# Patient Record
Sex: FEMALE | Race: BLACK OR AFRICAN AMERICAN | Employment: OTHER | URBAN - METROPOLITAN AREA
[De-identification: names, ages, dates, MRNs, and addresses within clinical notes are randomized per-mention and may not be internally consistent; named-entity substitution may affect disease eponyms.]

---

## 2017-01-25 ENCOUNTER — OFFICE VISIT (OUTPATIENT)
Dept: PULMONOLOGY | Age: 77
End: 2017-01-25

## 2017-01-25 VITALS
DIASTOLIC BLOOD PRESSURE: 80 MMHG | WEIGHT: 158 LBS | HEIGHT: 62 IN | TEMPERATURE: 98.3 F | HEART RATE: 78 BPM | BODY MASS INDEX: 29.08 KG/M2 | SYSTOLIC BLOOD PRESSURE: 130 MMHG | RESPIRATION RATE: 20 BRPM | OXYGEN SATURATION: 97 %

## 2017-01-25 DIAGNOSIS — I27.20 PULMONARY HYPERTENSION (HCC): ICD-10-CM

## 2017-01-25 DIAGNOSIS — R91.8 PULMONARY INFILTRATES: ICD-10-CM

## 2017-01-25 DIAGNOSIS — R05.9 COUGH: Primary | ICD-10-CM

## 2017-01-25 NOTE — PATIENT INSTRUCTIONS
Submit a good fresh sputum specimen to the hospital  Obtain lung scan before next visit  Obtain overnight oxygen study before next visit and bring equipment promptly back to the office  Call for symptoms such as worsening shortness of breath or cough productive of discolored mucus or unexplained fever

## 2017-01-25 NOTE — LETTER
Request for Examination Exam Date: 2017 Patient's Name:  Rosie Hauser SS#:  [id number] :  1940 Pregnant: NO Address:  [address] [place] [id number] Phone#:  [phone] (home) Ordering Physician: Teja Castro. Can Lindquist MD 
 
Technician: Briseida Mai LPN Insurance Information: See Attached Exams Ordered: CXR: PA & LAT Clinical Data/ Brief History: Cough R05 Preliminary Exam Report: 
 
 
 
 
 
 
 
 
___________________________  __________________ ___________ Radiologist                  Date         Time

## 2017-01-25 NOTE — PROGRESS NOTES
Chief Complaint   Patient presents with    Cough     Patient c/o cough since she was discharged from hospital in Aug. 2016. Cough has gotten worse since Nov. 2016 per patient. Startes at night continues thru night.

## 2017-01-25 NOTE — PROGRESS NOTES
[facility]  Pulmonary, Critical Care, and Sleep Medicine      Chief complaint:  cough    HPI:  Roberta Hutson is 77years old and returns to the office today for followup concerning a cough. She says she has been coughing or bringing up frothy and clear mucus without blood or purulence. She denies chest pain but has significant shortness of breath which is relatively stable but very noticeable when she does any type of activity. The patient is not aware if she stops breathing at night but she uses a sleeping pill and says she does not nap frequently in the daytime but her  says she does. Allergies   Allergen Reactions    Latex Itching    Asmanex Hfa [Mometasone] Itching    Cleocin [Clindamycin Hcl] Itching and Swelling     Lips and tongue    Pcn [Penicillins] Swelling    Robitussin [Guaifenesin] Rash     Current Outpatient Prescriptions   Medication Sig    HYDROcodone-acetaminophen (NORCO) 5-325 mg per tablet Take 1 Tab by mouth two (2) times a day. Max Daily Amount: 2 Tabs.  albuterol-ipratropium (DUO-NEB) 2.5 mg-0.5 mg/3 ml nebu 3 mL by Nebulization route three (3) times daily.  zolpidem (AMBIEN) 10 mg tablet Take 1 Tab by mouth nightly as needed for Sleep. Max Daily Amount: 10 mg.    furosemide (LASIX) 40 mg tablet Take 1 Tab by mouth daily. Indications: EDEMA    omeprazole (PRILOSEC) 20 mg capsule Take 20 mg by mouth daily.  VITAMIN B COMPLEX (SUPER B COMPLEX-B-12 PO) Take  by mouth.  Cetirizine (ZYRTEC) 10 mg cap Take  by mouth daily as needed.  naproxen sodium (ALEVE) 220 mg cap Take  by mouth as needed.  albuterol (PROVENTIL HFA, VENTOLIN HFA) 90 mcg/actuation inhaler Take 2 Puffs by inhalation.  hydroxychloroquine (PLAQUENIL) 200 mg tablet Take 200 mg by mouth daily.  amLODIPine (NORVASC) 5 mg tablet Take 1 Tab by mouth daily.  Indications: HYPERTENSION    ondansetron (ZOFRAN ODT) 4 mg disintegrating tablet 1 Tab by SubLINGual route every eight (8) hours as needed. Indications: nausea    TRIAMCINOLONE ACETONIDE (NASACORT NA) by Nasal route.  mometasone-formoterol (DULERA) 100-5 mcg/actuation HFA inhaler Take 2 Puffs by inhalation two (2) times a day.  CALCIUM CARBONATE (SCOOTER-SELTZER ANTACID PO) Take  by mouth.  5-HYDROXYTRYPTOPHAN (5-HTP PO) Take  by mouth daily.  OTHER Focus formula supplement    omega-3 fatty acids (FISH OIL) cap Take 1,500 mg by mouth.  potassium chloride (KLOR-CON M20) 20 mEq tablet Take 20 mEq by mouth two (2) times a day. No current facility-administered medications for this visit. Past Medical History   Diagnosis Date    Asthma     Chronic lung disease     Lupus ([facility]     Rheumatoid arthritis(714.0)      History reviewed. No pertinent past surgical history. Social History     Social History    Marital status: UNKNOWN     Spouse name: N/A    Number of children: N/A    Years of education: N/A     Occupational History    Not on file.      Social History Main Topics    Smoking status: Never Smoker    Smokeless tobacco: Never Used    Alcohol use No    Drug use: Not on file    Sexual activity: Not on file     Other Topics Concern    Not on file     Social History Narrative     Family History   Problem Relation Age of Onset    Heart Disease Mother     Asthma Mother     Asthma Father     Parkinson's Disease Father        Review of systems:  She denies fever or chills night sweats but has a poor appetite and has had some weight loss    Physical Exam:  Visit Vitals    /80 (BP 1 Location: Left arm, BP Patient Position: Sitting)    Pulse 78    Temp 98.3 °F (36.8 °C) (Oral)    Resp 20    Ht 5' 2\" (1.575 m)    Wt 71.7 kg (158 lb)    SpO2 97%    BMI 28.9 kg/m2       Well-developed well-nourished elderly  HEENT: WNL  Lymph node exam: Supraclavicular and cervical lymph nodes negative  Chest: Equal symmetrical expansion no excessive muscle use is no dullness no wheezes rales rubs  Heart: Regular rhythm no gallop or murmur no JVD no peripheral edema  Extremities: No cyanosis clubbing or calf tenderness  Neurological: Alert and oriented  Skin: No rashes but there is vitiligo  Musculoskeletal: No acute joint inflammation of muscle wasting    LABS:  O2 sat 97% room air at rest  Chest x-ray 1/25/17: Personally reviewed bilateral fibrotic infiltrates similar to chest x-ray July 2016    Impression:   Pulmonary fibrosis and upper lung zones but this is more characteristic of a chronic infection which may be stable  Cough clear mucus in the cause of this is not clear but does not appear to be due to a bacterial infection without fever. Pulmonary hypertension most likely associated with connective tissue disorder in a patient with an elevated MIGUEL ANGEL titer but other causes should be ruled out such as CETPH and TERESA    Plan:  Overnight oxygen study  VQ lung scan looking for evidence of CETPH  Recheck H&H concerning symptoms of shortness of breath in a patient with known anemia  SC  L- 70, aldolase to more fully evaluate connective tissue disorder  Sputum for AFB and fungus  Followup in 3-4 weeks for consideration for further evaluation with right heart catheter and treatment for pulmonary hypertension    Melyssa Cabrera MD , [facility]    CC: Andrews Davis MD     [address] [facility]  [place], [address] [address]     P: [phone]     F: [phone]

## 2017-01-31 ENCOUNTER — TELEPHONE (OUTPATIENT)
Dept: PULMONOLOGY | Age: 77
End: 2017-01-31

## 2017-01-31 DIAGNOSIS — R05.9 COUGH: Primary | ICD-10-CM

## 2017-01-31 DIAGNOSIS — R91.8 PULMONARY INFILTRATES: ICD-10-CM

## 2017-01-31 NOTE — TELEPHONE ENCOUNTER
Patient wants to know if she can come  sputum cups today. Told her yes she can. Clarified that patient knows how to produce a sputum sample. Went over instructions with her.      Raphael Cameron LPN

## 2017-01-31 NOTE — TELEPHONE ENCOUNTER
PT CALLED([phone]). WANTS TO TALK TO NURSE REGARDING SPUTUM TESTING. PLEASE CHECK WITH EJF AND CALL BACK.

## 2017-02-06 ENCOUNTER — HOSPITAL ENCOUNTER (OUTPATIENT)
Dept: LAB | Age: 77
Discharge: HOME | End: 2017-02-06
Payer: MEDICARE

## 2017-02-06 DIAGNOSIS — I27.20 PULMONARY HYPERTENSION (HCC): ICD-10-CM

## 2017-02-06 DIAGNOSIS — R05.9 COUGH: ICD-10-CM

## 2017-02-06 DIAGNOSIS — R91.8 PULMONARY INFILTRATES: ICD-10-CM

## 2017-02-06 LAB
BASOPHILS # BLD AUTO: 0 K/UL (ref 0–0.1)
BASOPHILS # BLD: 1 % (ref 0–2)
DIFFERENTIAL METHOD BLD: ABNORMAL
EOSINOPHIL # BLD: 0.9 K/UL (ref 0–0.4)
EOSINOPHIL NFR BLD: 14 % (ref 0–5)
ERYTHROCYTE [DISTWIDTH] IN BLOOD BY AUTOMATED COUNT: 15.6 % (ref 11.6–14.5)
HCT VFR BLD AUTO: 32.3 % (ref 35–45)
HGB BLD-MCNC: 9.9 G/DL (ref 12–16)
LYMPHOCYTES # BLD AUTO: 19 % (ref 21–52)
LYMPHOCYTES # BLD: 1.2 K/UL (ref 0.9–3.6)
MCH RBC QN AUTO: 23.3 PG (ref 24–34)
MCHC RBC AUTO-ENTMCNC: 30.7 G/DL (ref 31–37)
MCV RBC AUTO: 76.2 FL (ref 74–97)
MONOCYTES # BLD: 0.5 K/UL (ref 0.05–1.2)
MONOCYTES NFR BLD AUTO: 8 % (ref 3–10)
NEUTS SEG # BLD: 3.9 K/UL (ref 1.8–8)
NEUTS SEG NFR BLD AUTO: 58 % (ref 40–73)
PLATELET # BLD AUTO: 217 K/UL (ref 135–420)
PMV BLD AUTO: 10.7 FL (ref 9.2–11.8)
RBC # BLD AUTO: 4.24 M/UL (ref 4.2–5.3)
WBC # BLD AUTO: 6.5 K/UL (ref 4.6–13.2)

## 2017-02-06 PROCEDURE — 87116 MYCOBACTERIA CULTURE: CPT | Performed by: INTERNAL MEDICINE

## 2017-02-06 PROCEDURE — 82085 ASSAY OF ALDOLASE: CPT | Performed by: INTERNAL MEDICINE

## 2017-02-06 PROCEDURE — 36415 COLL VENOUS BLD VENIPUNCTURE: CPT | Performed by: INTERNAL MEDICINE

## 2017-02-06 PROCEDURE — 86235 NUCLEAR ANTIGEN ANTIBODY: CPT | Performed by: INTERNAL MEDICINE

## 2017-02-06 PROCEDURE — 87102 FUNGUS ISOLATION CULTURE: CPT | Performed by: INTERNAL MEDICINE

## 2017-02-06 PROCEDURE — 85025 COMPLETE CBC W/AUTO DIFF WBC: CPT | Performed by: INTERNAL MEDICINE

## 2017-02-07 LAB
ALDOLASE SERPL-CCNC: 7.3 U/L (ref 3.3–10.3)
ENA SCL70 AB SER-ACNC: <0.2 AI (ref 0–0.9)

## 2017-02-16 ENCOUNTER — TELEPHONE (OUTPATIENT)
Dept: PULMONOLOGY | Age: 77
End: 2017-02-16

## 2017-02-16 NOTE — TELEPHONE ENCOUNTER
Pt is wanting to know if we have gotten sputum results and labs she had done last week. She states she is coughing so bad it hurts and making her vomit at times. Please call [phone].

## 2017-02-16 NOTE — TELEPHONE ENCOUNTER
The pt's results are reviewed as negative. She is instructed to call her PCP for a sick visit or go to the ED or Urgent Care. We have no openings and Dr. Valery Bunch is not available to treat her symptoms of persistent cough.

## 2017-03-13 LAB
BACTERIA SPEC CULT: NORMAL
FUNGUS SMEAR,FNGSMR: NORMAL
SERVICE CMNT-IMP: NORMAL

## 2017-03-23 LAB
ACID FAST STN SPEC: NEGATIVE
MYCOBACTERIUM SPEC QL CULT: NEGATIVE
SPECIMEN PREPARATION: NORMAL
SPECIMEN SOURCE: NORMAL

## 2017-04-03 ENCOUNTER — OFFICE VISIT (OUTPATIENT)
Dept: PULMONOLOGY | Age: 77
End: 2017-04-03

## 2017-04-03 VITALS
DIASTOLIC BLOOD PRESSURE: 80 MMHG | HEART RATE: 78 BPM | BODY MASS INDEX: 27.97 KG/M2 | OXYGEN SATURATION: 96 % | HEIGHT: 62 IN | WEIGHT: 152 LBS | RESPIRATION RATE: 20 BRPM | TEMPERATURE: 98.2 F | SYSTOLIC BLOOD PRESSURE: 134 MMHG

## 2017-04-03 DIAGNOSIS — J84.10 PULMONARY FIBROSIS (HCC): ICD-10-CM

## 2017-04-03 DIAGNOSIS — K76.6 PAH (PULMONARY ARTERIAL HYPERTENSION) WITH PORTAL HYPERTENSION (HCC): ICD-10-CM

## 2017-04-03 DIAGNOSIS — I27.21 PAH (PULMONARY ARTERIAL HYPERTENSION) WITH PORTAL HYPERTENSION (HCC): ICD-10-CM

## 2017-04-03 DIAGNOSIS — R91.8 PULMONARY INFILTRATES: Primary | ICD-10-CM

## 2017-04-03 RX ORDER — BUDESONIDE 0.5 MG/2ML
500 INHALANT ORAL
COMMUNITY
End: 2017-04-03

## 2017-04-03 RX ORDER — PREDNISONE 20 MG/1
TABLET ORAL
Qty: 14 TAB | Refills: 0 | Status: SHIPPED | OUTPATIENT
Start: 2017-04-03 | End: 2017-05-05

## 2017-04-03 RX ORDER — BUDESONIDE 90 UG/1
AEROSOL, POWDER RESPIRATORY (INHALATION)
COMMUNITY
Start: 2017-03-30 | End: 2017-05-06 | Stop reason: ALTCHOICE

## 2017-04-03 NOTE — PATIENT INSTRUCTIONS
Continue Pulmicort 2 puffs twice a day  Albuterol 2 inhalations every 4 hours as needed and if you require albuterol too often to control respiratory symptoms call the office for severe symptoms go to the emergency room  Always call for symptoms such as increasing shortness of breath or cough productive of discolored mucus  Prednisone 2 tablets every morning for 7 days  Obtain echocardiogram before next visit

## 2017-04-03 NOTE — MR AVS SNAPSHOT
Visit Information Date & Time Provider Department Dept. Phone Encounter #  
 4/3/2017  1:45 PM Patrick Ware MD [facility] [id number] Follow-up Instructions Return in about 5 weeks (around 5/8/2017). Upcoming Health Maintenance Date Due DTaP/Tdap/Td series (1 - Tdap) 5/15/1962 ZOSTER VACCINE AGE 60> 5/15/2001 GLAUCOMA SCREENING Q2Y 5/15/2006 Pneumococcal 65+ Low/Medium Risk (1 of 2 - PCV13) 5/15/2006 MEDICARE YEARLY EXAM 5/15/2006 INFLUENZA AGE 9 TO ADULT 8/1/2016 Allergies as of 4/3/2017  Review Complete On: 4/3/2017 By: Tha Caceres RN Severity Noted Reaction Type Reactions Latex  06/04/2014    Itching Asmanex Hfa [Mometasone]  07/31/2016    Itching Cleocin [Clindamycin Hcl]  06/04/2014    Itching, Swelling Lips and tongue Pcn [Penicillins]  06/04/2014    Swelling Robitussin [Guaifenesin]  07/31/2016    Rash Current Immunizations  Reviewed on 9/29/2016 No immunizations on file. Not reviewed this visit You Were Diagnosed With   
  
 Codes Comments Pulmonary infiltrates    -  Primary ICD-10-CM: R91.8 ICD-9-CM: 793.19 PAH (pulmonary arterial hypertension) with portal hypertension (HCC)     ICD-10-CM: I27.2, K76.6 ICD-9-CM: 416.8 [facility]     ICD-10-CM: J84.10 ICD-9-CM: 282 Vitals BP Pulse Temp Resp Height(growth percentile) Weight(growth percentile) 134/80 (BP 1 Location: Left arm, BP Patient Position: Sitting) 78 98.2 °F (36.8 °C) (Oral) 20 5' 2\" (1.575 m) 152 lb (68.9 kg) SpO2 BMI OB Status Smoking Status 96% 27.8 kg/m2 Menopause Never Smoker Vitals History BMI and BSA Data Body Mass Index Body Surface Area  
 27.8 kg/m 2 1.74 m 2 Your Updated Medication List  
  
   
This list is accurate as of: 4/3/17  3:22 PM.  Always use your most recent med list.  
  
  
  
  
 5-HTP PO  
 Take  by mouth daily. albuterol 90 mcg/actuation inhaler Commonly known as:  PROVENTIL HFA, VENTOLIN HFA, PROAIR HFA Take 2 Puffs by inhalation. albuterol-ipratropium 2.5 mg-0.5 mg/3 ml Nebu Commonly known as:  DUO-NEB  
3 mL by Nebulization route three (3) times daily. ALEVE 220 mg Cap Generic drug:  naproxen sodium Take  by mouth as needed. SCOOTER-SELTZER ANTACID PO Take  by mouth. amLODIPine 5 mg tablet Commonly known as:  Ita Coop Take 1 Tab by mouth daily. Indications: HYPERTENSION  
  
 furosemide 40 mg tablet Commonly known as:  LASIX Take 1 Tab by mouth daily. Indications: EDEMA HYDROcodone-acetaminophen 5-325 mg per tablet Commonly known as:  Ivelisse Staple Take 1 Tab by mouth two (2) times a day. Max Daily Amount: 2 Tabs. KLOR-CON M20 20 mEq tablet Generic drug:  potassium chloride Take 20 mEq by mouth two (2) times a day. NASACORT NA  
by Nasal route. omeprazole 20 mg capsule Commonly known as:  PRILOSEC Take 20 mg by mouth daily. ondansetron 4 mg disintegrating tablet Commonly known as:  ZOFRAN ODT  
1 Tab by SubLINGual route every eight (8) hours as needed. Indications: nausea PLAQUENIL 200 mg tablet Generic drug:  hydroxychloroquine Take 200 mg by mouth daily. predniSONE 20 mg tablet Commonly known as:  DELTASONE  
2 tablets every morning with breakfast for 7 days PULMICORT FLEXHALER 90 mcg/actuation Aepb inhaler Generic drug:  budesonide SUPER B COMPLEX-B-12 PO Take  by mouth.  
  
 zolpidem 10 mg tablet Commonly known as:  AMBIEN Take 1 Tab by mouth nightly as needed for Sleep. Max Daily Amount: 10 mg.  
  
 ZyrTEC 10 mg Cap Generic drug:  Cetirizine Take  by mouth daily as needed. Prescriptions Printed Refills  
 predniSONE (DELTASONE) 20 mg tablet 0 Si tablets every morning with breakfast for 7 days Class: Print Follow-up Instructions Return in about 5 weeks (around 5/8/2017). Patient Instructions Continue Pulmicort 2 puffs twice a day Albuterol 2 inhalations every 4 hours as needed and if you require albuterol too often to control respiratory symptoms call the office for severe symptoms go to the emergency room Always call for symptoms such as increasing shortness of breath or cough productive of discolored mucus Prednisone 2 tablets every morning for 7 days Obtain echocardiogram before next visit Please provide this summary of care documentation to your next provider. Your primary care clinician is listed as Alejandra Juárez. If you have any questions after today's visit, please call [phone].

## 2017-04-03 NOTE — PROGRESS NOTES
[facility]  Pulmonary, Critical Care, and Sleep Medicine      Chief complaint:  Pulmonary infiltrates possible asthma and pulmonary hypertension    HPI:  Macy Gaines is 77years old and returns to the office today for followup concerning wheezing which she says she's had for several weeks despite taking prednisone and her usual Pulmicort and albuterol at least several times a day. She reports a cough but says she is not aware of purulence or blood she denies chest pain or leg swelling  Allergies   Allergen Reactions    Latex Itching    Asmanex Hfa [Mometasone] Itching    Cleocin [Clindamycin Hcl] Itching and Swelling     Lips and tongue    Pcn [Penicillins] Swelling    Robitussin [Guaifenesin] Rash     Current Outpatient Prescriptions   Medication Sig    PULMICORT FLEXHALER 90 mcg/actuation aepb inhaler     HYDROcodone-acetaminophen (NORCO) 5-325 mg per tablet Take 1 Tab by mouth two (2) times a day. Max Daily Amount: 2 Tabs.  zolpidem (AMBIEN) 10 mg tablet Take 1 Tab by mouth nightly as needed for Sleep. Max Daily Amount: 10 mg.    furosemide (LASIX) 40 mg tablet Take 1 Tab by mouth daily. Indications: EDEMA    omeprazole (PRILOSEC) 20 mg capsule Take 20 mg by mouth daily.  CALCIUM CARBONATE (SCOOTER-SELTZER ANTACID PO) Take  by mouth.  VITAMIN B COMPLEX (SUPER B COMPLEX-B-12 PO) Take  by mouth.  Cetirizine (ZYRTEC) 10 mg cap Take  by mouth daily as needed.  naproxen sodium (ALEVE) 220 mg cap Take  by mouth as needed.  albuterol (PROVENTIL HFA, VENTOLIN HFA) 90 mcg/actuation inhaler Take 2 Puffs by inhalation.  hydroxychloroquine (PLAQUENIL) 200 mg tablet Take 200 mg by mouth daily.  potassium chloride (KLOR-CON M20) 20 mEq tablet Take 20 mEq by mouth two (2) times a day.  albuterol-ipratropium (DUO-NEB) 2.5 mg-0.5 mg/3 ml nebu 3 mL by Nebulization route three (3) times daily.  amLODIPine (NORVASC) 5 mg tablet Take 1 Tab by mouth daily.  Indications: HYPERTENSION    ondansetron (ZOFRAN ODT) 4 mg disintegrating tablet 1 Tab by SubLINGual route every eight (8) hours as needed. Indications: nausea    TRIAMCINOLONE ACETONIDE (NASACORT NA) by Nasal route.  5-HYDROXYTRYPTOPHAN (5-HTP PO) Take  by mouth daily. No current facility-administered medications for this visit. Past Medical History:   Diagnosis Date    Asthma     Chronic lung disease     [facility]upus ([facility]     Rheumatoid arthritis(714.0) ([facility]      No past surgical history on file. Social History     Social History    Marital status: UNKNOWN     Spouse name: N/A    Number of children: N/A    Years of education: N/A     Occupational History    Not on file.      Social History Main Topics    Smoking status: Never Smoker    Smokeless tobacco: Never Used    Alcohol use No    Drug use: Not on file    Sexual activity: Not on file     Other Topics Concern    Not on file     Social History Narrative     Family History   Problem Relation Age of Onset    Heart Disease Mother     Asthma Mother     Asthma Father     Parkinson's Disease Father        Review of systems:  She reports no fever or chills but her appetite is poor and she's been received by muscle aches and pains which he attributes to lupus    Physical Exam:  Visit Vitals    /80 (BP 1 Location: Left arm, BP Patient Position: Sitting)    Pulse 78    Temp 98.2 °F (36.8 °C) (Oral)    Resp 20    Ht 5' 2\" (1.575 m)    Wt 68.9 kg (152 lb)    SpO2 96%    BMI 27.8 kg/m2       Well-developed well-nourished  HEENT: WNL  Lymph node exam: Significant radicular cervical lymph nodes negative  Chest: Equal symmetrical expansion no dullness a few scattered expiratory wheezes  No rales no rubs  Heart: Regular rhythm no gallop or murmur no JVD no peripheral edema  Extremities: No cyanosis clubbing or calf tenderness  Neurological: Alert and oriented    LABS:    O2 sat 96% room air at rest    Impression:   Possible exacerbation of asthma with wheezing now but the patient's spirometry was normal in the past  Pulmonary hypertension which needs to be confirmed and evaluated further in the future    Plan:  Prednisone 40 mg every day for 7 days and continue Pulmicort and albuterol  Echocardiogram on return with spirometry  Followup in 4-6 weeks    Nahomy Murillo MD , [facility]    CC: Alejandra Juárez MD     [address] [facility]. [facility] [address] [address]     P: [phone]     F: [phone]

## 2017-04-18 DIAGNOSIS — I27.21 PAH (PULMONARY ARTERY HYPERTENSION) (HCC): Primary | ICD-10-CM

## 2017-04-18 NOTE — PROGRESS NOTES
Per Dr. Baldo BERNARD, with read back, order placed for patient to have 2D Echo prior to appointment in May.      Pacheco Barr LPN

## 2017-05-03 ENCOUNTER — HOSPITAL ENCOUNTER (OUTPATIENT)
Dept: NON INVASIVE DIAGNOSTICS | Age: 77
Discharge: HOME | End: 2017-05-03
Attending: INTERNAL MEDICINE
Payer: COMMERCIAL

## 2017-05-03 DIAGNOSIS — I27.21 PAH (PULMONARY ARTERY HYPERTENSION) (HCC): ICD-10-CM

## 2017-05-03 PROCEDURE — 93306 TTE W/DOPPLER COMPLETE: CPT

## 2017-05-05 ENCOUNTER — TELEPHONE (OUTPATIENT)
Dept: PULMONOLOGY | Age: 77
End: 2017-05-05

## 2017-05-05 ENCOUNTER — APPOINTMENT (OUTPATIENT)
Dept: GENERAL RADIOLOGY | Age: 77
End: 2017-05-05
Attending: PHYSICIAN ASSISTANT
Payer: COMMERCIAL

## 2017-05-05 ENCOUNTER — HOSPITAL ENCOUNTER (EMERGENCY)
Age: 77
Discharge: HOME | End: 2017-05-05
Attending: EMERGENCY MEDICINE
Payer: COMMERCIAL

## 2017-05-05 VITALS
HEART RATE: 87 BPM | SYSTOLIC BLOOD PRESSURE: 146 MMHG | TEMPERATURE: 98.5 F | RESPIRATION RATE: 36 BRPM | DIASTOLIC BLOOD PRESSURE: 73 MMHG | WEIGHT: 155 LBS | OXYGEN SATURATION: 99 % | BODY MASS INDEX: 27.46 KG/M2 | HEIGHT: 63 IN

## 2017-05-05 DIAGNOSIS — J45.901 ASTHMA WITH ACUTE EXACERBATION, UNSPECIFIED ASTHMA SEVERITY: Primary | ICD-10-CM

## 2017-05-05 DIAGNOSIS — I27.20 PULMONARY HTN (HCC): ICD-10-CM

## 2017-05-05 LAB
ALBUMIN SERPL BCP-MCNC: 3.3 G/DL (ref 3.4–5)
ALBUMIN/GLOB SERPL: 0.8 {RATIO} (ref 0.8–1.7)
ALP SERPL-CCNC: 123 U/L (ref 45–117)
ALT SERPL-CCNC: 19 U/L (ref 13–56)
ANION GAP BLD CALC-SCNC: 4 MMOL/L (ref 3–18)
AST SERPL W P-5'-P-CCNC: 20 U/L (ref 15–37)
BASOPHILS # BLD AUTO: 0 K/UL (ref 0–0.1)
BASOPHILS # BLD: 0 % (ref 0–2)
BILIRUB SERPL-MCNC: 0.6 MG/DL (ref 0.2–1)
BNP SERPL-MCNC: 1061 PG/ML (ref 0–1800)
BUN SERPL-MCNC: 10 MG/DL (ref 7–18)
BUN/CREAT SERPL: 15 (ref 12–20)
CALCIUM SERPL-MCNC: 8.4 MG/DL (ref 8.5–10.1)
CHLORIDE SERPL-SCNC: 108 MMOL/L (ref 100–108)
CK MB CFR SERPL CALC: NORMAL % (ref 0–4)
CK MB SERPL-MCNC: <1 NG/ML (ref 5–25)
CK SERPL-CCNC: 81 U/L (ref 26–192)
CO2 SERPL-SCNC: 31 MMOL/L (ref 21–32)
CREAT SERPL-MCNC: 0.68 MG/DL (ref 0.6–1.3)
DIFFERENTIAL METHOD BLD: ABNORMAL
EOSINOPHIL # BLD: 0.5 K/UL (ref 0–0.4)
EOSINOPHIL NFR BLD: 8 % (ref 0–5)
ERYTHROCYTE [DISTWIDTH] IN BLOOD BY AUTOMATED COUNT: 16 % (ref 11.6–14.5)
GLOBULIN SER CALC-MCNC: 4.3 G/DL (ref 2–4)
GLUCOSE SERPL-MCNC: 107 MG/DL (ref 74–99)
HCT VFR BLD AUTO: 30.2 % (ref 35–45)
HGB BLD-MCNC: 9.3 G/DL (ref 12–16)
LYMPHOCYTES # BLD AUTO: 13 % (ref 21–52)
LYMPHOCYTES # BLD: 0.7 K/UL (ref 0.9–3.6)
MAGNESIUM SERPL-MCNC: 2.4 MG/DL (ref 1.6–2.6)
MCH RBC QN AUTO: 22.9 PG (ref 24–34)
MCHC RBC AUTO-ENTMCNC: 30.8 G/DL (ref 31–37)
MCV RBC AUTO: 74.2 FL (ref 74–97)
MONOCYTES # BLD: 0.5 K/UL (ref 0.05–1.2)
MONOCYTES NFR BLD AUTO: 8 % (ref 3–10)
NEUTS SEG # BLD: 4 K/UL (ref 1.8–8)
NEUTS SEG NFR BLD AUTO: 71 % (ref 40–73)
PLATELET # BLD AUTO: 181 K/UL (ref 135–420)
PLATELET COMMENTS,PCOM: ABNORMAL
PMV BLD AUTO: 10.6 FL (ref 9.2–11.8)
POTASSIUM SERPL-SCNC: 3.2 MMOL/L (ref 3.5–5.5)
PROT SERPL-MCNC: 7.6 G/DL (ref 6.4–8.2)
RBC # BLD AUTO: 4.07 M/UL (ref 4.2–5.3)
RBC MORPH BLD: ABNORMAL
RBC MORPH BLD: ABNORMAL
SODIUM SERPL-SCNC: 143 MMOL/L (ref 136–145)
TROPONIN I SERPL-MCNC: <0.02 NG/ML (ref 0–0.04)
WBC # BLD AUTO: 5.7 K/UL (ref 4.6–13.2)

## 2017-05-05 PROCEDURE — 85025 COMPLETE CBC W/AUTO DIFF WBC: CPT

## 2017-05-05 PROCEDURE — 93005 ELECTROCARDIOGRAM TRACING: CPT

## 2017-05-05 PROCEDURE — 74011250636 HC RX REV CODE- 250/636: Performed by: EMERGENCY MEDICINE

## 2017-05-05 PROCEDURE — 74011000250 HC RX REV CODE- 250: Performed by: EMERGENCY MEDICINE

## 2017-05-05 PROCEDURE — 80053 COMPREHEN METABOLIC PANEL: CPT

## 2017-05-05 PROCEDURE — 96374 THER/PROPH/DIAG INJ IV PUSH: CPT

## 2017-05-05 PROCEDURE — 94640 AIRWAY INHALATION TREATMENT: CPT

## 2017-05-05 PROCEDURE — 83880 ASSAY OF NATRIURETIC PEPTIDE: CPT

## 2017-05-05 PROCEDURE — 74011250637 HC RX REV CODE- 250/637: Performed by: EMERGENCY MEDICINE

## 2017-05-05 PROCEDURE — 83735 ASSAY OF MAGNESIUM: CPT | Performed by: EMERGENCY MEDICINE

## 2017-05-05 PROCEDURE — 74011636637 HC RX REV CODE- 636/637: Performed by: EMERGENCY MEDICINE

## 2017-05-05 PROCEDURE — 99284 EMERGENCY DEPT VISIT MOD MDM: CPT

## 2017-05-05 PROCEDURE — 77030013140 HC MSK NEB VYRM -A

## 2017-05-05 PROCEDURE — 82550 ASSAY OF CK (CPK): CPT

## 2017-05-05 PROCEDURE — 71010 XR CHEST PORT: CPT

## 2017-05-05 RX ORDER — ALBUTEROL SULFATE 0.83 MG/ML
2.5 SOLUTION RESPIRATORY (INHALATION)
Qty: 1 PACKAGE | Refills: 0 | Status: SHIPPED | OUTPATIENT
Start: 2017-05-05 | End: 2017-05-06 | Stop reason: ALTCHOICE

## 2017-05-05 RX ORDER — PREDNISONE 20 MG/1
60 TABLET ORAL
Status: COMPLETED | OUTPATIENT
Start: 2017-05-05 | End: 2017-05-05

## 2017-05-05 RX ORDER — ALBUTEROL SULFATE 90 UG/1
2 AEROSOL, METERED RESPIRATORY (INHALATION)
Status: COMPLETED | OUTPATIENT
Start: 2017-05-05 | End: 2017-05-05

## 2017-05-05 RX ORDER — IPRATROPIUM BROMIDE AND ALBUTEROL SULFATE 2.5; .5 MG/3ML; MG/3ML
3 SOLUTION RESPIRATORY (INHALATION)
Status: COMPLETED | OUTPATIENT
Start: 2017-05-05 | End: 2017-05-05

## 2017-05-05 RX ORDER — PREDNISONE 50 MG/1
50 TABLET ORAL DAILY
Qty: 4 TAB | Refills: 0 | Status: SHIPPED | OUTPATIENT
Start: 2017-05-05 | End: 2017-05-06 | Stop reason: ALTCHOICE

## 2017-05-05 RX ADMIN — IPRATROPIUM BROMIDE AND ALBUTEROL SULFATE 3 ML: .5; 3 SOLUTION RESPIRATORY (INHALATION) at 19:04

## 2017-05-05 RX ADMIN — IPRATROPIUM BROMIDE AND ALBUTEROL SULFATE 3 ML: .5; 3 SOLUTION RESPIRATORY (INHALATION) at 18:03

## 2017-05-05 RX ADMIN — PREDNISONE 60 MG: 20 TABLET ORAL at 19:04

## 2017-05-05 RX ADMIN — ALBUTEROL SULFATE 2 PUFF: 90 AEROSOL, METERED RESPIRATORY (INHALATION) at 19:04

## 2017-05-05 RX ADMIN — METHYLPREDNISOLONE SODIUM SUCCINATE 125 MG: 125 INJECTION, POWDER, FOR SOLUTION INTRAMUSCULAR; INTRAVENOUS at 18:03

## 2017-05-05 NOTE — DISCHARGE INSTRUCTIONS
Asthma Action Plan: After Your Visit  Your Care Instructions  An asthma action plan is based on peak flow and asthma symptoms. Sorting symptoms and peak flow into red, yellow, and green \"zones\" can help you know how bad your asthma is and what actions you should take. Work with your doctor to make your plan. An action plan may include:  · The peak flow readings and symptoms for each zone. · What medicines to take in each zone. · When to call a doctor. · A list of emergency contact numbers. · A list of your asthma triggers. Follow-up care is a key part of your treatment and safety. Be sure to make and go to all appointments, and call your doctor if you are having problems. It's also a good idea to know your test results and keep a list of the medicines you take. How can you care for yourself at home? · Take your daily medicines to help minimize long-term damage and avoid asthma attacks. · Check your peak flow every morning and evening. This is the best way to know how well your lungs are working. · Check your action plan to see what zone you are in.  ¨ If you are in the green zone, keep taking your daily asthma medicines as prescribed. ¨ If you are in the yellow zone, you may be having or will soon have an asthma attack. You may not have any symptoms, but your lungs are not working as well as they should. Take the medicines listed in your action plan. If you stay in the yellow zone, your doctor may need to increase the dose or add a medicine. ¨ If you are in the red zone, follow your action plan. If your symptoms or peak flow don't improve soon, you may need to go to the emergency room or be admitted to the hospital.  · Use an asthma diary. Write down your peak flow readings in the asthma diary. If you have an attack, write down what caused it (if you know), the symptoms, and what medicine you took.   · Make sure you know how and when to call your doctor or go to the hospital.  · Take both the asthma action plan and the asthma diary--along with your peak flow meter and medicines--when you see your doctor. Tell your doctor if you are having trouble following your action plan. When should you call for help? Call 911 anytime you think you may need emergency care. For example, call if:  · You have severe trouble breathing. Call your doctor now or seek immediate medical care if:  · Your symptoms do not get better after you have followed your asthma action plan. · You cough up yellow, dark brown, or bloody mucus (sputum). Watch closely for changes in your health, and be sure to contact your doctor if:  · Your coughing and wheezing get worse. · You need to use quick-relief medicine on more than 2 days a week (unless it is just for exercise). · You need help figuring out what is triggering your asthma attacks. Where can you learn more? Go to BitPass[employer]be  Enter B511 in the search box to learn more about \"Asthma Action Plan: After Your Visit. \"   © 4038-2338 [employer]. Care instructions adapted under license by [employer] (which disclaims liability or warranty for this information). This care instruction is for use with your licensed healthcare professional. If you have questions about a medical condition or this instruction, always ask your healthcare professional. [address] any warranty or liability for your use of this information. Content Version: 26.0.202788; Last Revised: March 9, 2012                   Pulmonary Hypertension: Care Instructions  Your Care Instructions  Pulmonary hypertension is high blood pressure in the arteries of your lungs. These blood vessels carry blood from the heart to the lungs, where the blood picks up oxygen. The walls of the arteries may get thick, and the arteries may get narrow. When this happens, blood does not flow as well as it should. Pressure builds up in the arteries.  Then your heart has to work harder to pump blood through your lungs. There are different types of pulmonary hypertension. They are caused by different things. Causes include other health conditions such as heart or lung problems. Sometimes it can happen without a known cause. When you have this condition, your body gets less oxygen from your blood. This causes symptoms such as shortness of breath and feeling tired, faint, or dizzy. Over time, these symptoms may change or get worse if your heart gets weaker. You may get heart failure. Heart failure means your heart doesn't pump as much blood as your body needs. Treatment can help you feel better and live longer. Your treatment options will depend on the type of pulmonary hypertension you have. It can be hard to learn that you have a problem with your lungs and heart. But there are things you can do to feel better and stay as active as you can. Follow-up care is a key part of your treatment and safety. Be sure to make and go to all appointments, and call your doctor if you are having problems. It's also a good idea to know your test results and keep a list of the medicines you take. How can you care for yourself at home? Medicine  · Be safe with medicines. Take your medicines exactly as prescribed. Call your doctor if you think you are having a problem with your medicine. You will get more details on the specific medicines your doctor prescribes. · Your doctor may prescribe oxygen therapy. You will get more details on how to use it. · Talk to your doctor before you take any vitamins, over-the-counter medicine, or herbal products. Don't take ibuprofen (Advil or Motrin) and naproxen (Aleve) without talking to your doctor first.  · If you take a blood thinner, be sure to get instructions about how to take your medicine safely. Blood thinners can cause serious bleeding problems. Activity  · Be as active as you can. Talk to your doctor about making a plan before you start a new activity.   · Ask your doctor if a pulmonary rehabilitation program is right for you. · Learn how to save your energy. Making small changes in daily activities can make a big impact on how you feel. Staying healthy  · Eat healthy foods, and try to stay at a healthy weight. · Do not smoke. Smoking can make this condition worse. If you need help quitting, talk to your doctor about stop-smoking programs and medicines. These can increase your chances of quitting for good. · Talk to your doctor about preventing pregnancy. You may need to take steps to avoid becoming pregnant. Pregnancy and childbirth can cause changes in the body that could be life-threatening for women who have this condition. · Avoid colds and flu. Get a pneumococcal vaccine shot. If you have had one before, ask your doctor if you need another dose. Get a flu vaccine every year. If you must be around people with colds or flu, wash your hands often. When should you call for help? Call 911 anytime you think you may need emergency care. For example, call if:  · You have symptoms of sudden heart failure. These may include:  ¨ Severe trouble breathing. ¨ A fast or irregular heartbeat. ¨ Coughing up pink, foamy mucus. ¨ Passing out. Call your doctor now or seek immediate medical care if:  · You have new or changed symptoms of heart failure, such as:  ¨ New or increased shortness of breath. ¨ New or worse swelling in your legs, ankles, or feet. ¨ Sudden weight gain, such as 3 pounds or more in 2 to 3 days. (Your doctor may suggest a different range of weight gain.)  ¨ Feeling dizzy or lightheaded or like you may faint. ¨ Feeling so tired or weak that you cannot do your usual activities. ¨ Not sleeping well. Shortness of breath wakes you at night. You need extra pillows to prop yourself up to breathe easier. Watch closely for changes in your health, and be sure to contact your doctor if:  · You have new or worse symptoms. Where can you learn more?   Go to http://brian-peyman.info/. Enter G995 in the search box to learn more about \"Pulmonary Hypertension: Care Instructions. \"  Current as of: January 27, 2016  Content Version: 11.2  © 4701-3849 [employer]. Care instructions adapted under license by [employer] (which disclaims liability or warranty for this information). If you have questions about a medical condition or this instruction, always ask your healthcare professional. [place] [address] any warranty or liability for your use of this information.

## 2017-05-05 NOTE — TELEPHONE ENCOUNTER
Received page from answering service by Zack Apodaca [phone] and left VM for call back. Also called pt Devendra Mitchell, phone kept on ringing and I could not leave a message.

## 2017-05-05 NOTE — ED NOTES
I performed a brief evaluation, including history and physical, of the patient here in triage and I have determined that pt will need further treatment and evaluation from the main side ER physician. I have placed initial orders to help in expediting patients care.      May 05, 2017 at 4:46 PM - Aimee De Leon PA-C        Visit Vitals    /82 (BP 1 Location: Left arm, BP Patient Position: Sitting)    Pulse 89    Temp 98.5 °F (36.9 °C)    Resp (!) 36    Ht 5' 3\" (1.6 m)    Wt 70.3 kg (155 lb)    SpO2 (!) 86%    BMI 27.46 kg/m2

## 2017-05-05 NOTE — ED PROVIDER NOTES
HPI Comments: Pt with history of asthma, pulmonary HTN, lupus and RA presents to ED due to urging by her children due to persistent symptoms of cough and wheezing \"since November\". She denies any acute changes to her symptoms today. She is followed by Dr. Teddy Giang and states she was doing well on pulmicort and albuterol \"but it seems to have stopped working a few months ago\". She denies any chest pain but \"there's a mild soreness but its not pain\". She denies any fevers or chills, no lower extremity or abdominal edema. She denies any history of CAD or CHF, no history of A fib or cardiac dysrhythmias, no history of liver or renal dysfunction, no known ill contacts. No other acute symptoms or complaints were noted. Pt did have an echo done yesterday, ordered by Dr. Teddy Giang and daughter notes that pt only has an MDI at home and she doesn't believe the medications are being effectively delivered without a nebulizer, \"she seemed to be doing better before when she had the vials of medicines\". Past Medical History:   Diagnosis Date    Asthma     Chronic lung disease     Lupus ([facility] [address]     Rheumatoid arthritis(714.0) ([facility] [address]        No past surgical history on file. Family History:   Problem Relation Age of Onset    Heart Disease Mother     Asthma Mother     Asthma Father     Parkinson's Disease Father        Social History     Social History    Marital status:      Spouse name: N/A    Number of children: N/A    Years of education: N/A     Occupational History    Not on file. Social History Main Topics    Smoking status: Never Smoker    Smokeless tobacco: Never Used    Alcohol use No    Drug use: Not on file    Sexual activity: Not on file     Other Topics Concern    Not on file     Social History Narrative         ALLERGIES: Latex; Asmanex hfa [mometasone];  Cleocin [clindamycin hcl]; Pcn [penicillins]; and Robitussin [guaifenesin]    Review of Systems   Constitutional: Negative for chills, diaphoresis, fatigue and fever. HENT: Negative. Eyes: Negative for visual disturbance. Respiratory: Positive for cough, chest tightness and wheezing. Negative for shortness of breath. Cardiovascular: Negative for chest pain, palpitations and leg swelling. Gastrointestinal: Negative for abdominal pain, diarrhea, nausea and vomiting. Endocrine: Negative. Genitourinary: Negative for dysuria and hematuria. Musculoskeletal: Negative. Negative for back pain, neck pain and neck stiffness. Skin: Negative for pallor. Allergic/Immunologic: Negative. Neurological: Negative for dizziness, syncope, light-headedness and headaches. Hematological: Does not bruise/bleed easily. Vitals:    05/05/17 1644   BP: 160/82   Pulse: 89   Resp: (!) 36   Temp: 98.5 °F (36.9 °C)   SpO2: (!) 86%   Weight: 70.3 kg (155 lb)   Height: 5' 3\" (1.6 m)            Physical Exam   Constitutional: She is oriented to person, place, and time. She appears well-developed and well-nourished. No distress. Resting comfortably on stretcher, speaking comfortably in full sentences. HENT:   Head: Normocephalic and atraumatic. MM moist   Eyes: Conjunctivae and EOM are normal. No scleral icterus. Sclera clear bilaterally   Neck: Normal range of motion. Neck supple. No JVD present. Non-tender to palpation   Cardiovascular: Normal rate, regular rhythm and normal heart sounds. Exam reveals no gallop and no friction rub. No murmur heard. Pulmonary/Chest: Effort normal. No respiratory distress. She has wheezes. She has no rales. She exhibits no tenderness. No crepitance with palpation. Bilateral expiratory wheeze without significant increase in expiratory phase time. No significant increase in WOB   Abdominal: Soft. She exhibits no distension. There is no tenderness. Genitourinary:   Genitourinary Comments: No CVA tenderness   Musculoskeletal: She exhibits no edema or tenderness.    Normal inspection of upper extremities. No edema noted to bilateral lower extremities   Lymphadenopathy:     She has no cervical adenopathy. Neurological: She is alert and oriented to person, place, and time. No cranial nerve deficit. She exhibits normal muscle tone. Normal motor and sensation bilaterally to upper and lower extremities   Skin: Skin is warm and dry. No rash noted. She is not diaphoretic. Psychiatric:   Normal mood and affect. Vitals reviewed. MDM  Number of Diagnoses or Management Options  Diagnosis management comments: Pt with long standing history of cough and wheezing with history of pulmonary HTN and asthma. No acute change to symptoms today. Will check labs and XR, give steroids and neb and reassess but anticipate discharge to home with PMD/pulmonology follow up. Echo done 5/3/17 showed EF of 55-60% with no regional wall motion abnormalities. Mild MR, mild-moderate TR and moderate NE, PAP of 50. No significant changes noted from prior echo. Reviewed results with pt and family. She notes that she is feeling better but still has some wheezing. She states that she doesn't have a nebulizer machine at home and doesn't think she ever did, \"they were supposed to get me one but I don't know what happened\". Will give spacer and home health consult to assist with COPD management. Pt and family in agreement with anticipated discharge plans.        Amount and/or Complexity of Data Reviewed  Clinical lab tests: ordered and reviewed  Tests in the radiology section of CPT®: ordered and reviewed  Tests in the medicine section of CPT®: ordered and reviewed  Decide to obtain previous medical records or to obtain history from someone other than the patient: yes  Obtain history from someone other than the patient: yes  Review and summarize past medical records: yes  Independent visualization of images, tracings, or specimens: yes    Risk of Complications, Morbidity, and/or Mortality  Presenting problems: moderate  Management options: moderate    Patient Progress  Patient progress: improved    ED Course       Procedures    EKG:  NSR, rate 82, LAD, RBBB, LAFB, no significant changes noted from 7/31/16.

## 2017-05-06 ENCOUNTER — APPOINTMENT (OUTPATIENT)
Dept: GENERAL RADIOLOGY | Age: 77
DRG: 189 | End: 2017-05-06
Attending: NURSE PRACTITIONER
Payer: MEDICARE

## 2017-05-06 ENCOUNTER — HOSPITAL ENCOUNTER (INPATIENT)
Age: 77
LOS: 9 days | Discharge: HOME HEALTH | DRG: 189 | End: 2017-05-15
Attending: EMERGENCY MEDICINE | Admitting: INTERNAL MEDICINE
Payer: MEDICARE

## 2017-05-06 DIAGNOSIS — R09.02 HYPOXIA: Primary | ICD-10-CM

## 2017-05-06 DIAGNOSIS — J45.51 SEVERE PERSISTENT ASTHMA WITH ACUTE EXACERBATION: ICD-10-CM

## 2017-05-06 PROBLEM — J44.1 COPD EXACERBATION (HCC): Status: ACTIVE | Noted: 2017-05-06

## 2017-05-06 LAB
ALBUMIN SERPL BCP-MCNC: 3.4 G/DL (ref 3.4–5)
ALBUMIN/GLOB SERPL: 0.8 {RATIO} (ref 0.8–1.7)
ALP SERPL-CCNC: 117 U/L (ref 45–117)
ALT SERPL-CCNC: 20 U/L (ref 13–56)
ANION GAP BLD CALC-SCNC: 6 MMOL/L (ref 3–18)
APPEARANCE UR: CLEAR
ARTERIAL PATENCY WRIST A: YES
AST SERPL W P-5'-P-CCNC: 25 U/L (ref 15–37)
BASE EXCESS BLD CALC-SCNC: 3 MMOL/L
BASOPHILS # BLD AUTO: 0 K/UL (ref 0–0.06)
BASOPHILS # BLD: 0 % (ref 0–2)
BDY SITE: ABNORMAL
BILIRUB SERPL-MCNC: 0.6 MG/DL (ref 0.2–1)
BILIRUB UR QL: NEGATIVE
BNP SERPL-MCNC: 1712 PG/ML (ref 0–1800)
BUN SERPL-MCNC: 16 MG/DL (ref 7–18)
BUN/CREAT SERPL: 25 (ref 12–20)
CALCIUM SERPL-MCNC: 8.5 MG/DL (ref 8.5–10.1)
CHLORIDE SERPL-SCNC: 108 MMOL/L (ref 100–108)
CK MB CFR SERPL CALC: 1.2 % (ref 0–4)
CK MB SERPL-MCNC: 1.3 NG/ML (ref 5–25)
CK SERPL-CCNC: 112 U/L (ref 26–192)
CO2 SERPL-SCNC: 28 MMOL/L (ref 21–32)
COLOR UR: YELLOW
CREAT SERPL-MCNC: 0.65 MG/DL (ref 0.6–1.3)
DIFFERENTIAL METHOD BLD: ABNORMAL
EOSINOPHIL # BLD: 0 K/UL (ref 0–0.4)
EOSINOPHIL NFR BLD: 0 % (ref 0–5)
ERYTHROCYTE [DISTWIDTH] IN BLOOD BY AUTOMATED COUNT: 16.1 % (ref 11.6–14.5)
GAS FLOW.O2 O2 DELIVERY SYS: ABNORMAL L/MIN
GLOBULIN SER CALC-MCNC: 4.3 G/DL (ref 2–4)
GLUCOSE SERPL-MCNC: 117 MG/DL (ref 74–99)
GLUCOSE UR STRIP.AUTO-MCNC: NEGATIVE MG/DL
HCO3 BLD-SCNC: 27.3 MMOL/L (ref 22–26)
HCT VFR BLD AUTO: 28.2 % (ref 35–45)
HGB BLD-MCNC: 8.6 G/DL (ref 12–16)
HGB UR QL STRIP: NEGATIVE
KETONES UR QL STRIP.AUTO: NEGATIVE MG/DL
LEUKOCYTE ESTERASE UR QL STRIP.AUTO: NEGATIVE
LYMPHOCYTES # BLD AUTO: 9 % (ref 21–52)
LYMPHOCYTES # BLD: 0.6 K/UL (ref 0.9–3.6)
MAGNESIUM SERPL-MCNC: 2.7 MG/DL (ref 1.6–2.6)
MCH RBC QN AUTO: 22.7 PG (ref 24–34)
MCHC RBC AUTO-ENTMCNC: 30.5 G/DL (ref 31–37)
MCV RBC AUTO: 74.4 FL (ref 74–97)
MONOCYTES # BLD: 0.4 K/UL (ref 0.05–1.2)
MONOCYTES NFR BLD AUTO: 6 % (ref 3–10)
NEUTS SEG # BLD: 5.4 K/UL (ref 1.8–8)
NEUTS SEG NFR BLD AUTO: 85 % (ref 40–73)
NITRITE UR QL STRIP.AUTO: NEGATIVE
O2/TOTAL GAS SETTING VFR VENT: 21 %
PCO2 BLD: 41.2 MMHG (ref 35–45)
PH BLD: 7.43 [PH] (ref 7.35–7.45)
PH UR STRIP: 6.5 [PH] (ref 5–8)
PLATELET # BLD AUTO: 168 K/UL (ref 135–420)
PLATELET COMMENTS,PCOM: ABNORMAL
PMV BLD AUTO: 10 FL (ref 9.2–11.8)
PO2 BLD: 54 MMHG (ref 80–100)
POTASSIUM SERPL-SCNC: 3.9 MMOL/L (ref 3.5–5.5)
PROT SERPL-MCNC: 7.7 G/DL (ref 6.4–8.2)
PROT UR STRIP-MCNC: NEGATIVE MG/DL
RBC # BLD AUTO: 3.79 M/UL (ref 4.2–5.3)
RBC MORPH BLD: ABNORMAL
SAO2 % BLD: 89 % (ref 92–97)
SERVICE CMNT-IMP: ABNORMAL
SODIUM SERPL-SCNC: 142 MMOL/L (ref 136–145)
SP GR UR REFRACTOMETRY: 1.01 (ref 1–1.03)
SPECIMEN TYPE: ABNORMAL
TOTAL RESP. RATE, ITRR: 20
TROPONIN I SERPL-MCNC: <0.02 NG/ML (ref 0–0.04)
UROBILINOGEN UR QL STRIP.AUTO: 0.2 EU/DL (ref 0.2–1)
WBC # BLD AUTO: 6.4 K/UL (ref 4.6–13.2)

## 2017-05-06 PROCEDURE — 74011000250 HC RX REV CODE- 250: Performed by: INTERNAL MEDICINE

## 2017-05-06 PROCEDURE — 74011250636 HC RX REV CODE- 250/636: Performed by: INTERNAL MEDICINE

## 2017-05-06 PROCEDURE — 74011636637 HC RX REV CODE- 636/637: Performed by: NURSE PRACTITIONER

## 2017-05-06 PROCEDURE — 99285 EMERGENCY DEPT VISIT HI MDM: CPT

## 2017-05-06 PROCEDURE — 71020 XR CHEST PA LAT: CPT

## 2017-05-06 PROCEDURE — 83735 ASSAY OF MAGNESIUM: CPT | Performed by: NURSE PRACTITIONER

## 2017-05-06 PROCEDURE — 82550 ASSAY OF CK (CPK): CPT | Performed by: NURSE PRACTITIONER

## 2017-05-06 PROCEDURE — 80053 COMPREHEN METABOLIC PANEL: CPT | Performed by: NURSE PRACTITIONER

## 2017-05-06 PROCEDURE — 77030013140 HC MSK NEB VYRM -A

## 2017-05-06 PROCEDURE — 83880 ASSAY OF NATRIURETIC PEPTIDE: CPT | Performed by: NURSE PRACTITIONER

## 2017-05-06 PROCEDURE — 87450 LEGIONELLA PNEUMOPHILA AG, URINE: CPT | Performed by: PHYSICIAN ASSISTANT

## 2017-05-06 PROCEDURE — 36600 WITHDRAWAL OF ARTERIAL BLOOD: CPT

## 2017-05-06 PROCEDURE — 87449 NOS EACH ORGANISM AG IA: CPT | Performed by: PHYSICIAN ASSISTANT

## 2017-05-06 PROCEDURE — 74011000250 HC RX REV CODE- 250: Performed by: NURSE PRACTITIONER

## 2017-05-06 PROCEDURE — 82803 BLOOD GASES ANY COMBINATION: CPT

## 2017-05-06 PROCEDURE — 65660000000 HC RM CCU STEPDOWN

## 2017-05-06 PROCEDURE — 94761 N-INVAS EAR/PLS OXIMETRY MLT: CPT

## 2017-05-06 PROCEDURE — 81003 URINALYSIS AUTO W/O SCOPE: CPT | Performed by: NURSE PRACTITIONER

## 2017-05-06 PROCEDURE — 93005 ELECTROCARDIOGRAM TRACING: CPT

## 2017-05-06 PROCEDURE — 94640 AIRWAY INHALATION TREATMENT: CPT

## 2017-05-06 PROCEDURE — 74011250637 HC RX REV CODE- 250/637: Performed by: INTERNAL MEDICINE

## 2017-05-06 PROCEDURE — 85025 COMPLETE CBC W/AUTO DIFF WBC: CPT | Performed by: NURSE PRACTITIONER

## 2017-05-06 RX ORDER — DIPHENHYDRAMINE HCL 25 MG
25 TABLET ORAL
COMMUNITY
End: 2017-05-15

## 2017-05-06 RX ORDER — CETIRIZINE HCL 10 MG
10 TABLET ORAL DAILY
COMMUNITY
End: 2017-05-15

## 2017-05-06 RX ORDER — ASPIRIN 650 MG
650 TABLET, DELAYED RELEASE (ENTERIC COATED) ORAL
COMMUNITY
End: 2017-05-15

## 2017-05-06 RX ORDER — NAPROXEN SODIUM 220 MG
220 TABLET ORAL 2 TIMES DAILY WITH MEALS
Status: DISCONTINUED | OUTPATIENT
Start: 2017-05-06 | End: 2017-05-06 | Stop reason: CLARIF

## 2017-05-06 RX ORDER — IPRATROPIUM BROMIDE AND ALBUTEROL SULFATE 2.5; .5 MG/3ML; MG/3ML
3 SOLUTION RESPIRATORY (INHALATION)
Status: COMPLETED | OUTPATIENT
Start: 2017-05-06 | End: 2017-05-06

## 2017-05-06 RX ORDER — LEVOFLOXACIN 5 MG/ML
750 INJECTION, SOLUTION INTRAVENOUS EVERY 24 HOURS
Status: COMPLETED | OUTPATIENT
Start: 2017-05-06 | End: 2017-05-10

## 2017-05-06 RX ORDER — SODIUM CHLORIDE 0.9 % (FLUSH) 0.9 %
5-10 SYRINGE (ML) INJECTION EVERY 8 HOURS
Status: DISCONTINUED | OUTPATIENT
Start: 2017-05-06 | End: 2017-05-15 | Stop reason: HOSPADM

## 2017-05-06 RX ORDER — PREDNISONE 20 MG/1
60 TABLET ORAL
Status: COMPLETED | OUTPATIENT
Start: 2017-05-06 | End: 2017-05-06

## 2017-05-06 RX ORDER — TRIAMCINOLONE ACETONIDE 55 UG/1
2 SPRAY, METERED NASAL
COMMUNITY
End: 2017-05-15

## 2017-05-06 RX ORDER — POTASSIUM CHLORIDE 20 MEQ/1
20 TABLET, EXTENDED RELEASE ORAL 2 TIMES DAILY
Status: DISCONTINUED | OUTPATIENT
Start: 2017-05-06 | End: 2017-05-15 | Stop reason: HOSPADM

## 2017-05-06 RX ORDER — BUDESONIDE AND FORMOTEROL FUMARATE DIHYDRATE 160; 4.5 UG/1; UG/1
2 AEROSOL RESPIRATORY (INHALATION)
Status: DISCONTINUED | OUTPATIENT
Start: 2017-05-06 | End: 2017-05-15 | Stop reason: HOSPADM

## 2017-05-06 RX ORDER — HEPARIN SODIUM 5000 [USP'U]/ML
5000 INJECTION, SOLUTION INTRAVENOUS; SUBCUTANEOUS EVERY 8 HOURS
Status: DISCONTINUED | OUTPATIENT
Start: 2017-05-06 | End: 2017-05-15 | Stop reason: HOSPADM

## 2017-05-06 RX ORDER — ZOLPIDEM TARTRATE 10 MG/1
10 TABLET ORAL
Status: ON HOLD | COMMUNITY
End: 2017-05-15

## 2017-05-06 RX ORDER — HYDROXYCHLOROQUINE SULFATE 200 MG/1
200 TABLET, FILM COATED ORAL DAILY
Status: DISCONTINUED | OUTPATIENT
Start: 2017-05-07 | End: 2017-05-15 | Stop reason: HOSPADM

## 2017-05-06 RX ORDER — AMLODIPINE BESYLATE 5 MG/1
5 TABLET ORAL DAILY
Status: DISCONTINUED | OUTPATIENT
Start: 2017-05-07 | End: 2017-05-15 | Stop reason: HOSPADM

## 2017-05-06 RX ORDER — PANTOPRAZOLE SODIUM 40 MG/1
40 TABLET, DELAYED RELEASE ORAL
Status: DISCONTINUED | OUTPATIENT
Start: 2017-05-07 | End: 2017-05-15 | Stop reason: HOSPADM

## 2017-05-06 RX ORDER — HYDROCODONE BITARTRATE AND ACETAMINOPHEN 5; 325 MG/1; MG/1
1 TABLET ORAL 2 TIMES DAILY
Status: DISCONTINUED | OUTPATIENT
Start: 2017-05-06 | End: 2017-05-07

## 2017-05-06 RX ORDER — SODIUM CHLORIDE 0.9 % (FLUSH) 0.9 %
5-10 SYRINGE (ML) INJECTION AS NEEDED
Status: DISCONTINUED | OUTPATIENT
Start: 2017-05-06 | End: 2017-05-15 | Stop reason: HOSPADM

## 2017-05-06 RX ORDER — FUROSEMIDE 40 MG/1
40 TABLET ORAL DAILY
Status: DISCONTINUED | OUTPATIENT
Start: 2017-05-07 | End: 2017-05-09

## 2017-05-06 RX ORDER — IPRATROPIUM BROMIDE AND ALBUTEROL SULFATE 2.5; .5 MG/3ML; MG/3ML
3 SOLUTION RESPIRATORY (INHALATION)
Status: DISCONTINUED | OUTPATIENT
Start: 2017-05-06 | End: 2017-05-08

## 2017-05-06 RX ORDER — NAPROXEN 250 MG/1
250 TABLET ORAL 2 TIMES DAILY WITH MEALS
Status: DISCONTINUED | OUTPATIENT
Start: 2017-05-06 | End: 2017-05-09

## 2017-05-06 RX ADMIN — POTASSIUM CHLORIDE 20 MEQ: 20 TABLET, EXTENDED RELEASE ORAL at 18:49

## 2017-05-06 RX ADMIN — PREDNISONE 60 MG: 20 TABLET ORAL at 10:45

## 2017-05-06 RX ADMIN — Medication 10 ML: at 23:27

## 2017-05-06 RX ADMIN — IPRATROPIUM BROMIDE AND ALBUTEROL SULFATE 3 ML: .5; 3 SOLUTION RESPIRATORY (INHALATION) at 21:52

## 2017-05-06 RX ADMIN — NAPROXEN 250 MG: 250 TABLET ORAL at 18:49

## 2017-05-06 RX ADMIN — HYDROCODONE BITARTRATE AND ACETAMINOPHEN 1 TABLET: 5; 325 TABLET ORAL at 21:52

## 2017-05-06 RX ADMIN — HEPARIN SODIUM 5000 UNITS: 5000 INJECTION, SOLUTION INTRAVENOUS; SUBCUTANEOUS at 14:50

## 2017-05-06 RX ADMIN — IPRATROPIUM BROMIDE AND ALBUTEROL SULFATE 3 ML: .5; 3 SOLUTION RESPIRATORY (INHALATION) at 11:58

## 2017-05-06 RX ADMIN — HEPARIN SODIUM 5000 UNITS: 5000 INJECTION, SOLUTION INTRAVENOUS; SUBCUTANEOUS at 23:30

## 2017-05-06 RX ADMIN — IPRATROPIUM BROMIDE AND ALBUTEROL SULFATE 3 ML: .5; 3 SOLUTION RESPIRATORY (INHALATION) at 14:50

## 2017-05-06 RX ADMIN — LEVOFLOXACIN 750 MG: 5 INJECTION, SOLUTION INTRAVENOUS at 14:50

## 2017-05-06 RX ADMIN — IPRATROPIUM BROMIDE AND ALBUTEROL SULFATE 3 ML: .5; 3 SOLUTION RESPIRATORY (INHALATION) at 10:46

## 2017-05-06 RX ADMIN — IPRATROPIUM BROMIDE AND ALBUTEROL SULFATE 3 ML: 2.5; .5 SOLUTION RESPIRATORY (INHALATION) at 11:33

## 2017-05-06 RX ADMIN — IPRATROPIUM BROMIDE AND ALBUTEROL SULFATE 3 ML: .5; 3 SOLUTION RESPIRATORY (INHALATION) at 18:49

## 2017-05-06 NOTE — ED TRIAGE NOTES
Pt, c/o cough, chest congestion , wheezing, was seen here  Yesterday, states not  Getting any better

## 2017-05-06 NOTE — ED PROVIDER NOTES
HPI Comments: Katia CastroAntonia Ladd is a 77year old female with a PMHX Lupus, Asthma, RA, and Chronic Lung Disease arrived to ER c/o fatigue, cough, congestion, wheezing, and SOB on exertion x2 days. She verbalizes has been coughing up yellow phlegm. Patient verbalizes she was seen in ER yesterday and has symptoms have worsen. Patient denies any recent travels. She has been taking Albuterol inhaler, Pulmicort, and Prednisone as directed with minimal relief. She verbalizes has been tolerating po fluids and solids well. Denies fever, chills, headache, dizziness, CP,SOB at rest or on exertion, abdominal pain, N/V/D, flank pain, back pain, urinary sx's, leg swelling, or any other concerns. Patient is a 77 y.o. female presenting with cough and wheezing. The history is provided by the patient. Cough   Associated symptoms include shortness of breath and wheezing. Pertinent negatives include no chest pain, no chills, no nausea and no vomiting. Wheezing    Associated symptoms include cough. Pertinent negatives include no chest pain, no fever, no abdominal pain, no vomiting and no diarrhea. Past Medical History:   Diagnosis Date    Asthma     Chronic lung disease     Lupus ([facility] [address]     Pulmonary hypertension (HCC)     Rheumatoid arthritis ([facility] [address]        No past surgical history on file. Family History:   Problem Relation Age of Onset    Heart Disease Mother     Asthma Mother     Asthma Father     Parkinson's Disease Father        Social History     Social History    Marital status:      Spouse name: N/A    Number of children: N/A    Years of education: N/A     Occupational History    Not on file.      Social History Main Topics    Smoking status: Never Smoker    Smokeless tobacco: Never Used    Alcohol use No    Drug use: Not on file    Sexual activity: Not on file     Other Topics Concern    Not on file     Social History Narrative         ALLERGIES: Latex; Asmanex hfa [mometasone]; Cleocin [clindamycin hcl]; Pcn [penicillins]; and Robitussin [guaifenesin]    Review of Systems   Constitutional: Positive for fatigue. Negative for activity change, appetite change, chills, diaphoresis and fever. HENT: Negative. Respiratory: Positive for cough, shortness of breath and wheezing. Negative for choking, chest tightness and stridor. Cardiovascular: Negative. Negative for chest pain, palpitations and leg swelling. Gastrointestinal: Negative. Negative for abdominal distention, abdominal pain, blood in stool, constipation, diarrhea, nausea and vomiting. Genitourinary: Negative. Musculoskeletal: Negative. Skin: Negative. Neurological: Negative. Vitals:    05/06/17 0944   BP: 180/84   Pulse: 84   Resp: (!) 32   Temp: 98.4 °F (36.9 °C)   SpO2: (!) 87%   Weight: 73.9 kg (163 lb)   Height: 5' 3\" (1.6 m)            Physical Exam   Constitutional: She is oriented to person, place, and time. She appears well-developed and well-nourished. No distress. HENT:   Mouth/Throat: Uvula is midline, oropharynx is clear and moist and mucous membranes are normal.   Cardiovascular: Normal rate, regular rhythm and normal heart sounds. Exam reveals no gallop and no friction rub. No murmur heard. Pulmonary/Chest: Accessory muscle usage present. No respiratory distress. She has no decreased breath sounds. She has wheezes. She has no rhonchi. She has no rales. She exhibits no tenderness. Scattered inspiratory and expiratory wheezes to all lobes. Abdominal: Soft. Bowel sounds are normal. She exhibits no distension and no mass. There is no tenderness. There is no rebound and no guarding. Musculoskeletal: Normal range of motion. Neurological: She is alert and oriented to person, place, and time. Skin: Skin is warm and dry. She is not diaphoretic. Psychiatric: She has a normal mood and affect.  Her behavior is normal. Judgment and thought content normal.   Nursing note and vitals reviewed. MDM  Number of Diagnoses or Management Options  Hypoxia:   Severe persistent asthma with acute exacerbation:   Diagnosis management comments: DDX:  URI  Bronchitis  Pneumonia  Asthma Exacerbation  COPD  CHF    Clinical Impression/Plan:    10:15 AM: Patient stable condition. I have re-assessed breath sounds. Scattered faint inspiratory and expiratory wheezes to all lobes. Patient speaking in full sentences with no distress. 10:50 AM: Consulted and discussed case with Dr. Ana Lopez. Per Dr. Ana Lopez, plan will be consult hospitalist for admission. Dx:  Hypoxia, asthma exacerbation    11:30 AM: [place] paged hospitalist    12:15 PM:  Consulted and spoke to Hospitalist (Dr. Deniz Johnson). Standard discussion included reason for patient ER visit. PMHx, V/S, ROS, PE, and diagnostic results. Dr. Rachelle Charles accepts admission and verbalizes he will place in orders once he evaluates patient in ER.     12:20 PM: Patient at bedside assessing patient.         Amount and/or Complexity of Data Reviewed  Clinical lab tests: ordered and reviewed  Tests in the radiology section of CPT®: ordered and reviewed  Review and summarize past medical records: yes  Discuss the patient with other providers: yes    Risk of Complications, Morbidity, and/or Mortality  Presenting problems: high  Diagnostic procedures: high  Management options: high      ED Course       Procedures

## 2017-05-06 NOTE — ED NOTES
Assisted patient to ambulate to restroom without difficulty, safety intact, will continue to monitor

## 2017-05-06 NOTE — H&P
[address] [place] [address] PS    Name:  Kassi Leger  MR#:  [id number]  :  1940  Account #:  [de-identified]  Date of Adm:  2017      CHIEF COMPLAINT: Shortness of breath. HISTORY OF PRESENT ILLNESS: The patient is a 78-year-old black  female with a history of COPD, lupus, rheumatoid arthritis, and  pulmonary fibrosis. She apparently was seen here last night for an  exacerbation of same. She was treated and subsequently discharged. However, during night, the patient became progressively more  uncomfortable with increased coughing and shortness of breath and  she was brought back to the emergency room by her family. As  mentioned, she has a history of COPD, lupus and rheumatoid arthritis. She also has a history of pulmonary fibrosis, presumably related to her  collagen vascular disorders. She has had documented chronic  respiratory failure in the past as well as echo evidence of pulmonary  hypertension. She has not been on home oxygen at home. There is no  history of chest pain associated with this, except with regard to  coughing. She has had no sputum production, no fever, shaking chills,  etc.    REVIEW OF SYSTEMS  CONSTITUTIONAL: Her weight has been stable for the past several  months. She has had no fevers. HEENT: No problems with vision, visual field cuts, tinnitus, difficulty  with pain in her face, postnasal drip. RESPIRATORY: As mentioned above. CARDIOVASCULAR: As mentioned above. GASTROINTESTINAL: No dysphagia, hematemesis, or melena. GENITOURINARY: No urologic or gynecologic problems. ENDOCRINOLOGIC: No diabetes, hypo- or hyperthyroidism. HEMATOLOGIC: No coagulopathies or thromboembolic phenomenon. ORTHOPEDIC: No bone pain, pain or swelling involving her joints, or  myopathies. SKIN: No history of easy bruisability or pruritus.   NEUROLOGIC: No seizure, tremor, change in her mental status, etc.    PAST MEDICAL HISTORY: Except as mentioned above, otherwise  unremarkable. SOCIAL HISTORY: She is a nonsmoker, does not use alcohol, does  not use street drugs. MEDICATIONS OF USE: Are mentioned on her admission  documentation. ALLERGIES: SHE HAS A NUMBER OF ALLERGIES INCLUDING  1. ASMANEX. 2. CLEOCIN. 3. LATEX. 4. PENICILLIN. 2. ROBITUSSIN. although I do not know what specific kind of reaction she has had to  any of these with the exception of the PENICILLIN, [address] [place] [address]  LATEX HAS CAUSED ITCHING. FAMILY HISTORY: Positive for congestive heart failure, hypertension,  and diabetes. PHYSICAL EXAMINATION  GENERAL: When I saw her, she was a well-developed, well-nourished  black female who was on a nebulizer machine. VITAL SIGNS: Her temperature was 98.4, pulse was 84 and regular,  respiratory rate was 32, and blood pressure was 180/84. HEENT: Unremarkable. Pharynx was clear. NECK: Supple. She had no carotid bruits. No enlargement of thyroid  gland, no enlargement of cervical nodes. Trachea midline. CHEST: Revealed good airway movement bilaterally. She had a  prolongation of expiratory phase. Occasional wheeze bilaterally. CARDIAC: Revealed PMI to be in the 5th intercostal space on  midclavicular line. S1 and S2 were normal. She had no S3, S4, or  murmur. ABDOMEN: Soft. She had no palpable organs or masses. EXTREMITIES: Reveal trace to +1 pedal edema bilaterally. She had  good pulses all around. NEUROLOGIC: Examination at the time I saw her was intact. IMPRESSION  1. Acute on chronic respiratory failure. 2. Chronic obstructive pulmonary disease exacerbation. 3. Lupus/rheumatoid arthritis by history. 4. Pulmonary fibrosis. 5. Hypertension. PLAN: The patient is started on intensive respiratory therapy,  bronchodilators, and steroids. Supplemental O2 has been used. She  will be started empirically on a fluoroquinolone although cultures are  presently pending. Other medications per orders.  She probably should  be admitted to tele which I will arrange. Usual housekeeping  procedures to include a PPI for stress ulcer prophylaxis and heparin  compound for DVT prophylaxis.         MD MARISSA Jaimes / Kadi Owens  D:  05/06/2017   12:53  T:  05/06/2017   13:12  Job #:  [id number]

## 2017-05-06 NOTE — IP AVS SNAPSHOT
Rola Lasso 
 
 
 [address] Patient: Johny Briggs MRN: [id number] MIGUEL ANGEL:[birth year] You are allergic to the following Allergen Reactions Latex Itching Asmanex Hfa (Mometasone) Itching Cleocin (Clindamycin Hcl) Itching Swelling Lips and tongue Pcn (Penicillins) Swelling Robitussin (Guaifenesin) Rash Recent Documentation Height Weight Breastfeeding? BMI OB Status Smoking Status 1.6 m 67.3 kg No 26.28 kg/m2 Menopause Former Smoker Emergency Contacts Name Discharge Info Relation Home Work Mobile Mike Blevins DISCHARGE CAREGIVER [3] Spouse [3] [phone] About your hospitalization You were admitted on:  May 6, 2017 You last received care in the:  [facility] [address] 1979 You were discharged on:  May 15, 2017 Unit phone number:  [phone] Why you were hospitalized Your primary diagnosis was:  Not on File Your diagnoses also included:  Copd Exacerbation (Hcc), Systemic Lupus Erythematosus (Hcc), Rheumatoid Arthritis (Hcc), Dyslipidemia, Htn (Hypertension), Pulmonary Htn (Hcc), Acute Bronchitis Providers Seen During Your Hospitalizations Provider Role Specialty Primary office phone Leslie Ochoa DO Attending Provider Emergency Medicine [phone] Mukul Fry MD Attending Provider Internal Medicine [phone] Patti Pelayo MD Attending Provider [facility] [phone] Your Primary Care Physician (PCP) Primary Care Physician Office Phone Office Fax Renny Romero [phone] Follow-up Information Follow up With Details Comments Contact Info Malika Chew MD On 5/16/2017 Per office/Mary Tuesday , May 16, 2017, @ 3:00pm. [address] [facility] [address] [place] [address] 
[phone] Monalisa Brown MD On 5/23/2017 Appointment at 3:30pm [address] [facility] [address] [address] 
[phone] Your Appointments Tuesday May 23, 2017  3:30 PM EDT Follow Up with Monalisa Brown MD  
[address] ([address]) [address] [facility], [address] [place] [address]  
[phone] Current Discharge Medication List  
  
START taking these medications Dose & Instructions Dispensing Information Comments Morning Noon Evening Bedtime  
 benzonatate 100 mg capsule Commonly known as:  TESSALON Your last dose was: Your next dose is:    
   
   
 Dose:  100 mg Take 1 Cap by mouth three (3) times daily as needed for Cough for up to 7 days. Indications: for breakthrough cough Quantity:  40 Cap Refills:  0  
     
   
   
   
  
 budesonide-formoterol 160-4.5 mcg/actuation HFA inhaler Commonly known as:  SYMBICORT Your last dose was: Your next dose is:    
   
   
 Dose:  2 Puff Take 2 Puffs by inhalation two (2) times a day. Indications: BRONCHOSPASM PREVENTION WITH COPD, MAINTENANCE THERAPY FOR ASTHMA Quantity:  1 Inhaler Refills:  0  
     
   
   
   
  
 chlorpheniramine-HYDROcodone 10-8 mg/5 mL suspension Commonly known as:  Verneice Canes Your last dose was: Your next dose is:    
   
   
 Dose:  5 mL Take 5 mL by mouth every twelve (12) hours as needed for Cough. Max Daily Amount: 10 mL. Quantity:  115 mL Refills:  0  
     
   
   
   
  
 tiotropium 18 mcg inhalation capsule Commonly known as:  Tian Meres Your last dose was: Your next dose is:    
   
   
 Dose:  1 Cap Take 1 Cap by inhalation daily. Quantity:  30 Cap Refills:  0  
     
   
   
   
  
 traZODone 50 mg tablet Commonly known as:  Alec Cifuentes Your last dose was: Your next dose is:    
   
   
 Dose:  50 mg Take 1 Tab by mouth nightly. Quantity:  30 Tab Refills:  0 CONTINUE these medications which have CHANGED Dose & Instructions Dispensing Information Comments Morning Noon Evening Bedtime HYDROcodone-acetaminophen 5-325 mg per tablet Commonly known as:  Vishnu Solis What changed:   
- when to take this 
- reasons to take this Your last dose was: Your next dose is:    
   
   
 Dose:  1 Tab Take 1 Tab by mouth every six (6) hours as needed. Max Daily Amount: 4 Tabs. Quantity:  60 Tab Refills:  0  
     
   
   
   
  
 predniSONE 10 mg tablet Commonly known as:  Elvin Primer What changed:   
- medication strength 
- how much to take - when to take this Your last dose was: Your next dose is:    
   
   
 Dose:  10 mg Take 1 Tab by mouth daily (with lunch). Quantity:  30 Tab Refills:  0  
     
   
   
   
  
 zolpidem 10 mg tablet Commonly known as:  AMBIEN What changed:  Another medication with the same name was removed. Continue taking this medication, and follow the directions you see here. Your last dose was: Your next dose is:    
   
   
 Dose:  10 mg Take 1 Tab by mouth nightly as needed for Sleep. Max Daily Amount: 10 mg.  
 Quantity:  30 Tab Refills:  0 CONTINUE these medications which have NOT CHANGED Dose & Instructions Dispensing Information Comments Morning Noon Evening Bedtime  
 albuterol-ipratropium 2.5 mg-0.5 mg/3 ml Nebu Commonly known as:  Xochitl Osuna Your last dose was: Your next dose is:    
   
   
 Dose:  3 mL  
3 mL by Nebulization route three (3) times daily. Quantity:  30 Nebule Refills:  1  
     
   
   
   
  
 amLODIPine 5 mg tablet Commonly known as:  Lashonda West Your last dose was: Your next dose is:    
   
   
 Dose:  5 mg Take 1 Tab by mouth daily. Indications: HYPERTENSION Quantity:  30 Tab Refills:  1  
     
   
   
   
  
 furosemide 40 mg tablet Commonly known as:  LASIX Your last dose was: Your next dose is:    
   
   
 Dose:  40 mg Take 1 Tab by mouth daily. Indications: EDEMA Quantity:  30 Tab Refills:  1 KLOR-CON M20 20 mEq tablet Generic drug:  potassium chloride Your last dose was: Your next dose is:    
   
   
 Dose:  20 mEq Take 20 mEq by mouth two (2) times a day. Refills:  0  
     
   
   
   
  
 omeprazole 20 mg capsule Commonly known as:  PRILOSEC Your last dose was: Your next dose is:    
   
   
 Dose:  20 mg Take 20 mg by mouth daily. Refills:  0  
     
   
   
   
  
 PLAQUENIL 200 mg tablet Generic drug:  hydroxychloroquine Your last dose was: Your next dose is:    
   
   
 Dose:  200 mg Take 200 mg by mouth daily. Refills:  0 STOP taking these medications 5-HTP PO  
   
  
 albuterol 2.5 mg /3 mL (0.083 %) nebulizer solution Commonly known as:  PROVENTIL VENTOLIN  
   
  
 albuterol 90 mcg/actuation inhaler Commonly known as:  PROVENTIL HFA, VENTOLIN HFA, PROAIR HFA  
   
  
 ALEVE 220 mg Cap Generic drug:  naproxen sodium SCOOTER-SELTZER ANTACID PO  
   
  
 aspirin  mg tablet BENADRYL ALLERGY 25 mg tablet Generic drug:  diphenhydrAMINE  
   
  
 NASACORT AQ 55 mcg nasal inhaler Generic drug:  triamcinolone NASACORT NA  
   
  
 SUPER B COMPLEX-B-12 PO ZyrTEC 10 mg Cap Generic drug:  Cetirizine ZyrTEC 10 mg tablet Generic drug:  cetirizine ASK your doctor about these medications Dose & Instructions Dispensing Information Comments Morning Noon Evening Bedtime  
 ondansetron 4 mg disintegrating tablet Commonly known as:  ZOFRAN ODT Your last dose was: Your next dose is:    
   
   
 Dose:  4 mg 1 Tab by SubLINGual route every eight (8) hours as needed. Indications: nausea Quantity:  30 Tab Refills:  0 PULMICORT FLEXHALER 90 mcg/actuation Aepb inhaler Generic drug:  budesonide Your last dose was: Your next dose is:    
   
   
  Refills:  0 Where to Get Your Medications Information on where to get these meds will be given to you by the nurse or doctor. ! Ask your nurse or doctor about these medications  
  benzonatate 100 mg capsule  
 budesonide-formoterol 160-4.5 mcg/actuation HFA inhaler  
 chlorpheniramine-HYDROcodone 10-8 mg/5 mL suspension HYDROcodone-acetaminophen 5-325 mg per tablet  
 predniSONE 10 mg tablet  
 tiotropium 18 mcg inhalation capsule  
 traZODone 50 mg tablet  
 zolpidem 10 mg tablet Discharge Instructions DISCHARGE SUMMARY from Nurse The following personal items are in your possession at time of discharge: 
 
Dental Appliances: None Visual Aid: Glasses Home Medications: None Jewelry: None Clothing: Pajamas, Shirt, Undergarments Other Valuables: At bedside, With patient Personal Items Sent to Safe: no PATIENT INSTRUCTIONS: 
 
 
F-face looks uneven A-arms unable to move or move unevenly S-speech slurred or non-existent T-time-call 911 as soon as signs and symptoms begin-DO NOT go Back to bed or wait to see if you get better-TIME IS BRAIN. Warning Signs of HEART ATTACK Call 911 if you have these symptoms: 
? Chest discomfort. Most heart attacks involve discomfort in the center of the chest that lasts more than a few minutes, or that goes away and comes back. It can feel like uncomfortable pressure, squeezing, fullness, or pain. ? Discomfort in other areas of the upper body. Symptoms can include pain or discomfort in one or both arms, the back, neck, jaw, or stomach. ? Shortness of breath with or without chest discomfort. ? Other signs may include breaking out in a cold sweat, nausea, or lightheadedness. Don't wait more than five minutes to call [address]! Fast action can save your life. Calling 911 is almost always the fastest way to get lifesaving treatment. Emergency Medical Services staff can begin treatment when they arrive  up to an hour sooner than if someone gets to the hospital by car. The discharge information has been reviewed with the patient. The patient verbalized understanding. Discharge medications reviewed with the patient and appropriate educational materials and side effects teaching were provided. Patient armband removed and shredded [employer] Activation Thank you for requesting access to [employer]. Please follow the instructions below to securely access and download your online medical record. [employer] allows you to send messages to your doctor, view your test results, renew your prescriptions, schedule appointments, and more. How Do I Sign Up? 1. In your internet browser, go to [email or web address] 
2. Click on the First Time User? Click Here link in the Sign In box. You will be redirect to the New Member Sign Up page. 3. Enter your [employer] Access Code exactly as it appears below. You will not need to use this code after youve completed the sign-up process. If you do not sign up before the expiration date, you must request a new code. [employer] Access Code: Activation code not generated Current [employer] Status: Patient Declined (This is the date your [employer] access code will ) 4. Enter the last four digits of your Social Security Number (xxxx) and Date of Birth (mm/dd/yyyy) as indicated and click Submit. You will be taken to the next sign-up page. 5. Create a [employer] ID. This will be your [employer] login ID and cannot be changed, so think of one that is secure and easy to remember. 6. Create a [employer] password. You can change your password at any time. 7. Enter your Password Reset Question and Answer. This can be used at a later time if you forget your password. 8. Enter your e-mail address. You will receive e-mail notification when new information is available in [address] 9. Click Sign Up. You can now view and download portions of your medical record. 10. Click the Download Summary menu link to download a portable copy of your medical information. Additional Information If you have questions, please visit the Frequently Asked Questions section of the [employer] website at [email or web address] Remember, [employer] is NOT to be used for urgent needs. For medical emergencies, dial 911. Discharge Orders None [employer] Announcement We are excited to announce that we are making your provider's discharge notes available to you in [employer]. You will see these notes when they are completed and signed by the physician that discharged you from your recent hospital stay. If you have any questions or concerns about any information you see in [employer], please call the Health Information Department where you were seen or reach out to your Primary Care Provider for more information about your plan of care. General Information Please provide this summary of care documentation to your next provider. Patient Signature:  ____________________________________________________________ Date:  ____________________________________________________________  
  
Erick Burgos Provider Signature:  ____________________________________________________________ Date:  ____________________________________________________________

## 2017-05-07 ENCOUNTER — APPOINTMENT (OUTPATIENT)
Dept: CT IMAGING | Age: 77
DRG: 189 | End: 2017-05-07
Attending: PHYSICIAN ASSISTANT
Payer: MEDICARE

## 2017-05-07 LAB
GLUCOSE BLD STRIP.AUTO-MCNC: 111 MG/DL (ref 70–110)
GLUCOSE BLD STRIP.AUTO-MCNC: 117 MG/DL (ref 70–110)
GLUCOSE BLD STRIP.AUTO-MCNC: 118 MG/DL (ref 70–110)
L PNEUMO AG UR QL IA: NEGATIVE
MAGNESIUM SERPL-MCNC: 2.5 MG/DL (ref 1.6–2.6)
S PNEUM AG UR QL: NEGATIVE

## 2017-05-07 PROCEDURE — 74011250637 HC RX REV CODE- 250/637: Performed by: INTERNAL MEDICINE

## 2017-05-07 PROCEDURE — 94640 AIRWAY INHALATION TREATMENT: CPT

## 2017-05-07 PROCEDURE — 83735 ASSAY OF MAGNESIUM: CPT | Performed by: INTERNAL MEDICINE

## 2017-05-07 PROCEDURE — 74011250636 HC RX REV CODE- 250/636: Performed by: INTERNAL MEDICINE

## 2017-05-07 PROCEDURE — 82962 GLUCOSE BLOOD TEST: CPT

## 2017-05-07 PROCEDURE — 65660000000 HC RM CCU STEPDOWN

## 2017-05-07 PROCEDURE — 74011000250 HC RX REV CODE- 250: Performed by: INTERNAL MEDICINE

## 2017-05-07 PROCEDURE — 74011636637 HC RX REV CODE- 636/637: Performed by: INTERNAL MEDICINE

## 2017-05-07 PROCEDURE — 71250 CT THORAX DX C-: CPT

## 2017-05-07 PROCEDURE — 36415 COLL VENOUS BLD VENIPUNCTURE: CPT | Performed by: INTERNAL MEDICINE

## 2017-05-07 RX ORDER — INSULIN LISPRO 100 [IU]/ML
INJECTION, SOLUTION INTRAVENOUS; SUBCUTANEOUS
Status: DISCONTINUED | OUTPATIENT
Start: 2017-05-07 | End: 2017-05-15 | Stop reason: HOSPADM

## 2017-05-07 RX ORDER — DEXTROSE 50 % IN WATER (D50W) INTRAVENOUS SYRINGE
25-50 AS NEEDED
Status: DISCONTINUED | OUTPATIENT
Start: 2017-05-07 | End: 2017-05-15 | Stop reason: HOSPADM

## 2017-05-07 RX ORDER — ZOLPIDEM TARTRATE 5 MG/1
5 TABLET ORAL
Status: DISCONTINUED | OUTPATIENT
Start: 2017-05-07 | End: 2017-05-08

## 2017-05-07 RX ORDER — BENZONATATE 100 MG/1
100 CAPSULE ORAL
Status: DISCONTINUED | OUTPATIENT
Start: 2017-05-07 | End: 2017-05-15 | Stop reason: HOSPADM

## 2017-05-07 RX ORDER — HYDROCODONE BITARTRATE AND ACETAMINOPHEN 5; 325 MG/1; MG/1
1 TABLET ORAL
Status: DISCONTINUED | OUTPATIENT
Start: 2017-05-07 | End: 2017-05-15 | Stop reason: HOSPADM

## 2017-05-07 RX ORDER — ZOLPIDEM TARTRATE 5 MG/1
10 TABLET ORAL
Status: DISCONTINUED | OUTPATIENT
Start: 2017-05-07 | End: 2017-05-07

## 2017-05-07 RX ORDER — MAGNESIUM SULFATE 100 %
16 CRYSTALS MISCELLANEOUS AS NEEDED
Status: DISCONTINUED | OUTPATIENT
Start: 2017-05-07 | End: 2017-05-15 | Stop reason: HOSPADM

## 2017-05-07 RX ADMIN — PREDNISONE 30 MG: 20 TABLET ORAL at 12:25

## 2017-05-07 RX ADMIN — IPRATROPIUM BROMIDE AND ALBUTEROL SULFATE 3 ML: .5; 3 SOLUTION RESPIRATORY (INHALATION) at 23:48

## 2017-05-07 RX ADMIN — Medication 10 ML: at 06:09

## 2017-05-07 RX ADMIN — LEVOFLOXACIN 750 MG: 5 INJECTION, SOLUTION INTRAVENOUS at 12:26

## 2017-05-07 RX ADMIN — FUROSEMIDE 40 MG: 40 TABLET ORAL at 09:05

## 2017-05-07 RX ADMIN — BENZONATATE 100 MG: 100 CAPSULE ORAL at 12:25

## 2017-05-07 RX ADMIN — HYDROCODONE BITARTRATE AND ACETAMINOPHEN 1 TABLET: 5; 325 TABLET ORAL at 09:05

## 2017-05-07 RX ADMIN — IPRATROPIUM BROMIDE AND ALBUTEROL SULFATE 3 ML: .5; 3 SOLUTION RESPIRATORY (INHALATION) at 00:11

## 2017-05-07 RX ADMIN — HYDROXYCHLOROQUINE SULFATE 200 MG: 200 TABLET, FILM COATED ENTERAL at 09:05

## 2017-05-07 RX ADMIN — HEPARIN SODIUM 5000 UNITS: 5000 INJECTION, SOLUTION INTRAVENOUS; SUBCUTANEOUS at 06:09

## 2017-05-07 RX ADMIN — NAPROXEN 250 MG: 250 TABLET ORAL at 09:06

## 2017-05-07 RX ADMIN — POTASSIUM CHLORIDE 20 MEQ: 20 TABLET, EXTENDED RELEASE ORAL at 17:01

## 2017-05-07 RX ADMIN — PANTOPRAZOLE SODIUM 40 MG: 40 TABLET, DELAYED RELEASE ORAL at 09:06

## 2017-05-07 RX ADMIN — POTASSIUM CHLORIDE 20 MEQ: 20 TABLET, EXTENDED RELEASE ORAL at 09:05

## 2017-05-07 RX ADMIN — HEPARIN SODIUM 5000 UNITS: 5000 INJECTION, SOLUTION INTRAVENOUS; SUBCUTANEOUS at 21:57

## 2017-05-07 RX ADMIN — Medication 10 ML: at 17:01

## 2017-05-07 RX ADMIN — NAPROXEN 250 MG: 250 TABLET ORAL at 17:01

## 2017-05-07 RX ADMIN — AMLODIPINE BESYLATE 5 MG: 5 TABLET ORAL at 09:06

## 2017-05-07 RX ADMIN — IPRATROPIUM BROMIDE AND ALBUTEROL SULFATE 3 ML: .5; 3 SOLUTION RESPIRATORY (INHALATION) at 20:52

## 2017-05-07 RX ADMIN — IPRATROPIUM BROMIDE AND ALBUTEROL SULFATE 3 ML: .5; 3 SOLUTION RESPIRATORY (INHALATION) at 10:34

## 2017-05-07 RX ADMIN — ZOLPIDEM TARTRATE 10 MG: 5 TABLET ORAL at 00:27

## 2017-05-07 RX ADMIN — Medication 10 ML: at 21:57

## 2017-05-07 RX ADMIN — HEPARIN SODIUM 5000 UNITS: 5000 INJECTION, SOLUTION INTRAVENOUS; SUBCUTANEOUS at 12:25

## 2017-05-07 RX ADMIN — ZOLPIDEM TARTRATE 5 MG: 5 TABLET ORAL at 23:45

## 2017-05-07 NOTE — ROUTINE PROCESS
Bedside shift change report given to [facility] (oncoming nurse) by Doug Ruiz RN (offgoing nurse). Report given with SBAR, Kardex, Intake/Output, MAR and Recent Results.

## 2017-05-07 NOTE — ROUTINE PROCESS
Received patient from ER per stretcher. Accompanied by family. Patient A/O x 4 in no distress. Orientation to room provided. Patient assisted to ambulate to bathroom, tolerated well. Call light placed within reach. Primary Nurse Ida Kent RN and IVONE Kelly performed a dual skin assessment on this patient No impairment noted  Adrián score is 21    0012- Patient requesting Dimple Douglas for sleeping. Fidel BUTCHER, received orders from Dr Shira Medina.

## 2017-05-07 NOTE — ROUTINE PROCESS
TRANSFER - IN REPORT:    Verbal report received from [facility]) on Cloyce Linda  being received from ER(unit) for routine progression of care      Report consisted of patients Situation, Background, Assessment and   Recommendations(SBAR). Information from the following report(s) Kardex, MAR and Cardiac Rhythm sinus rythm was reviewed with the receiving nurse. Opportunity for questions and clarification was provided. Assessment completed upon patients arrival to unit and care assumed.

## 2017-05-07 NOTE — PROGRESS NOTES
SUBJECTIVE:    Knows where she is,  and [place] president's name. Sees dr. Sheri Mckeon. Cough present. LAND improving. No chest or abdominal pain. Denies being on oxygen at home. OBJECTIVE:    Visit Vitals    /89 (BP 1 Location: Left arm, BP Patient Position: At rest)    Pulse 81    Temp 97 °F (36.1 °C)    Resp 18    Ht 5' 3\" (1.6 m)    Wt 75.9 kg (167 lb 4.8 oz)    SpO2 94%    Breastfeeding No    BMI 29.64 kg/m2     RRR  Diminished BS bilaterally, no wheezes  NT, BS +  No pedal edema  NAD  Follows commands    ASSESSMENT:    1. Acute hypoxic respiratory distress due to # 2.  2. Chronic obstructive pulmonary disease exacerbation with acute bronchitis. 3. Lupus/rheumatoid arthritis. 4. Pulmonary fibrosis. 5. Hypertension.     PLAN:    Add steroid  Cont antibiotic and nebs  Pulmonology consulted      CMP:   Lab Results   Component Value Date/Time     2017 10:37 AM    K 3.9 2017 10:37 AM     2017 10:37 AM    CO2 28 2017 10:37 AM    AGAP 6 2017 10:37 AM     (H) 2017 10:37 AM    BUN 16 2017 10:37 AM    CREA 0.65 2017 10:37 AM    GFRAA >60 2017 10:37 AM    GFRNA >60 2017 10:37 AM    CA 8.5 2017 10:37 AM    MG 2.5 2017 03:08 AM    ALB 3.4 2017 10:37 AM    TP 7.7 2017 10:37 AM    GLOB 4.3 (H) 2017 10:37 AM    AGRAT 0.8 2017 10:37 AM    SGOT 25 2017 10:37 AM    ALT 20 2017 10:37 AM     CBC:   Lab Results   Component Value Date/Time    WBC 6.4 2017 10:37 AM    HGB 8.6 (L) 2017 10:37 AM    HCT 28.2 (L) 2017 10:37 AM     2017 10:37 AM

## 2017-05-07 NOTE — ROUTINE PROCESS
Assumed patient care after receiving report from Isabella Mcgill. Patient in bed, awake, alert and oriented x3. Denies pain or discomforts at this time. Call light placed within reach. Bed in low position.

## 2017-05-07 NOTE — ROUTINE PROCESS
Bedside and Verbal shift change report given to IVONE Garcia (oncoming nurse) by Arian Saba (offgoing nurse). Report included the following information SBAR, Kardex, Procedure Summary, Intake/Output, MAR, Recent Results and Med Rec Status.

## 2017-05-07 NOTE — ED NOTES
TRANSFER - OUT REPORT:    Verbal report given to Shireen RN (name) on Ada Acevedo  being transferred to [employer] (unit) for routine progression of care       Report consisted of patients Situation, Background, Assessment and   Recommendations(SBAR). Information from the following report(s) SBAR, ED Summary and MAR was reviewed with the receiving nurse. Lines:   Peripheral IV 05/06/17 Right Arm (Active)   Site Assessment Clean, dry, & intact 5/6/2017 10:50 AM   Phlebitis Assessment 0 5/6/2017 10:50 AM   Infiltration Assessment 0 5/6/2017 10:50 AM   Dressing Status Clean, dry, & intact 5/6/2017 10:50 AM   Dressing Type Transparent 5/6/2017 10:50 AM   Hub Color/Line Status Patent;Blue 5/6/2017 10:50 AM        Opportunity for questions and clarification was provided.       Patient transported with:   Monitor  O2 @ 3 liters  Registered Nurse

## 2017-05-07 NOTE — CONSULTS
[facility]  Pulmonary, Critical Care, and Sleep Medicine    Name: Elba Le MRN: [id number]   : 1940 Hospital: [address] [place] [address]   Date: 2017        Pulmonary Initial In-Patient Consult                                              Consult requesting physician: Dr. Matt Lovell  Reason for Consult: Hypoxemic respiratory failure    IMPRESSION:   · Acute hypoxemic respiratory failure  · Pulmonary HTN - RV pressure 50 mmHg on 5/3/17  · Lupus, with interstitial lung disease, on Plaquenil   · Cough, with likely bronchitis, no infiltrate on CXR  · Chronic anemia  · Hx tobacco abuse, stopped in  (1pack per week x 16 years)      RECOMMENDATIONS:   · Would continue duonebs for wheezing  · Symbicort as maintenance  · Reasonable to continue short course of Levaquin  · Will obtain CT chest  · Strep, legionella Ag. Mycoplasma IgG/IgM  · Finished 7 day steroid course in early April - will speak with attending regarding steroids this admission (patient with excessive weigh gain)  · Last PFT 2016 was normal study  · Will need eval for home oxygen prior to discharge  · Would benefit from sleep study as outpatient  · Will need to follow up with Dr. Erin Betancourt once discharged. Subjective/History:     Elba Le is a 77 y.o. female with PMHx significant for chronic interstitial lung disease, lupus, RA, and pulmonary hypertension who came to the ED after developing a cough 5/6 morning which worsened throughout the day. She states she usually has a cough after being exposed to any type of dust, mold, or scent and usually happens around this time of the year. Her cough has been non-productive. She does associate some shortness of breath with this while at rest.  She does report some chest pain which she relates to being sore from coughing. She denies have any lower extremity edema.   She reports being on a 7 day course of Prednisone in early April for similar symptoms after seeing her pulmonologist as an outpatient. She denies any fevers, chills, weight loss, palpitations, abdominal pain, dysuria or hematuria, or recent diarrhea. Her last PFT was in 9/2016 from which she had a normal baseline FEV1. She is scheduled for an overnight oximetry study but has yet to have this done. She was admitted to the hospital and started on nebulizer, Symbicort, Levaquin, and has since had prednisone added. Review of Systems:  A comprehensive review of systems was negative except for that written in the HPI. Immunization status: There is no immunization history on file for this patient. Allergies   Allergen Reactions    Latex Itching    Asmanex Hfa [Mometasone] Itching    Cleocin [Clindamycin Hcl] Itching and Swelling     Lips and tongue    Pcn [Penicillins] Swelling    Robitussin [Guaifenesin] Rash        Past Medical History:   Diagnosis Date    Asthma     Chronic lung disease     Lupus ([facility]     Pulmonary hypertension (HCC)     Rheumatoid arthritis ([facility]         No past surgical history on file. Family History   Problem Relation Age of Onset    Heart Disease Mother     Asthma Mother     Asthma Father     Parkinson's Disease Father       Social History   Substance Use Topics    Smoking status: Never Smoker    Smokeless tobacco: Never Used    Alcohol use No        Prior to Admission medications    Medication Sig Start Date End Date Taking? Authorizing Provider   zolpidem (AMBIEN) 10 mg tablet Take 10 mg by mouth nightly as needed for Sleep. Yes Phys Other, MD   cetirizine (ZYRTEC) 10 mg tablet Take 10 mg by mouth daily. Yes Historical Provider   diphenhydrAMINE (BENADRYL ALLERGY) 25 mg tablet Take 25 mg by mouth every six (6) hours as needed for Sleep. Yes Historical Provider   triamcinolone (NASACORT AQ) 55 mcg nasal inhaler 2 Sprays daily as needed. Yes Historical Provider   aspirin  mg tablet Take 650 mg by mouth every six (6) hours as needed for Pain. Yes Historical Provider   HYDROcodone-acetaminophen (NORCO) 5-325 mg per tablet Take 1 Tab by mouth two (2) times a day. Max Daily Amount: 2 Tabs. 8/15/16  Yes Beverley Celis MD   albuterol-ipratropium (DUO-NEB) 2.5 mg-0.5 mg/3 ml nebu 3 mL by Nebulization route three (3) times daily. 8/15/16  Yes Beverley Celis MD   amLODIPine (NORVASC) 5 mg tablet Take 1 Tab by mouth daily. Indications: HYPERTENSION 8/15/16  Yes Beverley Celis MD   furosemide (LASIX) 40 mg tablet Take 1 Tab by mouth daily. Indications: EDEMA 8/15/16  Yes Beverley Celis MD   omeprazole (PRILOSEC) 20 mg capsule Take 20 mg by mouth daily. Yes Sameera King MD   hydroxychloroquine (PLAQUENIL) 200 mg tablet Take 200 mg by mouth daily. Yes Sameera King MD   potassium chloride (KLOR-CON M20) 20 mEq tablet Take 20 mEq by mouth two (2) times a day. Yes Sameera King MD   naproxen sodium (ALEVE) 220 mg cap Take  by mouth as needed.     Sameera King MD       Current Facility-Administered Medications   Medication Dose Route Frequency    zolpidem (AMBIEN) tablet 10 mg  10 mg Oral QHS PRN    predniSONE (DELTASONE) tablet 30 mg  30 mg Oral DAILY WITH LUNCH    HYDROcodone-acetaminophen (NORCO) 5-325 mg per tablet 1 Tab  1 Tab Oral Q6H PRN    insulin lispro (HUMALOG) injection   SubCUTAneous AC&HS    glucose chewable tablet 16 g  16 g Oral PRN    glucagon (GLUCAGEN) injection 1 mg  1 mg IntraMUSCular PRN    dextrose (D50W) injection syrg 12.5-25 g  25-50 mL IntraVENous PRN    albuterol-ipratropium (DUO-NEB) 2.5 MG-0.5 MG/3 ML  3 mL Nebulization Q4H RT    amLODIPine (NORVASC) tablet 5 mg  5 mg Oral DAILY    furosemide (LASIX) tablet 40 mg  40 mg Oral DAILY    pantoprazole (PROTONIX) tablet 40 mg  40 mg Oral ACB    hydroxychloroquine (PLAQUENIL) tablet 200 mg  200 mg Oral DAILY    potassium chloride (K-DUR, KLOR-CON) SR tablet 20 mEq  20 mEq Oral BID    sodium chloride (NS) flush 5-10 mL  5-10 mL IntraVENous Q8H    sodium chloride (NS) flush 5-10 mL  5-10 mL IntraVENous PRN    levoFLOXacin (LEVAQUIN) 750 mg in D5W IVPB  750 mg IntraVENous Q24H    heparin (porcine) injection 5,000 Units  5,000 Units SubCUTAneous Q8H    budesonide-formoterol (SYMBICORT) 160-4.5 mcg/actuation HFA inhaler 2 Puff  2 Puff Inhalation BID RT    naproxen (NAPROSYN) tablet 250 mg  250 mg Oral BID WITH MEALS         Telemetry:normal sinus rhythm    Objective:   Vital Signs:    Visit Vitals    /89 (BP 1 Location: Left arm, BP Patient Position: At rest)    Pulse 81    Temp 97 °F (36.1 °C)    Resp 18    Ht 5' 3\" (1.6 m)    Wt 75.9 kg (167 lb 4.8 oz)    SpO2 94%    Breastfeeding No    BMI 29.64 kg/m2       O2 Device: Nasal cannula   O2 Flow Rate (L/min): 2 l/min   Temp (24hrs), Av.8 °F (36.6 °C), Min:97 °F (36.1 °C), Max:98.2 °F (36.8 °C)       Intake/Output:   Last shift:         Last 3 shifts:  190 -  0700  In: 240 [P.O.:240]  Out: 750 [Urine:750]    Intake/Output Summary (Last 24 hours) at 17 0949  Last data filed at 17 0606   Gross per 24 hour   Intake              240 ml   Output              750 ml   Net             -510 ml       Physical Exam:      General: Middle aged female, laying in bed, no apparent distress   HEENT: Normocephalic and atraumatic. Conjunctivae clear. No scleral icterus. Neck: Supple. Trachea midline. No lymphadenopathy or thyromegaly. No JVD. Chest: No bruising or deformity. Lungs: Symmetrical chest rise and fall. No accessory muscle usage. Lungs with expiratory wheezing in bases bilaterally   Heart: Regular rate and regular rhythm. Abdomen:  Soft, non-tender. Extremity: Distal pulses 2+ in all extremities. Extremities without cyanosis,or edema. Neuro: Speech clear. No facial droop. Equal  strength bilaterally. Moves all extremities. Skin: Warm, dry.          Data:       Recent Results (from the past 24 hour(s))   CBC WITH AUTOMATED DIFF    Collection Time: 17 10:37 AM   Result Value Ref Range    WBC 6.4 4.6 - 13.2 K/uL    RBC 3.79 (L) 4.20 - 5.30 M/uL    HGB 8.6 (L) 12.0 - 16.0 g/dL    HCT 28.2 (L) 35.0 - 45.0 %    MCV 74.4 74.0 - 97.0 FL    MCH 22.7 (L) 24.0 - 34.0 PG    MCHC 30.5 (L) 31.0 - 37.0 g/dL    RDW 16.1 (H) 11.6 - 14.5 %    PLATELET 868 714 - 971 K/uL    MPV 10.0 9.2 - 11.8 FL    NEUTROPHILS 85 (H) 40 - 73 %    LYMPHOCYTES 9 (L) 21 - 52 %    MONOCYTES 6 3 - 10 %    EOSINOPHILS 0 0 - 5 %    BASOPHILS 0 0 - 2 %    ABS. NEUTROPHILS 5.4 1.8 - 8.0 K/UL    ABS. LYMPHOCYTES 0.6 (L) 0.9 - 3.6 K/UL    ABS. MONOCYTES 0.4 0.05 - 1.2 K/UL    ABS. EOSINOPHILS 0.0 0.0 - 0.4 K/UL    ABS. BASOPHILS 0.0 0.0 - 0.06 K/UL    DF AUTOMATED      PLATELET COMMENTS ADEQUATE PLATELETS      RBC COMMENTS ANISOCYTOSIS  1+        RBC COMMENTS POLYCHROMASIA  1+        RBC COMMENTS MICROCYTOSIS  1+       METABOLIC PANEL, COMPREHENSIVE    Collection Time: 05/06/17 10:37 AM   Result Value Ref Range    Sodium 142 136 - 145 mmol/L    Potassium 3.9 3.5 - 5.5 mmol/L    Chloride 108 100 - 108 mmol/L    CO2 28 21 - 32 mmol/L    Anion gap 6 3.0 - 18 mmol/L    Glucose 117 (H) 74 - 99 mg/dL    BUN 16 7.0 - 18 MG/DL    Creatinine 0.65 0.6 - 1.3 MG/DL    BUN/Creatinine ratio 25 (H) 12 - 20      GFR est AA >60 >60 ml/min/1.73m2    GFR est non-AA >60 >60 ml/min/1.73m2    Calcium 8.5 8.5 - 10.1 MG/DL    Bilirubin, total 0.6 0.2 - 1.0 MG/DL    ALT (SGPT) 20 13 - 56 U/L    AST (SGOT) 25 15 - 37 U/L    Alk.  phosphatase 117 45 - 117 U/L    Protein, total 7.7 6.4 - 8.2 g/dL    Albumin 3.4 3.4 - 5.0 g/dL    Globulin 4.3 (H) 2.0 - 4.0 g/dL    A-G Ratio 0.8 0.8 - 1.7     CARDIAC PANEL,(CK, CKMB & TROPONIN)    Collection Time: 05/06/17 10:37 AM   Result Value Ref Range     26 - 192 U/L    CK - MB 1.3 <3.6 ng/ml    CK-MB Index 1.2 0.0 - 4.0 %    Troponin-I, Qt. <0.02 0.0 - 0.045 NG/ML   PRO-BNP    Collection Time: 05/06/17 10:37 AM   Result Value Ref Range    NT pro-BNP 1712 0 - 1800 PG/ML   MAGNESIUM    Collection Time: 05/06/17 10:37 AM   Result Value Ref Range    Magnesium 2.7 (H) 1.6 - 2.6 mg/dL   POC G3    Collection Time: 05/06/17 10:45 AM   Result Value Ref Range    Device: ROOM AIR      FIO2 (POC) 21 %    pH (POC) 7.429 7.35 - 7.45      pCO2 (POC) 41.2 35.0 - 45.0 MMHG    pO2 (POC) 54 (L) 80 - 100 MMHG    HCO3 (POC) 27.3 (H) 22 - 26 MMOL/L    sO2 (POC) 89 (L) 92 - 97 %    Base excess (POC) 3 mmol/L    Allens test (POC) YES      Total resp.  rate 20      Site RIGHT RADIAL      Specimen type (POC) ARTERIAL      Performed by Sabina Badillo    EKG, 12 LEAD, INITIAL    Collection Time: 05/06/17 11:33 AM   Result Value Ref Range    Ventricular Rate 83 BPM    Atrial Rate 83 BPM    P-R Interval 148 ms    QRS Duration 140 ms    Q-T Interval 422 ms    QTC Calculation (Bezet) 495 ms    Calculated P Axis 62 degrees    Calculated R Axis -41 degrees    Calculated T Axis -13 degrees    Diagnosis       Normal sinus rhythm  Left axis deviation  Right bundle branch block  Moderate voltage criteria for LVH, may be normal variant  Abnormal ECG  When compared with ECG of 05-MAY-2017 17:01,  No significant change was found     URINALYSIS W/ RFLX MICROSCOPIC    Collection Time: 05/06/17  4:55 PM   Result Value Ref Range    Color YELLOW      Appearance CLEAR      Specific gravity 1.007 1.005 - 1.030      pH (UA) 6.5 5.0 - 8.0      Protein NEGATIVE  NEG mg/dL    Glucose NEGATIVE  NEG mg/dL    Ketone NEGATIVE  NEG mg/dL    Bilirubin NEGATIVE  NEG      Blood NEGATIVE  NEG      Urobilinogen 0.2 0.2 - 1.0 EU/dL    Nitrites NEGATIVE  NEG      Leukocyte Esterase NEGATIVE  NEG     MAGNESIUM    Collection Time: 05/07/17  3:08 AM   Result Value Ref Range    Magnesium 2.5 1.6 - 2.6 mg/dL         Chemistry Recent Labs      05/07/17   0308  05/06/17   1037  05/05/17   1730   GLU   --   117*  107*   NA   --   142  143   K   --   3.9  3.2*   CL   --   108  108   CO2   --   28  31   BUN   --   16  10   CREA   --   0.65  0.68   CA   --   8.5  8.4*   MG  2.5  2.7*  2.4 AGAP   --   6  4   BUCR   --   25*  15   AP   --   117  123*   TP   --   7.7  7.6   ALB   --   3.4  3.3*   GLOB   --   4.3*  4.3*   AGRAT   --   0.8  0.8        Lactic Acid Lactic acid   Date Value Ref Range Status   06/08/2014 1.4 0.4 - 2.0 MMOL/L Final     No results for input(s): LAC in the last 72 hours. Liver Enzymes Protein, total   Date Value Ref Range Status   05/06/2017 7.7 6.4 - 8.2 g/dL Final     Albumin   Date Value Ref Range Status   05/06/2017 3.4 3.4 - 5.0 g/dL Final     Globulin   Date Value Ref Range Status   05/06/2017 4.3 (H) 2.0 - 4.0 g/dL Final     A-G Ratio   Date Value Ref Range Status   05/06/2017 0.8 0.8 - 1.7   Final     AST (SGOT)   Date Value Ref Range Status   05/06/2017 25 15 - 37 U/L Final     Alk. phosphatase   Date Value Ref Range Status   05/06/2017 117 45 - 117 U/L Final     Recent Labs      05/06/17   1037  05/05/17   1730   TP  7.7  7.6   ALB  3.4  3.3*   GLOB  4.3*  4.3*   AGRAT  0.8  0.8   SGOT  25  20   AP  117  123*        CBC w/Diff Recent Labs      05/06/17   1037  05/05/17   1730   WBC  6.4  5.7   RBC  3.79*  4.07*   HGB  8.6*  9.3*   HCT  28.2*  30.2*   PLT  168  181   GRANS  85*  71   LYMPH  9*  13*   EOS  0  8*        Cardiac Enzymes Lab Results   Component Value Date/Time     05/06/2017 10:37 AM    CKMB 1.3 05/06/2017 10:37 AM    CKND1 1.2 05/06/2017 10:37 AM    TROIQ <0.02 05/06/2017 10:37 AM        BNP No results found for: BNP, BNPP, XBNPT     Coagulation No results for input(s): PTP, INR, APTT in the last 72 hours.     No lab exists for component: INREXT      Thyroid  No results found for: T4, T3U, TSH, TSHEXT    No results found for: T4     Lipid Panel Lab Results   Component Value Date/Time    Cholesterol, total 251 09/22/2010 08:04 AM    HDL Cholesterol 73 09/22/2010 08:04 AM    LDL, calculated 159.2 09/22/2010 08:04 AM    VLDL, calculated 18.8 09/22/2010 08:04 AM    Triglyceride 94 09/22/2010 08:04 AM    CHOL/HDL Ratio 3.4 09/22/2010 08:04 AM ABG Recent Labs      05/06/17   1045   PHI  7.429   PCO2I  41.2   PO2I  54*   HCO3I  27.3*   FIO2I  21        Urinalysis Lab Results   Component Value Date/Time    Color YELLOW 05/06/2017 04:55 PM    Appearance CLEAR 05/06/2017 04:55 PM    Specific gravity 1.007 05/06/2017 04:55 PM    pH (UA) 6.5 05/06/2017 04:55 PM    Protein NEGATIVE  05/06/2017 04:55 PM    Glucose NEGATIVE  05/06/2017 04:55 PM    Ketone NEGATIVE  05/06/2017 04:55 PM    Bilirubin NEGATIVE  05/06/2017 04:55 PM    Urobilinogen 0.2 05/06/2017 04:55 PM    Nitrites NEGATIVE  05/06/2017 04:55 PM    Leukocyte Esterase NEGATIVE  05/06/2017 04:55 PM        Micro  No results for input(s): SDES, CULT in the last 72 hours. No results for input(s): CULT in the last 72 hours. EKG No results found for this or any previous visit. PFT No flowsheet data found. Other ASA reactivity:   Pre-albumin:   Ionized Calcium:   NH4:   T3, FT4:  Cortisol:  Urine Osm:  Urine Lytes:   HbA1c:      Ultrasound      LE Doppler      ECHO No results found for this or any previous visit. CT (Most Recent)   Results from Hospital Encounter encounter on 07/31/16   CT CHEST W CONT   Narrative CT Chest With IV Contrast    HISTORY: Cough. Shortness of breath. Pneumonia. TECHNIQUE: CT images of the chest were obtained from the base of the neck to the  upper abdomen after administration of 75 cc of Isovue-300  Axial images in soft  tissue, bone and lung windows were obtained. Coronal reconstruction images were  also obtained. FINDINGS:     Thyroid gland appear normal. Several normal-sized lymph nodes are seen in the  precarinal space and the prevascular space. Largest one measured a millimeters. Pericardial recess was seen posterior to the ascending aorta. Thoracic aorta has normal caliber. Pulmonary arteries have normal caliber  without filling defects. SVC is patent. Central airway appear unremarkable.   Esophagus is normal. There is a small hiatal hernia. Interstitial densities in the right upper lobe has appearance consistent with  pulmonary fibrosis. There is traction bronchiectasis. Similar finding was seen  in the right middle lobe centrally and the right lower lobe centrally. To a  lesser degree. Similar pulmonary fibrosis with traction bronchiectasis was seen the left upper  lobe centrally. There is also bronchiectasis in the left lower lobe. No  confluent infiltrates are seen. No pleural effusion. There is scoliosis of the thoracic spine. Bony structures show no lytic or  blastic changes. Adrenal glands appear normal. Gallbladder distended normally as visualized. Spleen, liver, pancreas as visualized appear unremarkable. Impression IMPRESSION:  1. Pulmonary fibrosis along the bronchovascular tree with traction  bronchiectasis. This was seen in all the lobes but most prominently in the upper  lobes. 2. No acute confluent infiltrates or pleural effusion. 3. Small hiatal hernia. XR (Most Recent). CXR reviewed by me and compared with previous CXR   Results from Hospital Encounter encounter on 05/06/17   XR CHEST PA LAT   Narrative CHEST PA AND LATERAL    CPT CODE: 94677    HISTORY: Persistent cough, congestion, and wheezing x6 months with acute  exacerbation times several months , cough. COMPARISON: Chest x-ray May 5, 2017, July 31, 2016, October 10, 2009. FINDINGS:     PA and lateral views obtained. The cardiac and mediastinal silhouette is normal  for age. The lungs are emphysematous appearing with increased superimposed  interstitial markings predominantly at the upper to mid lungs unchanged from  July 2016. Interstitial markings have progressed compared to 2009. The  costophrenic angles are sharply defined. Pulmonary vascularity is normal. Marked  scoliosis.          Impression IMPRESSION:    Emphysema with superimposed chronic interstitial lung disease without change  from July 2016             Imaging: [x] I have personally reviewed the patients radiographs  CXR 5/6  FINDINGS:      PA and lateral views obtained. The cardiac and mediastinal silhouette is normal  for age. The lungs are emphysematous appearing with increased superimposed  interstitial markings predominantly at the upper to mid lungs unchanged from  July 2016. Interstitial markings have progressed compared to 2009. The  costophrenic angles are sharply defined.  Pulmonary vascularity is normal. Marked  scoliosis.     IMPRESSION  IMPRESSION:     Emphysema with superimposed chronic interstitial lung disease without change  from July 2016      [x]See my orders for details      JESUSITA Hurst  5/7/2017

## 2017-05-08 ENCOUNTER — TELEPHONE (OUTPATIENT)
Dept: PULMONOLOGY | Age: 77
End: 2017-05-08

## 2017-05-08 LAB
ANION GAP BLD CALC-SCNC: 11 MMOL/L (ref 3–18)
ATRIAL RATE: 82 BPM
ATRIAL RATE: 83 BPM
BASOPHILS # BLD AUTO: 0 K/UL (ref 0–0.1)
BASOPHILS # BLD: 0 % (ref 0–2)
BUN SERPL-MCNC: 15 MG/DL (ref 7–18)
BUN/CREAT SERPL: 17 (ref 12–20)
CALCIUM SERPL-MCNC: 8.6 MG/DL (ref 8.5–10.1)
CALCULATED P AXIS, ECG09: 55 DEGREES
CALCULATED P AXIS, ECG09: 62 DEGREES
CALCULATED R AXIS, ECG10: -41 DEGREES
CALCULATED R AXIS, ECG10: -47 DEGREES
CALCULATED T AXIS, ECG11: -13 DEGREES
CALCULATED T AXIS, ECG11: -5 DEGREES
CHLORIDE SERPL-SCNC: 106 MMOL/L (ref 100–108)
CO2 SERPL-SCNC: 24 MMOL/L (ref 21–32)
CREAT SERPL-MCNC: 0.89 MG/DL (ref 0.6–1.3)
DIAGNOSIS, 93000: NORMAL
DIAGNOSIS, 93000: NORMAL
DIFFERENTIAL METHOD BLD: ABNORMAL
EOSINOPHIL # BLD: 0 K/UL (ref 0–0.4)
EOSINOPHIL NFR BLD: 0 % (ref 0–5)
ERYTHROCYTE [DISTWIDTH] IN BLOOD BY AUTOMATED COUNT: 16.3 % (ref 11.6–14.5)
GLUCOSE BLD STRIP.AUTO-MCNC: 110 MG/DL (ref 70–110)
GLUCOSE BLD STRIP.AUTO-MCNC: 138 MG/DL (ref 70–110)
GLUCOSE BLD STRIP.AUTO-MCNC: 88 MG/DL (ref 70–110)
GLUCOSE BLD STRIP.AUTO-MCNC: 96 MG/DL (ref 70–110)
GLUCOSE SERPL-MCNC: 99 MG/DL (ref 74–99)
HCT VFR BLD AUTO: 30.8 % (ref 35–45)
HGB BLD-MCNC: 9.3 G/DL (ref 12–16)
LYMPHOCYTES # BLD AUTO: 14 % (ref 21–52)
LYMPHOCYTES # BLD: 1.1 K/UL (ref 0.9–3.6)
MAGNESIUM SERPL-MCNC: 2.2 MG/DL (ref 1.6–2.6)
MCH RBC QN AUTO: 22.5 PG (ref 24–34)
MCHC RBC AUTO-ENTMCNC: 30.2 G/DL (ref 31–37)
MCV RBC AUTO: 74.6 FL (ref 74–97)
MONOCYTES # BLD: 0.7 K/UL (ref 0.05–1.2)
MONOCYTES NFR BLD AUTO: 9 % (ref 3–10)
NEUTS SEG # BLD: 5.8 K/UL (ref 1.8–8)
NEUTS SEG NFR BLD AUTO: 77 % (ref 40–73)
P-R INTERVAL, ECG05: 138 MS
P-R INTERVAL, ECG05: 148 MS
PLATELET # BLD AUTO: 225 K/UL (ref 135–420)
PLATELET COMMENTS,PCOM: ABNORMAL
PMV BLD AUTO: 10.8 FL (ref 9.2–11.8)
POTASSIUM SERPL-SCNC: 3.3 MMOL/L (ref 3.5–5.5)
Q-T INTERVAL, ECG07: 406 MS
Q-T INTERVAL, ECG07: 422 MS
QRS DURATION, ECG06: 136 MS
QRS DURATION, ECG06: 140 MS
QTC CALCULATION (BEZET), ECG08: 474 MS
QTC CALCULATION (BEZET), ECG08: 495 MS
RBC # BLD AUTO: 4.13 M/UL (ref 4.2–5.3)
RBC MORPH BLD: ABNORMAL
RBC MORPH BLD: ABNORMAL
SODIUM SERPL-SCNC: 141 MMOL/L (ref 136–145)
VENTRICULAR RATE, ECG03: 82 BPM
VENTRICULAR RATE, ECG03: 83 BPM
WBC # BLD AUTO: 7.6 K/UL (ref 4.6–13.2)

## 2017-05-08 PROCEDURE — 80048 BASIC METABOLIC PNL TOTAL CA: CPT | Performed by: FAMILY MEDICINE

## 2017-05-08 PROCEDURE — 97162 PT EVAL MOD COMPLEX 30 MIN: CPT

## 2017-05-08 PROCEDURE — 74011636637 HC RX REV CODE- 636/637: Performed by: INTERNAL MEDICINE

## 2017-05-08 PROCEDURE — 74011000250 HC RX REV CODE- 250: Performed by: INTERNAL MEDICINE

## 2017-05-08 PROCEDURE — 77010033678 HC OXYGEN DAILY

## 2017-05-08 PROCEDURE — 74011250637 HC RX REV CODE- 250/637: Performed by: INTERNAL MEDICINE

## 2017-05-08 PROCEDURE — 85025 COMPLETE CBC W/AUTO DIFF WBC: CPT | Performed by: FAMILY MEDICINE

## 2017-05-08 PROCEDURE — 97165 OT EVAL LOW COMPLEX 30 MIN: CPT

## 2017-05-08 PROCEDURE — 65660000000 HC RM CCU STEPDOWN

## 2017-05-08 PROCEDURE — 36415 COLL VENOUS BLD VENIPUNCTURE: CPT | Performed by: FAMILY MEDICINE

## 2017-05-08 PROCEDURE — 82962 GLUCOSE BLOOD TEST: CPT

## 2017-05-08 PROCEDURE — 74011250636 HC RX REV CODE- 250/636: Performed by: NURSE PRACTITIONER

## 2017-05-08 PROCEDURE — 86738 MYCOPLASMA ANTIBODY: CPT | Performed by: FAMILY MEDICINE

## 2017-05-08 PROCEDURE — 83735 ASSAY OF MAGNESIUM: CPT | Performed by: FAMILY MEDICINE

## 2017-05-08 PROCEDURE — 74011250637 HC RX REV CODE- 250/637: Performed by: NURSE PRACTITIONER

## 2017-05-08 PROCEDURE — 94640 AIRWAY INHALATION TREATMENT: CPT

## 2017-05-08 PROCEDURE — 74011250636 HC RX REV CODE- 250/636: Performed by: INTERNAL MEDICINE

## 2017-05-08 RX ORDER — ZOLPIDEM TARTRATE 5 MG/1
10 TABLET ORAL
Status: DISCONTINUED | OUTPATIENT
Start: 2017-05-08 | End: 2017-05-09

## 2017-05-08 RX ORDER — POTASSIUM CHLORIDE 20 MEQ/1
20 TABLET, EXTENDED RELEASE ORAL
Status: COMPLETED | OUTPATIENT
Start: 2017-05-08 | End: 2017-05-08

## 2017-05-08 RX ORDER — IPRATROPIUM BROMIDE AND ALBUTEROL SULFATE 2.5; .5 MG/3ML; MG/3ML
3 SOLUTION RESPIRATORY (INHALATION)
Status: DISCONTINUED | OUTPATIENT
Start: 2017-05-08 | End: 2017-05-08

## 2017-05-08 RX ORDER — ALBUTEROL SULFATE 0.83 MG/ML
2.5 SOLUTION RESPIRATORY (INHALATION)
Status: DISCONTINUED | OUTPATIENT
Start: 2017-05-08 | End: 2017-05-15 | Stop reason: HOSPADM

## 2017-05-08 RX ORDER — ZOLPIDEM TARTRATE 5 MG/1
5 TABLET ORAL ONCE
Status: COMPLETED | OUTPATIENT
Start: 2017-05-08 | End: 2017-05-08

## 2017-05-08 RX ADMIN — Medication 10 ML: at 07:00

## 2017-05-08 RX ADMIN — POTASSIUM CHLORIDE 20 MEQ: 20 TABLET, EXTENDED RELEASE ORAL at 09:31

## 2017-05-08 RX ADMIN — NAPROXEN 250 MG: 250 TABLET ORAL at 17:36

## 2017-05-08 RX ADMIN — AMLODIPINE BESYLATE 5 MG: 5 TABLET ORAL at 09:30

## 2017-05-08 RX ADMIN — BUDESONIDE AND FORMOTEROL FUMARATE DIHYDRATE 2 PUFF: 160; 4.5 AEROSOL RESPIRATORY (INHALATION) at 07:54

## 2017-05-08 RX ADMIN — HYDROXYCHLOROQUINE SULFATE 200 MG: 200 TABLET, FILM COATED ENTERAL at 09:30

## 2017-05-08 RX ADMIN — ZOLPIDEM TARTRATE 5 MG: 5 TABLET ORAL at 01:57

## 2017-05-08 RX ADMIN — HEPARIN SODIUM 5000 UNITS: 5000 INJECTION, SOLUTION INTRAVENOUS; SUBCUTANEOUS at 21:45

## 2017-05-08 RX ADMIN — Medication 10 ML: at 13:22

## 2017-05-08 RX ADMIN — IPRATROPIUM BROMIDE AND ALBUTEROL SULFATE 3 ML: .5; 3 SOLUTION RESPIRATORY (INHALATION) at 03:24

## 2017-05-08 RX ADMIN — HYDROCODONE BITARTRATE AND ACETAMINOPHEN 1 TABLET: 5; 325 TABLET ORAL at 04:38

## 2017-05-08 RX ADMIN — PREDNISONE 30 MG: 20 TABLET ORAL at 13:11

## 2017-05-08 RX ADMIN — NAPROXEN 250 MG: 250 TABLET ORAL at 09:29

## 2017-05-08 RX ADMIN — HEPARIN SODIUM 5000 UNITS: 5000 INJECTION, SOLUTION INTRAVENOUS; SUBCUTANEOUS at 13:11

## 2017-05-08 RX ADMIN — LEVOFLOXACIN 750 MG: 5 INJECTION, SOLUTION INTRAVENOUS at 13:11

## 2017-05-08 RX ADMIN — IPRATROPIUM BROMIDE AND ALBUTEROL SULFATE 3 ML: .5; 3 SOLUTION RESPIRATORY (INHALATION) at 07:54

## 2017-05-08 RX ADMIN — Medication 10 ML: at 21:49

## 2017-05-08 RX ADMIN — FUROSEMIDE 40 MG: 40 TABLET ORAL at 09:30

## 2017-05-08 RX ADMIN — PANTOPRAZOLE SODIUM 40 MG: 40 TABLET, DELAYED RELEASE ORAL at 09:30

## 2017-05-08 RX ADMIN — POTASSIUM CHLORIDE 20 MEQ: 20 TABLET, EXTENDED RELEASE ORAL at 17:36

## 2017-05-08 RX ADMIN — BUDESONIDE AND FORMOTEROL FUMARATE DIHYDRATE 2 PUFF: 160; 4.5 AEROSOL RESPIRATORY (INHALATION) at 19:34

## 2017-05-08 RX ADMIN — POTASSIUM CHLORIDE 20 MEQ: 20 TABLET, EXTENDED RELEASE ORAL at 09:29

## 2017-05-08 RX ADMIN — HEPARIN SODIUM 5000 UNITS: 5000 INJECTION, SOLUTION INTRAVENOUS; SUBCUTANEOUS at 04:45

## 2017-05-08 NOTE — H&P
[address] [facility]    Name:  Amelia Sainz  MR#:  [id number]  :  1940  Account #:  [de-identified]  Date of Adm:  2017      CHIEF COMPLAINT: Shortness of breath. HISTORY OF PRESENT ILLNESS: This is a readmission of this 77  year-old black female with history of chronic interstitial lung disease,  lupus, rheumatoid arthritis, and pulmonary hypertension, who was  admitted because of shortness of breath. She was then admitted,  required steroids, and currently appears to be improving now on  Levaquin and oral prednisone. PAST MEDICAL HISTORY: SLE since , history of rheumatoid  arthritis, tonsillectomy, bilateral hernia repair, neck and polyp surgery,  multiple pneumonias in the past.    FAMILY HISTORY: Mother  of asthma. Father  of asbestosis. SOCIAL HISTORY: She is , does not smoke or drink. REVIEW OF SYSTEMS  HEENT: No headache. No visual or hearing problem. CARDIOVASCULAR/RESPIRATORY: No chest pain, but is short of  breath on exertion. GASTROINTESTINAL: No complaint. MUSCULOSKELETAL: No complaint. GENITOURINARY: No complaint. PHYSICAL EXAMINATION  GENERAL: She is awake and alert. No acute distress at rest.  VITAL SIGNS: As follows, blood pressure is 140/80, pulse 93,  temperature 96, saturation 94. HEAD: Normocephalic. EYES: Pupils equal. Eye ocular movements intact. NOSE: No septal deviation. MOUTH: Normal tongue, gums, buccal mucosa. NECK: Thyroid enlarged. Trachea is midline. CHEST: Symmetrical.  HEART: Regular. LUNGS: Decreased breath sounds. ABDOMEN: Soft, nontender. EXTREMITIES: Revealed no edema. LABORATORY DATA: Review of laboratory data revealed the  following: Hemoglobin is 9 grams, white count 7.6. Urine is negative. CHEMISTRY: BUN 15, creatinine 0.89. Magnesium is 2.2. Chest x-ray shows emphysema with chronic interstitial lung disease  without change. IMPRESSION  1.  Acute on chronic respiratory failure. 2. Interstitial lung disease. 3. SLE. 4. Pulmonary hypertension. 5. Anemia of chronic illness    DISCUSSION: Saturation level is low on exertion, so might need home  oxygen. Continue current medications for now, which includes  Prednisone 30 mg daily, Levaquin, Plaquenil, and other  bronchodilators.  Code status is MD JAGRUTI Aaron / TB  D:  05/08/2017   13:18  T:  05/08/2017   13:58  Job #:  [id number]

## 2017-05-08 NOTE — NURSE NAVIGATOR
Name: Merlene Alfred Age: 77 y.o. Gender: female  Date: 5/8/2017    Date of Admission: 5/6/2017  9:45 AM    Admission Type: Emergency    Patient Active Problem List    Diagnosis Date Noted    COPD exacerbation ([facility] 05/06/2017    Asthma exacerbation 08/05/2016    Systemic lupus erythematosus ([facility] 08/05/2016    Rheumatoid arthritis ([facility] 08/05/2016    HTN (hypertension) 08/05/2016    Dyslipidemia 08/05/2016    CAP (community acquired pneumonia) 07/31/2016    Pneumonia 07/31/2016    RUQ abdominal pain 06/08/2014    Pancreatitis, acute 06/08/2014    Cirrhosis of liver ([facility] 06/08/2014     Raúl Valdivia MD    Pulmonologist:    Past Medical History:   Diagnosis Date    Asthma     Chronic lung disease     Lupus ([facility]     Pulmonary hypertension ([facility]     Rheumatoid arthritis ([facility]      No past surgical history on file.   Current Facility-Administered Medications   Medication Dose Route Frequency    albuterol-ipratropium (DUO-NEB) 2.5 MG-0.5 MG/3 ML  3 mL Nebulization Q6H RT    phenol throat spray (CHLORASEPTIC) 1 Spray  1 Spray Oral PRN    predniSONE (DELTASONE) tablet 30 mg  30 mg Oral DAILY WITH LUNCH    HYDROcodone-acetaminophen (NORCO) 5-325 mg per tablet 1 Tab  1 Tab Oral Q6H PRN    insulin lispro (HUMALOG) injection   SubCUTAneous AC&HS    glucose chewable tablet 16 g  16 g Oral PRN    glucagon (GLUCAGEN) injection 1 mg  1 mg IntraMUSCular PRN    dextrose (D50W) injection syrg 12.5-25 g  25-50 mL IntraVENous PRN    benzonatate (TESSALON) capsule 100 mg  100 mg Oral TID PRN    zolpidem (AMBIEN) tablet 5 mg  5 mg Oral QHS PRN    amLODIPine (NORVASC) tablet 5 mg  5 mg Oral DAILY    furosemide (LASIX) tablet 40 mg  40 mg Oral DAILY    pantoprazole (PROTONIX) tablet 40 mg  40 mg Oral ACB    hydroxychloroquine (PLAQUENIL) tablet 200 mg  200 mg Oral DAILY    potassium chloride (K-DUR, KLOR-CON) SR tablet 20 mEq  20 mEq Oral BID    sodium chloride (NS) flush 5-10 mL  5-10 mL IntraVENous Q8H  sodium chloride (NS) flush 5-10 mL  5-10 mL IntraVENous PRN    levoFLOXacin (LEVAQUIN) 750 mg in D5W IVPB  750 mg IntraVENous Q24H    heparin (porcine) injection 5,000 Units  5,000 Units SubCUTAneous Q8H    budesonide-formoterol (SYMBICORT) 160-4.5 mcg/actuation HFA inhaler 2 Puff  2 Puff Inhalation BID RT    naproxen (NAPROSYN) tablet 250 mg  250 mg Oral BID WITH MEALS       Received Flu Vaccine for Current Season (usually Sept-March): Not Flu Season       Prior to admission patient was SOB          Today patient was observed lying in bed watching TV    Current Oxygen therapy O2 Device: Room air O2 Sat (%): 94 % Resp Rate: 20    Labs-   Recent Results (from the past 24 hour(s))   GLUCOSE, POC    Collection Time: 05/07/17 11:23 AM   Result Value Ref Range    Glucose (POC) 118 (H) 70 - 110 mg/dL   GLUCOSE, POC    Collection Time: 05/07/17  4:24 PM   Result Value Ref Range    Glucose (POC) 111 (H) 70 - 110 mg/dL   GLUCOSE, POC    Collection Time: 05/07/17  9:14 PM   Result Value Ref Range    Glucose (POC) 117 (H) 70 - 110 mg/dL   CBC WITH AUTOMATED DIFF    Collection Time: 05/08/17  3:37 AM   Result Value Ref Range    WBC 7.6 4.6 - 13.2 K/uL    RBC 4.13 (L) 4.20 - 5.30 M/uL    HGB 9.3 (L) 12.0 - 16.0 g/dL    HCT 30.8 (L) 35.0 - 45.0 %    MCV 74.6 74.0 - 97.0 FL    MCH 22.5 (L) 24.0 - 34.0 PG    MCHC 30.2 (L) 31.0 - 37.0 g/dL    RDW 16.3 (H) 11.6 - 14.5 %    PLATELET 470 736 - 804 K/uL    MPV 10.8 9.2 - 11.8 FL    NEUTROPHILS 77 (H) 40 - 73 %    LYMPHOCYTES 14 (L) 21 - 52 %    MONOCYTES 9 3 - 10 %    EOSINOPHILS 0 0 - 5 %    BASOPHILS 0 0 - 2 %    ABS. NEUTROPHILS 5.8 1.8 - 8.0 K/UL    ABS. LYMPHOCYTES 1.1 0.9 - 3.6 K/UL    ABS. MONOCYTES 0.7 0.05 - 1.2 K/UL    ABS. EOSINOPHILS 0.0 0.0 - 0.4 K/UL    ABS.  BASOPHILS 0.0 0.0 - 0.1 K/UL    DF AUTOMATED      PLATELET COMMENTS ADEQUATE PLATELETS      RBC COMMENTS MICROCYTOSIS  3+        RBC COMMENTS HYPOCHROMIA  2+       METABOLIC PANEL, BASIC    Collection Time: 05/08/17  3:37 AM   Result Value Ref Range    Sodium 141 136 - 145 mmol/L    Potassium 3.3 (L) 3.5 - 5.5 mmol/L    Chloride 106 100 - 108 mmol/L    CO2 24 21 - 32 mmol/L    Anion gap 11 3.0 - 18 mmol/L    Glucose 99 74 - 99 mg/dL    BUN 15 7.0 - 18 MG/DL    Creatinine 0.89 0.6 - 1.3 MG/DL    BUN/Creatinine ratio 17 12 - 20      GFR est AA >60 >60 ml/min/1.73m2    GFR est non-AA >60 >60 ml/min/1.73m2    Calcium 8.6 8.5 - 10.1 MG/DL   MAGNESIUM    Collection Time: 05/08/17  3:37 AM   Result Value Ref Range    Magnesium 2.2 1.6 - 2.6 mg/dL   GLUCOSE, POC    Collection Time: 05/08/17  8:48 AM   Result Value Ref Range    Glucose (POC) 96 70 - 110 mg/dL       No results found for: PO2, PCO2            Tests/Procedures resulted:  o Chest xray  o EKG  o Arterial Blood Gas  o Sputum culture    Recommendations:    Education reinforced, patient and/or family given opportunity to ask questions. Educational folder given and reviewed with patient, Reinforced Breathing techniques, follow up appointments, medication compliance, avoidance of lung irritants, and patient also watched educational video.     Yudi Harris RN

## 2017-05-08 NOTE — PROGRESS NOTES
Patient is not available to be assessed at this time. No family at bedside.     [address] [facility] Certified [address] [place] [address]   [phone]

## 2017-05-08 NOTE — ROUTINE PROCESS
Bedside and Verbal shift change report given to SAV Roque RN (oncoming nurse) by Ananth Bautista RN (offgoing nurse). Report included the following information SBAR, Kardex, Intake/Output, MAR and Recent Results.

## 2017-05-08 NOTE — ROUTINE PROCESS
Bedside shift change report given to CRISTO Bautista (oncoming nurse) by Marelyn Fabry RN (offgoing nurse). Report given with SBAR, Kardex, Intake/Output, MAR and Recent Results.

## 2017-05-08 NOTE — PROGRESS NOTES
Problem: Self Care Deficits Care Plan (Adult)  Goal: *Acute Goals and Plan of Care (Insert Text)  Outcome: Resolved/Met Date Met:  05/08/17  OCCUPATIONAL THERAPY EVALUATION/DISCHARGE     Patient: Ada Acevedo (8 y.o. female)  Date: 5/8/2017  Primary Diagnosis: COPD exacerbation ([facility]        Precautions: Fall      ASSESSMENT AND RECOMMENDATIONS:  Based on the objective data described below, the patient needed supervision with functional mobility with no AD and with LE self care tasks. Patient had WFL BUE AROM and  strength. Patient needed supervision with simulated toilet transfer. Patients' O2 stats decreased to 85% during mobility, but increased to 93% with rest and education/implementation of deep breathing techniques. Patients' nurse, Toya, notified and aware. Patient deferred to PT for mobility. Skilled acute care occupational therapy is not indicated at this time. Discharge Recommendations: Home Health  Further Equipment Recommendations for Discharge: shower chair       COMPLEXITY      Eval Complexity: History: LOW Complexity : Brief history review ; Examination: LOW Complexity : 1-3 performance deficits relating to physical, cognitive , or psychosocial skils that result in activity limitations and / or participation restrictions ; Decision Making:LOW Complexity : No comorbidities that affect functional and no verbal or physical assistance needed to complete eval tasks  Assessment: Low Complexity          G-CODES:      Self Care  Current  CI= 1-19%   Goal  CI= 1-19%   D/C  CI= 1-19%. The severity rating is based on the Level of Assistance required for Functional Mobility and ADLs. SUBJECTIVE:   Patient stated I'm a little SOB      OBJECTIVE DATA SUMMARY:       Past Medical History:   Diagnosis Date    Asthma      Chronic lung disease      [facility]      Pulmonary hypertension ([facility]      Rheumatoid arthritis ([facility]     No past surgical history on file.   Prior Level of Function/Home Situation: Patient reported she was independent in basic self care tasks and functional mobility PTA. Home Situation  Home Environment: Private residence  One/Two Story Residence: One story  Living Alone: No  Support Systems: Spouse/Significant Other/Partner  Patient Expects to be Discharged to[de-identified] Private residence  Current DME Used/Available at Home: Cane, straight  [X]     Right hand dominant       [ ]     Left hand dominant  Cognitive/Behavioral Status:  Neurologic State: Alert  Orientation Level: Oriented X4  Cognition: Follows commands     Skin: No skin changes noted     Edema: No edema noted     Vision/Perceptual:    Acuity: Within Defined Limits       Coordination:  Coordination: Within functional limits (BUEs)       Balance:  Sitting: Intact  Standing: Impaired; Without support  Standing - Static: Good  Standing - Dynamic : Fair     Strength:  Strength: Within functional limits (BUEs)     Tone & Sensation:  Tone: Normal (BUEs)  Sensation: Intact (BUEs)     Range of Motion:  AROM: Within functional limits (BUEs)     Functional Mobility and Transfers for ADLs:  Bed Mobility:  Rolling: Independent  Supine to Sit: Independent  Sit to Supine: Independent  Scooting: Independent  Transfers:  Sit to Stand: Supervision              Toilet Transfer : Stand-by asssistance (simulated)                 ADL Assessment:(clicnail judgement based on functional mobility)  Feeding: Independent     Oral Facial Hygiene/Grooming: Supervision     Bathing: Supervision     Upper Body Dressing: Supervision     Lower Body Dressing: Supervision     Toileting: Supervision     Pain:  Pt reports 0/10 pain or discomfort prior to treatment. Pt reports 0/10 pain or discomfort post treatment. Activity Tolerance:   Fair     Please refer to the flowsheet for vital signs taken during this treatment.   After treatment:   [ ]  Patient left in no apparent distress sitting up in chair  [X]  Patient left in no apparent distress in bed  [X]  Call bell left within reach  [X]  Nursing notified  [ ]  Caregiver present  [ ]  Bed alarm activated      COMMUNICATION/EDUCATION:   Communication/Collaboration:  [X]      Home safety education was provided and the patient/caregiver indicated understanding. [X]      Patient/family have participated as able and agree with findings and recommendations. [ ]      Patient is unable to participate in plan of care at this time.      Spencer Borges, OTR/L  Time Calculation: 9 mins

## 2017-05-08 NOTE — PROGRESS NOTES
Ambulated in downing with P.T. RA O2 sat 84%. Back to room to bed at rest RA 94%. Ambulating with O2 2L/M 98%. At rest on 2L/M  99%.

## 2017-05-08 NOTE — ROUTINE PROCESS
Report received from SAV Leon RN. Lying in bed asleep on rounds. No distress noted. Reported patient had difficulty getting to sleep. Reported changed in dose of Ambien. Pulmonary Dr francis this AM informed. See assessment. Call bell in reach.

## 2017-05-08 NOTE — PROGRESS NOTES
Problem: Mobility Impaired (Adult and Pediatric)  Goal: *Acute Goals and Plan of Care (Insert Text)  Physical Therapy Goals  Initiated 5/8/2017 and to be accomplished within 7 day(s)  1. Patient will transfer from bed to chair and chair to bed with independence using the least restrictive device. 3. Patient will perform sit to stand with independence. 4. Patient will ambulate with independence for 300 feet. 5. Patient will ascend/descend 3 stairs with 1 handrail(s) with supervision/set-up. PHYSICAL THERAPY EVALUATION     Patient: Sarah Elias (89 y.o. female)  Date: 5/8/2017  Primary Diagnosis: COPD exacerbation ([facility]        Precautions:   Fall      ASSESSMENT :  Based on the objective data described below, the patient presents with decreased strength, balance and activity tolerance resulting in decreased independence with functional mobility. Evaluation was performed on room air and patients O2 sat decreased to 85% with activity, after several minutes of resting it returned to 93%. Patients nurse was notified of O2 sat decrease with activity. Patient will benefit from skilled intervention to address the above impairments.   Patients rehabilitation potential is considered to be Good  Factors which may influence rehabilitation potential include:   [ ]         None noted  [ ]         Mental ability/status  [X]         Medical condition  [ ]         Home/family situation and support systems  [ ]         Safety awareness  [ ]         Pain tolerance/management  [ ]         Other:        PLAN :  Recommendations and Planned Interventions:  [X]           Bed Mobility Training             [X]    Neuromuscular Re-Education  [X]           Transfer Training                   [ ]    Orthotic/Prosthetic Training  [X]           Gait Training                          [ ]    Modalities  [X]           Therapeutic Exercises          [ ]    Edema Management/Control  [X]           Therapeutic Activities            [X] Patient and Family Training/Education  [ ]           Other (comment):     Frequency/Duration: Patient will be followed by physical therapy 1-2 times per day/4-7 days per week to address goals. Discharge Recommendations: Home Health  Further Equipment Recommendations for Discharge: N/A       G-CODES       Mobility  Current  CI= 1-19%   Goal  CH= 0%. The severity rating is based on the Level of Assistance required for Functional Mobility and ADLs. Eval Complexity: History: MEDIUM  Complexity : 1-2 comorbidities / personal factors will impact the outcome/ POC Exam:MEDIUM Complexity : 3 Standardized tests and measures addressing body structure, function, activity limitation and / or participation in recreation  Presentation: MEDIUM Complexity : Evolving with changing characteristics  Clinical Decision Making:Medium Complexity , Overall Complexity:MEDIUM      SUBJECTIVE:   Patient stated I get kind of dizzy and weak feeling when I move around.       OBJECTIVE DATA SUMMARY:       Past Medical History:   Diagnosis Date    Asthma      Chronic lung disease      Lupus ([facility]      Pulmonary hypertension ([facility])      Rheumatoid arthritis ([facility]     No past surgical history on file.   Prior Level of Function/Home Situation: independent with mobility without an assistive device but has a cane if she needs it  Home Situation  Home Environment: Private residence  [address] [place] [address] St: One story  Living Alone: No  Support Systems: Spouse/Significant Other/Partner  Patient Expects to be Discharged to[de-identified] Private residence  Current DME Used/Available at Home: [employer], straight  Critical Behavior:  Neurologic State: Alert  Orientation Level: Oriented X4  Cognition: Follows commands     Strength:    Strength: Generally decreased, functional (B Les)   Tone & Sensation:   Tone: Normal (B LEs)   Sensation: Intact (B LEs)     Range Of Motion:  AROM: Within functional limits (B LEs)   Functional Mobility:  Bed Mobility:  Rolling: Independent  Supine to Sit: Independent  Sit to Supine: Independent  Scooting: Independent  Transfers:  Sit to Stand: Supervision  Stand to Sit: Supervision  Balance:   Sitting: Intact  Standing: Without support  Standing - Static: Good  Standing - Dynamic : Fair  Ambulation/Gait Training:  Distance (ft): 160 Feet (ft)  Assistive Device:  (none)  Ambulation - Level of Assistance: Stand-by asssistance;Contact guard assistance  Gait Abnormalities: Trunk sway increased  Pain:  Pt reports 0/10 pain or discomfort prior to treatment. Pt reports 0/10 pain or discomfort post treatment. Activity Tolerance:   Fair-  Please refer to the flowsheet for vital signs taken during this treatment. After treatment:   [ ]         Patient left in no apparent distress sitting up in chair  [X]         Patient left in no apparent distress in bed  [X]         Call bell left within reach  [X]         Nursing present to give medications  [ ]         Caregiver present  [ ]         Bed alarm activated      COMMUNICATION/EDUCATION:   [X]         Fall prevention education was provided and the patient/caregiver indicated understanding. [X]         Patient/family have participated as able in goal setting and plan of care. [X]         Patient/family agree to work toward stated goals and plan of care. [ ]         Patient understands intent and goals of therapy, but is neutral about his/her participation. [ ]         Patient is unable to participate in goal setting and plan of care.   Educated patient on activity pacing     Thank you for this referral.  Ramiro Moore, PT   Time Calculation: 15 mins

## 2017-05-08 NOTE — PROGRESS NOTES
[facility]  Pulmonary, Critical Care, and Sleep Medicine    Name: Andrew Maria MRN: [id number]   : 1940 Hospital: [facility]   Date: 2017        Pulmonary Progress Note                                              Subjective/History:     Andrew Maria is a 77 y.o. female with PMHx significant for chronic interstitial lung disease, lupus, RA, and pulmonary hypertension who came to the ED after developing a worsening cough. Her last PFT was in 2016 from which she had a normal baseline FEV1. She is scheduled for an overnight oximetry study but has yet to have this done. 17  Patient is awake alert and oriented sitting up in bed finishing breakfast.   Stated that she did not sleep well last night. Patient states breathing is better however still complaining of a frequent cough that is now dry, sore throat. States that the bases of her lungs still feels tight. De-sats on ambulation to 86% on RA  Afebrile  No complaints of pain, nausea, abdominal discomfort, diarrhea    Review of Systems:  Constitutional: positive for fatigue  Eyes: negative  Ears, nose, mouth, throat, and face: positive for sore throat  Respiratory: positive for cough or dyspnea on exertion  Cardiovascular: negative  Gastrointestinal: negative  Genitourinary:negative  Integument/breast: negative  Hematologic/lymphatic: negative  Musculoskeletal:negative  Neurological: negative  Behavioral/Psych: negative    There is no immunization history on file for this patient. Allergies   Allergen Reactions    Latex Itching    Asmanex Hfa [Mometasone] Itching    Cleocin [Clindamycin Hcl] Itching and Swelling     Lips and tongue    Pcn [Penicillins] Swelling    Robitussin [Guaifenesin] Rash        Past Medical History:   Diagnosis Date    Asthma     Chronic lung disease     Lupus ([facility] [address]     Pulmonary hypertension (HCC)     Rheumatoid arthritis ([facility] [address]         No past surgical history on file. Family History   Problem Relation Age of Onset    Heart Disease Mother     Asthma Mother     Asthma Father     Parkinson's Disease Father       Social History   Substance Use Topics    Smoking status: Former Smoker     Packs/day: 0.20     Years: 16.00     Types: Cigarettes    Smokeless tobacco: Never Used    Alcohol use No        Prior to Admission medications    Medication Sig Start Date End Date Taking? Authorizing Provider   zolpidem (AMBIEN) 10 mg tablet Take 10 mg by mouth nightly as needed for Sleep. Yes Sameera King MD   cetirizine (ZYRTEC) 10 mg tablet Take 10 mg by mouth daily. Yes Historical Provider   diphenhydrAMINE (BENADRYL ALLERGY) 25 mg tablet Take 25 mg by mouth every six (6) hours as needed for Sleep. Yes Historical Provider   triamcinolone (NASACORT AQ) 55 mcg nasal inhaler 2 Sprays daily as needed. Yes Historical Provider   aspirin  mg tablet Take 650 mg by mouth every six (6) hours as needed for Pain. Yes Historical Provider   HYDROcodone-acetaminophen (NORCO) 5-325 mg per tablet Take 1 Tab by mouth two (2) times a day. Max Daily Amount: 2 Tabs. 8/15/16  Yes Alfredo Adam MD   albuterol-ipratropium (DUO-NEB) 2.5 mg-0.5 mg/3 ml nebu 3 mL by Nebulization route three (3) times daily. 8/15/16  Yes Alfredo Adam MD   amLODIPine (NORVASC) 5 mg tablet Take 1 Tab by mouth daily. Indications: HYPERTENSION 8/15/16  Yes Alfredo Adam MD   furosemide (LASIX) 40 mg tablet Take 1 Tab by mouth daily. Indications: EDEMA 8/15/16  Yes Alfredo Adam MD   omeprazole (PRILOSEC) 20 mg capsule Take 20 mg by mouth daily. Yes Sameera King MD   hydroxychloroquine (PLAQUENIL) 200 mg tablet Take 200 mg by mouth daily. Yes Sameera King MD   potassium chloride (KLOR-CON M20) 20 mEq tablet Take 20 mEq by mouth two (2) times a day. Yes Sameera King MD   naproxen sodium (ALEVE) 220 mg cap Take  by mouth as needed.     Sameera King MD       Current Facility-Administered Medications   Medication Dose Route Frequency    predniSONE (DELTASONE) tablet 30 mg  30 mg Oral DAILY WITH LUNCH    HYDROcodone-acetaminophen (NORCO) 5-325 mg per tablet 1 Tab  1 Tab Oral Q6H PRN    insulin lispro (HUMALOG) injection   SubCUTAneous AC&HS    glucose chewable tablet 16 g  16 g Oral PRN    glucagon (GLUCAGEN) injection 1 mg  1 mg IntraMUSCular PRN    dextrose (D50W) injection syrg 12.5-25 g  25-50 mL IntraVENous PRN    benzonatate (TESSALON) capsule 100 mg  100 mg Oral TID PRN    zolpidem (AMBIEN) tablet 5 mg  5 mg Oral QHS PRN    albuterol-ipratropium (DUO-NEB) 2.5 MG-0.5 MG/3 ML  3 mL Nebulization Q4H RT    amLODIPine (NORVASC) tablet 5 mg  5 mg Oral DAILY    furosemide (LASIX) tablet 40 mg  40 mg Oral DAILY    pantoprazole (PROTONIX) tablet 40 mg  40 mg Oral ACB    hydroxychloroquine (PLAQUENIL) tablet 200 mg  200 mg Oral DAILY    potassium chloride (K-DUR, KLOR-CON) SR tablet 20 mEq  20 mEq Oral BID    sodium chloride (NS) flush 5-10 mL  5-10 mL IntraVENous Q8H    sodium chloride (NS) flush 5-10 mL  5-10 mL IntraVENous PRN    levoFLOXacin (LEVAQUIN) 750 mg in D5W IVPB  750 mg IntraVENous Q24H    heparin (porcine) injection 5,000 Units  5,000 Units SubCUTAneous Q8H    budesonide-formoterol (SYMBICORT) 160-4.5 mcg/actuation HFA inhaler 2 Puff  2 Puff Inhalation BID RT    naproxen (NAPROSYN) tablet 250 mg  250 mg Oral BID WITH MEALS         Telemetry:normal sinus rhythm    Objective:   Vital Signs:    Visit Vitals    /81 (BP 1 Location: Left arm, BP Patient Position: At rest)    Pulse 100    Temp 97.8 °F (36.6 °C)    Resp 22    Ht 5' 3\" (1.6 m)    Wt 71.4 kg (157 lb 4.8 oz)    SpO2 94%    Breastfeeding No    BMI 27.86 kg/m2       O2 Device: Room air   O2 Flow Rate (L/min): 2 l/min   Temp (24hrs), Av.8 °F (36.6 °C), Min:97 °F (36.1 °C), Max:98.9 °F (37.2 °C)       Intake/Output:   Last shift:         Last 3 shifts: 1901 -  0700  In: 720 [P.O.:720]  Out: 1300 [Urine:1300]    Intake/Output Summary (Last 24 hours) at 05/08/17 5667  Last data filed at 05/07/17 2345   Gross per 24 hour   Intake              480 ml   Output              550 ml   Net              -70 ml       Physical Exam:     General appearance - oriented to person, place, and time and acyanotic, in no respiratory distress  Mental status - normal mood, behavior, speech, motor activity, and thought processes  Eyes - pupils equal and reactive, extraocular eye movements intact, sclera anicteric  Mouth - mucous membranes dry, pharynx normal without lesions  Neck - supple, no significant adenopathy  Chest - coarseness noted bilateral anterior, diminished in bilateral bases.  no tachypnea at rest, no retractions or cyanosis  Heart - normal rate, regular rhythm, normal S1, S2, no murmurs, rubs, clicks or gallops  Abdomen - soft, nontender, nondistended, no masses or organomegaly  bowel sounds normal  Neurological - alert, oriented, normal speech, no focal findings or movement disorder noted  Musculoskeletal - no joint tenderness, deformity or swelling  Extremities - peripheral pulses normal, no pedal edema, no clubbing or cyanosis  Skin -normal turgor, no rashes, no suspicious skin lesions noted    Data:       Recent Results (from the past 24 hour(s))   GLUCOSE, POC    Collection Time: 05/07/17 11:23 AM   Result Value Ref Range    Glucose (POC) 118 (H) 70 - 110 mg/dL   GLUCOSE, POC    Collection Time: 05/07/17  4:24 PM   Result Value Ref Range    Glucose (POC) 111 (H) 70 - 110 mg/dL   GLUCOSE, POC    Collection Time: 05/07/17  9:14 PM   Result Value Ref Range    Glucose (POC) 117 (H) 70 - 110 mg/dL   CBC WITH AUTOMATED DIFF    Collection Time: 05/08/17  3:37 AM   Result Value Ref Range    WBC 7.6 4.6 - 13.2 K/uL    RBC 4.13 (L) 4.20 - 5.30 M/uL    HGB 9.3 (L) 12.0 - 16.0 g/dL    HCT 30.8 (L) 35.0 - 45.0 %    MCV 74.6 74.0 - 97.0 FL    MCH 22.5 (L) 24.0 - 34.0 PG    MCHC 30.2 (L) 31.0 - 37.0 g/dL    RDW 16.3 (H) 11.6 - 14.5 %    PLATELET 525 690 - 704 K/uL    MPV 10.8 9.2 - 11.8 FL    NEUTROPHILS 77 (H) 40 - 73 %    LYMPHOCYTES 14 (L) 21 - 52 %    MONOCYTES 9 3 - 10 %    EOSINOPHILS 0 0 - 5 %    BASOPHILS 0 0 - 2 %    ABS. NEUTROPHILS 5.8 1.8 - 8.0 K/UL    ABS. LYMPHOCYTES 1.1 0.9 - 3.6 K/UL    ABS. MONOCYTES 0.7 0.05 - 1.2 K/UL    ABS. EOSINOPHILS 0.0 0.0 - 0.4 K/UL    ABS. BASOPHILS 0.0 0.0 - 0.1 K/UL    DF AUTOMATED      PLATELET COMMENTS ADEQUATE PLATELETS      RBC COMMENTS MICROCYTOSIS  3+        RBC COMMENTS HYPOCHROMIA  2+       METABOLIC PANEL, BASIC    Collection Time: 05/08/17  3:37 AM   Result Value Ref Range    Sodium 141 136 - 145 mmol/L    Potassium 3.3 (L) 3.5 - 5.5 mmol/L    Chloride 106 100 - 108 mmol/L    CO2 24 21 - 32 mmol/L    Anion gap 11 3.0 - 18 mmol/L    Glucose 99 74 - 99 mg/dL    BUN 15 7.0 - 18 MG/DL    Creatinine 0.89 0.6 - 1.3 MG/DL    BUN/Creatinine ratio 17 12 - 20      GFR est AA >60 >60 ml/min/1.73m2    GFR est non-AA >60 >60 ml/min/1.73m2    Calcium 8.6 8.5 - 10.1 MG/DL   MAGNESIUM    Collection Time: 05/08/17  3:37 AM   Result Value Ref Range    Magnesium 2.2 1.6 - 2.6 mg/dL         Chemistry Recent Labs      05/08/17   0337  05/07/17   0308  05/06/17   1037  05/05/17   1730   GLU  99   --   117*  107*   NA  141   --   142  143   K  3.3*   --   3.9  3.2*   CL  106   --   108  108   CO2  24   --   28  31   BUN  15   --   16  10   CREA  0.89   --   0.65  0.68   CA  8.6   --   8.5  8.4*   MG  2.2  2.5  2.7*  2.4   AGAP  11   --   6  4   BUCR  17   --   25*  15   AP   --    --   117  123*   TP   --    --   7.7  7.6   ALB   --    --   3.4  3.3*   GLOB   --    --   4.3*  4.3*   AGRAT   --    --   0.8  0.8        Lactic Acid Lactic acid   Date Value Ref Range Status   06/08/2014 1.4 0.4 - 2.0 MMOL/L Final     No results for input(s): LAC in the last 72 hours.      Liver Enzymes Protein, total   Date Value Ref Range Status   05/06/2017 7.7 6.4 - 8.2 g/dL Final     Albumin   Date Value Ref Range Status 05/06/2017 3.4 3.4 - 5.0 g/dL Final     Globulin   Date Value Ref Range Status   05/06/2017 4.3 (H) 2.0 - 4.0 g/dL Final     A-G Ratio   Date Value Ref Range Status   05/06/2017 0.8 0.8 - 1.7   Final     AST (SGOT)   Date Value Ref Range Status   05/06/2017 25 15 - 37 U/L Final     Alk. phosphatase   Date Value Ref Range Status   05/06/2017 117 45 - 117 U/L Final     Recent Labs      05/06/17   1037  05/05/17   1730   TP  7.7  7.6   ALB  3.4  3.3*   GLOB  4.3*  4.3*   AGRAT  0.8  0.8   SGOT  25  20   AP  117  123*        CBC w/Diff Recent Labs      05/08/17   0337  05/06/17   1037  05/05/17   1730   WBC  7.6  6.4  5.7   RBC  4.13*  3.79*  4.07*   HGB  9.3*  8.6*  9.3*   HCT  30.8*  28.2*  30.2*   PLT  225  168  181   GRANS  77*  85*  71   LYMPH  14*  9*  13*   EOS  0  0  8*        Cardiac Enzymes No results found for: CPK, CKMMB, CKMB, RCK3, CKMBT, CKNDX, CKND1, DORIS, TROPT, TROIQ, MARK, TROPT, TNIPOC, BNP, BNPP     BNP No results found for: BNP, BNPP, XBNPT     Coagulation No results for input(s): PTP, INR, APTT in the last 72 hours.     No lab exists for component: INREXT, INREXT      Thyroid  No results found for: T4, T3U, TSH, TSHEXT, TSHEXT    No results found for: T4     Lipid Panel Lab Results   Component Value Date/Time    Cholesterol, total 251 09/22/2010 08:04 AM    HDL Cholesterol 73 09/22/2010 08:04 AM    LDL, calculated 159.2 09/22/2010 08:04 AM    VLDL, calculated 18.8 09/22/2010 08:04 AM    Triglyceride 94 09/22/2010 08:04 AM    CHOL/HDL Ratio 3.4 09/22/2010 08:04 AM        ABG Recent Labs      05/06/17   1045   PHI  7.429   PCO2I  41.2   PO2I  54*   HCO3I  27.3*   FIO2I  21        Urinalysis Lab Results   Component Value Date/Time    Color YELLOW 05/06/2017 04:55 PM    Appearance CLEAR 05/06/2017 04:55 PM    Specific gravity 1.007 05/06/2017 04:55 PM    pH (UA) 6.5 05/06/2017 04:55 PM    Protein NEGATIVE  05/06/2017 04:55 PM    Glucose NEGATIVE  05/06/2017 04:55 PM    Ketone NEGATIVE  05/06/2017 04:55 PM    Bilirubin NEGATIVE  05/06/2017 04:55 PM    Urobilinogen 0.2 05/06/2017 04:55 PM    Nitrites NEGATIVE  05/06/2017 04:55 PM    Leukocyte Esterase NEGATIVE  05/06/2017 04:55 PM        Micro  No results for input(s): SDES, CULT in the last 72 hours. No results for input(s): CULT in the last 72 hours. EKG No results found for this or any previous visit. PFT No flowsheet data found. Other ASA reactivity:   Pre-albumin:   Ionized Calcium:   NH4:   T3, FT4:  Cortisol:  Urine Osm:  Urine Lytes:   HbA1c:      Ultrasound      LE Doppler      ECHO No results found for this or any previous visit. CT (Most Recent)   Results from Hospital Encounter encounter on 05/06/17   CT CHEST WO CONT   Narrative CT scan of chest, without intravenous contrast:        INDICATION:    Acute hypoxemic respiratory failure. COPD with exacerbation. Chronic lung disease. Pulmonary hypertension. TECHNIQUE:    Multidetector CT scan with 5 mm slice thickness, without intravenous contrast,  including chest and subphrenic levels. Sagittal and coronal images reformatted. All CT scans at this facility are performed using dose optimization technique as  appropriate to a  performed  examination, to include automated exposer control,  adjustment mA and / or  KV according to patient size (including appropriate  matching  for site specific examination), or use  of iterative  reconstruction  technique. COMPARISON STUDY: CT scan of chest on 7/31/2016. FINDINGS:    In upper lobe of right lung there are dense heterogeneous fibrotic changes with  volume loss and traction bronchiectasis, as also noted on previous CT scan on  7/31/2016. In the upper and midportion of right lower lobe there are also mild to moderate  fibrotic changes with chronic atelectatic changes, similar to previous study.     In left upper lobe there are also moderate dense heterogeneous fibrotic changes  with mild volume loss and mild traction bronchiectasis, similar to previous  study. Note is again made of mild to moderate chronic fibrotic changes in lingula of  left lung and in left lower lobe,, mildly increases compared to previous study. There is no definable new infiltrates or atelectasis in lungs. No evidence of pneumothorax or pleural effusion. There is no definable mass or pathologic lymphadenopathy at the thoracic inlet  or in visualized mediastinum as on these noncontrast study. The study demonstrates marked calcified plaques in the coronary arteries. There is no evidence of pericardial effusion. The study shows moderate to marked dextroscoliosis and moderate multilevel  spondylosis in thoracic spine as before. In visualized upper and midportion of liver and spleen there is no focal  abnormality. Spleen appears to be generous size. The lower portion of spleen is  not visualized on the study. In visualized upper portion of gallbladder at least one small stone is  identified. In both lung there are mild emphysematous changes without any obvious bulla  formation. IMPRESSIONS:    As compared to previous study in upper lobes of both lungs there is no interval  change. Mildly increased fibrotic changes in lower lobes of both lungs and  lingula of left lung. In upper lobes of both lung there are marked heterogeneous fibrotic changes,  right greater than left with evidence of volume loss and traction  bronchiectasis, similar to previous study. In rest of both lung there are mild-to-moderate scattered fibrotic changes with  associated chronic atelectatic changes in the fibrotic areas, similar to  previous study but probably mildly increased in left and right lower lobe and  lingula. .    There is no definable new infiltrates, new atelectasis or mass lesion in either  lung. Mild COPD without obvious bulla formation. No evidence of pneumothorax or pleural effusion.     Cholelithiasis or gallbladder is not completely visualized on this study. Findings suggestive of splenomegaly but spleen is not is that completely  included             XR (Most Recent). CXR reviewed by me and compared with previous CXR   Results from Hospital Encounter encounter on 05/06/17   XR CHEST PA LAT   Narrative CHEST PA AND LATERAL    CPT CODE: 12328    HISTORY: Persistent cough, congestion, and wheezing x6 months with acute  exacerbation times several months , cough. COMPARISON: Chest x-ray May 5, 2017, July 31, 2016, October 10, 2009. FINDINGS:     PA and lateral views obtained. The cardiac and mediastinal silhouette is normal  for age. The lungs are emphysematous appearing with increased superimposed  interstitial markings predominantly at the upper to mid lungs unchanged from  July 2016. Interstitial markings have progressed compared to 2009. The  costophrenic angles are sharply defined. Pulmonary vascularity is normal. Marked  scoliosis. Impression IMPRESSION:    Emphysema with superimposed chronic interstitial lung disease without change  from July 2016             Imaging:   [x] I have personally reviewed the patients radiographs  CXR 5/6  FINDINGS:      PA and lateral views obtained. The cardiac and mediastinal silhouette is normal  for age. The lungs are emphysematous appearing with increased superimposed  interstitial markings predominantly at the upper to mid lungs unchanged from  July 2016. Interstitial markings have progressed compared to 2009. The  costophrenic angles are sharply defined.  Pulmonary vascularity is normal. Marked  scoliosis.       IMPRESSION:     Emphysema with superimposed chronic interstitial lung disease without change  from July 2016    IMPRESSION:   · Acute hypoxemic respiratory failure  · Pulmonary HTN - RV pressure 50 mmHg on 5/3/17  · Lupus, with interstitial lung disease, on Plaquenil   · Cough, with likely bronchitis, no infiltrate on CXR  · Chronic anemia  · Hx tobacco abuse, stopped in [address] (1pack per week x 16 years)      RECOMMENDATIONS:   · Change duo-nebs to q 6hrs,Continue Symbicort as maintenance  · Continue short course of Levaquin - 5 days total  · CT chest- shows mildly increased fibrotic changes in bilateral lower lobes and lingula of left lung. There is no definable new infiltrates, new atelectasis or mass lesion in either  · lung. · Strep- negative , legionella Ag -negative. IgE -WNL, Mycoplasma - pending  · Continue prednisone, will taper as patient shows clinical improvement  · Last PFT 9/2016 was normal study  · Replace E-ytes PRN - 20 meq in addition to scheduled K+   · Will need eval for home oxygen prior to discharge  · Would benefit from sleep study as outpatient  · Will need to follow up in pulmonary clinic with Dr. Baldo Be once discharged.         [x]See my orders for details      Manan Costa NP    5/8/2017 7:12 AM

## 2017-05-09 LAB
25(OH)D3 SERPL-MCNC: 9.2 NG/ML (ref 30–100)
ANION GAP BLD CALC-SCNC: 8 MMOL/L (ref 3–18)
APTT PPP: 33.7 SEC (ref 23–36.4)
BUN SERPL-MCNC: 15 MG/DL (ref 7–18)
BUN/CREAT SERPL: 18 (ref 12–20)
CALCIUM SERPL-MCNC: 8.8 MG/DL (ref 8.5–10.1)
CHLORIDE SERPL-SCNC: 106 MMOL/L (ref 100–108)
CO2 SERPL-SCNC: 29 MMOL/L (ref 21–32)
CREAT SERPL-MCNC: 0.84 MG/DL (ref 0.6–1.3)
GLUCOSE BLD STRIP.AUTO-MCNC: 101 MG/DL (ref 70–110)
GLUCOSE BLD STRIP.AUTO-MCNC: 109 MG/DL (ref 70–110)
GLUCOSE BLD STRIP.AUTO-MCNC: 113 MG/DL (ref 70–110)
GLUCOSE BLD STRIP.AUTO-MCNC: 90 MG/DL (ref 70–110)
GLUCOSE SERPL-MCNC: 93 MG/DL (ref 74–99)
M PNEUMO IGG SER IA-ACNC: 156 U/ML (ref 0–99)
M PNEUMO IGM SER IA-ACNC: <770 U/ML (ref 0–769)
POTASSIUM SERPL-SCNC: 3.6 MMOL/L (ref 3.5–5.5)
SODIUM SERPL-SCNC: 143 MMOL/L (ref 136–145)

## 2017-05-09 PROCEDURE — 74011250637 HC RX REV CODE- 250/637: Performed by: INTERNAL MEDICINE

## 2017-05-09 PROCEDURE — 85730 THROMBOPLASTIN TIME PARTIAL: CPT | Performed by: INTERNAL MEDICINE

## 2017-05-09 PROCEDURE — 82164 ANGIOTENSIN I ENZYME TEST: CPT | Performed by: INTERNAL MEDICINE

## 2017-05-09 PROCEDURE — 94640 AIRWAY INHALATION TREATMENT: CPT

## 2017-05-09 PROCEDURE — 82962 GLUCOSE BLOOD TEST: CPT

## 2017-05-09 PROCEDURE — 86146 BETA-2 GLYCOPROTEIN ANTIBODY: CPT | Performed by: INTERNAL MEDICINE

## 2017-05-09 PROCEDURE — 65660000000 HC RM CCU STEPDOWN

## 2017-05-09 PROCEDURE — 74011250636 HC RX REV CODE- 250/636: Performed by: NURSE PRACTITIONER

## 2017-05-09 PROCEDURE — 74011636637 HC RX REV CODE- 636/637: Performed by: INTERNAL MEDICINE

## 2017-05-09 PROCEDURE — 97116 GAIT TRAINING THERAPY: CPT

## 2017-05-09 PROCEDURE — 82306 VITAMIN D 25 HYDROXY: CPT | Performed by: INTERNAL MEDICINE

## 2017-05-09 PROCEDURE — 74011250636 HC RX REV CODE- 250/636: Performed by: INTERNAL MEDICINE

## 2017-05-09 PROCEDURE — 74011250637 HC RX REV CODE- 250/637: Performed by: FAMILY MEDICINE

## 2017-05-09 PROCEDURE — 36415 COLL VENOUS BLD VENIPUNCTURE: CPT | Performed by: INTERNAL MEDICINE

## 2017-05-09 PROCEDURE — 80048 BASIC METABOLIC PNL TOTAL CA: CPT | Performed by: INTERNAL MEDICINE

## 2017-05-09 PROCEDURE — 86147 CARDIOLIPIN ANTIBODY EA IG: CPT | Performed by: INTERNAL MEDICINE

## 2017-05-09 PROCEDURE — 82652 VIT D 1 25-DIHYDROXY: CPT | Performed by: INTERNAL MEDICINE

## 2017-05-09 RX ORDER — TRAZODONE HYDROCHLORIDE 50 MG/1
25 TABLET ORAL ONCE
Status: COMPLETED | OUTPATIENT
Start: 2017-05-09 | End: 2017-05-09

## 2017-05-09 RX ORDER — POTASSIUM CHLORIDE 1.5 G/1.77G
40 POWDER, FOR SOLUTION ORAL
Status: COMPLETED | OUTPATIENT
Start: 2017-05-09 | End: 2017-05-09

## 2017-05-09 RX ORDER — PREDNISONE 20 MG/1
20 TABLET ORAL
Status: DISCONTINUED | OUTPATIENT
Start: 2017-05-09 | End: 2017-05-14

## 2017-05-09 RX ORDER — FUROSEMIDE 40 MG/1
40 TABLET ORAL 2 TIMES DAILY
Status: DISCONTINUED | OUTPATIENT
Start: 2017-05-09 | End: 2017-05-12

## 2017-05-09 RX ORDER — TRAZODONE HYDROCHLORIDE 50 MG/1
50 TABLET ORAL
Status: DISCONTINUED | OUTPATIENT
Start: 2017-05-09 | End: 2017-05-15 | Stop reason: HOSPADM

## 2017-05-09 RX ADMIN — POTASSIUM CHLORIDE 20 MEQ: 20 TABLET, EXTENDED RELEASE ORAL at 09:16

## 2017-05-09 RX ADMIN — PREDNISONE 20 MG: 20 TABLET ORAL at 12:17

## 2017-05-09 RX ADMIN — TIOTROPIUM BROMIDE 18 MCG: 18 CAPSULE ORAL; RESPIRATORY (INHALATION) at 08:27

## 2017-05-09 RX ADMIN — LEVOFLOXACIN 750 MG: 5 INJECTION, SOLUTION INTRAVENOUS at 12:17

## 2017-05-09 RX ADMIN — AMLODIPINE BESYLATE 5 MG: 5 TABLET ORAL at 09:16

## 2017-05-09 RX ADMIN — HEPARIN SODIUM 5000 UNITS: 5000 INJECTION, SOLUTION INTRAVENOUS; SUBCUTANEOUS at 20:36

## 2017-05-09 RX ADMIN — POTASSIUM CHLORIDE 40 MEQ: 1.5 POWDER, FOR SOLUTION ORAL at 20:36

## 2017-05-09 RX ADMIN — HYDROXYCHLOROQUINE SULFATE 200 MG: 200 TABLET, FILM COATED ENTERAL at 09:16

## 2017-05-09 RX ADMIN — Medication 10 ML: at 21:09

## 2017-05-09 RX ADMIN — TRAZODONE HYDROCHLORIDE 25 MG: 50 TABLET ORAL at 03:41

## 2017-05-09 RX ADMIN — BUDESONIDE AND FORMOTEROL FUMARATE DIHYDRATE 2 PUFF: 160; 4.5 AEROSOL RESPIRATORY (INHALATION) at 20:19

## 2017-05-09 RX ADMIN — Medication 10 ML: at 12:18

## 2017-05-09 RX ADMIN — ZOLPIDEM TARTRATE 10 MG: 5 TABLET ORAL at 00:31

## 2017-05-09 RX ADMIN — BUDESONIDE AND FORMOTEROL FUMARATE DIHYDRATE 2 PUFF: 160; 4.5 AEROSOL RESPIRATORY (INHALATION) at 07:16

## 2017-05-09 RX ADMIN — HEPARIN SODIUM 5000 UNITS: 5000 INJECTION, SOLUTION INTRAVENOUS; SUBCUTANEOUS at 12:16

## 2017-05-09 RX ADMIN — POTASSIUM CHLORIDE 20 MEQ: 20 TABLET, EXTENDED RELEASE ORAL at 17:35

## 2017-05-09 RX ADMIN — HEPARIN SODIUM 5000 UNITS: 5000 INJECTION, SOLUTION INTRAVENOUS; SUBCUTANEOUS at 03:45

## 2017-05-09 RX ADMIN — FUROSEMIDE 40 MG: 40 TABLET ORAL at 17:38

## 2017-05-09 RX ADMIN — FUROSEMIDE 40 MG: 40 TABLET ORAL at 09:16

## 2017-05-09 RX ADMIN — PANTOPRAZOLE SODIUM 40 MG: 40 TABLET, DELAYED RELEASE ORAL at 09:16

## 2017-05-09 RX ADMIN — NAPROXEN 250 MG: 250 TABLET ORAL at 09:16

## 2017-05-09 NOTE — ROUTINE PROCESS
Bedside and Verbal shift change report given to SAV Sheets RN (oncoming nurse) by Ananth Bautista RN (offgoing nurse). Report included the following information SBAR, Kardex, Intake/Output, MAR and Recent Results.

## 2017-05-09 NOTE — PROGRESS NOTES
[facility]  Pulmonary, Critical Care, and Sleep Medicine    Name: Macy Gaines MRN: [id number]   : 1940 Hospital: [address] [place] [address] Dr   Date: 2017        Pulmonary Progress Note                                              Subjective/History:     Macy Gaines is a 77 y.o. female with PMHx significant for chronic interstitial lung disease, lupus, RA, and pulmonary hypertension who came to the ED after developing a worsening cough. Her last PFT was in 2016 from which she had a normal baseline FEV1. She is scheduled for an overnight oximetry study but has yet to have this done. 17  Feels much better vs. Admission. SaO2 low 90's on RA. Has BL LE edema. Review of Systems:  Constitutional: positive for fatigue  Eyes: negative  Ears, nose, mouth, throat, and face: positive for sore throat  Respiratory: positive for cough or dyspnea on exertion  Cardiovascular: negative  Gastrointestinal: negative  Genitourinary:negative  Integument/breast: negative  Hematologic/lymphatic: negative  Musculoskeletal:negative  Neurological: negative  Behavioral/Psych: negative    There is no immunization history on file for this patient. Allergies   Allergen Reactions    Latex Itching    Asmanex Hfa [Mometasone] Itching    Cleocin [Clindamycin Hcl] Itching and Swelling     Lips and tongue    Pcn [Penicillins] Swelling    Robitussin [Guaifenesin] Rash        Past Medical History:   Diagnosis Date    Asthma     Chronic lung disease     Lupus ([facility] [address]     Pulmonary hypertension (HCC)     Rheumatoid arthritis ([facility] [address]         No past surgical history on file.      Family History   Problem Relation Age of Onset    Heart Disease Mother     Asthma Mother     Asthma Father     Parkinson's Disease Father       Social History   Substance Use Topics    Smoking status: Former Smoker     Packs/day: 0.20     Years: 16.00     Types: Cigarettes    Smokeless tobacco: Never Used    Alcohol use No        Prior to Admission medications    Medication Sig Start Date End Date Taking? Authorizing Provider   zolpidem (AMBIEN) 10 mg tablet Take 10 mg by mouth nightly as needed for Sleep. Yes Sameera King MD   cetirizine (ZYRTEC) 10 mg tablet Take 10 mg by mouth daily. Yes Historical Provider   diphenhydrAMINE (BENADRYL ALLERGY) 25 mg tablet Take 25 mg by mouth every six (6) hours as needed for Sleep. Yes Historical Provider   triamcinolone (NASACORT AQ) 55 mcg nasal inhaler 2 Sprays daily as needed. Yes Historical Provider   aspirin  mg tablet Take 650 mg by mouth every six (6) hours as needed for Pain. Yes Historical Provider   HYDROcodone-acetaminophen (NORCO) 5-325 mg per tablet Take 1 Tab by mouth two (2) times a day. Max Daily Amount: 2 Tabs. 8/15/16  Yes Beverley Celis MD   albuterol-ipratropium (DUO-NEB) 2.5 mg-0.5 mg/3 ml nebu 3 mL by Nebulization route three (3) times daily. 8/15/16  Yes Beverley Celis MD   amLODIPine (NORVASC) 5 mg tablet Take 1 Tab by mouth daily. Indications: HYPERTENSION 8/15/16  Yes Beverley Celis MD   furosemide (LASIX) 40 mg tablet Take 1 Tab by mouth daily. Indications: EDEMA 8/15/16  Yes Beverley Celis MD   omeprazole (PRILOSEC) 20 mg capsule Take 20 mg by mouth daily. Yes Sameera King MD   hydroxychloroquine (PLAQUENIL) 200 mg tablet Take 200 mg by mouth daily. Yes Sameera King MD   potassium chloride (KLOR-CON M20) 20 mEq tablet Take 20 mEq by mouth two (2) times a day. Yes Sameera King MD   naproxen sodium (ALEVE) 220 mg cap Take  by mouth as needed.     Sameera King MD       Current Facility-Administered Medications   Medication Dose Route Frequency    phenol throat spray (CHLORASEPTIC) 1 Spray  1 Spray Oral PRN    tiotropium (SPIRIVA) inhalation capsule 18 mcg  1 Cap Inhalation DAILY    albuterol (PROVENTIL VENTOLIN) nebulizer solution 2.5 mg  2.5 mg Nebulization Q6H PRN    zolpidem (AMBIEN) tablet 10 mg  10 mg Oral QHS PRN    predniSONE (DELTASONE) tablet 30 mg  30 mg Oral DAILY WITH LUNCH    HYDROcodone-acetaminophen (NORCO) 5-325 mg per tablet 1 Tab  1 Tab Oral Q6H PRN    insulin lispro (HUMALOG) injection   SubCUTAneous AC&HS    glucose chewable tablet 16 g  16 g Oral PRN    glucagon (GLUCAGEN) injection 1 mg  1 mg IntraMUSCular PRN    dextrose (D50W) injection syrg 12.5-25 g  25-50 mL IntraVENous PRN    benzonatate (TESSALON) capsule 100 mg  100 mg Oral TID PRN    amLODIPine (NORVASC) tablet 5 mg  5 mg Oral DAILY    furosemide (LASIX) tablet 40 mg  40 mg Oral DAILY    pantoprazole (PROTONIX) tablet 40 mg  40 mg Oral ACB    hydroxychloroquine (PLAQUENIL) tablet 200 mg  200 mg Oral DAILY    potassium chloride (K-DUR, KLOR-CON) SR tablet 20 mEq  20 mEq Oral BID    sodium chloride (NS) flush 5-10 mL  5-10 mL IntraVENous Q8H    sodium chloride (NS) flush 5-10 mL  5-10 mL IntraVENous PRN    levoFLOXacin (LEVAQUIN) 750 mg in D5W IVPB  750 mg IntraVENous Q24H    heparin (porcine) injection 5,000 Units  5,000 Units SubCUTAneous Q8H    budesonide-formoterol (SYMBICORT) 160-4.5 mcg/actuation HFA inhaler 2 Puff  2 Puff Inhalation BID RT    naproxen (NAPROSYN) tablet 250 mg  250 mg Oral BID WITH MEALS         Telemetry:normal sinus rhythm    Objective:   Vital Signs:    Visit Vitals    /83 (BP 1 Location: Left arm, BP Patient Position: At rest)    Pulse 61    Temp 98.1 °F (36.7 °C)    Resp 18    Ht 5' 3\" (1.6 m)    Wt 70.3 kg (154 lb 15.7 oz)    SpO2 94%    Breastfeeding No    BMI 27.45 kg/m2       O2 Device: Nasal cannula   O2 Flow Rate (L/min): 2 l/min   Temp (24hrs), Av.3 °F (36.8 °C), Min:98.1 °F (36.7 °C), Max:98.5 °F (36.9 °C)       Intake/Output:   Last shift:      701 - 1900  In: 240 [P.O.:240]  Out: -   Last 3 shifts: 1901 -  0700  In: 1060 [P.O.:1060]  Out: 550 [Urine:550]    Intake/Output Summary (Last 24 hours) at 17 1043  Last data filed at 17 1004   Gross per 24 hour   Intake 820 ml   Output              450 ml   Net              370 ml       Physical Exam:     General appearance - oriented to person, place, and time and acyanotic, in no respiratory distress  Mental status - normal mood, behavior, speech, motor activity, and thought processes  Eyes - pupils equal and reactive, extraocular eye movements intact, sclera anicteric  Mouth - mucous membranes dry, pharynx normal without lesions  Neck - supple, no significant adenopathy  Chest - coarseness noted bilateral anterior, diminished in bilateral bases.  no tachypnea at rest, no retractions or cyanosis  Heart - normal rate, regular rhythm, normal S1, S2, no murmurs, rubs, clicks or gallops  Abdomen - soft, nontender, nondistended, no masses or organomegaly  bowel sounds normal  Neurological - alert, oriented, normal speech, no focal findings or movement disorder noted  Musculoskeletal - no joint tenderness, deformity or swelling  Extremities - peripheral pulses normal, no pedal edema, no clubbing or cyanosis  Skin -normal turgor, no rashes, no suspicious skin lesions noted    Data:       Recent Results (from the past 24 hour(s))   GLUCOSE, POC    Collection Time: 05/08/17 11:59 AM   Result Value Ref Range    Glucose (POC) 88 70 - 110 mg/dL   GLUCOSE, POC    Collection Time: 05/08/17  3:17 PM   Result Value Ref Range    Glucose (POC) 110 70 - 110 mg/dL   GLUCOSE, POC    Collection Time: 05/08/17  9:44 PM   Result Value Ref Range    Glucose (POC) 138 (H) 70 - 110 mg/dL   GLUCOSE, POC    Collection Time: 05/09/17  8:20 AM   Result Value Ref Range    Glucose (POC) 90 70 - 110 mg/dL         Chemistry Recent Labs      05/08/17   0337  05/07/17   0308   GLU  99   --    NA  141   --    K  3.3*   --    CL  106   --    CO2  24   --    BUN  15   --    CREA  0.89   --    CA  8.6   --    MG  2.2  2.5   AGAP  11   --    BUCR  17   --         Lactic Acid Lactic acid   Date Value Ref Range Status   06/08/2014 1.4 0.4 - 2.0 MMOL/L Final     No results for input(s): LAC in the last 72 hours. Liver Enzymes Protein, total   Date Value Ref Range Status   05/06/2017 7.7 6.4 - 8.2 g/dL Final     Albumin   Date Value Ref Range Status   05/06/2017 3.4 3.4 - 5.0 g/dL Final     Globulin   Date Value Ref Range Status   05/06/2017 4.3 (H) 2.0 - 4.0 g/dL Final     A-G Ratio   Date Value Ref Range Status   05/06/2017 0.8 0.8 - 1.7   Final     AST (SGOT)   Date Value Ref Range Status   05/06/2017 25 15 - 37 U/L Final     Alk. phosphatase   Date Value Ref Range Status   05/06/2017 117 45 - 117 U/L Final     No results for input(s): TP, ALB, GLOB, AGRAT, SGOT, GPT, AP, TBIL in the last 72 hours. No lab exists for component: DBIL     CBC w/Diff Recent Labs      05/08/17   0337   WBC  7.6   RBC  4.13*   HGB  9.3*   HCT  30.8*   PLT  225   GRANS  77*   LYMPH  14*   EOS  0        Cardiac Enzymes No results found for: CPK, CKMMB, CKMB, RCK3, CKMBT, CKNDX, CKND1, DORIS, TROPT, TROIQ, MARK, TROPT, TNIPOC, BNP, BNPP     BNP No results found for: BNP, BNPP, XBNPT     Coagulation No results for input(s): PTP, INR, APTT in the last 72 hours.     No lab exists for component: INREXT, INREXT      Thyroid  No results found for: T4, T3U, TSH, TSHEXT, TSHEXT    No results found for: T4     Lipid Panel Lab Results   Component Value Date/Time    Cholesterol, total 251 09/22/2010 08:04 AM    HDL Cholesterol 73 09/22/2010 08:04 AM    LDL, calculated 159.2 09/22/2010 08:04 AM    VLDL, calculated 18.8 09/22/2010 08:04 AM    Triglyceride 94 09/22/2010 08:04 AM    CHOL/HDL Ratio 3.4 09/22/2010 08:04 AM        ABG Recent Labs      05/06/17   1045   PHI  7.429   PCO2I  41.2   PO2I  54*   HCO3I  27.3*   FIO2I  21        Urinalysis Lab Results   Component Value Date/Time    Color YELLOW 05/06/2017 04:55 PM    Appearance CLEAR 05/06/2017 04:55 PM    Specific gravity 1.007 05/06/2017 04:55 PM    pH (UA) 6.5 05/06/2017 04:55 PM    Protein NEGATIVE  05/06/2017 04:55 PM    Glucose NEGATIVE  05/06/2017 04:55 PM    Ketone NEGATIVE  05/06/2017 04:55 PM    Bilirubin NEGATIVE  05/06/2017 04:55 PM    Urobilinogen 0.2 05/06/2017 04:55 PM    Nitrites NEGATIVE  05/06/2017 04:55 PM    Leukocyte Esterase NEGATIVE  05/06/2017 04:55 PM        Micro  No results for input(s): SDES, CULT in the last 72 hours. No results for input(s): CULT in the last 72 hours. EKG No results found for this or any previous visit. PFT No flowsheet data found. Other ASA reactivity:   Pre-albumin:   Ionized Calcium:   NH4:   T3, FT4:  Cortisol:  Urine Osm:  Urine Lytes:   HbA1c:      Ultrasound      LE Doppler      ECHO No results found for this or any previous visit. CT (Most Recent)   Results from Hospital Encounter encounter on 05/06/17   CT CHEST WO CONT   Narrative CT scan of chest, without intravenous contrast:        INDICATION:    Acute hypoxemic respiratory failure. COPD with exacerbation. Chronic lung disease. Pulmonary hypertension. TECHNIQUE:    Multidetector CT scan with 5 mm slice thickness, without intravenous contrast,  including chest and subphrenic levels. Sagittal and coronal images reformatted. All CT scans at this facility are performed using dose optimization technique as  appropriate to a  performed  examination, to include automated exposer control,  adjustment mA and / or  KV according to patient size (including appropriate  matching  for site specific examination), or use  of iterative  reconstruction  technique. COMPARISON STUDY: CT scan of chest on 7/31/2016. FINDINGS:    In upper lobe of right lung there are dense heterogeneous fibrotic changes with  volume loss and traction bronchiectasis, as also noted on previous CT scan on  7/31/2016. In the upper and midportion of right lower lobe there are also mild to moderate  fibrotic changes with chronic atelectatic changes, similar to previous study.     In left upper lobe there are also moderate dense heterogeneous fibrotic changes  with mild volume loss and mild traction bronchiectasis, similar to previous  study. Note is again made of mild to moderate chronic fibrotic changes in lingula of  left lung and in left lower lobe,, mildly increases compared to previous study. There is no definable new infiltrates or atelectasis in lungs. No evidence of pneumothorax or pleural effusion. There is no definable mass or pathologic lymphadenopathy at the thoracic inlet  or in visualized mediastinum as on these noncontrast study. The study demonstrates marked calcified plaques in the coronary arteries. There is no evidence of pericardial effusion. The study shows moderate to marked dextroscoliosis and moderate multilevel  spondylosis in thoracic spine as before. In visualized upper and midportion of liver and spleen there is no focal  abnormality. Spleen appears to be generous size. The lower portion of spleen is  not visualized on the study. In visualized upper portion of gallbladder at least one small stone is  identified. In both lung there are mild emphysematous changes without any obvious bulla  formation. IMPRESSIONS:    As compared to previous study in upper lobes of both lungs there is no interval  change. Mildly increased fibrotic changes in lower lobes of both lungs and  lingula of left lung. In upper lobes of both lung there are marked heterogeneous fibrotic changes,  right greater than left with evidence of volume loss and traction  bronchiectasis, similar to previous study. In rest of both lung there are mild-to-moderate scattered fibrotic changes with  associated chronic atelectatic changes in the fibrotic areas, similar to  previous study but probably mildly increased in left and right lower lobe and  lingula. .    There is no definable new infiltrates, new atelectasis or mass lesion in either  lung. Mild COPD without obvious bulla formation.     No evidence of pneumothorax or pleural effusion. Cholelithiasis or gallbladder is not completely visualized on this study. Findings suggestive of splenomegaly but spleen is not is that completely  included             XR (Most Recent). CXR reviewed by me and compared with previous CXR   Results from Hospital Encounter encounter on 05/06/17   XR CHEST PA LAT   Narrative CHEST PA AND LATERAL    CPT CODE: 41107    HISTORY: Persistent cough, congestion, and wheezing x6 months with acute  exacerbation times several months , cough. COMPARISON: Chest x-ray May 5, 2017, July 31, 2016, October 10, 2009. FINDINGS:     PA and lateral views obtained. The cardiac and mediastinal silhouette is normal  for age. The lungs are emphysematous appearing with increased superimposed  interstitial markings predominantly at the upper to mid lungs unchanged from  July 2016. Interstitial markings have progressed compared to 2009. The  costophrenic angles are sharply defined. Pulmonary vascularity is normal. Marked  scoliosis. Impression IMPRESSION:    Emphysema with superimposed chronic interstitial lung disease without change  from July 2016             Imaging:   [x] I have personally reviewed the patients radiographs  CXR 5/6  FINDINGS:      PA and lateral views obtained. The cardiac and mediastinal silhouette is normal  for age. The lungs are emphysematous appearing with increased superimposed  interstitial markings predominantly at the upper to mid lungs unchanged from  July 2016. Interstitial markings have progressed compared to 2009. The  costophrenic angles are sharply defined.  Pulmonary vascularity is normal. Marked  scoliosis.       IMPRESSION:     Emphysema with superimposed chronic interstitial lung disease without change  from July 2016    IMPRESSION:   · Acute hypoxemic respiratory failure: ?acute exacerbation of underlying fibrotic disease vs acute respiratory infection- improving on ABX and steroids  · Pulmonary HTN - RV pressure 50 mmHg on 5/3/17; likely group 3 though with Hx of SLE would consider CTEPH (group 4)  · Lupus on Plaquenil   · Upper-lobe predominant ILD: ddx includes HP, sarcoid, occupational disease (silicosis), EG (inconsistent), CEP (inconsistent), TB (inconsistent), CF (inconsistent), AS (inconsistent). First 2 seem most likely. CTD usually results in lower-lobe predominant disease, though this could be atypical fibrotic NSIP  · Chronic anemia  · Hx tobacco abuse, stopped in 1976 (1pack per week x 16 years)      RECOMMENDATIONS:   · Change PRN duonebs,Continue Symbicort as maintenance  · Continue short course of Levaquin - 5 days total  · Send HP panel, ACE, vitamin D levels  · Strep- negative , legionella Ag -negative. IgE -WNL, Mycoplasma - pending; defer resp viral panel as pt improving  · Continue prednisone, taper to 20 mg today  · Last PFT 9/2016 was normal study   · Continue lasix, though increase dose for diuresis with BNP nearly 2k  · DC scheduled naprosyn  · Send BMP now with hypokalemia yesterday  · Will need eval for home oxygen prior to discharge  · Would benefit from sleep study as outpatient  · Will need to follow up in pulmonary clinic with Dr. Janeth Hawkins once discharged. · Further evaluation of PH per Dr. Janeth Hawkins; group 3 and 4 most likely; will send ptt and APL antibodies as screen for hypercoagulable state.  If positive, consider V/Q for CTEPH    Milo Mcdowell MD  PCCM  30 min     [x]See my orders for details      Angelica Marcus MD    5/9/2017 7:12 AM

## 2017-05-09 NOTE — ADT AUTH CERT NOTES
Patient Demographics        Patient Name [address] Sex  Address Phone       Scott Burgos [phone] Female 1940 [facility] [phone] (Home)  [phone] (Mobile) *Preferred*           CSN:       [id number]           Admit Date: Admit Time Room Bed       May 6, 2017  9:45  [77927] 01 [[id number]]           Attending Providers        Provider Pager From To       [phone] Goins DO  17       Halley Casarez MD  17       [facility], MD  17            Emergency Contact(s)        Name Relation Home Work [address] 76 Spouse [phone]           Utilization Review           Chronic Obstructive Pulmonary Disease - Care Day 4 (2017) by Gabriella Gurrola RN        Review Entered Review Status       2017 Completed       Details              Care Day: 4 Care Date: 2017 Level of Care: Telemetry       Guideline Day 3        Clinical Status       (X) * Hemodynamic stability       2017 1:48 PM EDT by Scooter Rooney         tele  97.5 74 126/79 18 94% 2l nc              ( ) * Mental status at baseline       ( ) * No evidence of infection or outpatient treatment planned       ( ) * ABG acceptable       ( ) * Airflow rates improved       ( ) * Eating and sleeping without frequent dyspnea       ( ) * Breathing comfortably at rest [I]       ( ) * Adequate diet intake       ( ) * Discharge plans and education understood              Activity       ( ) * Ambulatory              Routes       (X) * Oral hydration, medications, and diet              Interventions       (X) * Reduced respiratory therapy [J]       (X) Oxygen as needed       2017 1:48 PM EDT by Scooter Prior         2l nc                     Medications       (X) Oral steroids       (X) Inhaled bronchodilators              2017 1:48 PM EDT by Scooter Prior       Subject: Additional Clinical Information       po meds iv levaquin 750 mg q24h prednisone 20 mgdaily gluc 90 101 vit d 9.2 low + cough santamaria + fatigue + sore throat Myranda LE edema change dounebs to prn cont symbicort as maintenance cont short course of levaquin 5 days total send HP penel ace vit d levels mycoplasma penidng stre neg legionella neg IgE wnl cont prednisone taper send bmp need eval for home o2 prior to dc would benefit from sleep study as outpatient further pulmonary followup in clinic once dischargedPT recommendation home health cont to monitor and follow                                   * Milestone                  Chronic Obstructive Pulmonary Disease - Care Day 3 (5/8/2017) by Mary Young, IVONE        Review Entered Review Status       5/8/2017 Completed       Details              Care Day: 3 Care Date: 5/8/2017 Level of Care: Telemetry       Guideline Day 3        Clinical Status       (X) * Hemodynamic stability       5/8/2017 3:46 PM EDT by Reshma Abdul         98.2 93 140/80 20 94% 2l nc              ( ) * Mental status at baseline       ( ) * No evidence of infection or outpatient treatment planned       ( ) * ABG acceptable       ( ) * Airflow rates improved       ( ) * Eating and sleeping without frequent dyspnea       ( ) * Breathing comfortably at rest [I]       ( ) * Adequate diet intake       ( ) * Discharge plans and education understood              Activity       ( ) * Ambulatory              Routes       (X) * Oral hydration, medications, and diet       5/8/2017 3:46 PM EDT by Reshma Abdul         cardiac diet                     Interventions       (X) * Reduced respiratory therapy [J]       (X) Oxygen as needed       5/8/2017 3:46 PM EDT by Reshma Abdul         2l nc                     Medications       (X) Oral steroids       5/8/2017 3:46 PM EDT by Reshma Abdul         prednsione 30 mg po daily              (X) Inhaled bronchodilators       5/8/2017 3:46 PM EDT by Reshma Abdul         dounebs changed from q4h to q6h                     5/8/2017 3:46 PM EDT by Susanne Villafana       Subject: Additional Clinical Information       heparin 5000 units sq q8h po meds iv levaquin 750 mg q24h unezenia q4h changed to q6h 6 min walk test today fall prec PT and OT recommends hhc desats on ambulation to 86% ra k 3.3 po klor con given continue to monitor                                   * Milestone                  Chronic Obstructive Pulmonary Disease - Care Day 2 (5/7/2017) by Sami Lynch RN        Review Entered Review Status       5/8/2017 Completed       Details              Care Day: 2 Care Date: 5/7/2017 Level of Care: Telemetry       Guideline Day 2        Level Of Care       (X) Floor or intermediate care       5/8/2017 3:40 PM EDT by Susanne Villafana         tele remote                     Clinical Status       (X) * Mechanical and noninvasive ventilation absent       (X) * Uncompensated acidosis absent       (X) * ABG stable or improved       (X) * Hemodynamic stability       5/8/2017 3:40 PM EDT by Susanne Villafana         97 81 179/89 18 94% 2l nc                     Routes       (X) * Diet as tolerated       5/8/2017 3:40 PM EDT by Susanne Villafana         cardiac diet                     Interventions       (X) Oxygen       5/8/2017 3:40 PM EDT by Susanne Villafana         2l nc              (X) Respiratory therapy       5/8/2017 3:40 PM EDT by Susanne alonzo              (X) DVT prophylaxis       5/8/2017 3:40 PM EDT by Susanne Villafana         heparin 5000 units sq q8h                     Medications       (X) Systemic corticosteroids       5/8/2017 3:40 PM EDT by Susanne Villafana         po prednisone 30 mg daily with breakfast started today              (X) Inhaled bronchodilators       5/8/2017 3:40 PM EDT by Susanne alonzo q4h              (X) Possible IV antibiotics       5/8/2017 3:40 PM EDT by Susanne iVllafana         iv leavquin 750 mg q24h                     5/8/2017 3:40 PM EDT by Anam Rosario Subject: Additional Clinical Information       LAND improving pulmonary consulting denies being on o2 at home per pulmonary cont dounebs for wheezing reasonable to cont short course of levaquin strep legionella Ag mycoplasma igG/ig M need home eval for o2 prior to dc cardiac diet fall prec pt consult elevate hob                                   * Milestone

## 2017-05-09 NOTE — PROGRESS NOTES
Problem: Mobility Impaired (Adult and Pediatric)  Goal: *Acute Goals and Plan of Care (Insert Text)  Physical Therapy Goals  Initiated 5/8/2017 and to be accomplished within 7 day(s)  1. Patient will transfer from bed to chair and chair to bed with independence using the least restrictive device. 3. Patient will perform sit to stand with independence. 4. Patient will ambulate with independence for 300 feet. 5. Patient will ascend/descend 3 stairs with 1 handrail(s) with supervision/set-up. PHYSICAL THERAPY TREATMENT     Patient: Johny Briggs (6 y.o. female)  Date: 5/9/2017  Diagnosis: COPD exacerbation ([facility] [address] <principal problem not specified>       Precautions: Fall  Chart, physical therapy assessment, plan of care and goals were reviewed. ASSESSMENT:  Pt presents today alert and agreeable to therapy and was able to transfer from sup to sit to stand and amb 300ft in hallway on room air. Pt denied SOB or dizziness with ambulation and at conclusion of ambulation, transferred into locked recliner. Pt continues to benefit from therapy and is progressing appropriately towards goals. RN in room and aware of pt sats s/p ambulation. Pt left with call bell by her side and proper handoff performed to nursing. Progression toward goals:  [X]      Improving appropriately and progressing toward goals  [ ]      Improving slowly and progressing toward goals  [ ]      Not making progress toward goals and plan of care will be adjusted       PLAN:  Patient continues to benefit from skilled intervention to address the above impairments. Continue treatment per established plan of care. Discharge Recommendations:  Home Health  Further Equipment Recommendations for Discharge:  N/A       G-CODES:      Mobility  Current  CI= 1-19%   Goal  CI= 1-19%. The severity rating is based on the Level of Assistance required for Functional Mobility and ADLs.          SUBJECTIVE:   Patient stated I feel good today, but I'm tired.      OBJECTIVE DATA SUMMARY:   Critical Behavior:  Neurologic State: Alert, Appropriate for age  Orientation Level: Oriented X4  Cognition: Appropriate decision making, Follows commands     Functional Mobility Training:  Bed Mobility:  Rolling: Independent  Supine to Sit: Independent  Sit to Supine: Independent  Scooting: Independent   Transfers:  Sit to Stand: Modified independent  Stand to Sit: Modified independent      Balance:  Sitting: Intact  Standing: Impaired  Standing - Static: Good  Standing - Dynamic : Fair  Ambulation/Gait Training:  Distance (ft): 300 Feet (ft)  Assistive Device:  (none)  Ambulation - Level of Assistance: Contact guard assistance        Pain:  Pt reports 0/10 pain or discomfort prior to treatment. Pt reports 0/10 pain or discomfort post treatment. Activity Tolerance:   Pt SaO2 immediately upon sitting is 87% and chelle to 92% with 30s of deep breathing. Please refer to the flowsheet for vital signs taken during this treatment.   After treatment:   [X] Patient left in no apparent distress sitting up in chair  [ ] Patient left in no apparent distress in bed  [X] Call bell left within reach  [X] Nursing notified  [ ] Caregiver present  [ ] Bed alarm activated      Nadeen Suggs PT   Time Calculation: 10 mins

## 2017-05-09 NOTE — PROGRESS NOTES
Marvin Neff M.D. PROGRESS NOTE    Name: Eder Burgess MRN: [id number]   : 1940 Hospital: [address] [place] [address]   Date: 2017  Admission Date: 2017     Subjective/Objective/Plans  1. ILD  2. Acute and chronic respiratory failure  3. RA    Poor sleep last night, added Trazodone 25 mg when Ambien 10 did not help  She reports SOB and cough better  Poor sleep at night, willing to try Trazodone in placed of Ambien    Plan  Trazodone 50 mg HS    Vital Signs:  Visit Vitals    /83 (BP 1 Location: Left arm, BP Patient Position: At rest)    Pulse 61    Temp 98.1 °F (36.7 °C)    Resp 18    Ht 5' 3\" (1.6 m)    Wt 70.3 kg (154 lb 15.7 oz)    SpO2 94%    Breastfeeding No    BMI 27.45 kg/m2       O2 Device: Nasal cannula   O2 Flow Rate (L/min): 2 l/min   Temp (24hrs), Av.3 °F (36.8 °C), Min:98.1 °F (36.7 °C), Max:98.5 °F (36.9 °C)     8:11 AM  Intake/Output:   Last shift:         Last 3 shifts:  1901 -  0700  In: 4157 [P.O.:1060]  Out: 550 [Urine:550]    Intake/Output Summary (Last 24 hours) at 17 0811  Last data filed at 17 0601   Gross per 24 hour   Intake              820 ml   Output              450 ml   Net              370 ml         Physical Exam:    General: in no apparent distress, in no respiratory distress and acyanotic and alert    HEENT: pupils equal, no ear discharge   Neck: No abnormally enlarged lymph nodes. , no JDV   Mouth: MMM no lesions    Chest: normal, no breast masses   Lungs: decreased air exchange bilaterally   Heart: Regular rate and rhythm   Abdomen: abdomen is soft without significant tenderness, masses, organomegaly or guarding   Extremity: negative   Neuro: alert    Skin: Skin color, texture, turgor normal. No rashes or lesions    Data Review:    Labs: Results:       Chemistry Recent Labs      17   0337  17   1037   GLU  99  117*   NA  141  142   K  3.3*  3.9   CL  106  108   CO2  24  28   BUN  15  16   CREA  0.89  0.65   CA  8.6 8. 5   AGAP  11  6   BUCR  17  25*   TBILI   --   0.6   AP   --   117   TP   --   7.7   ALB   --   3.4   GLOB   --   4.3*   AGRAT   --   0.8      CBC w/Diff Recent Labs      05/08/17   0337  05/06/17   1037   WBC  7.6  6.4   RBC  4.13*  3.79*   HGB  9.3*  8.6*   HCT  30.8*  28.2*   PLT  225  168   GRANS  77*  85*   LYMPH  14*  9*   EOS  0  0      Cardiac Enzymes Recent Labs      05/06/17   1037   CPK  112   CKND1  1.2      Coagulation No results for input(s): PTP, INR, APTT in the last 72 hours. No lab exists for component: INREXT    Lipid Panel Lab Results   Component Value Date/Time    Cholesterol, total 251 09/22/2010 08:04 AM    HDL Cholesterol 73 09/22/2010 08:04 AM    LDL, calculated 159.2 09/22/2010 08:04 AM    VLDL, calculated 18.8 09/22/2010 08:04 AM    Triglyceride 94 09/22/2010 08:04 AM    CHOL/HDL Ratio 3.4 09/22/2010 08:04 AM      BNP No results for input(s): BNPP in the last 72 hours. Liver Enzymes Recent Labs      05/06/17   1037   TP  7.7   ALB  3.4   TBILI  0.6   AP  117   SGOT  25   ALT  20      Thyroid Studies No results found for: T4, T3U, TSH, TSHEXT       Procedures/imaging: see electronic medical records for all procedures, Xrays and details which were not copied into this note but were reviewed. Allergies: Allergies   Allergen Reactions    Latex Itching    Asmanex Hfa [Mometasone] Itching    Cleocin [Clindamycin Hcl] Itching and Swelling     Lips and tongue    Pcn [Penicillins] Swelling    Robitussin [Guaifenesin] Rash       Home Medications:  Prior to Admission Medications   Prescriptions Last Dose Informant Patient Reported? Taking? HYDROcodone-acetaminophen (NORCO) 5-325 mg per tablet 5/6/2017 at Unknown time  No Yes   Sig: Take 1 Tab by mouth two (2) times a day. Max Daily Amount: 2 Tabs. albuterol-ipratropium (DUO-NEB) 2.5 mg-0.5 mg/3 ml nebu 5/6/2017 at Unknown time  No Yes   Sig: 3 mL by Nebulization route three (3) times daily.    amLODIPine (NORVASC) 5 mg tablet 2017 at Unknown time  No Yes   Sig: Take 1 Tab by mouth daily. Indications: HYPERTENSION   aspirin  mg tablet 2017 at Unknown time  Yes Yes   Sig: Take 650 mg by mouth every six (6) hours as needed for Pain. cetirizine (ZYRTEC) 10 mg tablet 2017 at Unknown time  Yes Yes   Sig: Take 10 mg by mouth daily. diphenhydrAMINE (BENADRYL ALLERGY) 25 mg tablet 2017 at Unknown time  Yes Yes   Sig: Take 25 mg by mouth every six (6) hours as needed for Sleep.   furosemide (LASIX) 40 mg tablet 2017 at Unknown time  No Yes   Sig: Take 1 Tab by mouth daily. Indications: EDEMA   hydroxychloroquine (PLAQUENIL) 200 mg tablet 2017 at Unknown time  Yes Yes   Sig: Take 200 mg by mouth daily. naproxen sodium (ALEVE) 220 mg cap 5/3/2017  Yes No   Sig: Take  by mouth as needed. omeprazole (PRILOSEC) 20 mg capsule 2017 at Unknown time  Yes Yes   Sig: Take 20 mg by mouth daily. potassium chloride (KLOR-CON M20) 20 mEq tablet 2017 at Unknown time  Yes Yes   Sig: Take 20 mEq by mouth two (2) times a day. triamcinolone (NASACORT AQ) 55 mcg nasal inhaler 2017 at Unknown time  Yes Yes   Si Sprays daily as needed. zolpidem (AMBIEN) 10 mg tablet 2017 at Unknown time  Yes Yes   Sig: Take 10 mg by mouth nightly as needed for Sleep.       Facility-Administered Medications: None       Current Medications:  Current Facility-Administered Medications   Medication Dose Route Frequency    phenol throat spray (CHLORASEPTIC) 1 Spray  1 Spray Oral PRN    tiotropium (SPIRIVA) inhalation capsule 18 mcg  1 Cap Inhalation DAILY    albuterol (PROVENTIL VENTOLIN) nebulizer solution 2.5 mg  2.5 mg Nebulization Q6H PRN    zolpidem (AMBIEN) tablet 10 mg  10 mg Oral QHS PRN    predniSONE (DELTASONE) tablet 30 mg  30 mg Oral DAILY WITH LUNCH    HYDROcodone-acetaminophen (NORCO) 5-325 mg per tablet 1 Tab  1 Tab Oral Q6H PRN    insulin lispro (HUMALOG) injection   SubCUTAneous AC&HS    glucose chewable tablet 16 g  16 g Oral PRN    glucagon (GLUCAGEN) injection 1 mg  1 mg IntraMUSCular PRN    dextrose (D50W) injection syrg 12.5-25 g  25-50 mL IntraVENous PRN    benzonatate (TESSALON) capsule 100 mg  100 mg Oral TID PRN    amLODIPine (NORVASC) tablet 5 mg  5 mg Oral DAILY    furosemide (LASIX) tablet 40 mg  40 mg Oral DAILY    pantoprazole (PROTONIX) tablet 40 mg  40 mg Oral ACB    hydroxychloroquine (PLAQUENIL) tablet 200 mg  200 mg Oral DAILY    potassium chloride (K-DUR, KLOR-CON) SR tablet 20 mEq  20 mEq Oral BID    sodium chloride (NS) flush 5-10 mL  5-10 mL IntraVENous Q8H    sodium chloride (NS) flush 5-10 mL  5-10 mL IntraVENous PRN    levoFLOXacin (LEVAQUIN) 750 mg in D5W IVPB  750 mg IntraVENous Q24H    heparin (porcine) injection 5,000 Units  5,000 Units SubCUTAneous Q8H    budesonide-formoterol (SYMBICORT) 160-4.5 mcg/actuation HFA inhaler 2 Puff  2 Puff Inhalation BID RT    naproxen (NAPROSYN) tablet 250 mg  250 mg Oral BID WITH MEALS       Chart and notes reviewed. Data reviewed. I have evaluated and examined the patient. IMPRESSION:   Patient Active Problem List   Diagnosis Code    RUQ abdominal pain R10.11    Pancreatitis, acute K85.90    Cirrhosis of liver ([facility] [address] K74.60    CAP (community acquired pneumonia) J18.9    Pneumonia J18.9    Asthma exacerbation J45. 901    Systemic lupus erythematosus (HCC) M32.9    Rheumatoid arthritis (HCC) M06.9    HTN (hypertension) I10    Dyslipidemia E78.5    COPD exacerbation (HCC) J44.1 ·         PLAN:/DISCUSSION:   · As per Pulmonary, asses for home O2  · JERRY Pyle MD  5/9/2017, 8:11 AM

## 2017-05-09 NOTE — ROUTINE PROCESS
1909 - Received bedside report from Toya VELÁSQUEZ RN, patient was sitting up in bed with a visitor at bedside, oriented to call button. 1390 - Dr. Annie Pittman, gave verbal order for trazodone 25mg, patient complained of not sleeping, took [place] at [place], but unable to fall asleep. Gave report to Toya VELÁSQUEZ, using SBAR, MAR, and Kardex.

## 2017-05-09 NOTE — ROUTINE PROCESS
Report received from SAV Estevez RN. Lying in bed asleep. Reported had difficulty sleeping last night Had Ambien 10 mg & trazadone 25 mg. No distress noted at this time. See assessment. Call bell in reach.

## 2017-05-10 LAB
1,25(OH)2D3 SERPL-MCNC: 93.1 PG/ML (ref 19.9–79.3)
ACE SERPL-CCNC: 41 U/L (ref 14–82)
ANION GAP BLD CALC-SCNC: 8 MMOL/L (ref 3–18)
B2 GLYCOPROT1 IGG SER-ACNC: <9 GPI IGG UNITS (ref 0–20)
B2 GLYCOPROT1 IGM SER-ACNC: <9 GPI IGM UNITS (ref 0–32)
BUN SERPL-MCNC: 20 MG/DL (ref 7–18)
BUN/CREAT SERPL: 17 (ref 12–20)
CALCIUM SERPL-MCNC: 8.7 MG/DL (ref 8.5–10.1)
CARDIOLIPIN IGA SER IA-ACNC: <9 APL U/ML (ref 0–11)
CARDIOLIPIN IGG SER IA-ACNC: <9 GPL U/ML (ref 0–14)
CARDIOLIPIN IGM SER IA-ACNC: <9 MPL U/ML (ref 0–12)
CHLORIDE SERPL-SCNC: 103 MMOL/L (ref 100–108)
CO2 SERPL-SCNC: 28 MMOL/L (ref 21–32)
CREAT SERPL-MCNC: 1.15 MG/DL (ref 0.6–1.3)
GLUCOSE BLD STRIP.AUTO-MCNC: 120 MG/DL (ref 70–110)
GLUCOSE BLD STRIP.AUTO-MCNC: 77 MG/DL (ref 70–110)
GLUCOSE BLD STRIP.AUTO-MCNC: 87 MG/DL (ref 70–110)
GLUCOSE BLD STRIP.AUTO-MCNC: 88 MG/DL (ref 70–110)
GLUCOSE SERPL-MCNC: 85 MG/DL (ref 74–99)
POTASSIUM SERPL-SCNC: 4 MMOL/L (ref 3.5–5.5)
SODIUM SERPL-SCNC: 139 MMOL/L (ref 136–145)

## 2017-05-10 PROCEDURE — 65660000000 HC RM CCU STEPDOWN

## 2017-05-10 PROCEDURE — 74011250636 HC RX REV CODE- 250/636: Performed by: INTERNAL MEDICINE

## 2017-05-10 PROCEDURE — 74011250637 HC RX REV CODE- 250/637: Performed by: INTERNAL MEDICINE

## 2017-05-10 PROCEDURE — 74011250637 HC RX REV CODE- 250/637: Performed by: FAMILY MEDICINE

## 2017-05-10 PROCEDURE — 94640 AIRWAY INHALATION TREATMENT: CPT

## 2017-05-10 PROCEDURE — 74011636637 HC RX REV CODE- 636/637: Performed by: INTERNAL MEDICINE

## 2017-05-10 PROCEDURE — 36415 COLL VENOUS BLD VENIPUNCTURE: CPT | Performed by: INTERNAL MEDICINE

## 2017-05-10 PROCEDURE — 97116 GAIT TRAINING THERAPY: CPT

## 2017-05-10 PROCEDURE — 80048 BASIC METABOLIC PNL TOTAL CA: CPT | Performed by: INTERNAL MEDICINE

## 2017-05-10 PROCEDURE — 97530 THERAPEUTIC ACTIVITIES: CPT

## 2017-05-10 PROCEDURE — 74011250636 HC RX REV CODE- 250/636: Performed by: NURSE PRACTITIONER

## 2017-05-10 PROCEDURE — 82962 GLUCOSE BLOOD TEST: CPT

## 2017-05-10 RX ADMIN — Medication 10 ML: at 21:22

## 2017-05-10 RX ADMIN — POTASSIUM CHLORIDE 20 MEQ: 20 TABLET, EXTENDED RELEASE ORAL at 08:46

## 2017-05-10 RX ADMIN — BENZONATATE 100 MG: 100 CAPSULE ORAL at 01:06

## 2017-05-10 RX ADMIN — HEPARIN SODIUM 5000 UNITS: 5000 INJECTION, SOLUTION INTRAVENOUS; SUBCUTANEOUS at 13:15

## 2017-05-10 RX ADMIN — TRAZODONE HYDROCHLORIDE 50 MG: 50 TABLET ORAL at 01:06

## 2017-05-10 RX ADMIN — BUDESONIDE AND FORMOTEROL FUMARATE DIHYDRATE 2 PUFF: 160; 4.5 AEROSOL RESPIRATORY (INHALATION) at 20:09

## 2017-05-10 RX ADMIN — HYDROCODONE BITARTRATE AND ACETAMINOPHEN 1 TABLET: 5; 325 TABLET ORAL at 04:07

## 2017-05-10 RX ADMIN — FUROSEMIDE 40 MG: 40 TABLET ORAL at 17:06

## 2017-05-10 RX ADMIN — LEVOFLOXACIN 750 MG: 5 INJECTION, SOLUTION INTRAVENOUS at 13:16

## 2017-05-10 RX ADMIN — FUROSEMIDE 40 MG: 40 TABLET ORAL at 08:46

## 2017-05-10 RX ADMIN — Medication 10 ML: at 05:37

## 2017-05-10 RX ADMIN — AMLODIPINE BESYLATE 5 MG: 5 TABLET ORAL at 08:46

## 2017-05-10 RX ADMIN — PANTOPRAZOLE SODIUM 40 MG: 40 TABLET, DELAYED RELEASE ORAL at 08:46

## 2017-05-10 RX ADMIN — TRAZODONE HYDROCHLORIDE 50 MG: 50 TABLET ORAL at 23:17

## 2017-05-10 RX ADMIN — HEPARIN SODIUM 5000 UNITS: 5000 INJECTION, SOLUTION INTRAVENOUS; SUBCUTANEOUS at 04:07

## 2017-05-10 RX ADMIN — HYDROXYCHLOROQUINE SULFATE 200 MG: 200 TABLET, FILM COATED ENTERAL at 08:46

## 2017-05-10 RX ADMIN — HEPARIN SODIUM 5000 UNITS: 5000 INJECTION, SOLUTION INTRAVENOUS; SUBCUTANEOUS at 21:20

## 2017-05-10 RX ADMIN — TIOTROPIUM BROMIDE 18 MCG: 18 CAPSULE ORAL; RESPIRATORY (INHALATION) at 08:39

## 2017-05-10 RX ADMIN — Medication 10 ML: at 13:22

## 2017-05-10 RX ADMIN — PREDNISONE 20 MG: 20 TABLET ORAL at 13:15

## 2017-05-10 RX ADMIN — POTASSIUM CHLORIDE 20 MEQ: 20 TABLET, EXTENDED RELEASE ORAL at 17:06

## 2017-05-10 NOTE — PROGRESS NOTES
Problem: Mobility Impaired (Adult and Pediatric)  Goal: *Acute Goals and Plan of Care (Insert Text)  Physical Therapy Goals  Initiated 5/8/2017 and to be accomplished within 7 day(s)  1. Patient will transfer from bed to chair and chair to bed with independence using the least restrictive device. 3. Patient will perform sit to stand with independence. 4. Patient will ambulate with independence for 300 feet. 5. Patient will ascend/descend 3 stairs with 1 handrail(s) with supervision/set-up. Outcome: Progressing Towards Goal  PHYSICAL THERAPY TREATMENT     Patient: Eileen Paredes (73 y.o. female)  Date: 5/10/2017  Diagnosis: COPD exacerbation ([facility] [address] <principal problem not specified>       Precautions: Fall   Chart, physical therapy assessment, plan of care and goals were reviewed. ASSESSMENT:  Pt is very mobile, but has episodes of lightheadedness. Pt uses wall to coast and would benefit fro RW to increase lateral balance. Progression toward goals:  [ ]      Improving appropriately and progressing toward goals  [X]      Improving slowly and progressing toward goals  [ ]      Not making progress toward goals and plan of care will be adjusted       PLAN:  Patient continues to benefit from skilled intervention to address the above impairments. Continue treatment per established plan of care. Discharge Recommendations:  Home Health  Further Equipment Recommendations for Discharge:   rolling walker       SUBJECTIVE:   Patient stated I'm going home tomorrow.       OBJECTIVE DATA SUMMARY:   Critical Behavior:  Neurologic State: Alert, Eyes open spontaneously  Orientation Level: Oriented X4  Cognition: Follows commands, Appropriate safety awareness, Appropriate for age attention/concentration, Appropriate decision making     Functional Mobility Training:  Bed Mobility:  Rolling: Independent  Supine to Sit: Independent  Sit to Supine: Independent  Scooting: Independent  Transfers:  Sit to Stand: Stand-by asssistance  Stand to Sit: Modified independent  Balance:  Sitting: Intact  Standing: Impaired  Standing - Static: Good  Standing - Dynamic : Fair  Ambulation/Gait Training:  Distance (ft): 250 Feet (ft)  Ambulation - Level of Assistance: Contact guard assistance  Gait Abnormalities: Path deviations  Activity Tolerance:   Fair  Please refer to the flowsheet for vital signs taken during this treatment.   After treatment:   [ ] Patient left in no apparent distress sitting up in chair  [X] Patient left in no apparent distress in bed  [X] Call bell left within reach  [X] Nursing notified  [X] Caregiver present  [ ] Bed alarm activated      Desirae Roldan PTA   Time Calculation: 25 mins

## 2017-05-10 NOTE — PROGRESS NOTES
Care Management Interventions  PCP Verified by CM: Yes (as listed)  Palliative Care Consult (Criteria: CHF and RRAT>21): No  Reason for No Palliative Care Consult: Other (see comment)  Mode of Transport at Discharge: Self (pt's  will assist)  Transition of Care Consult (CM Consult): Discharge Planning  MyChart Signup: No  Discharge Durable Medical Equipment: No (has cane at home)  Health Maintenance Reviewed: Yes  Physical Therapy Consult: Yes  Occupational Therapy Consult: Yes  Speech Therapy Consult: No  Current Support Network: Lives with Spouse  Confirm Follow Up Transport: Self  Plan discussed with Pt/Family/Caregiver: Yes  Discharge Location  Discharge Placement: Home    Patient 77years old female admitted to [facility] with COPD exacerbation. Spoke to the patient alone in room, she is AAO x 4, open to conversation. Patient reports to be self care & independent with ADLs & IADLs, she has cane, does not use it, she drives. Patient plans to return home at [place], her  will provide transport home. Patient was introduced [facility] for COPD, she is open to it.

## 2017-05-10 NOTE — PROGRESS NOTES
[facility]  Pulmonary, Critical Care, and Sleep Medicine    Name: Donna Lux MRN: [id number]   : 1940 Hospital: [address] [place] [address] Dr   Date: 5/10/2017        Pulmonary Progress Note                                              Subjective/History:     Donna Lux is a 77 y.o. female with PMHx significant for chronic interstitial lung disease, lupus, RA, and pulmonary hypertension who came to the ED after developing a worsening cough. Her last PFT was in 2016 from which she had a normal baseline FEV1. She is scheduled for an overnight oximetry study but has yet to have this done. 05/10/17  Feels much better vs. Admission. SaO2 low 90's on RA. Has BL LE edema. Still complains of non productive cough    Review of Systems:  Constitutional: positive for fatigue  Eyes: negative  Ears, nose, mouth, throat, and face: positive for sore throat  Respiratory: positive for cough or dyspnea on exertion  Cardiovascular: negative  Gastrointestinal: negative  Genitourinary:negative  Integument/breast: negative  Hematologic/lymphatic: negative  Musculoskeletal:negative  Neurological: negative  Behavioral/Psych: negative    There is no immunization history on file for this patient. Allergies   Allergen Reactions    Latex Itching    Asmanex Hfa [Mometasone] Itching    Cleocin [Clindamycin Hcl] Itching and Swelling     Lips and tongue    Pcn [Penicillins] Swelling    Robitussin [Guaifenesin] Rash        Past Medical History:   Diagnosis Date    Asthma     Chronic lung disease     [facility]us ([facility] [address]     Pulmonary hypertension (HCC)     Rheumatoid arthritis ([facility] [address]         No past surgical history on file.      Family History   Problem Relation Age of Onset    Heart Disease Mother     Asthma Mother     Asthma Father     Parkinson's Disease Father       Social History   Substance Use Topics    Smoking status: Former Smoker     Packs/day: 0.20     Years: 16.00     Types: Cigarettes    Smokeless tobacco: Never Used    Alcohol use No        Prior to Admission medications    Medication Sig Start Date End Date Taking? Authorizing Provider   zolpidem (AMBIEN) 10 mg tablet Take 10 mg by mouth nightly as needed for Sleep. Yes Sameera King MD   cetirizine (ZYRTEC) 10 mg tablet Take 10 mg by mouth daily. Yes Historical Provider   diphenhydrAMINE (BENADRYL ALLERGY) 25 mg tablet Take 25 mg by mouth every six (6) hours as needed for Sleep. Yes Historical Provider   triamcinolone (NASACORT AQ) 55 mcg nasal inhaler 2 Sprays daily as needed. Yes Historical Provider   aspirin  mg tablet Take 650 mg by mouth every six (6) hours as needed for Pain. Yes Historical Provider   HYDROcodone-acetaminophen (NORCO) 5-325 mg per tablet Take 1 Tab by mouth two (2) times a day. Max Daily Amount: 2 Tabs. 8/15/16  Yes Christian Stoddard MD   albuterol-ipratropium (DUO-NEB) 2.5 mg-0.5 mg/3 ml nebu 3 mL by Nebulization route three (3) times daily. 8/15/16  Yes Christian Stoddard MD   amLODIPine (NORVASC) 5 mg tablet Take 1 Tab by mouth daily. Indications: HYPERTENSION 8/15/16  Yes Christian Stoddard MD   furosemide (LASIX) 40 mg tablet Take 1 Tab by mouth daily. Indications: EDEMA 8/15/16  Yes Christian Stoddard MD   omeprazole (PRILOSEC) 20 mg capsule Take 20 mg by mouth daily. Yes Sameera King MD   hydroxychloroquine (PLAQUENIL) 200 mg tablet Take 200 mg by mouth daily. Yes Sameera King MD   potassium chloride (KLOR-CON M20) 20 mEq tablet Take 20 mEq by mouth two (2) times a day. Yes Sameera King MD   naproxen sodium (ALEVE) 220 mg cap Take  by mouth as needed.     Sameera King MD       Current Facility-Administered Medications   Medication Dose Route Frequency    predniSONE (DELTASONE) tablet 20 mg  20 mg Oral DAILY WITH LUNCH    furosemide (LASIX) tablet 40 mg  40 mg Oral BID    traZODone (DESYREL) tablet 50 mg  50 mg Oral QHS    phenol throat spray (CHLORASEPTIC) 1 Spray  1 Spray Oral PRN    tiotropium (SPIRIVA) inhalation capsule 18 mcg  1 Cap Inhalation DAILY    albuterol (PROVENTIL VENTOLIN) nebulizer solution 2.5 mg  2.5 mg Nebulization Q6H PRN    HYDROcodone-acetaminophen (NORCO) 5-325 mg per tablet 1 Tab  1 Tab Oral Q6H PRN    insulin lispro (HUMALOG) injection   SubCUTAneous AC&HS    glucose chewable tablet 16 g  16 g Oral PRN    glucagon (GLUCAGEN) injection 1 mg  1 mg IntraMUSCular PRN    dextrose (D50W) injection syrg 12.5-25 g  25-50 mL IntraVENous PRN    benzonatate (TESSALON) capsule 100 mg  100 mg Oral TID PRN    amLODIPine (NORVASC) tablet 5 mg  5 mg Oral DAILY    pantoprazole (PROTONIX) tablet 40 mg  40 mg Oral ACB    hydroxychloroquine (PLAQUENIL) tablet 200 mg  200 mg Oral DAILY    potassium chloride (K-DUR, KLOR-CON) SR tablet 20 mEq  20 mEq Oral BID    sodium chloride (NS) flush 5-10 mL  5-10 mL IntraVENous Q8H    sodium chloride (NS) flush 5-10 mL  5-10 mL IntraVENous PRN    levoFLOXacin (LEVAQUIN) 750 mg in D5W IVPB  750 mg IntraVENous Q24H    heparin (porcine) injection 5,000 Units  5,000 Units SubCUTAneous Q8H    budesonide-formoterol (SYMBICORT) 160-4.5 mcg/actuation HFA inhaler 2 Puff  2 Puff Inhalation BID RT         Telemetry:normal sinus rhythm    Objective:   Vital Signs:    Visit Vitals    /74 (BP 1 Location: Left arm, BP Patient Position: Supine)    Pulse 68    Temp 98.3 °F (36.8 °C)    Resp 18    Ht 5' 3\" (1.6 m)    Wt 67 kg (147 lb 11.2 oz)    SpO2 92%    Breastfeeding No    BMI 26.16 kg/m2       O2 Device: Room air   O2 Flow Rate (L/min): 2 l/min   Temp (24hrs), Av.2 °F (36.8 °C), Min:97.8 °F (36.6 °C), Max:98.5 °F (36.9 °C)       Intake/Output:   Last shift:      05/10 07 - 05/10 1900  In: 360 [P.O.:360]  Out: -   Last 3 shifts: 1901 - 05/10 0700  In: 1050 [P.O.:1050]  Out: 400 [Urine:400]    Intake/Output Summary (Last 24 hours) at 05/10/17 1353  Last data filed at 05/10/17 1316   Gross per 24 hour   Intake             1020 ml   Output 100 ml   Net              920 ml       Physical Exam:     General appearance - oriented to person, place, and time and acyanotic, in no respiratory distress  Mental status - normal mood, behavior, speech, motor activity, and thought processes  Eyes - pupils equal and reactive, extraocular eye movements intact, sclera anicteric  Mouth - mucous membranes dry, pharynx normal without lesions  Neck - supple, no significant adenopathy  Chest - coarseness noted bilateral anterior, diminished in bilateral bases.  no tachypnea at rest, no retractions or cyanosis  Heart - normal rate, regular rhythm, normal S1, S2, no murmurs, rubs, clicks or gallops  Abdomen - soft, nontender, nondistended, no masses or organomegaly  bowel sounds normal  Neurological - alert, oriented, normal speech, no focal findings or movement disorder noted  Musculoskeletal - no joint tenderness, deformity or swelling  Extremities - peripheral pulses normal, no pedal edema, no clubbing or cyanosis  Skin -normal turgor, no rashes, no suspicious skin lesions noted    Data:       Recent Results (from the past 24 hour(s))   GLUCOSE, POC    Collection Time: 05/09/17  3:32 PM   Result Value Ref Range    Glucose (POC) 113 (H) 70 - 110 mg/dL   GLUCOSE, POC    Collection Time: 05/09/17  9:04 PM   Result Value Ref Range    Glucose (POC) 109 70 - 084 mg/dL   METABOLIC PANEL, BASIC    Collection Time: 05/10/17  3:02 AM   Result Value Ref Range    Sodium 139 136 - 145 mmol/L    Potassium 4.0 3.5 - 5.5 mmol/L    Chloride 103 100 - 108 mmol/L    CO2 28 21 - 32 mmol/L    Anion gap 8 3.0 - 18 mmol/L    Glucose 85 74 - 99 mg/dL    BUN 20 (H) 7.0 - 18 MG/DL    Creatinine 1.15 0.6 - 1.3 MG/DL    BUN/Creatinine ratio 17 12 - 20      GFR est AA 56 (L) >60 ml/min/1.73m2    GFR est non-AA 46 (L) >60 ml/min/1.73m2    Calcium 8.7 8.5 - 10.1 MG/DL   GLUCOSE, POC    Collection Time: 05/10/17  7:55 AM   Result Value Ref Range    Glucose (POC) 88 70 - 110 mg/dL   GLUCOSE, POC Collection Time: 05/10/17 12:05 PM   Result Value Ref Range    Glucose (POC) 87 70 - 110 mg/dL         Chemistry Recent Labs      05/10/17   0302  05/09/17   1135  05/08/17   0337   GLU  85  93  99   NA  139  143  141   K  4.0  3.6  3.3*   CL  103  106  106   CO2  28  29  24   BUN  20*  15  15   CREA  1.15  0.84  0.89   CA  8.7  8.8  8.6   MG   --    --   2.2   AGAP  8  8  11   BUCR  17  18  17        Lactic Acid Lactic acid   Date Value Ref Range Status   06/08/2014 1.4 0.4 - 2.0 MMOL/L Final     No results for input(s): LAC in the last 72 hours. Liver Enzymes Protein, total   Date Value Ref Range Status   05/06/2017 7.7 6.4 - 8.2 g/dL Final     Albumin   Date Value Ref Range Status   05/06/2017 3.4 3.4 - 5.0 g/dL Final     Globulin   Date Value Ref Range Status   05/06/2017 4.3 (H) 2.0 - 4.0 g/dL Final     A-G Ratio   Date Value Ref Range Status   05/06/2017 0.8 0.8 - 1.7   Final     AST (SGOT)   Date Value Ref Range Status   05/06/2017 25 15 - 37 U/L Final     Alk. phosphatase   Date Value Ref Range Status   05/06/2017 117 45 - 117 U/L Final     No results for input(s): TP, ALB, GLOB, AGRAT, SGOT, GPT, AP, TBIL in the last 72 hours.     No lab exists for component: DBIL     CBC w/Diff Recent Labs      05/08/17   0337   WBC  7.6   RBC  4.13*   HGB  9.3*   HCT  30.8*   PLT  225   GRANS  77*   LYMPH  14*   EOS  0        Cardiac Enzymes No results found for: CPK, CKMMB, CKMB, RCK3, CKMBT, CKNDX, CKND1, DORIS, TROPT, TROIQ, MARK, TROPT, TNIPOC, BNP, BNPP     BNP No results found for: BNP, BNPP, XBNPT     Coagulation Recent Labs      05/09/17   1135   APTT  33.7         Thyroid  No results found for: T4, T3U, TSH, TSHEXT, TSHEXT    No results found for: T4     Lipid Panel Lab Results   Component Value Date/Time    Cholesterol, total 251 09/22/2010 08:04 AM    HDL Cholesterol 73 09/22/2010 08:04 AM    LDL, calculated 159.2 09/22/2010 08:04 AM    VLDL, calculated 18.8 09/22/2010 08:04 AM    Triglyceride 94 09/22/2010 08:04 AM    CHOL/HDL Ratio 3.4 09/22/2010 08:04 AM        ABG No results for input(s): PHI, PHI, PCO2I, PO2, PO2I, HCO3, HCO3I, FIO2, FIO2I in the last 72 hours. No lab exists for component: POC2     Urinalysis Lab Results   Component Value Date/Time    Color YELLOW 05/06/2017 04:55 PM    Appearance CLEAR 05/06/2017 04:55 PM    Specific gravity 1.007 05/06/2017 04:55 PM    pH (UA) 6.5 05/06/2017 04:55 PM    Protein NEGATIVE  05/06/2017 04:55 PM    Glucose NEGATIVE  05/06/2017 04:55 PM    Ketone NEGATIVE  05/06/2017 04:55 PM    Bilirubin NEGATIVE  05/06/2017 04:55 PM    Urobilinogen 0.2 05/06/2017 04:55 PM    Nitrites NEGATIVE  05/06/2017 04:55 PM    Leukocyte Esterase NEGATIVE  05/06/2017 04:55 PM        Micro  No results for input(s): SDES, CULT in the last 72 hours. No results for input(s): CULT in the last 72 hours. EKG No results found for this or any previous visit. PFT No flowsheet data found. Other ASA reactivity:   Pre-albumin:   Ionized Calcium:   NH4:   T3, FT4:  Cortisol:  Urine Osm:  Urine Lytes:   HbA1c:      Ultrasound      LE Doppler      ECHO No results found for this or any previous visit. CT (Most Recent)   Results from Hospital Encounter encounter on 05/06/17   CT CHEST WO CONT   Narrative CT scan of chest, without intravenous contrast:        INDICATION:    Acute hypoxemic respiratory failure. COPD with exacerbation. Chronic lung disease. Pulmonary hypertension. TECHNIQUE:    Multidetector CT scan with 5 mm slice thickness, without intravenous contrast,  including chest and subphrenic levels. Sagittal and coronal images reformatted.     All CT scans at this facility are performed using dose optimization technique as  appropriate to a  performed  examination, to include automated exposer control,  adjustment mA and / or  KV according to patient size (including appropriate  matching  for site specific examination), or use  of iterative reconstruction  technique. COMPARISON STUDY: CT scan of chest on 7/31/2016. FINDINGS:    In upper lobe of right lung there are dense heterogeneous fibrotic changes with  volume loss and traction bronchiectasis, as also noted on previous CT scan on  7/31/2016. In the upper and midportion of right lower lobe there are also mild to moderate  fibrotic changes with chronic atelectatic changes, similar to previous study. In left upper lobe there are also moderate dense heterogeneous fibrotic changes  with mild volume loss and mild traction bronchiectasis, similar to previous  study. Note is again made of mild to moderate chronic fibrotic changes in lingula of  left lung and in left lower lobe,, mildly increases compared to previous study. There is no definable new infiltrates or atelectasis in lungs. No evidence of pneumothorax or pleural effusion. There is no definable mass or pathologic lymphadenopathy at the thoracic inlet  or in visualized mediastinum as on these noncontrast study. The study demonstrates marked calcified plaques in the coronary arteries. There is no evidence of pericardial effusion. The study shows moderate to marked dextroscoliosis and moderate multilevel  spondylosis in thoracic spine as before. In visualized upper and midportion of liver and spleen there is no focal  abnormality. Spleen appears to be generous size. The lower portion of spleen is  not visualized on the study. In visualized upper portion of gallbladder at least one small stone is  identified. In both lung there are mild emphysematous changes without any obvious bulla  formation. IMPRESSIONS:    As compared to previous study in upper lobes of both lungs there is no interval  change. Mildly increased fibrotic changes in lower lobes of both lungs and  lingula of left lung.     In upper lobes of both lung there are marked heterogeneous fibrotic changes,  right greater than left with evidence of volume loss and traction  bronchiectasis, similar to previous study. In rest of both lung there are mild-to-moderate scattered fibrotic changes with  associated chronic atelectatic changes in the fibrotic areas, similar to  previous study but probably mildly increased in left and right lower lobe and  lingula. .    There is no definable new infiltrates, new atelectasis or mass lesion in either  lung. Mild COPD without obvious bulla formation. No evidence of pneumothorax or pleural effusion. Cholelithiasis or gallbladder is not completely visualized on this study. Findings suggestive of splenomegaly but spleen is not is that completely  included             XR (Most Recent). CXR reviewed by me and compared with previous CXR   Results from Hospital Encounter encounter on 05/06/17   XR CHEST PA LAT   Narrative CHEST PA AND LATERAL    CPT CODE: 64444    HISTORY: Persistent cough, congestion, and wheezing x6 months with acute  exacerbation times several months , cough. COMPARISON: Chest x-ray May 5, 2017, July 31, 2016, October 10, 2009. FINDINGS:     PA and lateral views obtained. The cardiac and mediastinal silhouette is normal  for age. The lungs are emphysematous appearing with increased superimposed  interstitial markings predominantly at the upper to mid lungs unchanged from  July 2016. Interstitial markings have progressed compared to 2009. The  costophrenic angles are sharply defined. Pulmonary vascularity is normal. Marked  scoliosis. Impression IMPRESSION:    Emphysema with superimposed chronic interstitial lung disease without change  from July 2016             Imaging:   [x] I have personally reviewed the patients radiographs  CXR 5/6  FINDINGS:      PA and lateral views obtained. The cardiac and mediastinal silhouette is normal  for age.  The lungs are emphysematous appearing with increased superimposed  interstitial markings predominantly at the upper to mid lungs unchanged from  July 2016. Interstitial markings have progressed compared to 2009. The  costophrenic angles are sharply defined. Pulmonary vascularity is normal. Marked  scoliosis.       IMPRESSION:     Emphysema with superimposed chronic interstitial lung disease without change  from July 2016    IMPRESSION:   · Acute hypoxemic respiratory failure: ?acute exacerbation of underlying fibrotic disease vs acute respiratory infection- improving on ABX and steroids  · Pulmonary HTN - RV pressure 50 mmHg on 5/3/17; likely group 3 though with Hx of SLE would consider CTEPH (group 4) if evidence of VTE  · Lupus on Plaquenil   · Upper-lobe predominant ILD: ddx includes HP, sarcoid, occupational disease (silicosis), EG (inconsistent), CEP (inconsistent), TB (inconsistent), CF (inconsistent), AS (inconsistent). First 2 seem most likely. CTD usually results in lower-lobe predominant disease, though this could be atypical fibrotic NSIP  · Chronic anemia  · Hx tobacco abuse, stopped in 1976 (1pack per week x 16 years)      RECOMMENDATIONS:   · Continue PRN duonebs,Continue Symbicort as maintenance  · Continue short course of Levaquin - 5 days total  · Send HP panel, ACE, vitamin D levels  · Strep- negative , legionella Ag -negative. IgE -WNL, Mycoplasma - normal IgM with elevated IgG; defer resp viral panel as pt improving  · Continue prednisone, tapered to 20 mg   · Last PFT 9/2016 was normal study   · Continue lasix, though increase dose for diuresis with BNP nearly 2k  · DC scheduled naprosyn  · Hypokalemia resolved  · Will need eval for home oxygen prior to discharge  · Would benefit from sleep study as outpatient  · Will need to follow up in pulmonary clinic with Dr. Joy Juárez once discharged. · Further evaluation of PH per Dr. Joy Juárez; group 3 and 4 most likely; will send ptt and APL antibodies as screen for hypercoagulable state.  If positive, consider V/Q for CTEPH     [x]See my orders for details      Josh Manuel MD 5/10/2017 7:12 AM

## 2017-05-10 NOTE — PROGRESS NOTES
Lucille Miranda M.D. PROGRESS NOTE    Name: Julieta Barrera MRN: [id number]   : 1940 Hospital: [address] [place] [address]   Date: 5/10/2017  Admission Date: 2017     Subjective/Objective/Plans  1. Acute and chronic Resp failure  2. ILD maybe from RA  3. RA  4. SLE  5. HTN  6. Sleep difficulty    Less SOB  Tried Trazodone last night, DC Ambien, did not sleep with Trazodone  Plan  Restart Ambien    Vital Signs:  Visit Vitals    /74 (BP 1 Location: Left arm, BP Patient Position: Supine)    Pulse 72    Temp 98.4 °F (36.9 °C)    Resp 18    Ht 5' 3\" (1.6 m)    Wt 67 kg (147 lb 11.2 oz)    SpO2 94%    Breastfeeding No    BMI 26.16 kg/m2       O2 Device: Room air   O2 Flow Rate (L/min): 2 l/min   Temp (24hrs), Av.1 °F (36.7 °C), Min:97.5 °F (36.4 °C), Max:98.4 °F (36.9 °C)     7:20 AM  Intake/Output:   Last shift:         Last 3 shifts:  1901 - 05/10 0700  In: 1050 [P.O.:1050]  Out: 400 [Urine:400]    Intake/Output Summary (Last 24 hours) at 05/10/17 0720  Last data filed at 05/10/17 0523   Gross per 24 hour   Intake              950 ml   Output              100 ml   Net              850 ml         Physical Exam:    General: in no apparent distress    HEENT: pupils equal, no ear discharge   Neck: No abnormally enlarged lymph nodes. , no JDV   Mouth: MMM no lesions    Chest: normal, no breast masses   Lungs: decreased air exchange bilaterally   Heart: Regular rate and rhythm   Abdomen: abdomen is soft without significant tenderness, masses, organomegaly or guarding   Extremity: negative   Neuro: alert    Skin: Skin color, texture, turgor normal. No rashes or lesions    Data Review:    Labs: Results:       Chemistry Recent Labs      05/10/17   0302  17   1135  17   0337   GLU  85  93  99   NA  139  143  141   K  4.0  3.6  3.3*   CL  103  106  106   CO2  28  29  24   BUN  20*  15  15   CREA  1.15  0.84  0.89   CA  8.7  8.8  8.6   AGAP  8  8  11   BUCR  17  18  17      CBC w/Diff Recent Labs      05/08/17   0337   WBC  7.6   RBC  4.13*   HGB  9.3*   HCT  30.8*   PLT  225   GRANS  77*   LYMPH  14*   EOS  0      Cardiac Enzymes No results for input(s): CPK, CKND1, DORIS in the last 72 hours. No lab exists for component: CKRMB, TROIP   Coagulation Recent Labs      05/09/17   1135   APTT  33.7       Lipid Panel Lab Results   Component Value Date/Time    Cholesterol, total 251 09/22/2010 08:04 AM    HDL Cholesterol 73 09/22/2010 08:04 AM    LDL, calculated 159.2 09/22/2010 08:04 AM    VLDL, calculated 18.8 09/22/2010 08:04 AM    Triglyceride 94 09/22/2010 08:04 AM    CHOL/HDL Ratio 3.4 09/22/2010 08:04 AM      BNP No results for input(s): BNPP in the last 72 hours. Liver Enzymes No results for input(s): TP, ALB, TBILI, AP, SGOT, ALT, CBIL in the last 72 hours. Thyroid Studies No results found for: T4, T3U, TSH, TSHEXT       Procedures/imaging: see electronic medical records for all procedures, Xrays and details which were not copied into this note but were reviewed. Allergies: Allergies   Allergen Reactions    Latex Itching    Asmanex Hfa [Mometasone] Itching    Cleocin [Clindamycin Hcl] Itching and Swelling     Lips and tongue    Pcn [Penicillins] Swelling    Robitussin [Guaifenesin] Rash       Home Medications:  Prior to Admission Medications   Prescriptions Last Dose Informant Patient Reported? Taking? HYDROcodone-acetaminophen (NORCO) 5-325 mg per tablet 5/6/2017 at Unknown time  No Yes   Sig: Take 1 Tab by mouth two (2) times a day. Max Daily Amount: 2 Tabs. albuterol-ipratropium (DUO-NEB) 2.5 mg-0.5 mg/3 ml nebu 5/6/2017 at Unknown time  No Yes   Sig: 3 mL by Nebulization route three (3) times daily. amLODIPine (NORVASC) 5 mg tablet 4/6/2017 at Unknown time  No Yes   Sig: Take 1 Tab by mouth daily. Indications: HYPERTENSION   aspirin  mg tablet 5/5/2017 at Unknown time  Yes Yes   Sig: Take 650 mg by mouth every six (6) hours as needed for Pain.    cetirizine (ZYRTEC) 10 mg tablet 2017 at Unknown time  Yes Yes   Sig: Take 10 mg by mouth daily. diphenhydrAMINE (BENADRYL ALLERGY) 25 mg tablet 2017 at Unknown time  Yes Yes   Sig: Take 25 mg by mouth every six (6) hours as needed for Sleep.   furosemide (LASIX) 40 mg tablet 2017 at Unknown time  No Yes   Sig: Take 1 Tab by mouth daily. Indications: EDEMA   hydroxychloroquine (PLAQUENIL) 200 mg tablet 2017 at Unknown time  Yes Yes   Sig: Take 200 mg by mouth daily. naproxen sodium (ALEVE) 220 mg cap 5/3/2017  Yes No   Sig: Take  by mouth as needed. omeprazole (PRILOSEC) 20 mg capsule 2017 at Unknown time  Yes Yes   Sig: Take 20 mg by mouth daily. potassium chloride (KLOR-CON M20) 20 mEq tablet 2017 at Unknown time  Yes Yes   Sig: Take 20 mEq by mouth two (2) times a day. triamcinolone (NASACORT AQ) 55 mcg nasal inhaler 2017 at Unknown time  Yes Yes   Si Sprays daily as needed. zolpidem (AMBIEN) 10 mg tablet 2017 at Unknown time  Yes Yes   Sig: Take 10 mg by mouth nightly as needed for Sleep.       Facility-Administered Medications: None       Current Medications:  Current Facility-Administered Medications   Medication Dose Route Frequency    predniSONE (DELTASONE) tablet 20 mg  20 mg Oral DAILY WITH LUNCH    furosemide (LASIX) tablet 40 mg  40 mg Oral BID    traZODone (DESYREL) tablet 50 mg  50 mg Oral QHS    phenol throat spray (CHLORASEPTIC) 1 Spray  1 Spray Oral PRN    tiotropium (SPIRIVA) inhalation capsule 18 mcg  1 Cap Inhalation DAILY    albuterol (PROVENTIL VENTOLIN) nebulizer solution 2.5 mg  2.5 mg Nebulization Q6H PRN    HYDROcodone-acetaminophen (NORCO) 5-325 mg per tablet 1 Tab  1 Tab Oral Q6H PRN    insulin lispro (HUMALOG) injection   SubCUTAneous AC&HS    glucose chewable tablet 16 g  16 g Oral PRN    glucagon (GLUCAGEN) injection 1 mg  1 mg IntraMUSCular PRN    dextrose (D50W) injection syrg 12.5-25 g  25-50 mL IntraVENous PRN    benzonatate (TESSALON) capsule 100 mg  100 mg Oral TID PRN    amLODIPine (NORVASC) tablet 5 mg  5 mg Oral DAILY    pantoprazole (PROTONIX) tablet 40 mg  40 mg Oral ACB    hydroxychloroquine (PLAQUENIL) tablet 200 mg  200 mg Oral DAILY    potassium chloride (K-DUR, KLOR-CON) SR tablet 20 mEq  20 mEq Oral BID    sodium chloride (NS) flush 5-10 mL  5-10 mL IntraVENous Q8H    sodium chloride (NS) flush 5-10 mL  5-10 mL IntraVENous PRN    levoFLOXacin (LEVAQUIN) 750 mg in D5W IVPB  750 mg IntraVENous Q24H    heparin (porcine) injection 5,000 Units  5,000 Units SubCUTAneous Q8H    budesonide-formoterol (SYMBICORT) 160-4.5 mcg/actuation HFA inhaler 2 Puff  2 Puff Inhalation BID RT       Chart and notes reviewed. Data reviewed. I have evaluated and examined the patient. IMPRESSION:   Patient Active Problem List   Diagnosis Code    RUQ abdominal pain R10.11    Pancreatitis, acute K85.90    Cirrhosis of liver ([facility] [address] K74.60    CAP (community acquired pneumonia) J18.9    Pneumonia J18.9    Asthma exacerbation J45. 901    Systemic lupus erythematosus (HCC) M32.9    Rheumatoid arthritis (HCC) M06.9    HTN (hypertension) I10    Dyslipidemia E78.5    COPD exacerbation (HCC) J44.1 ·         PLAN:/DISCUSSION:   · Ambien  · EJRRY  Soon when ok with Pulmonary        Kalin Davis MD  5/10/2017, 7:20 AM

## 2017-05-10 NOTE — ROUTINE PROCESS
Assumed patient care after receiving report from [place]. Patient in chair, awake, alert and oriented x4. Denies pain or discomforts at this time. Family visitors at bedside. Denies needs at this time.

## 2017-05-10 NOTE — CDMP QUERY
\"Anemia\" has been documented. Please specify. =>Anemia of chronic disease  =>Other anemia (spell out)  =>Other Explanation of clinical findings  =>Unable to Determine (no explanation of clinical findings)    The medical record reflects the following:  Risk:  --PMH COPD, chronic respiratory failure, Lupus, HTN    Clinical Indicators:  --5/5/2017 17:30: HGB: 9.3 (L), HCT: 30.2 (L)  --5/6/2017 10:37: HGB: 8.6 (L), HCT: 28.2 (L)  --5/8/2017 03:37: HGB: 9.3 (L), HCT: 30.8 (L)    Treatment:   --receiving serial CBC    Please clarify and document your clinical opinion in the progress notes and discharge summary including the definitive and/or presumptive diagnosis, (suspected or probable), related to the above clinical findings. Please include clinical findings supporting your diagnosis. If you DECLINE this query or would like to communicate with [facility], please utilize the \"[facility] message box\" at the TOP of the Progress Note on the right.       Thank you,  Anil Goetz [address] [place] [address]

## 2017-05-10 NOTE — ROUTINE PROCESS
1906 Received bedside report from Toya VELÁSQUEZ RN, patient is sitting up in bed, with her  at her bedside, oriented to call button. 2200 - Pt asked to received the trazodone at 2400. Gave bedside report to Krishna Bernal RN, using SBAR, MAR, and Kardex.

## 2017-05-11 ENCOUNTER — APPOINTMENT (OUTPATIENT)
Dept: NUCLEAR MEDICINE | Age: 77
DRG: 189 | End: 2017-05-11
Attending: PHYSICIAN ASSISTANT
Payer: MEDICARE

## 2017-05-11 PROBLEM — J20.9 ACUTE BRONCHITIS: Status: ACTIVE | Noted: 2017-05-11

## 2017-05-11 PROBLEM — I27.20 PULMONARY HTN (HCC): Chronic | Status: ACTIVE | Noted: 2017-05-11

## 2017-05-11 LAB
ANION GAP BLD CALC-SCNC: 10 MMOL/L (ref 3–18)
BUN SERPL-MCNC: 28 MG/DL (ref 7–18)
BUN/CREAT SERPL: 21 (ref 12–20)
CALCIUM SERPL-MCNC: 8.7 MG/DL (ref 8.5–10.1)
CHLORIDE SERPL-SCNC: 101 MMOL/L (ref 100–108)
CO2 SERPL-SCNC: 27 MMOL/L (ref 21–32)
CREAT SERPL-MCNC: 1.34 MG/DL (ref 0.6–1.3)
GLUCOSE BLD STRIP.AUTO-MCNC: 103 MG/DL (ref 70–110)
GLUCOSE BLD STRIP.AUTO-MCNC: 126 MG/DL (ref 70–110)
GLUCOSE BLD STRIP.AUTO-MCNC: 126 MG/DL (ref 70–110)
GLUCOSE BLD STRIP.AUTO-MCNC: 89 MG/DL (ref 70–110)
GLUCOSE SERPL-MCNC: 94 MG/DL (ref 74–99)
POTASSIUM SERPL-SCNC: 4 MMOL/L (ref 3.5–5.5)
SODIUM SERPL-SCNC: 138 MMOL/L (ref 136–145)

## 2017-05-11 PROCEDURE — 74011250637 HC RX REV CODE- 250/637: Performed by: INTERNAL MEDICINE

## 2017-05-11 PROCEDURE — 82962 GLUCOSE BLOOD TEST: CPT

## 2017-05-11 PROCEDURE — 74011250636 HC RX REV CODE- 250/636: Performed by: INTERNAL MEDICINE

## 2017-05-11 PROCEDURE — A9540 TC99M MAA: HCPCS

## 2017-05-11 PROCEDURE — 97530 THERAPEUTIC ACTIVITIES: CPT

## 2017-05-11 PROCEDURE — 74011636637 HC RX REV CODE- 636/637: Performed by: INTERNAL MEDICINE

## 2017-05-11 PROCEDURE — 86606 ASPERGILLUS ANTIBODY: CPT | Performed by: PHYSICIAN ASSISTANT

## 2017-05-11 PROCEDURE — 80048 BASIC METABOLIC PNL TOTAL CA: CPT | Performed by: INTERNAL MEDICINE

## 2017-05-11 PROCEDURE — 97116 GAIT TRAINING THERAPY: CPT

## 2017-05-11 PROCEDURE — 65660000000 HC RM CCU STEPDOWN

## 2017-05-11 PROCEDURE — 94640 AIRWAY INHALATION TREATMENT: CPT

## 2017-05-11 PROCEDURE — 36415 COLL VENOUS BLD VENIPUNCTURE: CPT | Performed by: INTERNAL MEDICINE

## 2017-05-11 PROCEDURE — 74011250637 HC RX REV CODE- 250/637: Performed by: FAMILY MEDICINE

## 2017-05-11 RX ADMIN — PREDNISONE 20 MG: 20 TABLET ORAL at 12:48

## 2017-05-11 RX ADMIN — BUDESONIDE AND FORMOTEROL FUMARATE DIHYDRATE 2 PUFF: 160; 4.5 AEROSOL RESPIRATORY (INHALATION) at 20:36

## 2017-05-11 RX ADMIN — Medication 10 ML: at 21:49

## 2017-05-11 RX ADMIN — FUROSEMIDE 40 MG: 40 TABLET ORAL at 17:22

## 2017-05-11 RX ADMIN — TIOTROPIUM BROMIDE 18 MCG: 18 CAPSULE ORAL; RESPIRATORY (INHALATION) at 09:33

## 2017-05-11 RX ADMIN — HEPARIN SODIUM 5000 UNITS: 5000 INJECTION, SOLUTION INTRAVENOUS; SUBCUTANEOUS at 12:48

## 2017-05-11 RX ADMIN — FUROSEMIDE 40 MG: 40 TABLET ORAL at 09:43

## 2017-05-11 RX ADMIN — HEPARIN SODIUM 5000 UNITS: 5000 INJECTION, SOLUTION INTRAVENOUS; SUBCUTANEOUS at 04:00

## 2017-05-11 RX ADMIN — Medication 10 ML: at 09:46

## 2017-05-11 RX ADMIN — TRAZODONE HYDROCHLORIDE 50 MG: 50 TABLET ORAL at 21:49

## 2017-05-11 RX ADMIN — AMLODIPINE BESYLATE 5 MG: 5 TABLET ORAL at 09:43

## 2017-05-11 RX ADMIN — POTASSIUM CHLORIDE 20 MEQ: 20 TABLET, EXTENDED RELEASE ORAL at 09:43

## 2017-05-11 RX ADMIN — BUDESONIDE AND FORMOTEROL FUMARATE DIHYDRATE 2 PUFF: 160; 4.5 AEROSOL RESPIRATORY (INHALATION) at 09:33

## 2017-05-11 RX ADMIN — POTASSIUM CHLORIDE 20 MEQ: 20 TABLET, EXTENDED RELEASE ORAL at 17:22

## 2017-05-11 RX ADMIN — PANTOPRAZOLE SODIUM 40 MG: 40 TABLET, DELAYED RELEASE ORAL at 09:43

## 2017-05-11 RX ADMIN — Medication 5 ML: at 15:01

## 2017-05-11 RX ADMIN — HEPARIN SODIUM 5000 UNITS: 5000 INJECTION, SOLUTION INTRAVENOUS; SUBCUTANEOUS at 21:46

## 2017-05-11 RX ADMIN — HYDROXYCHLOROQUINE SULFATE 200 MG: 200 TABLET, FILM COATED ENTERAL at 09:43

## 2017-05-11 RX ADMIN — HYDROCODONE BITARTRATE AND ACETAMINOPHEN 1 TABLET: 5; 325 TABLET ORAL at 04:00

## 2017-05-11 NOTE — PROGRESS NOTES
Assumed care; Pt resting in bed; no distress noted; Pt denies pain; bed in low position; Side rails up x 3; Call light and personal belonging within reach. Will continue to monitor. 1230: Pt assisted to recliner and set up for lunch. Safety socks on; Call light and personal belonging within reach. 1310: Pt off floor for procedure. Tele at bedside. 1410: Returned to unit. Assisted back to bed; Tele on; Call light and personal belonging within reach. 1430; Ambulating in hallway with pt. Steady gait noted.

## 2017-05-11 NOTE — PROGRESS NOTES
Problem: Mobility Impaired (Adult and Pediatric)  Goal: *Acute Goals and Plan of Care (Insert Text)  Physical Therapy Goals  Initiated 5/8/2017 and to be accomplished within 7 day(s)  1. Patient will transfer from bed to chair and chair to bed with independence using the least restrictive device. 3. Patient will perform sit to stand with independence. 4. Patient will ambulate with independence for 300 feet. 5. Patient will ascend/descend 3 stairs with 1 handrail(s) with supervision/set-up. Time: 2280                 Pt found sleeping heavily upon entry to room and is minimally aroused by verbal and tactile stimulation. Pt opens eyes for a moment mumbles and is again heavily sleeping. Pt left to rest and will follow up as day progresses.      Carmelita Cox PTA  5/11/2017

## 2017-05-11 NOTE — ROUTINE PROCESS
Bedside shift change report given to Jamilah (oncoming nurse) by Terry Pedersen RN (offgoing nurse). Report given with SBAR, Kardex, Intake/Output, MAR and Recent Results.

## 2017-05-11 NOTE — DISCHARGE SUMMARY
[facility] [address] SUMMARY    Name:  Sri Huerta  MR#:  [id number]  :  1940  Account #:  [de-identified]  Date of Adm:  2017  Date of Transfer:05/15/ 2017  Copy to Dr Alicia Hsieh  Copy to Dr Terra Acosta  1. Acute bronchitis. 2. Chronic interstitial lung disease. 3. Systemic lupus erythematosus. 4. Rheumatoid arthritis. 5. Pulmonary hypertension. 6. History of multiple pneumonias. 7. Sleep disorder. 8. Acute on chronic respiratory failure    DIET: Cardiac. ACTIVITY: As tolerated. MEDICATIONS ON DISCHARGE  1. Norvasc 5 mg daily. 2. Symbicort 2 puffs b.i.d.  3. Lasix 40 mg daily  4. Verapamil 200 mg daily. 5. Levaquin 750 mg daily. 6. Protonix 40 mg daily. 7. K-Dur 20 mEq b.i.d.  8. Prednisone 10mg daily   9. Spiriva 18 mcg daily. 10. Desyrel 50 mg daily. 11. Albuterol nebulizer every 6 hours p.r.n.  12. Maysel 5/325 one every 6 hours p.r.n. Pain. 13. Tussionex 1 teaspoon every 12 hours  13. Plaquenil 200 mg daily    FOLLOWUP: The patient is to follow up with Dr. Nava Prince. Needs sleep  studies as patient has trouble sleeping. also need home o  SUMMARY: This is a readmission of this 43-year-old black female with  history of chronic interstitial lung disease likely related to rheumatoid  arthritis, history of SLE, and pulmonary hypertension, who was  admitted because of shortness of breath and nonproductive cough. PHYSICAL EXAMINATION  GENERAL: She was awake and alert. VITAL SIGNS: Stable. HEART: Regular. LUNGS: Decreased breath sounds. ABDOMEN: Soft. EXTREMITIES: Revealed no edema. CLINICAL COURSE AND MANAGEMENT: The patient was put on  Levaquin and bronchodilators, was seen by Pulmonary. Chest x-ray  did not show any infiltrate suggesting pneumonia. It appears she has  acute bronchitis and responded to treatment with antibiotics, Levaquin,  bronchodilators and steroids. At this time, the patient is feeling better.   She has trouble sleeping, she has been on Ambien 10 mg over the  years. She was started on trazodone but that did not help so will be  started back on Ambien. Plan is to discharge her and take the  prednisone as outpatient. She is to follow up with her rheumatologist.  She was not on steroids prior to admission. She will be tested to check   need for home oxygen and also do apnea link for possible sleep apnea  Patient has a persistent cough, was prescribed Tessalon pearles, broncho  Dilators but nothing helped till she was prescribed Tussionex. She was advised to follow up  With dr Cher Duarte, will dc on Prednisone 10 mg daily till she sees Dr Cher Duarte    Please see medication  reconciliation for complete list of medications.         MD JAGRUTI Merritt / MILENA  D:  05/11/2017   11:57  T:  05/11/2017   12:16  Job #:  [id number]

## 2017-05-11 NOTE — PROGRESS NOTES
[facility]  Pulmonary, Critical Care, and Sleep Medicine    Name: Migue Scott MRN: [id number]   : 1940 Hospital: [address] [place] [address] Dr   Date: 2017        Pulmonary Progress Note                 Pulmonary Staff  I have seen and evaluated the patient and discussed with PA. Agree with observations and findings below along with the following observations. The patient states she still has a cough but is feeling better. She is concerned about her insomnia and requested Ambien 10mg. I discussed with the patient how that was too high a dose for female patients (max 5mg) and  That certain sleeping aids can impair respiratory function and need to be used judiciously. Patient does have an order for Trazodone. Will monitor response. The patient was actually sleeping when I went to evaluate her. Recommend sleep hygiene methods such as keeping light on during the day and to have patient sit in chair, etc. Keep room dark during sleep time. Important to assess need for home both at rest and walking. Agree with obtain V/Q scan this admission. Clinical time spent evaluating patient and coordination of care: 35 min    . Lia Newman,   Pulmonary/ Critical Care                                           Subjective/History:     Migue Scott is a 77 y.o. female with PMHx significant for chronic interstitial lung disease, lupus, RA, and pulmonary hypertension who came to the ED after developing a worsening cough. Her last PFT was in 2016 from which she had a normal baseline FEV1. She is scheduled for an overnight oximetry study but has yet to have this done. 17  Breathing better - has less coughing spells however during episodes still causes some chest soreness; some episodes productive of white phlegm however mostly non-productive. Oxygenating well on room air.        Review of Systems:  Constitutional: positive for fatigue  Eyes: negative  Ears, nose, mouth, throat, and face: positive for sore throat  Respiratory: positive for cough or dyspnea on exertion  Cardiovascular: negative  Gastrointestinal: negative  Genitourinary:negative  Integument/breast: negative  Hematologic/lymphatic: negative  Musculoskeletal:negative  Neurological: negative  Behavioral/Psych: negative    There is no immunization history on file for this patient. Allergies   Allergen Reactions    Latex Itching    Asmanex Hfa [Mometasone] Itching    Cleocin [Clindamycin Hcl] Itching and Swelling     Lips and tongue    Pcn [Penicillins] Swelling    Robitussin [Guaifenesin] Rash        Past Medical History:   Diagnosis Date    Asthma     Chronic lung disease     Lupus ([facility] [address]     Pulmonary hypertension (HCC)     Rheumatoid arthritis ([facility] [address]         No past surgical history on file. Family History   Problem Relation Age of Onset    Heart Disease Mother     Asthma Mother     Asthma Father     Parkinson's Disease Father       Social History   Substance Use Topics    Smoking status: Former Smoker     Packs/day: 0.20     Years: 16.00     Types: Cigarettes    Smokeless tobacco: Never Used    Alcohol use No        Prior to Admission medications    Medication Sig Start Date End Date Taking? Authorizing Provider   zolpidem (AMBIEN) 10 mg tablet Take 10 mg by mouth nightly as needed for Sleep. Yes Sameera King MD   cetirizine (ZYRTEC) 10 mg tablet Take 10 mg by mouth daily. Yes Historical Provider   diphenhydrAMINE (BENADRYL ALLERGY) 25 mg tablet Take 25 mg by mouth every six (6) hours as needed for Sleep. Yes Historical Provider   triamcinolone (NASACORT AQ) 55 mcg nasal inhaler 2 Sprays daily as needed. Yes Historical Provider   aspirin  mg tablet Take 650 mg by mouth every six (6) hours as needed for Pain. Yes Historical Provider   HYDROcodone-acetaminophen (NORCO) 5-325 mg per tablet Take 1 Tab by mouth two (2) times a day. Max Daily Amount: 2 Tabs.  8/15/16  Yes Oleg Jerome MD albuterol-ipratropium (DUO-NEB) 2.5 mg-0.5 mg/3 ml nebu 3 mL by Nebulization route three (3) times daily. 8/15/16  Yes Jammie Orozco MD   amLODIPine (NORVASC) 5 mg tablet Take 1 Tab by mouth daily. Indications: HYPERTENSION 8/15/16  Yes Jammie Orozco MD   furosemide (LASIX) 40 mg tablet Take 1 Tab by mouth daily. Indications: EDEMA 8/15/16  Yes Jammie Orozco MD   omeprazole (PRILOSEC) 20 mg capsule Take 20 mg by mouth daily. Yes Sameera Other, MD   hydroxychloroquine (PLAQUENIL) 200 mg tablet Take 200 mg by mouth daily. Yes Sameera King MD   potassium chloride (KLOR-CON M20) 20 mEq tablet Take 20 mEq by mouth two (2) times a day. Yes Sameera King, MD   naproxen sodium (ALEVE) 220 mg cap Take  by mouth as needed.     Sameera King MD       Current Facility-Administered Medications   Medication Dose Route Frequency    predniSONE (DELTASONE) tablet 20 mg  20 mg Oral DAILY WITH LUNCH    furosemide (LASIX) tablet 40 mg  40 mg Oral BID    traZODone (DESYREL) tablet 50 mg  50 mg Oral QHS    phenol throat spray (CHLORASEPTIC) 1 Spray  1 Spray Oral PRN    tiotropium (SPIRIVA) inhalation capsule 18 mcg  1 Cap Inhalation DAILY    albuterol (PROVENTIL VENTOLIN) nebulizer solution 2.5 mg  2.5 mg Nebulization Q6H PRN    HYDROcodone-acetaminophen (NORCO) 5-325 mg per tablet 1 Tab  1 Tab Oral Q6H PRN    insulin lispro (HUMALOG) injection   SubCUTAneous AC&HS    glucose chewable tablet 16 g  16 g Oral PRN    glucagon (GLUCAGEN) injection 1 mg  1 mg IntraMUSCular PRN    dextrose (D50W) injection syrg 12.5-25 g  25-50 mL IntraVENous PRN    benzonatate (TESSALON) capsule 100 mg  100 mg Oral TID PRN    amLODIPine (NORVASC) tablet 5 mg  5 mg Oral DAILY    pantoprazole (PROTONIX) tablet 40 mg  40 mg Oral ACB    hydroxychloroquine (PLAQUENIL) tablet 200 mg  200 mg Oral DAILY    potassium chloride (K-DUR, KLOR-CON) SR tablet 20 mEq  20 mEq Oral BID    sodium chloride (NS) flush 5-10 mL  5-10 mL IntraVENous Q8H    sodium chloride (NS) flush 5-10 mL  5-10 mL IntraVENous PRN    heparin (porcine) injection 5,000 Units  5,000 Units SubCUTAneous Q8H    budesonide-formoterol (SYMBICORT) 160-4.5 mcg/actuation HFA inhaler 2 Puff  2 Puff Inhalation BID RT         Telemetry:normal sinus rhythm    Objective:   Vital Signs:    Visit Vitals    /74 (BP 1 Location: Left arm, BP Patient Position: Supine)    Pulse 64    Temp 97.3 °F (36.3 °C)    Resp 18    Ht 5' 3\" (1.6 m)    Wt 67.6 kg (149 lb)    SpO2 96%    Breastfeeding No    BMI 26.39 kg/m2       O2 Device: Room air   O2 Flow Rate (L/min): 2 l/min   Temp (24hrs), Av.1 °F (36.7 °C), Min:97.3 °F (36.3 °C), Max:98.6 °F (37 °C)       Intake/Output:   Last shift:       07 - 1900  In: 240 [P.O.:240]  Out: -   Last 3 shifts:  190 -  0700  In: 1080 [P.O.:1080]  Out: 100 [Urine:100]    Intake/Output Summary (Last 24 hours) at 17 0943  Last data filed at 17 0844   Gross per 24 hour   Intake             1080 ml   Output              100 ml   Net              980 ml       Physical Exam:     General appearance - oriented to person, place, and time and acyanotic, in no respiratory distress  Mental status - normal mood, behavior, speech, motor activity, and thought processes  Eyes - pupils equal and reactive, extraocular eye movements intact, sclera anicteric  Mouth - mucous membranes dry, pharynx normal without lesions  Neck - supple, no significant adenopathy  Chest - some coarseness noted on left upper to mid lung area, diminished in bilateral bases.  no tachypnea at rest, no retractions or cyanosis  Heart - normal rate, regular rhythm, normal S1, S2, no murmurs, rubs, clicks or gallops  Abdomen - soft, nontender, nondistended, no masses or organomegaly  bowel sounds normal  Neurological - alert, oriented, normal speech, no focal findings or movement disorder noted  Musculoskeletal - no joint tenderness, deformity or swelling  Extremities - peripheral pulses normal, no pedal edema, no clubbing or cyanosis  Skin -normal turgor, has baseline hyperpigmentation throughout body    Data:       Recent Results (from the past 24 hour(s))   GLUCOSE, POC    Collection Time: 05/10/17 12:05 PM   Result Value Ref Range    Glucose (POC) 87 70 - 110 mg/dL   GLUCOSE, POC    Collection Time: 05/10/17  3:28 PM   Result Value Ref Range    Glucose (POC) 77 70 - 110 mg/dL   GLUCOSE, POC    Collection Time: 05/10/17  9:19 PM   Result Value Ref Range    Glucose (POC) 120 (H) 70 - 203 mg/dL   METABOLIC PANEL, BASIC    Collection Time: 05/11/17  3:11 AM   Result Value Ref Range    Sodium 138 136 - 145 mmol/L    Potassium 4.0 3.5 - 5.5 mmol/L    Chloride 101 100 - 108 mmol/L    CO2 27 21 - 32 mmol/L    Anion gap 10 3.0 - 18 mmol/L    Glucose 94 74 - 99 mg/dL    BUN 28 (H) 7.0 - 18 MG/DL    Creatinine 1.34 (H) 0.6 - 1.3 MG/DL    BUN/Creatinine ratio 21 (H) 12 - 20      GFR est AA 47 (L) >60 ml/min/1.73m2    GFR est non-AA 39 (L) >60 ml/min/1.73m2    Calcium 8.7 8.5 - 10.1 MG/DL   GLUCOSE, POC    Collection Time: 05/11/17  7:20 AM   Result Value Ref Range    Glucose (POC) 89 70 - 110 mg/dL         Chemistry Recent Labs      05/11/17   0311  05/10/17   0302  05/09/17   1135   GLU  94  85  93   NA  138  139  143   K  4.0  4.0  3.6   CL  101  103  106   CO2  27  28  29   BUN  28*  20*  15   CREA  1.34*  1.15  0.84   CA  8.7  8.7  8.8   AGAP  10  8  8   BUCR  21*  17  18        Lactic Acid Lactic acid   Date Value Ref Range Status   06/08/2014 1.4 0.4 - 2.0 MMOL/L Final     No results for input(s): LAC in the last 72 hours.      Liver Enzymes Protein, total   Date Value Ref Range Status   05/06/2017 7.7 6.4 - 8.2 g/dL Final     Albumin   Date Value Ref Range Status   05/06/2017 3.4 3.4 - 5.0 g/dL Final     Globulin   Date Value Ref Range Status   05/06/2017 4.3 (H) 2.0 - 4.0 g/dL Final     A-G Ratio   Date Value Ref Range Status   05/06/2017 0.8 0.8 - 1.7   Final     AST (SGOT)   Date Value Ref Range Status   05/06/2017 25 15 - 37 U/L Final     Alk. phosphatase   Date Value Ref Range Status   05/06/2017 117 45 - 117 U/L Final     No results for input(s): TP, ALB, GLOB, AGRAT, SGOT, GPT, AP, TBIL in the last 72 hours. No lab exists for component: DBIL     CBC w/Diff No results for input(s): WBC, RBC, HGB, HCT, PLT, GRANS, LYMPH, EOS, HGBEXT, HCTEXT, PLTEXT, HGBEXT, HCTEXT, PLTEXT in the last 72 hours. Cardiac Enzymes No results found for: CPK, CKMMB, CKMB, RCK3, CKMBT, CKNDX, CKND1, DORIS, TROPT, TROIQ, MARK, TROPT, TNIPOC, BNP, BNPP     BNP No results found for: BNP, BNPP, XBNPT     Coagulation Recent Labs      05/09/17   1135   APTT  33.7         Thyroid  No results found for: T4, T3U, TSH, TSHEXT, TSHEXT    No results found for: T4     Lipid Panel Lab Results   Component Value Date/Time    Cholesterol, total 251 09/22/2010 08:04 AM    HDL Cholesterol 73 09/22/2010 08:04 AM    LDL, calculated 159.2 09/22/2010 08:04 AM    VLDL, calculated 18.8 09/22/2010 08:04 AM    Triglyceride 94 09/22/2010 08:04 AM    CHOL/HDL Ratio 3.4 09/22/2010 08:04 AM        ABG No results for input(s): PHI, PHI, PCO2I, PO2, PO2I, HCO3, HCO3I, FIO2, FIO2I in the last 72 hours. No lab exists for component: POC2     Urinalysis Lab Results   Component Value Date/Time    Color YELLOW 05/06/2017 04:55 PM    Appearance CLEAR 05/06/2017 04:55 PM    Specific gravity 1.007 05/06/2017 04:55 PM    pH (UA) 6.5 05/06/2017 04:55 PM    Protein NEGATIVE  05/06/2017 04:55 PM    Glucose NEGATIVE  05/06/2017 04:55 PM    Ketone NEGATIVE  05/06/2017 04:55 PM    Bilirubin NEGATIVE  05/06/2017 04:55 PM    Urobilinogen 0.2 05/06/2017 04:55 PM    Nitrites NEGATIVE  05/06/2017 04:55 PM    Leukocyte Esterase NEGATIVE  05/06/2017 04:55 PM        Micro  No results for input(s): SDES, CULT in the last 72 hours. No results for input(s): CULT in the last 72 hours. EKG No results found for this or any previous visit. PFT No flowsheet data found. Other ASA reactivity:   Pre-albumin:   Ionized Calcium:   NH4:   T3, FT4:  Cortisol:  Urine Osm:  Urine Lytes:   HbA1c:      Ultrasound      LE Doppler      ECHO No results found for this or any previous visit. CT (Most Recent)   Results from Hospital Encounter encounter on 05/06/17   CT CHEST WO CONT   Narrative CT scan of chest, without intravenous contrast:        INDICATION:    Acute hypoxemic respiratory failure. COPD with exacerbation. Chronic lung disease. Pulmonary hypertension. TECHNIQUE:    Multidetector CT scan with 5 mm slice thickness, without intravenous contrast,  including chest and subphrenic levels. Sagittal and coronal images reformatted. All CT scans at this facility are performed using dose optimization technique as  appropriate to a  performed  examination, to include automated exposer control,  adjustment mA and / or  KV according to patient size (including appropriate  matching  for site specific examination), or use  of iterative  reconstruction  technique. COMPARISON STUDY: CT scan of chest on 7/31/2016. FINDINGS:    In upper lobe of right lung there are dense heterogeneous fibrotic changes with  volume loss and traction bronchiectasis, as also noted on previous CT scan on  7/31/2016. In the upper and midportion of right lower lobe there are also mild to moderate  fibrotic changes with chronic atelectatic changes, similar to previous study. In left upper lobe there are also moderate dense heterogeneous fibrotic changes  with mild volume loss and mild traction bronchiectasis, similar to previous  study. Note is again made of mild to moderate chronic fibrotic changes in lingula of  left lung and in left lower lobe,, mildly increases compared to previous study. There is no definable new infiltrates or atelectasis in lungs. No evidence of pneumothorax or pleural effusion.     There is no definable mass or pathologic lymphadenopathy at the thoracic inlet  or in visualized mediastinum as on these noncontrast study. The study demonstrates marked calcified plaques in the coronary arteries. There is no evidence of pericardial effusion. The study shows moderate to marked dextroscoliosis and moderate multilevel  spondylosis in thoracic spine as before. In visualized upper and midportion of liver and spleen there is no focal  abnormality. Spleen appears to be generous size. The lower portion of spleen is  not visualized on the study. In visualized upper portion of gallbladder at least one small stone is  identified. In both lung there are mild emphysematous changes without any obvious bulla  formation. IMPRESSIONS:    As compared to previous study in upper lobes of both lungs there is no interval  change. Mildly increased fibrotic changes in lower lobes of both lungs and  lingula of left lung. In upper lobes of both lung there are marked heterogeneous fibrotic changes,  right greater than left with evidence of volume loss and traction  bronchiectasis, similar to previous study. In rest of both lung there are mild-to-moderate scattered fibrotic changes with  associated chronic atelectatic changes in the fibrotic areas, similar to  previous study but probably mildly increased in left and right lower lobe and  lingula. .    There is no definable new infiltrates, new atelectasis or mass lesion in either  lung. Mild COPD without obvious bulla formation. No evidence of pneumothorax or pleural effusion. Cholelithiasis or gallbladder is not completely visualized on this study. Findings suggestive of splenomegaly but spleen is not is that completely  included             XR (Most Recent).  CXR reviewed by me and compared with previous CXR   Results from Hospital Encounter encounter on 05/06/17   XR CHEST PA LAT   Narrative CHEST PA AND LATERAL    CPT CODE: 16068    HISTORY: Persistent cough, congestion, and wheezing x6 months with acute  exacerbation times several months , cough. COMPARISON: Chest x-ray May 5, 2017, July 31, 2016, October 10, 2009. FINDINGS:     PA and lateral views obtained. The cardiac and mediastinal silhouette is normal  for age. The lungs are emphysematous appearing with increased superimposed  interstitial markings predominantly at the upper to mid lungs unchanged from  July 2016. Interstitial markings have progressed compared to 2009. The  costophrenic angles are sharply defined. Pulmonary vascularity is normal. Marked  scoliosis. Impression IMPRESSION:    Emphysema with superimposed chronic interstitial lung disease without change  from July 2016             Imaging:  No new imaging    CXR 5/6  FINDINGS:      PA and lateral views obtained. The cardiac and mediastinal silhouette is normal  for age. The lungs are emphysematous appearing with increased superimposed  interstitial markings predominantly at the upper to mid lungs unchanged from  July 2016. Interstitial markings have progressed compared to 2009. The  costophrenic angles are sharply defined. Pulmonary vascularity is normal. Marked  scoliosis.       IMPRESSION:     Emphysema with superimposed chronic interstitial lung disease without change  from July 2016    IMPRESSION:   · Acute hypoxemic respiratory failure - ?acute exacerbation of underlying fibrotic disease vs acute respiratory infection- improving - completed 5 days levaquin and currently on steroids  · Pulmonary HTN - RV pressure 50 mmHg on 5/3/17; likely group 3 though with Hx of SLE would consider CTEPH (group 4) if evidence of VTE  · Lupus on Plaquenil   · Upper-lobe predominant ILD: ddx includes HP, sarcoid, occupational disease (silicosis), EG (inconsistent), CEP (inconsistent), TB (inconsistent), CF (inconsistent), AS (inconsistent). First 2 seem most likely.  CTD usually results in lower-lobe predominant disease, though this could be atypical fibrotic NSIP  · Chronic anemia  · Hx tobacco abuse, stopped in 1976 (1pack per week x 16 years)      RECOMMENDATIONS:   · Continue Symbicort as maintenance and duonebs as needed  · Completed 5 days levaquin  · Send HP panel; ACE level -41 , vitamin D levels - Vit D 25 9.2 (L), calcitrol - 93. 1(H)  · Strep- negative , legionella Ag -negative. IgE -WNL, Mycoplasma - normal IgM with elevated IgG; defer resp viral panel as pt improving  · Continue prednisone, tapered to 20 mg   · Last PFT 9/2016 was normal study   · Continue lasix, though increase dose for diuresis with BNP nearly 2k  · DC scheduled naprosyn  · Hypokalemia resolved  · Will need eval for home oxygen prior to discharge  · Would benefit from sleep study as outpatient  · Will need to follow up in pulmonary clinic with Dr. Sheri Mckeon once discharged. · Further evaluation of PH per Dr. Sheri Mckeon; group 3 and 4 most likely; will send ptt and APL antibodies as screen for hypercoagulable state.  If positive, consider V/Q for CTEPH         Gilles Young PA-C    5/11/2017

## 2017-05-11 NOTE — PROGRESS NOTES
Problem: Mobility Impaired (Adult and Pediatric)  Goal: *Acute Goals and Plan of Care (Insert Text)  Physical Therapy Goals  Initiated 5/8/2017 and to be accomplished within 7 day(s)  1. Patient will transfer from bed to chair and chair to bed with independence using the least restrictive device. 3. Patient will perform sit to stand with independence. 4. Patient will ambulate with independence for 300 feet. 5. Patient will ascend/descend 3 stairs with 1 handrail(s) with supervision/set-up. PHYSICAL THERAPY TREATMENT     Patient: Gilberto Rubio (78 y.o. female)  Date: 5/11/2017  Diagnosis: COPD exacerbation ([facility] [address] <principal problem not specified>       Precautions: Fall  Chart, physical therapy assessment, plan of care and goals were reviewed. ASSESSMENT:  Pt demo's increased standing dynamic balance with cues for TA draw and activation of posterior chain with ambulation as indicated by performance of community distances with single significant path deviation due to head turn/distraction. Pt is receptive to all cuing and education regarding safety in particular sit>stand sequence. Pt may benefit from use of SPC upon return to home in order to minimize fall risk post hospital admission. Progression toward goals:  [X]      Improving appropriately and progressing toward goals  [ ]      Improving slowly and progressing toward goals  [ ]      Not making progress toward goals and plan of care will be adjusted       PLAN:  Patient continues to benefit from skilled intervention to address the above impairments. Continue treatment per established plan of care. Discharge Recommendations:  Home Health   Further Equipment Recommendations for Discharge:  N/A.       SUBJECTIVE:   Patient stated I have a cane and a walker at home.       OBJECTIVE DATA SUMMARY:   Critical Behavior:  Neurologic State: Alert  Orientation Level: Oriented X4  Cognition: Follows commands     Functional Mobility Training:  Bed Mobility:  Rolling: Independent  Supine to Sit: Independent  Sit to Supine: Independent  Scooting: Independent  Transfers:  Sit to Stand: Independent  Stand to Sit: Supervision (verbal cues for safety and sequence for minimal fall risk)  Balance:  Sitting: Intact  Standing: Impaired  Standing - Static: Good  Standing - Dynamic : Fair  Ambulation/Gait Training:  Distance (ft): 250 Feet (ft)  Assistive Device:  (0 AD)  Ambulation - Level of Assistance: Supervision  Gait Abnormalities: Decreased step clearance; Path deviations (slight deviations with some head turns. self correct )  Speed/Veena: Slow  Pain:  Pt reports 0/10 pain or discomfort prior to treatment. Pt reports 0/10 pain or discomfort post treatment. Activity Tolerance:   Fair+  Please refer to the flowsheet for vital signs taken during this treatment.   After treatment:   [ ] Patient left in no apparent distress sitting up in chair  [X] Patient left in no apparent distress in bed  [X] Call bell left within reach  [ ] Nursing notified  [ ] Caregiver present  [ ] Bed alarm activated      Carmelita Cox PTA   Time Calculation: 23 mins

## 2017-05-12 LAB
ERYTHROCYTE [DISTWIDTH] IN BLOOD BY AUTOMATED COUNT: 15.9 % (ref 11.6–14.5)
GLUCOSE BLD STRIP.AUTO-MCNC: 136 MG/DL (ref 70–110)
GLUCOSE BLD STRIP.AUTO-MCNC: 147 MG/DL (ref 70–110)
GLUCOSE BLD STRIP.AUTO-MCNC: 92 MG/DL (ref 70–110)
GLUCOSE BLD STRIP.AUTO-MCNC: 96 MG/DL (ref 70–110)
HCT VFR BLD AUTO: 34.3 % (ref 35–45)
HGB BLD-MCNC: 10.4 G/DL (ref 12–16)
MCH RBC QN AUTO: 22.2 PG (ref 24–34)
MCHC RBC AUTO-ENTMCNC: 30.3 G/DL (ref 31–37)
MCV RBC AUTO: 73.1 FL (ref 74–97)
PLATELET # BLD AUTO: 270 K/UL (ref 135–420)
PMV BLD AUTO: 10.6 FL (ref 9.2–11.8)
RBC # BLD AUTO: 4.69 M/UL (ref 4.2–5.3)
WBC # BLD AUTO: 9.5 K/UL (ref 4.6–13.2)

## 2017-05-12 PROCEDURE — 74011250637 HC RX REV CODE- 250/637: Performed by: FAMILY MEDICINE

## 2017-05-12 PROCEDURE — 74011250637 HC RX REV CODE- 250/637: Performed by: INTERNAL MEDICINE

## 2017-05-12 PROCEDURE — 74011250636 HC RX REV CODE- 250/636: Performed by: INTERNAL MEDICINE

## 2017-05-12 PROCEDURE — 65660000000 HC RM CCU STEPDOWN

## 2017-05-12 PROCEDURE — 94640 AIRWAY INHALATION TREATMENT: CPT

## 2017-05-12 PROCEDURE — 85027 COMPLETE CBC AUTOMATED: CPT | Performed by: FAMILY MEDICINE

## 2017-05-12 PROCEDURE — 74011636637 HC RX REV CODE- 636/637: Performed by: INTERNAL MEDICINE

## 2017-05-12 PROCEDURE — 36415 COLL VENOUS BLD VENIPUNCTURE: CPT | Performed by: FAMILY MEDICINE

## 2017-05-12 PROCEDURE — 82962 GLUCOSE BLOOD TEST: CPT

## 2017-05-12 RX ORDER — FUROSEMIDE 40 MG/1
40 TABLET ORAL DAILY
Status: DISCONTINUED | OUTPATIENT
Start: 2017-05-13 | End: 2017-05-15 | Stop reason: HOSPADM

## 2017-05-12 RX ADMIN — HEPARIN SODIUM 5000 UNITS: 5000 INJECTION, SOLUTION INTRAVENOUS; SUBCUTANEOUS at 13:48

## 2017-05-12 RX ADMIN — Medication 10 ML: at 05:28

## 2017-05-12 RX ADMIN — Medication 10 ML: at 21:29

## 2017-05-12 RX ADMIN — BUDESONIDE AND FORMOTEROL FUMARATE DIHYDRATE 2 PUFF: 160; 4.5 AEROSOL RESPIRATORY (INHALATION) at 08:25

## 2017-05-12 RX ADMIN — POTASSIUM CHLORIDE 20 MEQ: 20 TABLET, EXTENDED RELEASE ORAL at 10:33

## 2017-05-12 RX ADMIN — PANTOPRAZOLE SODIUM 40 MG: 40 TABLET, DELAYED RELEASE ORAL at 10:33

## 2017-05-12 RX ADMIN — BUDESONIDE AND FORMOTEROL FUMARATE DIHYDRATE 2 PUFF: 160; 4.5 AEROSOL RESPIRATORY (INHALATION) at 20:51

## 2017-05-12 RX ADMIN — FUROSEMIDE 40 MG: 40 TABLET ORAL at 10:33

## 2017-05-12 RX ADMIN — PREDNISONE 20 MG: 20 TABLET ORAL at 13:47

## 2017-05-12 RX ADMIN — HYDROCODONE BITARTRATE AND ACETAMINOPHEN 1 TABLET: 5; 325 TABLET ORAL at 00:18

## 2017-05-12 RX ADMIN — AMLODIPINE BESYLATE 5 MG: 5 TABLET ORAL at 10:33

## 2017-05-12 RX ADMIN — POTASSIUM CHLORIDE 20 MEQ: 20 TABLET, EXTENDED RELEASE ORAL at 17:51

## 2017-05-12 RX ADMIN — TIOTROPIUM BROMIDE 18 MCG: 18 CAPSULE ORAL; RESPIRATORY (INHALATION) at 08:25

## 2017-05-12 RX ADMIN — TRAZODONE HYDROCHLORIDE 50 MG: 50 TABLET ORAL at 21:28

## 2017-05-12 RX ADMIN — Medication 10 ML: at 17:52

## 2017-05-12 RX ADMIN — HYDROCODONE BITARTRATE AND ACETAMINOPHEN 1 TABLET: 5; 325 TABLET ORAL at 13:59

## 2017-05-12 RX ADMIN — HYDROXYCHLOROQUINE SULFATE 200 MG: 200 TABLET, FILM COATED ENTERAL at 10:33

## 2017-05-12 RX ADMIN — HEPARIN SODIUM 5000 UNITS: 5000 INJECTION, SOLUTION INTRAVENOUS; SUBCUTANEOUS at 21:29

## 2017-05-12 NOTE — ROUTINE PROCESS
Received report from [facility]. Pt AAOx3, NAD, breathing non labored, on room air, HOB up. Bed at the lowest level on lock position, call bell w/i reach. Bedside and Verbal shift change report given to IVONE Swain (oncoming nurse) by me (offgoing nurse).  Report included the following information SBAR, Kardex, Intake/Output, MAR, Recent Results and Cardiac Rhythm SR.

## 2017-05-12 NOTE — PROGRESS NOTES
Robert Julian M.D. PROGRESS NOTE    Name: Ada Acevedo MRN: [id number]   : [address]: [facility]   Date: 2017  Admission Date: 2017     Subjective/Objective/Plans    Still SOB, persistent cough  Pulmonary note rec stay another 24/ 48 hrs    Vital Signs:  Visit Vitals    /75 (BP 1 Location: Right arm, BP Patient Position: At rest)    Pulse 63    Temp 97.7 °F (36.5 °C)    Resp 18    Ht 5' 3\" (1.6 m)    Wt 68 kg (150 lb)    SpO2 97%    Breastfeeding No    BMI 26.57 kg/m2       O2 Device: Room air   O2 Flow Rate (L/min): 2 l/min   Temp (24hrs), Av.2 °F (36.8 °C), Min:97.2 °F (36.2 °C), Max:98.8 °F (37.1 °C)     2:40 PM  Intake/Output:   Last shift:         Last 3 shifts: 05/10 1901 -  0700  In: 1560 [P.O.:1560]  Out: 550 [Urine:550]    Intake/Output Summary (Last 24 hours) at 17 1440  Last data filed at 17 0528   Gross per 24 hour   Intake              360 ml   Output              250 ml   Net              110 ml         Physical Exam:    General: in no apparent distress, in no respiratory distress and acyanotic and alert    HEENT: pupils equal, no ear discharge   Neck: No abnormally enlarged lymph nodes. , no JDV   Mouth: MMM no lesions    Chest: normal, no breast masses   Lungs: rhonchi   Heart: Regular rate and rhythm   Abdomen: abdomen is soft without significant tenderness, masses, organomegaly or guarding   Extremity: negative   Neuro: alert    Skin: Skin color, texture, turgor normal. No rashes or lesions    Data Review:    Labs: Results:       Chemistry Recent Labs      17   0311  05/10/17   0302   GLU  94  85   NA  138  139   K  4.0  4.0   CL  101  103   CO2  27  28   BUN  28*  20*   CREA  1.34*  1.15   CA  8.7  8.7   AGAP  10  8   BUCR  21*  17      CBC w/Diff Recent Labs      17   0420   WBC  9.5   RBC  4.69   HGB  10.4*   HCT  34.3*   PLT  270      Cardiac Enzymes No results for input(s): CPK, CKND1, DORIS in the last 72 hours.    No lab exists for component: CKRMB, TROIP   Coagulation No results for input(s): PTP, INR, APTT in the last 72 hours. No lab exists for component: INREXT    Lipid Panel Lab Results   Component Value Date/Time    Cholesterol, total 251 09/22/2010 08:04 AM    HDL Cholesterol 73 09/22/2010 08:04 AM    LDL, calculated 159.2 09/22/2010 08:04 AM    VLDL, calculated 18.8 09/22/2010 08:04 AM    Triglyceride 94 09/22/2010 08:04 AM    CHOL/HDL Ratio 3.4 09/22/2010 08:04 AM      BNP No results for input(s): BNPP in the last 72 hours. Liver Enzymes No results for input(s): TP, ALB, TBILI, AP, SGOT, ALT, CBIL in the last 72 hours. Thyroid Studies No results found for: T4, T3U, TSH, TSHEXT       Procedures/imaging: see electronic medical records for all procedures, Xrays and details which were not copied into this note but were reviewed. Allergies: Allergies   Allergen Reactions    Latex Itching    Asmanex Hfa [Mometasone] Itching    Cleocin [Clindamycin Hcl] Itching and Swelling     Lips and tongue    Pcn [Penicillins] Swelling    Robitussin [Guaifenesin] Rash       Home Medications:  Prior to Admission Medications   Prescriptions Last Dose Informant Patient Reported? Taking? HYDROcodone-acetaminophen (NORCO) 5-325 mg per tablet 5/6/2017 at Unknown time  No Yes   Sig: Take 1 Tab by mouth two (2) times a day. Max Daily Amount: 2 Tabs. albuterol-ipratropium (DUO-NEB) 2.5 mg-0.5 mg/3 ml nebu 5/6/2017 at Unknown time  No Yes   Sig: 3 mL by Nebulization route three (3) times daily. amLODIPine (NORVASC) 5 mg tablet 4/6/2017 at Unknown time  No Yes   Sig: Take 1 Tab by mouth daily. Indications: HYPERTENSION   aspirin  mg tablet 5/5/2017 at Unknown time  Yes Yes   Sig: Take 650 mg by mouth every six (6) hours as needed for Pain. cetirizine (ZYRTEC) 10 mg tablet 5/5/2017 at Unknown time  Yes Yes   Sig: Take 10 mg by mouth daily.    diphenhydrAMINE (BENADRYL ALLERGY) 25 mg tablet 5/5/2017 at Unknown time  Yes Yes   Sig: Take 25 mg by mouth every six (6) hours as needed for Sleep.   furosemide (LASIX) 40 mg tablet 2017 at Unknown time  No Yes   Sig: Take 1 Tab by mouth daily. Indications: EDEMA   hydroxychloroquine (PLAQUENIL) 200 mg tablet 2017 at Unknown time  Yes Yes   Sig: Take 200 mg by mouth daily. naproxen sodium (ALEVE) 220 mg cap 5/3/2017  Yes No   Sig: Take  by mouth as needed. omeprazole (PRILOSEC) 20 mg capsule 2017 at Unknown time  Yes Yes   Sig: Take 20 mg by mouth daily. potassium chloride (KLOR-CON M20) 20 mEq tablet 2017 at Unknown time  Yes Yes   Sig: Take 20 mEq by mouth two (2) times a day. triamcinolone (NASACORT AQ) 55 mcg nasal inhaler 2017 at Unknown time  Yes Yes   Si Sprays daily as needed. zolpidem (AMBIEN) 10 mg tablet 2017 at Unknown time  Yes Yes   Sig: Take 10 mg by mouth nightly as needed for Sleep.       Facility-Administered Medications: None       Current Medications:  Current Facility-Administered Medications   Medication Dose Route Frequency    [START ON 2017] furosemide (LASIX) tablet 40 mg  40 mg Oral DAILY    predniSONE (DELTASONE) tablet 20 mg  20 mg Oral DAILY WITH LUNCH    traZODone (DESYREL) tablet 50 mg  50 mg Oral QHS    phenol throat spray (CHLORASEPTIC) 1 Spray  1 Spray Oral PRN    tiotropium (SPIRIVA) inhalation capsule 18 mcg  1 Cap Inhalation DAILY    albuterol (PROVENTIL VENTOLIN) nebulizer solution 2.5 mg  2.5 mg Nebulization Q6H PRN    HYDROcodone-acetaminophen (NORCO) 5-325 mg per tablet 1 Tab  1 Tab Oral Q6H PRN    insulin lispro (HUMALOG) injection   SubCUTAneous AC&HS    glucose chewable tablet 16 g  16 g Oral PRN    glucagon (GLUCAGEN) injection 1 mg  1 mg IntraMUSCular PRN    dextrose (D50W) injection syrg 12.5-25 g  25-50 mL IntraVENous PRN    benzonatate (TESSALON) capsule 100 mg  100 mg Oral TID PRN    amLODIPine (NORVASC) tablet 5 mg  5 mg Oral DAILY    pantoprazole (PROTONIX) tablet 40 mg  40 mg Oral ACB    hydroxychloroquine (PLAQUENIL) tablet 200 mg  200 mg Oral DAILY    potassium chloride (K-DUR, KLOR-CON) SR tablet 20 mEq  20 mEq Oral BID    sodium chloride (NS) flush 5-10 mL  5-10 mL IntraVENous Q8H    sodium chloride (NS) flush 5-10 mL  5-10 mL IntraVENous PRN    heparin (porcine) injection 5,000 Units  5,000 Units SubCUTAneous Q8H    budesonide-formoterol (SYMBICORT) 160-4.5 mcg/actuation HFA inhaler 2 Puff  2 Puff Inhalation BID RT       Chart and notes reviewed. Data reviewed. I have evaluated and examined the patient.         IMPRESSION:   Patient Active Problem List   Diagnosis Code    Cirrhosis of liver ([facility] [address] K74.60    Systemic lupus erythematosus ([facility] [address] M32.9    Rheumatoid arthritis ([facility] [address] M06.9    HTN (hypertension) I10    Dyslipidemia E78.5    COPD exacerbation (HCC) J44.1    Pulmonary HTN (HCC) I27.2    Acute bronchitis J20.9 ·         PLAN:/DISCUSSION:   · Dc in         Florencio Alcaraz MD  5/12/2017, 2:40 PM

## 2017-05-12 NOTE — ROUTINE PROCESS
Bedside and Verbal shift change report given to [facility] RN (oncoming nurse) by Jaskaran Monte RN (offgoing nurse). Report given with Tricia ESCOBAR and MAR.

## 2017-05-12 NOTE — PROGRESS NOTES
[facility]  Pulmonary, Critical Care, and Sleep Medicine    Name: Macy Gaines MRN: [id number]   : 1940 Hospital: [address] [place] [address] Dr   Date: 2017        Pulmonary Progress Note                                                Subjective/History:     Macy Gaines is a 77 y.o. female with PMHx significant for chronic interstitial lung disease, lupus, RA, and pulmonary hypertension who came to the ED after developing a worsening cough. Her last PFT was in 2016 from which she had a normal baseline FEV1. She is scheduled for an overnight oximetry study but has yet to have this done. 17  No new events overnight. States her breathing has improved; less coughing overnight. Has some joint knee pain today which she intermittently experiences and attributes to her lupus. Oxygenating well on room air. V/Q scan with low probability PE. Review of Systems:  Constitutional: positive for fatigue  Eyes: negative  Ears, nose, mouth, throat, and face: positive for sore throat  Respiratory: positive for cough or dyspnea on exertion  Cardiovascular: negative  Gastrointestinal: negative  Genitourinary:negative  Integument/breast: negative  Hematologic/lymphatic: negative  Musculoskeletal:negative  Neurological: negative  Behavioral/Psych: negative    There is no immunization history on file for this patient. Allergies   Allergen Reactions    Latex Itching    Asmanex Hfa [Mometasone] Itching    Cleocin [Clindamycin Hcl] Itching and Swelling     Lips and tongue    Pcn [Penicillins] Swelling    Robitussin [Guaifenesin] Rash        Past Medical History:   Diagnosis Date    Asthma     Chronic lung disease     Lupus ([facility]     Pulmonary hypertension (HCC)     Rheumatoid arthritis ([facility]         No past surgical history on file.      Family History   Problem Relation Age of Onset    Heart Disease Mother     Asthma Mother     Asthma Father    [facility] Parkinson's Disease Father Social History   Substance Use Topics    Smoking status: Former Smoker     Packs/day: 0.20     Years: 16.00     Types: Cigarettes    Smokeless tobacco: Never Used    Alcohol use No        Prior to Admission medications    Medication Sig Start Date End Date Taking? Authorizing Provider   zolpidem (AMBIEN) 10 mg tablet Take 10 mg by mouth nightly as needed for Sleep. Yes Sameera King MD   cetirizine (ZYRTEC) 10 mg tablet Take 10 mg by mouth daily. Yes Historical Provider   diphenhydrAMINE (BENADRYL ALLERGY) 25 mg tablet Take 25 mg by mouth every six (6) hours as needed for Sleep. Yes Historical Provider   triamcinolone (NASACORT AQ) 55 mcg nasal inhaler 2 Sprays daily as needed. Yes Historical Provider   aspirin  mg tablet Take 650 mg by mouth every six (6) hours as needed for Pain. Yes Historical Provider   HYDROcodone-acetaminophen (NORCO) 5-325 mg per tablet Take 1 Tab by mouth two (2) times a day. Max Daily Amount: 2 Tabs. 8/15/16  Yes Kalin Davis MD   albuterol-ipratropium (DUO-NEB) 2.5 mg-0.5 mg/3 ml nebu 3 mL by Nebulization route three (3) times daily. 8/15/16  Yes Kalin Davis MD   amLODIPine (NORVASC) 5 mg tablet Take 1 Tab by mouth daily. Indications: HYPERTENSION 8/15/16  Yes Kalin Davis MD   furosemide (LASIX) 40 mg tablet Take 1 Tab by mouth daily. Indications: EDEMA 8/15/16  Yes Kalin Davis MD   omeprazole (PRILOSEC) 20 mg capsule Take 20 mg by mouth daily. Yes Sameera King MD   hydroxychloroquine (PLAQUENIL) 200 mg tablet Take 200 mg by mouth daily. Yes Sameera King MD   potassium chloride (KLOR-CON M20) 20 mEq tablet Take 20 mEq by mouth two (2) times a day. Yes Sameera King MD   naproxen sodium (ALEVE) 220 mg cap Take  by mouth as needed.     Sameera King MD       Current Facility-Administered Medications   Medication Dose Route Frequency    predniSONE (DELTASONE) tablet 20 mg  20 mg Oral DAILY WITH LUNCH    furosemide (LASIX) tablet 40 mg  40 mg Oral BID    traZODone (DESYREL) tablet 50 mg  50 mg Oral QHS    phenol throat spray (CHLORASEPTIC) 1 Spray  1 Spray Oral PRN    tiotropium (SPIRIVA) inhalation capsule 18 mcg  1 Cap Inhalation DAILY    albuterol (PROVENTIL VENTOLIN) nebulizer solution 2.5 mg  2.5 mg Nebulization Q6H PRN    HYDROcodone-acetaminophen (NORCO) 5-325 mg per tablet 1 Tab  1 Tab Oral Q6H PRN    insulin lispro (HUMALOG) injection   SubCUTAneous AC&HS    glucose chewable tablet 16 g  16 g Oral PRN    glucagon (GLUCAGEN) injection 1 mg  1 mg IntraMUSCular PRN    dextrose (D50W) injection syrg 12.5-25 g  25-50 mL IntraVENous PRN    benzonatate (TESSALON) capsule 100 mg  100 mg Oral TID PRN    amLODIPine (NORVASC) tablet 5 mg  5 mg Oral DAILY    pantoprazole (PROTONIX) tablet 40 mg  40 mg Oral ACB    hydroxychloroquine (PLAQUENIL) tablet 200 mg  200 mg Oral DAILY    potassium chloride (K-DUR, KLOR-CON) SR tablet 20 mEq  20 mEq Oral BID    sodium chloride (NS) flush 5-10 mL  5-10 mL IntraVENous Q8H    sodium chloride (NS) flush 5-10 mL  5-10 mL IntraVENous PRN    heparin (porcine) injection 5,000 Units  5,000 Units SubCUTAneous Q8H    budesonide-formoterol (SYMBICORT) 160-4.5 mcg/actuation HFA inhaler 2 Puff  2 Puff Inhalation BID RT         Telemetry:normal sinus rhythm    Objective:   Vital Signs:    Visit Vitals    /73 (BP 1 Location: Right arm, BP Patient Position: At rest)    Pulse 69    Temp 97.7 °F (36.5 °C)    Resp 18    Ht 5' 3\" (1.6 m)    Wt 68 kg (150 lb)    SpO2 95%    Breastfeeding No    BMI 26.57 kg/m2       O2 Device: Room air   O2 Flow Rate (L/min): 2 l/min   Temp (24hrs), Av.2 °F (36.8 °C), Min:97.2 °F (36.2 °C), Max:98.8 °F (37.1 °C)       Intake/Output:   Last shift:         Last 3 shifts: 05/10 1901 -  0700  In: 1560 [P.O.:1560]  Out: 550 [Urine:550]    Intake/Output Summary (Last 24 hours) at 17 1117  Last data filed at 17 0528   Gross per 24 hour   Intake              600 ml   Output 450 ml   Net              150 ml       Physical Exam:     General appearance - oriented to person, place, and time and acyanotic, in no respiratory distress  Mental status - normal mood, behavior, speech, motor activity, and thought processes  Eyes - pupils equal and reactive, extraocular eye movements intact, sclera anicteric  Mouth - oral mucous membranes normal, pharynx normal without lesions  Neck - supple, no significant adenopathy  Chest - symmetrical chest expansion, no accessory muscle use; no crackles/ rales/ wheezing noted  Heart - normal rate, regular rhythm, normal S1, S2, no murmurs, rubs, clicks or gallops  Abdomen - soft, nontender, nondistended, no masses or organomegaly  bowel sounds normal  Neurological - alert, oriented, normal speech, no focal findings or movement disorder noted  Musculoskeletal - no deformity or swelling; + kyphosis  Extremities - peripheral pulses normal, no pedal edema, no clubbing or cyanosis  Skin -normal turgor, has baseline hyperpigmentation throughout body      Data:       Recent Results (from the past 24 hour(s))   GLUCOSE, POC    Collection Time: 05/11/17 12:27 PM   Result Value Ref Range    Glucose (POC) 103 70 - 110 mg/dL   GLUCOSE, POC    Collection Time: 05/11/17  3:29 PM   Result Value Ref Range    Glucose (POC) 126 (H) 70 - 110 mg/dL   GLUCOSE, POC    Collection Time: 05/11/17  8:58 PM   Result Value Ref Range    Glucose (POC) 126 (H) 70 - 110 mg/dL   CBC W/O DIFF    Collection Time: 05/12/17  4:20 AM   Result Value Ref Range    WBC 9.5 4.6 - 13.2 K/uL    RBC 4.69 4.20 - 5.30 M/uL    HGB 10.4 (L) 12.0 - 16.0 g/dL    HCT 34.3 (L) 35.0 - 45.0 %    MCV 73.1 (L) 74.0 - 97.0 FL    MCH 22.2 (L) 24.0 - 34.0 PG    MCHC 30.3 (L) 31.0 - 37.0 g/dL    RDW 15.9 (H) 11.6 - 14.5 %    PLATELET 333 203 - 376 K/uL    MPV 10.6 9.2 - 11.8 FL   GLUCOSE, POC    Collection Time: 05/12/17  7:29 AM   Result Value Ref Range    Glucose (POC) 92 70 - 110 mg/dL         Chemistry Recent Labs      05/11/17   0311  05/10/17   0302  05/09/17   1135   GLU  94  85  93   NA  138  139  143   K  4.0  4.0  3.6   CL  101  103  106   CO2  27  28  29   BUN  28*  20*  15   CREA  1.34*  1.15  0.84   CA  8.7  8.7  8.8   AGAP  10  8  8   BUCR  21*  17  18        Lactic Acid Lactic acid   Date Value Ref Range Status   06/08/2014 1.4 0.4 - 2.0 MMOL/L Final     No results for input(s): LAC in the last 72 hours. Liver Enzymes Protein, total   Date Value Ref Range Status   05/06/2017 7.7 6.4 - 8.2 g/dL Final     Albumin   Date Value Ref Range Status   05/06/2017 3.4 3.4 - 5.0 g/dL Final     Globulin   Date Value Ref Range Status   05/06/2017 4.3 (H) 2.0 - 4.0 g/dL Final     A-G Ratio   Date Value Ref Range Status   05/06/2017 0.8 0.8 - 1.7   Final     AST (SGOT)   Date Value Ref Range Status   05/06/2017 25 15 - 37 U/L Final     Alk. phosphatase   Date Value Ref Range Status   05/06/2017 117 45 - 117 U/L Final     No results for input(s): TP, ALB, GLOB, AGRAT, SGOT, GPT, AP, TBIL in the last 72 hours. No lab exists for component: DBIL     CBC w/Diff Recent Labs      05/12/17   0420   WBC  9.5   RBC  4.69   HGB  10.4*   HCT  34.3*   PLT  270        Cardiac Enzymes No results found for: CPK, CKMMB, CKMB, RCK3, CKMBT, CKNDX, CKND1, DORIS, TROPT, TROIQ, MARK, TROPT, TNIPOC, BNP, BNPP     BNP No results found for: BNP, BNPP, XBNPT     Coagulation Recent Labs      05/09/17   1135   APTT  33.7         Thyroid  No results found for: T4, T3U, TSH, TSHEXT, TSHEXT    No results found for: T4     Lipid Panel Lab Results   Component Value Date/Time    Cholesterol, total 251 09/22/2010 08:04 AM    HDL Cholesterol 73 09/22/2010 08:04 AM    LDL, calculated 159.2 09/22/2010 08:04 AM    VLDL, calculated 18.8 09/22/2010 08:04 AM    Triglyceride 94 09/22/2010 08:04 AM    CHOL/HDL Ratio 3.4 09/22/2010 08:04 AM        ABG No results for input(s): PHI, PHI, PCO2I, PO2, PO2I, HCO3, HCO3I, FIO2, FIO2I in the last 72 hours.     No lab exists for component: POC2     Urinalysis Lab Results   Component Value Date/Time    Color YELLOW 05/06/2017 04:55 PM    Appearance CLEAR 05/06/2017 04:55 PM    Specific gravity 1.007 05/06/2017 04:55 PM    pH (UA) 6.5 05/06/2017 04:55 PM    Protein NEGATIVE  05/06/2017 04:55 PM    Glucose NEGATIVE  05/06/2017 04:55 PM    Ketone NEGATIVE  05/06/2017 04:55 PM    Bilirubin NEGATIVE  05/06/2017 04:55 PM    Urobilinogen 0.2 05/06/2017 04:55 PM    Nitrites NEGATIVE  05/06/2017 04:55 PM    Leukocyte Esterase NEGATIVE  05/06/2017 04:55 PM        Micro  No results for input(s): SDES, CULT in the last 72 hours. No results for input(s): CULT in the last 72 hours. EKG No results found for this or any previous visit. PFT No flowsheet data found. Other ASA reactivity:   Pre-albumin:   Ionized Calcium:   NH4:   T3, FT4:  Cortisol:  Urine Osm:  Urine Lytes:   HbA1c:      Ultrasound      LE Doppler      ECHO No results found for this or any previous visit. CT (Most Recent)   Results from Hospital Encounter encounter on 05/06/17   CT CHEST WO CONT   Narrative CT scan of chest, without intravenous contrast:        INDICATION:    Acute hypoxemic respiratory failure. COPD with exacerbation. Chronic lung disease. Pulmonary hypertension. TECHNIQUE:    Multidetector CT scan with 5 mm slice thickness, without intravenous contrast,  including chest and subphrenic levels. Sagittal and coronal images reformatted. All CT scans at this facility are performed using dose optimization technique as  appropriate to a  performed  examination, to include automated exposer control,  adjustment mA and / or  KV according to patient size (including appropriate  matching  for site specific examination), or use  of iterative  reconstruction  technique. COMPARISON STUDY: CT scan of chest on 7/31/2016.     FINDINGS:    In upper lobe of right lung there are dense heterogeneous fibrotic changes with  volume loss and traction bronchiectasis, as also noted on previous CT scan on  7/31/2016. In the upper and midportion of right lower lobe there are also mild to moderate  fibrotic changes with chronic atelectatic changes, similar to previous study. In left upper lobe there are also moderate dense heterogeneous fibrotic changes  with mild volume loss and mild traction bronchiectasis, similar to previous  study. Note is again made of mild to moderate chronic fibrotic changes in lingula of  left lung and in left lower lobe,, mildly increases compared to previous study. There is no definable new infiltrates or atelectasis in lungs. No evidence of pneumothorax or pleural effusion. There is no definable mass or pathologic lymphadenopathy at the thoracic inlet  or in visualized mediastinum as on these noncontrast study. The study demonstrates marked calcified plaques in the coronary arteries. There is no evidence of pericardial effusion. The study shows moderate to marked dextroscoliosis and moderate multilevel  spondylosis in thoracic spine as before. In visualized upper and midportion of liver and spleen there is no focal  abnormality. Spleen appears to be generous size. The lower portion of spleen is  not visualized on the study. In visualized upper portion of gallbladder at least one small stone is  identified. In both lung there are mild emphysematous changes without any obvious bulla  formation. IMPRESSIONS:    As compared to previous study in upper lobes of both lungs there is no interval  change. Mildly increased fibrotic changes in lower lobes of both lungs and  lingula of left lung. In upper lobes of both lung there are marked heterogeneous fibrotic changes,  right greater than left with evidence of volume loss and traction  bronchiectasis, similar to previous study.     In rest of both lung there are mild-to-moderate scattered fibrotic changes with  associated chronic atelectatic changes in the fibrotic areas, similar to  previous study but probably mildly increased in left and right lower lobe and  lingula. .    There is no definable new infiltrates, new atelectasis or mass lesion in either  lung. Mild COPD without obvious bulla formation. No evidence of pneumothorax or pleural effusion. Cholelithiasis or gallbladder is not completely visualized on this study. Findings suggestive of splenomegaly but spleen is not is that completely  included             XR (Most Recent). CXR reviewed by me and compared with previous CXR   Results from Hospital Encounter encounter on 05/06/17   XR CHEST PA LAT   Narrative CHEST PA AND LATERAL    CPT CODE: 40773    HISTORY: Persistent cough, congestion, and wheezing x6 months with acute  exacerbation times several months , cough. COMPARISON: Chest x-ray May 5, 2017, July 31, 2016, October 10, 2009. FINDINGS:     PA and lateral views obtained. The cardiac and mediastinal silhouette is normal  for age. The lungs are emphysematous appearing with increased superimposed  interstitial markings predominantly at the upper to mid lungs unchanged from  July 2016. Interstitial markings have progressed compared to 2009. The  costophrenic angles are sharply defined. Pulmonary vascularity is normal. Marked  scoliosis. Impression IMPRESSION:    Emphysema with superimposed chronic interstitial lung disease without change  from July 2016             Imaging:  No new imaging    CXR 5/6  FINDINGS:      PA and lateral views obtained. The cardiac and mediastinal silhouette is normal for age. The lungs are emphysematous appearing with increased superimposed interstitial markings predominantly at the upper to mid lungs unchanged from July 2016. Interstitial markings have progressed compared to 2009. The costophrenic angles are sharply defined.  Pulmonary vascularity is normal. Marked scoliosis.     IMPRESSION: Emphysema with superimposed chronic interstitial lung disease without change from July 2016        IMPRESSION:   · Acute hypoxemic respiratory failure - ?acute exacerbation of underlying fibrotic disease vs acute respiratory infection- improved - completed 5 days levaquin and currently on steroids  · Pulmonary HTN - RV pressure 50 mmHg on 5/3/17; likely group 3 though with Hx of SLE would consider CTEPH (group 4) if evidence of VTE  · Lupus on Plaquenil   · Upper-lobe predominant ILD: ddx includes HP, sarcoid, occupational disease (silicosis), EG (inconsistent), CEP (inconsistent), TB (inconsistent), CF (inconsistent), AS (inconsistent). First 2 seem most likely. CTD usually results in lower-lobe predominant disease, though this could be atypical fibrotic NSIP  · Chronic anemia  · Hx tobacco abuse, stopped in 1976 (1pack per week x 16 years)  · GENIE      RECOMMENDATIONS:   · Continue Symbicort as maintenance medication and duonebs as needed  · Continue prednisone, tapered to 20 mg PO daily  · Completed 5 days levaquin  · HP panel pending- can follow up as OP; ACE level -41 , vitamin D levels - Vit D 25 9.2 (L), calcitrol -         93. 1(H). V/Q scan with low probability for PE  · Strep- negative , legionella Ag -negative. IgE -WNL, Mycoplasma - normal IgM with elevated IgG; defer           resp viral panel as pt improving  · Last PFT 9/2016 was normal study   · Recommend to decrease lasix given mild increase of Crea- change to Qday from BID and monitor for             clinical response. · Will need eval for home oxygen prior to discharge  · Would benefit from sleep study as outpatient  · Arrange for follow up in pulmonary clinic with Dr. Ab Cortez prior to discharge - further evaluation of [place] 30 per Dr. Ab Cortez; group 3 and 4 most likely         Chinyere Portillo PA-C    5/12/2017       Pulmonary Staff  I have independently evaluated the patient and discussed above findings and care plan with RADHA and patient.  The patient continues to report intermittent arthralgias - especially in her right leg which is not new in nature. + void urine but no BM today. Pending clinical course anticipate discharge home in the next 24-48 hours with OP pulmonary follow up    Clinical Time  Spent evaluating the patient and coordinating care: 30 min      . Natalya García, DO  Pulmonary Critical Care

## 2017-05-13 LAB
ANION GAP BLD CALC-SCNC: 7 MMOL/L (ref 3–18)
BUN SERPL-MCNC: 22 MG/DL (ref 7–18)
BUN/CREAT SERPL: 25 (ref 12–20)
CALCIUM SERPL-MCNC: 8.9 MG/DL (ref 8.5–10.1)
CHLORIDE SERPL-SCNC: 100 MMOL/L (ref 100–108)
CO2 SERPL-SCNC: 33 MMOL/L (ref 21–32)
CREAT SERPL-MCNC: 0.88 MG/DL (ref 0.6–1.3)
GLUCOSE BLD STRIP.AUTO-MCNC: 104 MG/DL (ref 70–110)
GLUCOSE BLD STRIP.AUTO-MCNC: 141 MG/DL (ref 70–110)
GLUCOSE BLD STRIP.AUTO-MCNC: 96 MG/DL (ref 70–110)
GLUCOSE BLD STRIP.AUTO-MCNC: 96 MG/DL (ref 70–110)
GLUCOSE SERPL-MCNC: 98 MG/DL (ref 74–99)
POTASSIUM SERPL-SCNC: 3.7 MMOL/L (ref 3.5–5.5)
SODIUM SERPL-SCNC: 140 MMOL/L (ref 136–145)

## 2017-05-13 PROCEDURE — 65270000029 HC RM PRIVATE

## 2017-05-13 PROCEDURE — 74011250637 HC RX REV CODE- 250/637: Performed by: INTERNAL MEDICINE

## 2017-05-13 PROCEDURE — 74011636637 HC RX REV CODE- 636/637: Performed by: INTERNAL MEDICINE

## 2017-05-13 PROCEDURE — 36415 COLL VENOUS BLD VENIPUNCTURE: CPT | Performed by: INTERNAL MEDICINE

## 2017-05-13 PROCEDURE — 74011250637 HC RX REV CODE- 250/637: Performed by: PHYSICIAN ASSISTANT

## 2017-05-13 PROCEDURE — 82962 GLUCOSE BLOOD TEST: CPT

## 2017-05-13 PROCEDURE — 94640 AIRWAY INHALATION TREATMENT: CPT

## 2017-05-13 PROCEDURE — 77030027138 HC INCENT SPIROMETER -A

## 2017-05-13 PROCEDURE — 74011250637 HC RX REV CODE- 250/637: Performed by: FAMILY MEDICINE

## 2017-05-13 PROCEDURE — 74011250636 HC RX REV CODE- 250/636: Performed by: INTERNAL MEDICINE

## 2017-05-13 PROCEDURE — 80048 BASIC METABOLIC PNL TOTAL CA: CPT | Performed by: INTERNAL MEDICINE

## 2017-05-13 RX ADMIN — PREDNISONE 20 MG: 20 TABLET ORAL at 15:40

## 2017-05-13 RX ADMIN — POTASSIUM CHLORIDE 20 MEQ: 20 TABLET, EXTENDED RELEASE ORAL at 09:03

## 2017-05-13 RX ADMIN — Medication 10 ML: at 05:24

## 2017-05-13 RX ADMIN — HEPARIN SODIUM 5000 UNITS: 5000 INJECTION, SOLUTION INTRAVENOUS; SUBCUTANEOUS at 21:48

## 2017-05-13 RX ADMIN — HYDROCODONE BITARTRATE AND ACETAMINOPHEN 1 TABLET: 5; 325 TABLET ORAL at 01:54

## 2017-05-13 RX ADMIN — TRAZODONE HYDROCHLORIDE 50 MG: 50 TABLET ORAL at 21:48

## 2017-05-13 RX ADMIN — HEPARIN SODIUM 5000 UNITS: 5000 INJECTION, SOLUTION INTRAVENOUS; SUBCUTANEOUS at 05:22

## 2017-05-13 RX ADMIN — BUDESONIDE AND FORMOTEROL FUMARATE DIHYDRATE 2 PUFF: 160; 4.5 AEROSOL RESPIRATORY (INHALATION) at 19:44

## 2017-05-13 RX ADMIN — HEPARIN SODIUM 5000 UNITS: 5000 INJECTION, SOLUTION INTRAVENOUS; SUBCUTANEOUS at 13:50

## 2017-05-13 RX ADMIN — HYDROXYCHLOROQUINE SULFATE 200 MG: 200 TABLET, FILM COATED ENTERAL at 09:03

## 2017-05-13 RX ADMIN — Medication 10 ML: at 21:50

## 2017-05-13 RX ADMIN — TIOTROPIUM BROMIDE 18 MCG: 18 CAPSULE ORAL; RESPIRATORY (INHALATION) at 09:04

## 2017-05-13 RX ADMIN — POTASSIUM CHLORIDE 20 MEQ: 20 TABLET, EXTENDED RELEASE ORAL at 19:16

## 2017-05-13 RX ADMIN — BUDESONIDE AND FORMOTEROL FUMARATE DIHYDRATE 2 PUFF: 160; 4.5 AEROSOL RESPIRATORY (INHALATION) at 09:04

## 2017-05-13 RX ADMIN — Medication 10 ML: at 15:41

## 2017-05-13 RX ADMIN — FUROSEMIDE 40 MG: 40 TABLET ORAL at 09:04

## 2017-05-13 RX ADMIN — PANTOPRAZOLE SODIUM 40 MG: 40 TABLET, DELAYED RELEASE ORAL at 09:03

## 2017-05-13 RX ADMIN — AMLODIPINE BESYLATE 5 MG: 5 TABLET ORAL at 09:03

## 2017-05-13 NOTE — ROUTINE PROCESS
Bedside and Verbal shift change report given to Ja Lin RN (oncoming nurse) by Desiree Tobias RN (offgoing nurse). Report given with Tricia ESCOBAR and MAR.

## 2017-05-13 NOTE — PROGRESS NOTES
3 [facility]  Pulmonary, Critical Care, and Sleep Medicine    Name: Hanny eVlázquez MRN: [id number]   : 1940 Hospital: [address] [place] [address] Dr   Date: 2017        Pulmonary Progress Note                                                Subjective/History:     Hanny Velázquez is a 77 y.o. female with PMHx significant for chronic interstitial lung disease, lupus, RA, and pulmonary hypertension who came to the ED after developing a worsening cough. Her last PFT was in 2016 from which she had a normal baseline FEV1. She is scheduled for an overnight oximetry study but has yet to have this done. 17  No new events overnight. States her breathing has improved; less coughing overnight- but stiff persistent. States that she feels much better since last week. Her cough is now dry previously with dark yellow sputum per patient. Oxygenating well on room air at rest. No complaints at this time. At this time I suspect ILD complicated with lower respiratory infectious process- that later of which has been treated and improving. Creat. was elevated on 17. Need to recheck today        Review of Systems:  Constitutional: positive for fatigue  Eyes: negative  Ears, nose, mouth, throat, and face: positive for sore throat  Respiratory: positive for cough or dyspnea on exertion  Cardiovascular: negative  Gastrointestinal: negative  Genitourinary:negative  Integument/breast: negative  Hematologic/lymphatic: negative  Musculoskeletal:negative  Neurological: negative  Behavioral/Psych: negative    There is no immunization history on file for this patient.      Allergies   Allergen Reactions    Latex Itching    Asmanex Hfa [Mometasone] Itching    Cleocin [Clindamycin Hcl] Itching and Swelling     Lips and tongue    Pcn [Penicillins] Swelling    Robitussin [Guaifenesin] Rash        Past Medical History:   Diagnosis Date    Asthma     Chronic lung disease     [facility]us ([facility] [address] [facility]     [facility] [facility]     Rheumatoid arthritis ([facility] [address] [facility]         No past surgical history on file. Family History   Problem Relation Age of Onset    Heart Disease Mother     Asthma Mother     Asthma Father     Parkinson's Disease Father       Social History   Substance Use Topics    Smoking status: Former Smoker     Packs/day: 0.20     Years: 16.00     Types: Cigarettes    Smokeless tobacco: Never Used    Alcohol use No        Prior to Admission medications    Medication Sig Start Date End Date Taking? Authorizing Provider   zolpidem (AMBIEN) 10 mg tablet Take 10 mg by mouth nightly as needed for Sleep. Yes Sameera King MD   cetirizine (ZYRTEC) 10 mg tablet Take 10 mg by mouth daily. Yes Historical Provider   diphenhydrAMINE (BENADRYL ALLERGY) 25 mg tablet Take 25 mg by mouth every six (6) hours as needed for Sleep. Yes Historical Provider   triamcinolone (NASACORT AQ) 55 mcg nasal inhaler 2 Sprays daily as needed. Yes Historical Provider   aspirin  mg tablet Take 650 mg by mouth every six (6) hours as needed for Pain. Yes Historical Provider   HYDROcodone-acetaminophen (NORCO) 5-325 mg per tablet Take 1 Tab by mouth two (2) times a day. Max Daily Amount: 2 Tabs. 8/15/16  Yes Tiffany Sterling MD   albuterol-ipratropium (DUO-NEB) 2.5 mg-0.5 mg/3 ml nebu 3 mL by Nebulization route three (3) times daily. 8/15/16  Yes Tiffany Sterling MD   amLODIPine (NORVASC) 5 mg tablet Take 1 Tab by mouth daily. Indications: HYPERTENSION 8/15/16  Yes Tiffany Sterling MD   furosemide (LASIX) 40 mg tablet Take 1 Tab by mouth daily. Indications: EDEMA 8/15/16  Yes Tiffany Sterling MD   omeprazole (PRILOSEC) 20 mg capsule Take 20 mg by mouth daily. Yes Sameera King MD   hydroxychloroquine (PLAQUENIL) 200 mg tablet Take 200 mg by mouth daily. Yes Sameera King MD   potassium chloride (KLOR-CON M20) 20 mEq tablet Take 20 mEq by mouth two (2) times a day.    Yes Sameera King MD   naproxen sodium (ALEVE) 220 mg cap Take  by mouth as needed.     Phys Other, MD       Current Facility-Administered Medications   Medication Dose Route Frequency    furosemide (LASIX) tablet 40 mg  40 mg Oral DAILY    predniSONE (DELTASONE) tablet 20 mg  20 mg Oral DAILY WITH LUNCH    traZODone (DESYREL) tablet 50 mg  50 mg Oral QHS    phenol throat spray (CHLORASEPTIC) 1 Spray  1 Spray Oral PRN    tiotropium (SPIRIVA) inhalation capsule 18 mcg  1 Cap Inhalation DAILY    albuterol (PROVENTIL VENTOLIN) nebulizer solution 2.5 mg  2.5 mg Nebulization Q6H PRN    HYDROcodone-acetaminophen (NORCO) 5-325 mg per tablet 1 Tab  1 Tab Oral Q6H PRN    insulin lispro (HUMALOG) injection   SubCUTAneous AC&HS    glucose chewable tablet 16 g  16 g Oral PRN    glucagon (GLUCAGEN) injection 1 mg  1 mg IntraMUSCular PRN    dextrose (D50W) injection syrg 12.5-25 g  25-50 mL IntraVENous PRN    benzonatate (TESSALON) capsule 100 mg  100 mg Oral TID PRN    amLODIPine (NORVASC) tablet 5 mg  5 mg Oral DAILY    pantoprazole (PROTONIX) tablet 40 mg  40 mg Oral ACB    hydroxychloroquine (PLAQUENIL) tablet 200 mg  200 mg Oral DAILY    potassium chloride (K-DUR, KLOR-CON) SR tablet 20 mEq  20 mEq Oral BID    sodium chloride (NS) flush 5-10 mL  5-10 mL IntraVENous Q8H    sodium chloride (NS) flush 5-10 mL  5-10 mL IntraVENous PRN    heparin (porcine) injection 5,000 Units  5,000 Units SubCUTAneous Q8H    budesonide-formoterol (SYMBICORT) 160-4.5 mcg/actuation HFA inhaler 2 Puff  2 Puff Inhalation BID RT         Telemetry:normal sinus rhythm    Objective:   Vital Signs:    Visit Vitals    /75 (BP 1 Location: Left arm, BP Patient Position: At rest)    Pulse 60    Temp 97.6 °F (36.4 °C)    Resp 18    Ht 5' 3\" (1.6 m)    Wt 67.3 kg (148 lb 5.9 oz)    SpO2 96%    Breastfeeding No    BMI 26.28 kg/m2       O2 Device: Room air   O2 Flow Rate (L/min): 2 l/min   Temp (24hrs), Av °F (36.7 °C), Min:97.6 °F (36.4 °C), Max:98.3 °F (36.8 °C)       Intake/Output:   Last shift: 05/13 0701 - 05/13 1900  In: 480 [P.O.:480]  Out: -   Last 3 shifts: 05/11 1901 - 05/13 0700  In: 560 [P.O.:560]  Out: 551 [Urine:550]    Intake/Output Summary (Last 24 hours) at 05/13/17 1123  Last data filed at 05/13/17 2179   Gross per 24 hour   Intake              680 ml   Output              301 ml   Net              379 ml       Physical Exam:     General appearance - oriented to person, place, and time and acyanotic, in no respiratory distress  Mental status - normal mood, behavior, speech, motor activity, and thought processes  Eyes - pupils equal and reactive, extraocular eye movements intact, sclera anicteric  Mouth - oral mucous membranes normal, pharynx normal without lesions  Neck - supple, no significant adenopathy  Chest - symmetrical chest expansion, no accessory muscle use; crackles at posterior bases- cough with deep inspiration  Heart - normal rate, regular rhythm, normal S1, S2, no murmurs, rubs, clicks or gallops  Abdomen - soft, nontender, nondistended, no masses or organomegaly palpated  bowel sounds normal  Neurological - alert, oriented, normal speech, no focal findings  Extremities - peripheral pulses normal, no pedal edema, no clubbing or cyanosis  Skin -normal turgor, has baseline hyperpigmentation throughout body (Melasma)       Data:       Recent Results (from the past 24 hour(s))   GLUCOSE, POC    Collection Time: 05/12/17 11:43 AM   Result Value Ref Range    Glucose (POC) 96 70 - 110 mg/dL   GLUCOSE, POC    Collection Time: 05/12/17  4:21 PM   Result Value Ref Range    Glucose (POC) 136 (H) 70 - 110 mg/dL   GLUCOSE, POC    Collection Time: 05/12/17  8:43 PM   Result Value Ref Range    Glucose (POC) 147 (H) 70 - 110 mg/dL   GLUCOSE, POC    Collection Time: 05/13/17  8:15 AM   Result Value Ref Range    Glucose (POC) 96 70 - 110 mg/dL   GLUCOSE, POC    Collection Time: 05/13/17 11:09 AM   Result Value Ref Range    Glucose (POC) 96 70 - 110 mg/dL         Chemistry Recent Labs 05/11/17   0311   GLU  94   NA  138   K  4.0   CL  101   CO2  27   BUN  28*   CREA  1.34*   CA  8.7   AGAP  10   BUCR  21*        Lactic Acid Lactic acid   Date Value Ref Range Status   06/08/2014 1.4 0.4 - 2.0 MMOL/L Final     No results for input(s): LAC in the last 72 hours. Liver Enzymes Protein, total   Date Value Ref Range Status   05/06/2017 7.7 6.4 - 8.2 g/dL Final     Albumin   Date Value Ref Range Status   05/06/2017 3.4 3.4 - 5.0 g/dL Final     Globulin   Date Value Ref Range Status   05/06/2017 4.3 (H) 2.0 - 4.0 g/dL Final     A-G Ratio   Date Value Ref Range Status   05/06/2017 0.8 0.8 - 1.7   Final     AST (SGOT)   Date Value Ref Range Status   05/06/2017 25 15 - 37 U/L Final     Alk. phosphatase   Date Value Ref Range Status   05/06/2017 117 45 - 117 U/L Final     No results for input(s): TP, ALB, GLOB, AGRAT, SGOT, GPT, AP, TBIL in the last 72 hours. No lab exists for component: DBIL     CBC w/Diff Recent Labs      05/12/17   0420   WBC  9.5   RBC  4.69   HGB  10.4*   HCT  34.3*   PLT  270        Cardiac Enzymes No results found for: CPK, CKMMB, CKMB, RCK3, CKMBT, CKNDX, CKND1, DORIS, TROPT, TROIQ, MARK, TROPT, TNIPOC, BNP, BNPP     BNP No results found for: BNP, BNPP, XBNPT     Coagulation No results for input(s): PTP, INR, APTT in the last 72 hours. No lab exists for component: INREXT, INREXT      Thyroid  No results found for: T4, T3U, TSH, TSHEXT, TSHEXT    No results found for: T4     Lipid Panel Lab Results   Component Value Date/Time    Cholesterol, total 251 09/22/2010 08:04 AM    HDL Cholesterol 73 09/22/2010 08:04 AM    LDL, calculated 159.2 09/22/2010 08:04 AM    VLDL, calculated 18.8 09/22/2010 08:04 AM    Triglyceride 94 09/22/2010 08:04 AM    CHOL/HDL Ratio 3.4 09/22/2010 08:04 AM        ABG No results for input(s): PHI, PHI, PCO2I, PO2, PO2I, HCO3, HCO3I, FIO2, FIO2I in the last 72 hours.     No lab exists for component: POC2     Urinalysis Lab Results   Component Value Date/Time    Color YELLOW 05/06/2017 04:55 PM    Appearance CLEAR 05/06/2017 04:55 PM    Specific gravity 1.007 05/06/2017 04:55 PM    pH (UA) 6.5 05/06/2017 04:55 PM    Protein NEGATIVE  05/06/2017 04:55 PM    Glucose NEGATIVE  05/06/2017 04:55 PM    Ketone NEGATIVE  05/06/2017 04:55 PM    Bilirubin NEGATIVE  05/06/2017 04:55 PM    Urobilinogen 0.2 05/06/2017 04:55 PM    Nitrites NEGATIVE  05/06/2017 04:55 PM    Leukocyte Esterase NEGATIVE  05/06/2017 04:55 PM        Micro  No results for input(s): SDES, CULT in the last 72 hours. No results for input(s): CULT in the last 72 hours. EKG No results found for this or any previous visit. PFT No flowsheet data found. Other ASA reactivity:   Pre-albumin:   Ionized Calcium:   NH4:   T3, FT4:  Cortisol:  Urine Osm:  Urine Lytes:   HbA1c:      Ultrasound      LE Doppler      ECHO No results found for this or any previous visit. CT (Most Recent)   Results from Hospital Encounter encounter on 05/06/17   CT CHEST WO CONT   Narrative CT scan of chest, without intravenous contrast:        INDICATION:    Acute hypoxemic respiratory failure. COPD with exacerbation. Chronic lung disease. Pulmonary hypertension. TECHNIQUE:    Multidetector CT scan with 5 mm slice thickness, without intravenous contrast,  including chest and subphrenic levels. Sagittal and coronal images reformatted. All CT scans at this facility are performed using dose optimization technique as  appropriate to a  performed  examination, to include automated exposer control,  adjustment mA and / or  KV according to patient size (including appropriate  matching  for site specific examination), or use  of iterative  reconstruction  technique. COMPARISON STUDY: CT scan of chest on 7/31/2016. FINDINGS:    In upper lobe of right lung there are dense heterogeneous fibrotic changes with  volume loss and traction bronchiectasis, as also noted on previous CT scan on  7/31/2016.     In the upper and midportion of right lower lobe there are also mild to moderate  fibrotic changes with chronic atelectatic changes, similar to previous study. In left upper lobe there are also moderate dense heterogeneous fibrotic changes  with mild volume loss and mild traction bronchiectasis, similar to previous  study. Note is again made of mild to moderate chronic fibrotic changes in lingula of  left lung and in left lower lobe,, mildly increases compared to previous study. There is no definable new infiltrates or atelectasis in lungs. No evidence of pneumothorax or pleural effusion. There is no definable mass or pathologic lymphadenopathy at the thoracic inlet  or in visualized mediastinum as on these noncontrast study. The study demonstrates marked calcified plaques in the coronary arteries. There is no evidence of pericardial effusion. The study shows moderate to marked dextroscoliosis and moderate multilevel  spondylosis in thoracic spine as before. In visualized upper and midportion of liver and spleen there is no focal  abnormality. Spleen appears to be generous size. The lower portion of spleen is  not visualized on the study. In visualized upper portion of gallbladder at least one small stone is  identified. In both lung there are mild emphysematous changes without any obvious bulla  formation. IMPRESSIONS:    As compared to previous study in upper lobes of both lungs there is no interval  change. Mildly increased fibrotic changes in lower lobes of both lungs and  lingula of left lung. In upper lobes of both lung there are marked heterogeneous fibrotic changes,  right greater than left with evidence of volume loss and traction  bronchiectasis, similar to previous study.     In rest of both lung there are mild-to-moderate scattered fibrotic changes with  associated chronic atelectatic changes in the fibrotic areas, similar to  previous study but probably mildly increased in left and right lower lobe and  lingula. .    There is no definable new infiltrates, new atelectasis or mass lesion in either  lung. Mild COPD without obvious bulla formation. No evidence of pneumothorax or pleural effusion. Cholelithiasis or gallbladder is not completely visualized on this study. Findings suggestive of splenomegaly but spleen is not is that completely  included             XR (Most Recent). CXR reviewed by me and compared with previous CXR   Results from Hospital Encounter encounter on 05/06/17   XR CHEST PA LAT   Narrative CHEST PA AND LATERAL    CPT CODE: 93808    HISTORY: Persistent cough, congestion, and wheezing x6 months with acute  exacerbation times several months , cough. COMPARISON: Chest x-ray May 5, 2017, July 31, 2016, October 10, 2009. FINDINGS:     PA and lateral views obtained. The cardiac and mediastinal silhouette is normal  for age. The lungs are emphysematous appearing with increased superimposed  interstitial markings predominantly at the upper to mid lungs unchanged from  July 2016. Interstitial markings have progressed compared to 2009. The  costophrenic angles are sharply defined. Pulmonary vascularity is normal. Marked  scoliosis. Impression IMPRESSION:    Emphysema with superimposed chronic interstitial lung disease without change  from July 2016             Imaging:  No new imaging    CXR 5/6  FINDINGS:      PA and lateral views obtained. The cardiac and mediastinal silhouette is normal for age. The lungs are emphysematous appearing with increased superimposed interstitial markings predominantly at the upper to mid lungs unchanged from July 2016. Interstitial markings have progressed compared to 2009. The costophrenic angles are sharply defined. Pulmonary vascularity is normal. Marked scoliosis.     IMPRESSION: Emphysema with superimposed chronic interstitial lung disease without change from July 2016    V/Q Scan 5/11/17: IMPRESSION:  1.  Low probability ventilation perfusion scan. IMPRESSION:   · Acute hypoxemic respiratory failure - ?acute exacerbation of underlying fibrotic disease vs acute respiratory infection- improved - completed 5 days levaquin and currently on steroids  · Pulmonary HTN - RV pressure 50 mmHg on 5/3/17; likely group 3 though with Hx of SLE would consider CTEPH (group 4) if evidence of VTE  · Lupus on Plaquenil   · Upper-lobe predominant ILD: ddx includes HP, sarcoid, occupational disease (silicosis), EG (inconsistent), CEP (inconsistent), TB (inconsistent), CF (inconsistent), AS (inconsistent). First 2 seem most likely. CTD usually results in lower-lobe predominant disease, though this could be atypical fibrotic NSIP  · Chronic anemia  · Hx tobacco abuse, stopped in 1976 (1pack per week x 16 years)  · GENIE      RECOMMENDATIONS:   · Metabolic panel today  · Continue Symbicort as maintenance medication and duonebs as needed  · Continue prednisone, tapered to 20 mg PO daily  · Completed 5 days levaquin  · HP panel pending- can follow up as OP; ACE level -41 , vitamin D levels - Vit D 25 9.2 (L), calcitrol -         93. 1(H). V/Q scan with low probability for PE  · Strep- negative , legionella Ag -negative. IgE -WNL, Mycoplasma - normal IgM with elevated IgG; defer           resp viral panel as pt improving  · Last PFT 9/2016 was normal study   · Recommend to decrease lasix given mild increase of Crea- change to Qday from BID and monitor for             clinical response. · Will need eval for home oxygen prior to discharge  · Would benefit from sleep study as outpatient  · Arrange for follow up in pulmonary clinic with Dr. Yesica Lentz prior to discharge - further evaluation of [place] 30 per Dr. Yesica Lentz; group 3 and 4 most likely         Pulmonary Staff  I have independently evaluated the patient and discussed above findings and care plan with PA-C and patient.  The patient continues to report intermittent arthralgias - especially in her right leg which is not new in nature. + void urine but no BM today.   Pending clinical course anticipate discharge home in the next 24-48 hours with OP pulmonary follow up    Clinical Time  Spent evaluating the patient and coordinating care: 30 min      Glynn Kolb DO  Pulmonary Critical Care

## 2017-05-13 NOTE — ROUTINE PROCESS
TRANSFER - OUT REPORT:    Verbal report given to Liza WISEMAN(name) on Pacheco Russell  being transferred to (unit) for routine progression of care       Report consisted of patients Situation, Background, Assessment and   Recommendations(SBAR). Information from the following report(s) SBAR, Kardex, [facility] and Recent Results was reviewed with the receiving nurse. Lines:   Peripheral IV 05/07/17 Right Forearm (Active)   Site Assessment Clean, dry, & intact 5/11/2017  8:09 PM   Phlebitis Assessment 0 5/11/2017  8:09 PM   Infiltration Assessment 0 5/11/2017  8:09 PM   Dressing Status Clean, dry, & intact 5/11/2017  8:09 PM   Dressing Type Transparent 5/11/2017  8:09 PM   Hub Color/Line Status Blue;Flushed;Patent 5/11/2017  8:09 PM   Alcohol Cap Used No 5/11/2017  8:09 PM        Opportunity for questions and clarification was provided.       Patient transported with:   Tech/transportation

## 2017-05-13 NOTE — PROGRESS NOTES
NUTRITION    Nutrition Screen      RECOMMENDATIONS / PLAN:     - No nutrition intervention indicated at this time. Will re-screen as appropriate. NUTRITION INTERVENTIONS & DIAGNOSIS:     Nutrition Diagnosis: No nutrition diagnosis at this time. ASSESSMENT:     Subjective/Objective: Tolerating diet. No changes in appetite. Noted possible discharge today or tomorrow. Average po intake adequate to meet patients estimated nutritional needs:   [x] Yes     [] No   [] Unable to determine at this time    Diet: DIET CARDIAC Regular      Food Allergies: NKFA, Latex noted  Current Appetite:   [x] Good     [] Fair     [] Poor     [] Other:  Appetite/meal intake prior to admission:   [x] Good     [] Fair     [] Poor     [] Other:  Feeding Limitations:  [] Swallowing difficulty    [] Chewing difficulty    [] Other:  Current Meal Intake: Patient Vitals for the past 100 hrs:   % Diet Eaten   05/13/17 0937 75 %   05/11/17 1410 100 %   05/11/17 0844 100 %   05/10/17 1316 50 %   05/10/17 1001 75 %   05/09/17 1823 80 %   05/09/17 1447 50 %   05/09/17 1004 30 %       BM:  5/12  Skin Integrity: WDL  Edema: none  Pertinent Medications: Reviewed    Recent Labs      05/11/17   0311   NA  138   K  4.0   CL  101   CO2  27   GLU  94   BUN  28*   CREA  1.34*   CA  8.7       Intake/Output Summary (Last 24 hours) at 05/13/17 1253  Last data filed at 05/13/17 0937   Gross per 24 hour   Intake              680 ml   Output              301 ml   Net              379 ml       Anthropometrics:  Ht Readings from Last 1 Encounters:   05/06/17 5' 3\" (1.6 m)     Last 3 Recorded Weights in this Encounter    05/11/17 0407 05/12/17 0343 05/13/17 0733   Weight: 67.6 kg (149 lb) 68 kg (150 lb) 67.3 kg (148 lb 5.9 oz)     Body mass index is 26.28 kg/(m^2).           Weight History:  Pt denies weight change PTA  Weight Metrics 5/13/2017 5/5/2017 4/3/2017 1/25/2017 9/29/2016 8/15/2016 6/8/2014   Weight 148 lb 5.9 oz 155 lb 152 lb 158 lb 156 lb 170 lb 1.6 oz 154 lb   BMI 26.28 kg/m2 27.46 kg/m2 27.8 kg/m2 28.9 kg/m2 28.53 kg/m2 31.11 kg/m2 27.29 kg/m2        Admitting Diagnosis: COPD exacerbation (HCC)  Past Medical History:   Diagnosis Date    Asthma     Chronic lung disease     [facility]up[facility] ([facility] [address]     [facility] ([facility]     Rheumatoid arthritis ([facility]        Education Needs:        [x] None identified  [] Identified - Not appropriate at this time  []  Identified and addressed - refer to education log  Learning Limitations:   [x] None identified  [] Identified    Cultural, Sabianism & ethnic food preferences:  [x] None identified    [] Identified and addressed     ESTIMATED NUTRITION NEEDS:     Calories: 6279-6629 kcal (MSJx1.2-1.3) based on  [x] Actual BW: 67 kg      [] IBW   Protein: 54-67 gm (0.8-1 gm/kg) based on  [x] Actual BW      [] IBW   Fluid: 1 mL/kcal     MONITORING & EVALUATION:     Nutrition Goal(s):   1. Po intake of meals will continue to meet >75% of patient estimated nutritional needs within the next 7 days.   Outcome:  [] Met/Ongoing    []  Not Met    [x] New/Initial Goal     Monitoring:   [x] Diet tolerance   [x] Meal intake   [] Supplement intake   [] GI symptoms/ability to tolerate po diet   [] Respiratory status   [] Plan of care       Previous Recommendations (for follow-up assessments only):     []   Implemented       []   Not Implemented (RD to address)     [] No Recommendation Made     Discharge Planning: cardiac diet   [x] Participated in care planning, discharge planning, & interdisciplinary rounds as appropriate      Fidelina Pemberton, [address]    Pager: [phone]

## 2017-05-14 LAB
GLUCOSE BLD STRIP.AUTO-MCNC: 113 MG/DL (ref 70–110)
GLUCOSE BLD STRIP.AUTO-MCNC: 134 MG/DL (ref 70–110)
GLUCOSE BLD STRIP.AUTO-MCNC: 95 MG/DL (ref 70–110)
GLUCOSE BLD STRIP.AUTO-MCNC: 99 MG/DL (ref 70–110)

## 2017-05-14 PROCEDURE — 82962 GLUCOSE BLOOD TEST: CPT

## 2017-05-14 PROCEDURE — 74011250637 HC RX REV CODE- 250/637: Performed by: FAMILY MEDICINE

## 2017-05-14 PROCEDURE — 74011636637 HC RX REV CODE- 636/637: Performed by: NURSE PRACTITIONER

## 2017-05-14 PROCEDURE — 74011250637 HC RX REV CODE- 250/637: Performed by: PHYSICIAN ASSISTANT

## 2017-05-14 PROCEDURE — 74011250637 HC RX REV CODE- 250/637: Performed by: INTERNAL MEDICINE

## 2017-05-14 PROCEDURE — 94640 AIRWAY INHALATION TREATMENT: CPT

## 2017-05-14 PROCEDURE — 65270000029 HC RM PRIVATE

## 2017-05-14 PROCEDURE — 74011250636 HC RX REV CODE- 250/636: Performed by: INTERNAL MEDICINE

## 2017-05-14 RX ORDER — PREDNISONE 10 MG/1
10 TABLET ORAL
Status: DISCONTINUED | OUTPATIENT
Start: 2017-05-14 | End: 2017-05-15 | Stop reason: HOSPADM

## 2017-05-14 RX ORDER — HYDROCODONE POLISTIREX AND CHLORPHENIRAMINE POLISTIREX 10; 8 MG/5ML; MG/5ML
5 SUSPENSION, EXTENDED RELEASE ORAL
Status: DISCONTINUED | OUTPATIENT
Start: 2017-05-14 | End: 2017-05-15 | Stop reason: HOSPADM

## 2017-05-14 RX ADMIN — PANTOPRAZOLE SODIUM 40 MG: 40 TABLET, DELAYED RELEASE ORAL at 06:35

## 2017-05-14 RX ADMIN — Medication 5 ML: at 14:00

## 2017-05-14 RX ADMIN — HYDROCODONE BITARTRATE AND ACETAMINOPHEN 1 TABLET: 5; 325 TABLET ORAL at 22:16

## 2017-05-14 RX ADMIN — PREDNISONE 10 MG: 10 TABLET ORAL at 12:49

## 2017-05-14 RX ADMIN — HYDROCODONE BITARTRATE AND ACETAMINOPHEN 1 TABLET: 5; 325 TABLET ORAL at 01:17

## 2017-05-14 RX ADMIN — BUDESONIDE AND FORMOTEROL FUMARATE DIHYDRATE 2 PUFF: 160; 4.5 AEROSOL RESPIRATORY (INHALATION) at 19:55

## 2017-05-14 RX ADMIN — HYDROCODONE POLISTIREX AND CHLORPHENIRAMINE POLISTIREX 5 ML: 10; 8 SUSPENSION, EXTENDED RELEASE ORAL at 22:15

## 2017-05-14 RX ADMIN — HEPARIN SODIUM 5000 UNITS: 5000 INJECTION, SOLUTION INTRAVENOUS; SUBCUTANEOUS at 12:50

## 2017-05-14 RX ADMIN — HEPARIN SODIUM 5000 UNITS: 5000 INJECTION, SOLUTION INTRAVENOUS; SUBCUTANEOUS at 22:15

## 2017-05-14 RX ADMIN — AMLODIPINE BESYLATE 5 MG: 5 TABLET ORAL at 08:25

## 2017-05-14 RX ADMIN — POTASSIUM CHLORIDE 20 MEQ: 20 TABLET, EXTENDED RELEASE ORAL at 17:24

## 2017-05-14 RX ADMIN — BUDESONIDE AND FORMOTEROL FUMARATE DIHYDRATE 2 PUFF: 160; 4.5 AEROSOL RESPIRATORY (INHALATION) at 09:50

## 2017-05-14 RX ADMIN — HEPARIN SODIUM 5000 UNITS: 5000 INJECTION, SOLUTION INTRAVENOUS; SUBCUTANEOUS at 04:12

## 2017-05-14 RX ADMIN — HYDROXYCHLOROQUINE SULFATE 200 MG: 200 TABLET, FILM COATED ENTERAL at 08:25

## 2017-05-14 RX ADMIN — Medication 10 ML: at 22:16

## 2017-05-14 RX ADMIN — FUROSEMIDE 40 MG: 40 TABLET ORAL at 12:49

## 2017-05-14 RX ADMIN — HYDROCODONE BITARTRATE AND ACETAMINOPHEN 1 TABLET: 5; 325 TABLET ORAL at 17:27

## 2017-05-14 RX ADMIN — TRAZODONE HYDROCHLORIDE 50 MG: 50 TABLET ORAL at 22:16

## 2017-05-14 RX ADMIN — POTASSIUM CHLORIDE 20 MEQ: 20 TABLET, EXTENDED RELEASE ORAL at 08:25

## 2017-05-14 NOTE — ROUTINE PROCESS
Bedside and Verbal shift change report given to Marycruz Mares (oncoming nurse) by January James (offgoing nurse). Report included the following information SBAR, Kardex, MAR and Recent Results.     SITUATION:    Code Status: Full Code   Reason for Admission: COPD exacerbation ([facility]    [facility] day: 8   Problem List:       Hospital Problems  Date Reviewed: 5/11/2017          Codes Class Noted POA    Pulmonary HTN ([facility] (Chronic) ICD-10-CM: I27.2  ICD-9-CM: 416.8  5/11/2017 Yes        Acute bronchitis ICD-10-CM: J20.9  ICD-9-CM: 466.0  5/11/2017 Yes        COPD exacerbation ([facility] ICD-10-CM: J44.1  ICD-9-CM: 491.21  5/6/2017 Unknown        Systemic lupus erythematosus ([facility] (Chronic) ICD-10-CM: M32.9  ICD-9-CM: 710.0  8/5/2016 Yes        Rheumatoid arthritis ([facility] (Chronic) ICD-10-CM: M06.9  ICD-9-CM: 714.0  8/5/2016 Yes        HTN (hypertension) (Chronic) ICD-10-CM: I10  ICD-9-CM: 401.9  8/5/2016 Yes        Dyslipidemia (Chronic) ICD-10-CM: E78.5  ICD-9-CM: 272.4  8/5/2016 Yes        Cirrhosis of liver ([facility] ICD-10-CM: K74.60  ICD-9-CM: 571.5  6/8/2014 Yes              BACKGROUND:    Past Medical History:   Past Medical History:   Diagnosis Date    Asthma     Chronic lung disease     Lupus ([facility]     Pulmonary hypertension ([facility]     Rheumatoid arthritis ([facility]          Patient taking anticoagulants yes     ASSESSMENT:    Changes in Assessment Throughout Shift: No     Patient has Central Line: no Reasons if yes:    Patient has Phillips Cath: no Reasons if yes:       Last Vitals:     Vitals:    05/14/17 0839 05/14/17 0950 05/14/17 1209 05/14/17 1604   BP: 144/80  111/65 120/67   Pulse: 73  72 70   Resp: 16  16 16   Temp: 96.6 °F (35.9 °C)  98.1 °F (36.7 °C) 97.7 °F (36.5 °C)   SpO2: 97% 97% 96% 96%   Weight:       Height:            IV and DRAINS (will only show if present)   Peripheral IV 05/07/17 Right Forearm-Site Assessment: Clean, dry, & intact  [REMOVED] Peripheral IV 05/06/17 Right Arm-Site Assessment: Clean, dry, & intact     WOUND (if present)   Wound Type:  none   Dressing present Dressing Present : No   Wound Concerns/Notes:  none     PAIN    Pain Assessment    Pain Intensity 1: 5 (05/14/17 1700)    Pain Location 1: Knee    Pain Intervention(s) 1: Medication (see MAR)    Patient Stated Pain Goal: 0  o Interventions for Pain:  none, Norco  o Intervention effective: yes  o Time of last intervention: 1700   o Reassessment Completed: yes      Last 3 Weights:  Last 3 Recorded Weights in this Encounter    05/11/17 0407 05/12/17 0343 05/13/17 0733   Weight: 67.6 kg (149 lb) 68 kg (150 lb) 67.3 kg (148 lb 5.9 oz)     Weight change:      INTAKE/OUPUT    Current Shift: 05/14 0701 - 05/14 1900  In: 200 [P.O.:200]  Out: -     Last three shifts: 05/12 1901 - 05/14 0700  In: 1160 [P.O.:1160]  Out: 600 [Urine:600]     LAB RESULTS     Recent Labs      05/12/17   0420   WBC  9.5   HGB  10.4*   HCT  34.3*   PLT  270        Recent Labs      05/13/17   1247   NA  140   K  3.7   GLU  98   BUN  22*   CREA  0.88   CA  8.9       RECOMMENDATIONS AND DISCHARGE PLANNING     1. Pending tests/procedures/ Plan of Care or Other Needs:      2. Discharge plan for patient and Needs/Barriers:     3. Estimated Discharge Date:5/15/17  Posted on Whiteboard in [facility]:       4. The patient's care plan was reviewed with the oncoming nurse. \"HEALS\" SAFETY CHECK      Fall Risk    Total Score: 1    Safety Measures: Safety Measures: Bed/Chair alarm on, Bed/Chair-Wheels locked, Bed in low position, Call light within reach, Fall prevention (comment)    A safety check occurred in the patient's room between off going nurse and oncoming nurse listed above.     The safety check included the below items  Area Items   H  High Alert Medications - Verify all high alert medication drips (heparin, PCA, etc.)   E  Equipment - Suction is set up for ALL patients (with yanker)  - Red plugs utilized for all equipment (IV pumps, etc.)  - WOWs wiped down at end of shift.  - Room stocked with oxygen, suction, and other unit-specific supplies   A  Alarms - Bed alarm is set for fall risk patients  - Ensure chair alarm is in place and activated if patient is up in a chair   L  Lines - Check IV for any infiltration  - Phillips bag is empty if patient has a Phillips   - Tubing and IV bags are labeled   S  Safety   - Room is clean, patient is clean, and equipment is clean. - Hallways are clear from equipment besides carts. - Fall bracelet on for fall risk patients  - Ensure room is clear and free of clutter  - Suction is set up for ALL patients (with humaker)  - Hallways are clear from equipment besides carts.    - Isolation precautions followed, supplies available outside room, sign posted     Rebecca Robles

## 2017-05-14 NOTE — ROUTINE PROCESS
Bedside and Verbal shift change report given to Marie (oncoming nurse) by Kye Toribio RN (offgoing nurse). Report included the following information SBAR, Kardex, MAR and Recent Results.     SITUATION:    Code Status: Full Code   Reason for Admission: COPD exacerbation ([facility]    [facility] day: 8   Problem List:       Hospital Problems  Date Reviewed: 5/11/2017          Codes Class Noted POA    Pulmonary HTN ([facility] (Chronic) ICD-10-CM: I27.2  ICD-9-CM: 416.8  5/11/2017 Yes        Acute bronchitis ICD-10-CM: J20.9  ICD-9-CM: 466.0  5/11/2017 Yes        COPD exacerbation ([facility] ICD-10-CM: J44.1  ICD-9-CM: 491.21  5/6/2017 Unknown        Systemic lupus erythematosus ([facility] (Chronic) ICD-10-CM: M32.9  ICD-9-CM: 710.0  8/5/2016 Yes        Rheumatoid arthritis ([facility] (Chronic) ICD-10-CM: M06.9  ICD-9-CM: 714.0  8/5/2016 Yes        HTN (hypertension) (Chronic) ICD-10-CM: I10  ICD-9-CM: 401.9  8/5/2016 Yes        Dyslipidemia (Chronic) ICD-10-CM: E78.5  ICD-9-CM: 272.4  8/5/2016 Yes        Cirrhosis of liver ([facility] ICD-10-CM: K74.60  ICD-9-CM: 571.5  6/8/2014 Yes              BACKGROUND:    Past Medical History:   Past Medical History:   Diagnosis Date    Asthma     Chronic lung disease     Lupus ([facility]     Pulmonary hypertension ([facility]     Rheumatoid arthritis ([facility]          Patient taking anticoagulants no     ASSESSMENT:    Changes in Assessment Throughout Shift: no     Patient has Central Line: no Reasons if yes: No   Patient has Phillips Cath: no Reasons if yes: No      Last Vitals:     Vitals:    05/13/17 1105 05/13/17 1540 05/13/17 1949 05/13/17 2118   BP: 124/78 123/74  127/74   Pulse: 69 69  74   Resp: 18 20  18   Temp: 97.8 °F (36.6 °C) 98 °F (36.7 °C)  98.1 °F (36.7 °C)   SpO2: 96% 95% 96% 95%   Weight:       Height:            IV and DRAINS (will only show if present)   Peripheral IV 05/07/17 Right Forearm-Site Assessment: Clean, dry, & intact  [REMOVED] Peripheral IV 05/06/17 Right Arm-Site Assessment: Clean, dry, & intact     WOUND (if present)   Wound Type:  none   Dressing present Dressing Present : No   Wound Concerns/Notes:  none     PAIN    Pain Assessment    Pain Intensity 1: 5 (05/14/17 0117)    Pain Location 1: Knee    Pain Intervention(s) 1: Medication (see MAR)    Patient Stated Pain Goal: 2  o Interventions for Pain:  none  o Intervention effective: no  o Time of last intervention: 0700   o Reassessment Completed: no      Last 3 Weights:  Last 3 Recorded Weights in this Encounter    05/11/17 0407 05/12/17 0343 05/13/17 0733   Weight: 67.6 kg (149 lb) 68 kg (150 lb) 67.3 kg (148 lb 5.9 oz)     Weight change:      INTAKE/OUPUT    Current Shift:      Last three shifts: 05/12 0701 - 05/13 1900  In: 1160 [P.O.:1160]  Out: 601 [Urine:600]     LAB RESULTS     Recent Labs      05/12/17   0420   WBC  9.5   HGB  10.4*   HCT  34.3*   PLT  270        Recent Labs      05/13/17   1247  05/11/17   0311   NA  140  138   K  3.7  4.0   GLU  98  94   BUN  22*  28*   CREA  0.88  1.34*   CA  8.9  8.7       RECOMMENDATIONS AND DISCHARGE PLANNING     1. Pending tests/procedures/ Plan of Care or Other Needs: TBD     2. Discharge plan for patient and Needs/Barriers: TBD    3. Estimated Discharge Date: TBD Posted on Whiteboard in Patients Room: no      4. The patient's care plan was reviewed with the oncoming nurse. \"HEALS\" SAFETY CHECK      Fall Risk    Total Score: 1    Safety Measures: Safety Measures: Bed/Chair alarm on, Bed/Chair-Wheels locked, Bed in low position, Call light within reach, Fall prevention (comment)    A safety check occurred in the patient's room between off going nurse and oncoming nurse listed above.     The safety check included the below items  Area Items   H  High Alert Medications - Verify all high alert medication drips (heparin, PCA, etc.)   E  Equipment - Suction is set up for ALL patients (with yanker)  - Red plugs utilized for all equipment (IV pumps, etc.)  - WOWs wiped down at end of shift.  - Room stocked with oxygen, suction, and other unit-specific supplies   A  Alarms - Bed alarm is set for fall risk patients  - Ensure chair alarm is in place and activated if patient is up in a chair   L  Lines - Check IV for any infiltration  - Phillips bag is empty if patient has a Phillips   - Tubing and IV bags are labeled   S  Safety   - Room is clean, patient is clean, and equipment is clean. - Hallways are clear from equipment besides carts. - Fall bracelet on for fall risk patients  - Ensure room is clear and free of clutter  - Suction is set up for ALL patients (with yanker)  - Hallways are clear from equipment besides carts.    - Isolation precautions followed, supplies available outside room, sign posted     Sg Roy RN

## 2017-05-14 NOTE — ROUTINE PROCESS
TRANSFER - IN REPORT:    Verbal report received from Siva Triana RN(name) on Sarah Stamp  being received from ED(unit) for change in patient condition( none)      Report consisted of patients Situation, Background, Assessment and   Recommendations(SBAR). Information from the following report(s) SBAR was reviewed with the receiving nurse. Opportunity for questions and clarification was provided. Assessment completed upon patients arrival to unit and care assumed. 0157: Pt stable. Ambulating in the room. Pain managed with po pain medication. No nausea or vomiting. No SOB. Diminished lung sound. Pt belonging in the room. Call light within pt reach and bed to the lowest position. Will continue with pt plan of care.

## 2017-05-14 NOTE — PROGRESS NOTES
Rodrigo Hall M.D. PROGRESS NOTE    Name: Good Hernandes MRN: [id number]   : 1940 Hospital: [address] [place] [address]   Date: 2017  Admission Date: 2017     Subjective/Objective/Plans  1. COPD  2. ILD  3.RA    Bothered by frequent cough, already on Tessalon pearles  Will add Tussionex        Vital Signs:  Visit Vitals    /80 (BP 1 Location: Right arm, BP Patient Position: At rest)    Pulse 73    Temp 96.6 °F (35.9 °C)    Resp 16    Ht 5' 3\" (1.6 m)    Wt 67.3 kg (148 lb 5.9 oz)    SpO2 97%    Breastfeeding No    BMI 26.28 kg/m2       O2 Device: Room air   O2 Flow Rate (L/min): 2 l/min   Temp (24hrs), Av.5 °F (36.4 °C), Min:96.6 °F (35.9 °C), Max:98.1 °F (36.7 °C)     9:27 AM  Intake/Output:   Last shift:       07 - 1900  In: 200 [P.O.:200]  Out: -   Last 3 shifts: 1901 -  0700  In: 1160 [P.O.:1160]  Out: 600 [Urine:600]    Intake/Output Summary (Last 24 hours) at 17 0927  Last data filed at 17 0841   Gross per 24 hour   Intake             1160 ml   Output              300 ml   Net              860 ml         Physical Exam:    General: in no apparent distress    HEENT: pupils equal, no ear discharge   Neck: No abnormally enlarged lymph nodes. , no JDV   Mouth: MMM no lesions    Chest: normal, no breast masses   Lungs: rhonchi   Heart: Regular rate and rhythm   Abdomen: abdomen is soft without significant tenderness, masses, organomegaly or guarding   Extremity: negative   Neuro: alert    Skin: Skin color, texture, turgor normal. No rashes or lesions    Data Review:    Labs: Results:       Chemistry Recent Labs      17   1247   GLU  98   NA  140   K  3.7   CL  100   CO2  33*   BUN  22*   CREA  0.88   CA  8.9   AGAP  7   BUCR  25*      CBC w/Diff Recent Labs      17   0420   WBC  9.5   RBC  4.69   HGB  10.4*   HCT  34.3*   PLT  270      Cardiac Enzymes No results for input(s): CPK, CKND1, DORIS in the last 72 hours.     No lab exists for component: CKRMB, TROIP   Coagulation No results for input(s): PTP, INR, APTT in the last 72 hours. No lab exists for component: INREXT    Lipid Panel Lab Results   Component Value Date/Time    Cholesterol, total 251 09/22/2010 08:04 AM    HDL Cholesterol 73 09/22/2010 08:04 AM    LDL, calculated 159.2 09/22/2010 08:04 AM    VLDL, calculated 18.8 09/22/2010 08:04 AM    Triglyceride 94 09/22/2010 08:04 AM    CHOL/HDL Ratio 3.4 09/22/2010 08:04 AM      BNP No results for input(s): BNPP in the last 72 hours. Liver Enzymes No results for input(s): TP, ALB, TBILI, AP, SGOT, ALT, CBIL in the last 72 hours. Thyroid Studies No results found for: T4, T3U, TSH, TSHEXT       Procedures/imaging: see electronic medical records for all procedures, Xrays and details which were not copied into this note but were reviewed. Allergies: Allergies   Allergen Reactions    Latex Itching    Asmanex Hfa [Mometasone] Itching    Cleocin [Clindamycin Hcl] Itching and Swelling     Lips and tongue    Pcn [Penicillins] Swelling    Robitussin [Guaifenesin] Rash       Home Medications:  Prior to Admission Medications   Prescriptions Last Dose Informant Patient Reported? Taking? HYDROcodone-acetaminophen (NORCO) 5-325 mg per tablet 5/6/2017 at Unknown time  No Yes   Sig: Take 1 Tab by mouth two (2) times a day. Max Daily Amount: 2 Tabs. albuterol-ipratropium (DUO-NEB) 2.5 mg-0.5 mg/3 ml nebu 5/6/2017 at Unknown time  No Yes   Sig: 3 mL by Nebulization route three (3) times daily. amLODIPine (NORVASC) 5 mg tablet 4/6/2017 at Unknown time  No Yes   Sig: Take 1 Tab by mouth daily. Indications: HYPERTENSION   aspirin  mg tablet 5/5/2017 at Unknown time  Yes Yes   Sig: Take 650 mg by mouth every six (6) hours as needed for Pain. cetirizine (ZYRTEC) 10 mg tablet 5/5/2017 at Unknown time  Yes Yes   Sig: Take 10 mg by mouth daily.    diphenhydrAMINE (BENADRYL ALLERGY) 25 mg tablet 5/5/2017 at Unknown time  Yes Yes   Sig: Take 25 mg by mouth every six (6) hours as needed for Sleep.   furosemide (LASIX) 40 mg tablet 2017 at Unknown time  No Yes   Sig: Take 1 Tab by mouth daily. Indications: EDEMA   hydroxychloroquine (PLAQUENIL) 200 mg tablet 2017 at Unknown time  Yes Yes   Sig: Take 200 mg by mouth daily. naproxen sodium (ALEVE) 220 mg cap 5/3/2017  Yes No   Sig: Take  by mouth as needed. omeprazole (PRILOSEC) 20 mg capsule 2017 at Unknown time  Yes Yes   Sig: Take 20 mg by mouth daily. potassium chloride (KLOR-CON M20) 20 mEq tablet 2017 at Unknown time  Yes Yes   Sig: Take 20 mEq by mouth two (2) times a day. triamcinolone (NASACORT AQ) 55 mcg nasal inhaler 2017 at Unknown time  Yes Yes   Si Sprays daily as needed. zolpidem (AMBIEN) 10 mg tablet 2017 at Unknown time  Yes Yes   Sig: Take 10 mg by mouth nightly as needed for Sleep.       Facility-Administered Medications: None       Current Medications:  Current Facility-Administered Medications   Medication Dose Route Frequency    predniSONE (DELTASONE) tablet 10 mg  10 mg Oral DAILY WITH LUNCH    furosemide (LASIX) tablet 40 mg  40 mg Oral DAILY    traZODone (DESYREL) tablet 50 mg  50 mg Oral QHS    phenol throat spray (CHLORASEPTIC) 1 Spray  1 Spray Oral PRN    tiotropium (SPIRIVA) inhalation capsule 18 mcg  1 Cap Inhalation DAILY    albuterol (PROVENTIL VENTOLIN) nebulizer solution 2.5 mg  2.5 mg Nebulization Q6H PRN    HYDROcodone-acetaminophen (NORCO) 5-325 mg per tablet 1 Tab  1 Tab Oral Q6H PRN    insulin lispro (HUMALOG) injection   SubCUTAneous AC&HS    glucose chewable tablet 16 g  16 g Oral PRN    glucagon (GLUCAGEN) injection 1 mg  1 mg IntraMUSCular PRN    dextrose (D50W) injection syrg 12.5-25 g  25-50 mL IntraVENous PRN    benzonatate (TESSALON) capsule 100 mg  100 mg Oral TID PRN    amLODIPine (NORVASC) tablet 5 mg  5 mg Oral DAILY    pantoprazole (PROTONIX) tablet 40 mg  40 mg Oral ACB    hydroxychloroquine (PLAQUENIL) tablet 200 mg  200 mg Oral DAILY    potassium chloride (K-DUR, KLOR-CON) SR tablet 20 mEq  20 mEq Oral BID    sodium chloride (NS) flush 5-10 mL  5-10 mL IntraVENous Q8H    sodium chloride (NS) flush 5-10 mL  5-10 mL IntraVENous PRN    heparin (porcine) injection 5,000 Units  5,000 Units SubCUTAneous Q8H    budesonide-formoterol (SYMBICORT) 160-4.5 mcg/actuation HFA inhaler 2 Puff  2 Puff Inhalation BID RT       Chart and notes reviewed. Data reviewed. I have evaluated and examined the patient.         IMPRESSION:   Patient Active Problem List   Diagnosis Code    Cirrhosis of liver ([facility] K74.60    Systemic lupus erythematosus ([facility] M32.9    Rheumatoid arthritis ([facility] M06.9    HTN (hypertension) I10    Dyslipidemia E78.5    COPD exacerbation (HCC) J44.1    Pulmonary HTN (HCC) I27.2    Acute bronchitis J20.9 ·         PLAN:/DISCUSSION:   · Dc in         Sari Mckenzie MD  5/14/2017, 9:27 AM

## 2017-05-14 NOTE — PROGRESS NOTES
[facility]   Progress Note      Name: aMcy Gaines   :    MRN: [id number]   Date: 2017 8:59 AM     [x]I have reviewed the flowsheet and previous days notes. Events overnight reviewed and discussed with nursing staff. Vital signs and records reviewed. Macy Gaines is a 77 y.o. female with PMHx significant for chronic interstitial lung disease, lupus, RA, and pulmonary hypertension who came to the ED after developing a worsening cough. Her last PFT was in 2016 from which she had a normal baseline FEV1. Subjective:    No new events overnight. Pt states that her breathing has improved as well as her cough has decreased. She continues to state that she feels better and expects a discharge from hospital on tomorrow.                  ROS:  Constitutional: Positive for fatigue  Eyes: negative  Ears, nose, mouth, throat, and face  Respiratory: Positive for cough and dyspnea on exertion  Cardiovascular: negative  Gastrointestinal: negative  Genitourinary:negative  Integument/breast: negative  Hematologic/lymphatic: negative  Musculoskeletal:negative  Neurological: negative  Behavioral/Psych: negative      Vital Signs:    Visit Vitals    /80 (BP 1 Location: Right arm, BP Patient Position: At rest)    Pulse 73    Temp 96.6 °F (35.9 °C)    Resp 16    Ht 5' 3\" (1.6 m)    Wt 67.3 kg (148 lb 5.9 oz)    SpO2 97%    Breastfeeding No    BMI 26.28 kg/m2       O2 Device: Room air   O2 Flow Rate (L/min): 2 l/min   Temp (24hrs), Av.5 °F (36.4 °C), Min:96.6 °F (35.9 °C), Max:98.1 °F (36.7 °C)       Intake/Output:   Last shift:       07 - 1900  In: 200 [P.O.:200]  Out: -   Last 3 shifts: 1901 -  0700  In: 1160 [P.O.:1160]  Out: 600 [Urine:600]    Intake/Output Summary (Last 24 hours) at 17 0859  Last data filed at 17 0841   Gross per 24 hour   Intake             1160 ml   Output              300 ml   Net              860 ml Physical Exam:    General: Appears comfortable, NAD  HEENT:  Anicteric sclerae; pink palpebral conjunctivae; mucosa moist  Resp:  Symmetrical chest expansion, no accessory muscle use; good airway entry;  No wheeze, crackles at posterior bases  CV:  S1, S2 present; regular rate and rhythm  GI:  Abdomen soft, non-tender; (+) active bowel sounds  Extremities:  +2 pulses on all extremities; no edema/ cyanosis/ clubbing noted  Skin:  Warm; no rashes/ lesions noted  Neurologic:  Non-focal  DATA:     Current Facility-Administered Medications   Medication Dose Route Frequency    furosemide (LASIX) tablet 40 mg  40 mg Oral DAILY    predniSONE (DELTASONE) tablet 20 mg  20 mg Oral DAILY WITH LUNCH    traZODone (DESYREL) tablet 50 mg  50 mg Oral QHS    phenol throat spray (CHLORASEPTIC) 1 Spray  1 Spray Oral PRN    tiotropium (SPIRIVA) inhalation capsule 18 mcg  1 Cap Inhalation DAILY    albuterol (PROVENTIL VENTOLIN) nebulizer solution 2.5 mg  2.5 mg Nebulization Q6H PRN    HYDROcodone-acetaminophen (NORCO) 5-325 mg per tablet 1 Tab  1 Tab Oral Q6H PRN    insulin lispro (HUMALOG) injection   SubCUTAneous AC&HS    glucose chewable tablet 16 g  16 g Oral PRN    glucagon (GLUCAGEN) injection 1 mg  1 mg IntraMUSCular PRN    dextrose (D50W) injection syrg 12.5-25 g  25-50 mL IntraVENous PRN    benzonatate (TESSALON) capsule 100 mg  100 mg Oral TID PRN    amLODIPine (NORVASC) tablet 5 mg  5 mg Oral DAILY    pantoprazole (PROTONIX) tablet 40 mg  40 mg Oral ACB    hydroxychloroquine (PLAQUENIL) tablet 200 mg  200 mg Oral DAILY    potassium chloride (K-DUR, KLOR-CON) SR tablet 20 mEq  20 mEq Oral BID    sodium chloride (NS) flush 5-10 mL  5-10 mL IntraVENous Q8H    sodium chloride (NS) flush 5-10 mL  5-10 mL IntraVENous PRN    heparin (porcine) injection 5,000 Units  5,000 Units SubCUTAneous Q8H    budesonide-formoterol (SYMBICORT) 160-4.5 mcg/actuation HFA inhaler 2 Puff  2 Puff Inhalation BID RT Labs: Results:       Chemistry Recent Labs      05/13/17   1247   GLU  98   NA  140   K  3.7   CL  100   CO2  33*   BUN  22*   CREA  0.88   CA  8.9   AGAP  7   BUCR  25*      CBC w/Diff Recent Labs      05/12/17   0420   WBC  9.5   RBC  4.69   HGB  10.4*   HCT  34.3*   PLT  270      Coagulation No results for input(s): PTP, INR, APTT in the last 72 hours. No lab exists for component: INREXT    Liver Enzymes No results for input(s): TP, ALB, TBIL, AP, SGOT, GPT in the last 72 hours. No lab exists for component: DBIL   ABG No results found for: PH, PHI, PCO2, PCO2I, PO2, PO2I, HCO3, HCO3I, FIO2, FIO2I   Microbiology No results for input(s): CULT in the last 72 hours. Imaging: No new imaging      IMPRESSION:   · Acute hypoxemic respiratory failure - ?acute exacerbation of underlying fibrotic disease vs acute respiratory infection- improved - completed 5 days levaquin and currently on steroid taper. · Pulmonary HTN - RV pressure 50 mmHg on 5/3/17; likely group 3 though with Hx of SLE would consider CTEPH (group 4) if evidence of VTE  · Lupus on Plaquenil   · Upper-lobe predominant ILD: ddx includes HP, sarcoid, occupational disease (silicosis), EG (inconsistent), CEP (inconsistent), TB (inconsistent), CF (inconsistent), AS (inconsistent). First 2 seem most likely. CTD usually results in lower-lobe predominant disease, though this could be atypical fibrotic NSIP  · Chronic anemia  · Hx tobacco abuse, stopped in 1976 (1pack per week x 16 years)  · GENIE        PLAN:   · Resp -  Doing well on RA. May supplement 02 via NC to maintain p02 >92% prn. Continue Symbicort and Spiriva. Continue Albuterol neb prn Continue to taper prednisone, todays taper from 20 mg daily to 10 mg daily. HP panel pending- can follow up as OP; ACE level -41 , vitamin D levels - Vit D 25 9.2 (L), calcitrol - 93. 1(H). V/Q scan with low probability for PE. Last PFT 9/2016- normal study.   ·  Will need eval for home 02 prior to discharge. · Would benefit from sleep study as OP. · ID - Completed Levaquin X 5 day treatment. Strep- negative , legionella Ag -negative. IgE -WNL, Mycoplasma - normal IgM with elevated IgG;   · CVS - HD stable  · Heme/onc -  H/H on 5/12/17 10.4/34.3  Platelet count [phone]   · Metabolic - Stable  · Renal - Continue to monitor  · Endocrine - Continue to monitor and use S/S   · Neuro/ Pain/ Sedation - Continue present pain management regime  · Prophylaxis - DVT, GI- Heparin/protonix  · Follow up with Dr. Janeth Hawkins at discharge. My assessment/plan was discussed with:  [x]nursing []PT/OT    []respiratory therapy [x]Dr. Emily Schaefer DO   []family []     Kevin Ann NP    Pulmonary Staff:  I have independently evaluated the patient. Patient looks better today than yesterday. Still with some coughing but overall improved. Agree with above evaluation, assessment and plan of care. Anticipate discharge home with OP pulmonary follow up within the next 24 hours    Clinical care and time spent coordinating patient care: 20 min    . Carolyn Guy DO  Pulmonary/ Critical Care

## 2017-05-15 ENCOUNTER — HOME HEALTH ADMISSION (OUTPATIENT)
Dept: HOME HEALTH SERVICES | Facility: HOME HEALTH | Age: 77
End: 2017-05-15

## 2017-05-15 VITALS
HEART RATE: 72 BPM | BODY MASS INDEX: 26.29 KG/M2 | HEIGHT: 63 IN | DIASTOLIC BLOOD PRESSURE: 84 MMHG | WEIGHT: 148.37 LBS | TEMPERATURE: 97.3 F | OXYGEN SATURATION: 95 % | SYSTOLIC BLOOD PRESSURE: 127 MMHG | RESPIRATION RATE: 18 BRPM

## 2017-05-15 LAB
A FUMIGATUS1 AB SER QL ID: NEGATIVE
A PULLULANS AB SER QL: NEGATIVE
GLUCOSE BLD STRIP.AUTO-MCNC: 106 MG/DL (ref 70–110)
GLUCOSE BLD STRIP.AUTO-MCNC: 115 MG/DL (ref 70–110)
LACEYELLA SACCHARI AB SER QL: NEGATIVE
PIGEON SERUM AB QL ID: NEGATIVE
S RECTIVIRGULA AB SER QL ID: NEGATIVE
T VULGARIS AB SER QL ID: NEGATIVE

## 2017-05-15 PROCEDURE — 74011250637 HC RX REV CODE- 250/637: Performed by: INTERNAL MEDICINE

## 2017-05-15 PROCEDURE — 82962 GLUCOSE BLOOD TEST: CPT

## 2017-05-15 PROCEDURE — 74011250637 HC RX REV CODE- 250/637: Performed by: FAMILY MEDICINE

## 2017-05-15 PROCEDURE — 74011636637 HC RX REV CODE- 636/637: Performed by: NURSE PRACTITIONER

## 2017-05-15 PROCEDURE — 94640 AIRWAY INHALATION TREATMENT: CPT

## 2017-05-15 PROCEDURE — 74011250636 HC RX REV CODE- 250/636: Performed by: INTERNAL MEDICINE

## 2017-05-15 PROCEDURE — 74011250637 HC RX REV CODE- 250/637: Performed by: PHYSICIAN ASSISTANT

## 2017-05-15 RX ORDER — HYDROCODONE POLISTIREX AND CHLORPHENIRAMINE POLISTIREX 10; 8 MG/5ML; MG/5ML
5 SUSPENSION, EXTENDED RELEASE ORAL
Qty: 115 ML | Refills: 0 | Status: SHIPPED | OUTPATIENT
Start: 2017-05-15 | End: 2020-12-29

## 2017-05-15 RX ORDER — BENZONATATE 100 MG/1
100 CAPSULE ORAL
Qty: 40 CAP | Refills: 0 | Status: SHIPPED | OUTPATIENT
Start: 2017-05-15 | End: 2017-05-22

## 2017-05-15 RX ORDER — PREDNISONE 10 MG/1
10 TABLET ORAL
Qty: 30 TAB | Refills: 0 | Status: SHIPPED | OUTPATIENT
Start: 2017-05-15 | End: 2019-07-01 | Stop reason: DRUGHIGH

## 2017-05-15 RX ORDER — ZOLPIDEM TARTRATE 10 MG/1
10 TABLET ORAL
Qty: 30 TAB | Refills: 0 | Status: SHIPPED | OUTPATIENT
Start: 2017-05-15 | End: 2021-03-08

## 2017-05-15 RX ORDER — HYDROCODONE BITARTRATE AND ACETAMINOPHEN 5; 325 MG/1; MG/1
1 TABLET ORAL
Qty: 60 TAB | Refills: 0 | Status: SHIPPED | OUTPATIENT
Start: 2017-05-15 | End: 2020-12-29

## 2017-05-15 RX ORDER — BUDESONIDE AND FORMOTEROL FUMARATE DIHYDRATE 160; 4.5 UG/1; UG/1
2 AEROSOL RESPIRATORY (INHALATION) 2 TIMES DAILY
Qty: 1 INHALER | Refills: 0 | Status: SHIPPED | OUTPATIENT
Start: 2017-05-15 | End: 2018-05-10 | Stop reason: SDUPTHER

## 2017-05-15 RX ORDER — TRAZODONE HYDROCHLORIDE 50 MG/1
50 TABLET ORAL
Qty: 30 TAB | Refills: 0 | Status: SHIPPED | OUTPATIENT
Start: 2017-05-15 | End: 2020-12-29

## 2017-05-15 RX ADMIN — HEPARIN SODIUM 5000 UNITS: 5000 INJECTION, SOLUTION INTRAVENOUS; SUBCUTANEOUS at 06:22

## 2017-05-15 RX ADMIN — HYDROXYCHLOROQUINE SULFATE 200 MG: 200 TABLET, FILM COATED ENTERAL at 08:41

## 2017-05-15 RX ADMIN — PANTOPRAZOLE SODIUM 40 MG: 40 TABLET, DELAYED RELEASE ORAL at 06:31

## 2017-05-15 RX ADMIN — HYDROCODONE POLISTIREX AND CHLORPHENIRAMINE POLISTIREX 5 ML: 10; 8 SUSPENSION, EXTENDED RELEASE ORAL at 08:41

## 2017-05-15 RX ADMIN — POTASSIUM CHLORIDE 20 MEQ: 20 TABLET, EXTENDED RELEASE ORAL at 08:33

## 2017-05-15 RX ADMIN — FUROSEMIDE 40 MG: 40 TABLET ORAL at 08:33

## 2017-05-15 RX ADMIN — TIOTROPIUM BROMIDE 18 MCG: 18 CAPSULE ORAL; RESPIRATORY (INHALATION) at 08:44

## 2017-05-15 RX ADMIN — HYDROCODONE BITARTRATE AND ACETAMINOPHEN 1 TABLET: 5; 325 TABLET ORAL at 05:30

## 2017-05-15 RX ADMIN — BENZONATATE 100 MG: 100 CAPSULE ORAL at 02:11

## 2017-05-15 RX ADMIN — HYDROCODONE BITARTRATE AND ACETAMINOPHEN 1 TABLET: 5; 325 TABLET ORAL at 14:57

## 2017-05-15 RX ADMIN — PREDNISONE 10 MG: 10 TABLET ORAL at 14:57

## 2017-05-15 RX ADMIN — BUDESONIDE AND FORMOTEROL FUMARATE DIHYDRATE 2 PUFF: 160; 4.5 AEROSOL RESPIRATORY (INHALATION) at 08:44

## 2017-05-15 NOTE — HOME CARE
Received HH referral, Discharge noted for today, [facility] will follow for SN for COPD ,med management & education; Notified DME rep Recardo Dye from [employer] of order for home O2 and Nebulizer machine; pt states she has a cane at home. DAYANARA MENEZES.

## 2017-05-15 NOTE — PROGRESS NOTES
Patient has dc order, HHC order. Saw the patient alone in room, she agrees with dc, however, stated, she can stay in the hospitla longer, if needed. Patient chose [facility] for services ordered, she signed [address] form, original placed to paper light chart. Patient will need O2 for home use and nebulizer machine prior she leaves the hospital today. Primary nurse made aware to perform SpO2 assessment. Pt's  shi assist with transport at [facility] when he gets off of work tonight.

## 2017-05-15 NOTE — PROGRESS NOTES
Patient ambulating in hallway with oxygen. Patient saturations were 95% on room air before ambulating in hallway with oxygen. Patient saturations were 97%-98% on 2 liters of oxygen while ambulating in hallway.

## 2017-05-15 NOTE — DISCHARGE INSTRUCTIONS
DISCHARGE SUMMARY from Nurse    The following personal items are in your possession at time of discharge:    Dental Appliances: None  Visual Aid: Glasses     Home Medications: None  Jewelry: None  Clothing: Pajamas, Shirt, Undergarments  Other Valuables: At bedside, With patient  Personal Items Sent to Safe: no          PATIENT INSTRUCTIONS:    After general anesthesia or intravenous sedation, for 24 hours or while taking prescription Narcotics:  · Limit your activities  · Do not drive and operate hazardous machinery  · Do not make important personal or business decisions  · Do  not drink alcoholic beverages  · If you have not urinated within 8 hours after discharge, please contact your surgeon on call. Report the following to your surgeon:  · Excessive pain, swelling, redness or odor of or around the surgical area  · Temperature over 100.5  · Nausea and vomiting lasting longer than 4 hours or if unable to take medications  · Any signs of decreased circulation or nerve impairment to extremity: change in color, persistent  numbness, tingling, coldness or increase pain  · Any questions        What to do at Home:  Recommended activity: Activity as tolerated. If you experience any of the following symptoms severe chest pain, Shortness of Breath, Dizziness, nausea or vomiting, please follow up with Dr. Gita Razo. *  Please give a list of your current medications to your Primary Care Provider. *  Please update this list whenever your medications are discontinued, doses are      changed, or new medications (including over-the-counter products) are added. *  Please carry medication information at all times in case of emergency situations. These are general instructions for a healthy lifestyle:    No smoking/ No tobacco products/ Avoid exposure to second hand smoke    Surgeon General's Warning:  Quitting smoking now greatly reduces serious risk to your health.     Obesity, smoking, and sedentary lifestyle greatly increases your risk for illness    A healthy diet, regular physical exercise & weight monitoring are important for maintaining a healthy lifestyle    You may be retaining fluid if you have a history of heart failure or if you experience any of the following symptoms:  Weight gain of 3 pounds or more overnight or 5 pounds in a week, increased swelling in our hands or feet or shortness of breath while lying flat in bed. Please call your doctor as soon as you notice any of these symptoms; do not wait until your next office visit. Recognize signs and symptoms of STROKE:    F-face looks uneven    A-arms unable to move or move unevenly    S-speech slurred or non-existent    T-time-call 911 as soon as signs and symptoms begin-DO NOT go       Back to bed or wait to see if you get better-TIME IS BRAIN. Warning Signs of HEART ATTACK     Call 911 if you have these symptoms:   Chest discomfort. Most heart attacks involve discomfort in the center of the chest that lasts more than a few minutes, or that goes away and comes back. It can feel like uncomfortable pressure, squeezing, fullness, or pain.  Discomfort in other areas of the upper body. Symptoms can include pain or discomfort in one or both arms, the back, neck, jaw, or stomach.  Shortness of breath with or without chest discomfort.  Other signs may include breaking out in a cold sweat, nausea, or lightheadedness. Don't wait more than five minutes to call 911 - MINUTES MATTER! Fast action can save your life. Calling 911 is almost always the fastest way to get lifesaving treatment. Emergency Medical Services staff can begin treatment when they arrive -- up to an hour sooner than if someone gets to the hospital by car. The discharge information has been reviewed with the patient. The patient verbalized understanding.     Discharge medications reviewed with the patient and appropriate educational materials and side effects teaching were provided. Patient armband removed and shredded    MyChart Activation    Thank you for requesting access to [facility]. Please follow the instructions below to securely access and download your online medical record. [facility] allows you to send messages to your doctor, view your test results, renew your prescriptions, schedule appointments, and more. How Do I Sign Up? 1. In your internet browser, go to [email or web address]  2. Click on the First Time User? Click Here link in the Sign In box. You will be redirect to the New Member Sign Up page. 3. Enter your [facility] Access Code exactly as it appears below. You will not need to use this code after youve completed the sign-up process. If you do not sign up before the expiration date, you must request a new code. [facility] Access Code: Activation code not generated  Current [facility] Status: Patient Declined (This is the date your [facility] access code will )    4. Enter the last four digits of your Social Security Number (xxxx) and Date of Birth (mm/dd/yyyy) as indicated and click Submit. You will be taken to the next sign-up page. 5. Create a [facility] ID. This will be your [facility] login ID and cannot be changed, so think of one that is secure and easy to remember. 6. Create a [facility] password. You can change your password at any time. 7. Enter your Password Reset Question and Answer. This can be used at a later time if you forget your password. 8. Enter your e-mail address. You will receive e-mail notification when new information is available in [address]. 9. Click Sign Up. You can now view and download portions of your medical record. 10. Click the Download Summary menu link to download a portable copy of your medical information. Additional Information    If you have questions, please visit the Frequently Asked Questions section of the [facility] website at [email or web address] Remember, [facility] is NOT to be used for urgent needs. For medical emergencies, dial 911.

## 2017-05-15 NOTE — ROUTINE PROCESS
Pt is in stable condition, discharged completed. Pt is receiving nebulizer instruction bu respiratory.

## 2017-05-15 NOTE — PROGRESS NOTES
Parvez Little M.D. PROGRESS NOTE    Name: Maurice Hurst MRN: [id number]   : 1940 Hospital: [address] [place] [address]   Date: 5/15/2017  Admission Date: 2017     Subjective/Objective/Plans  1. COPD  2. ILD  3.RA    Need home oxygen  Apnea link to r/o TERESA  Tussionex helps cough    Vital Signs:  Visit Vitals    BP 98/50 (BP 1 Location: Right arm, BP Patient Position: At rest)    Pulse 72    Temp 97.1 °F (36.2 °C)    Resp 17    Ht 5' 3\" (1.6 m)    Wt 67.3 kg (148 lb 5.9 oz)    SpO2 98%    Breastfeeding No    BMI 26.28 kg/m2       O2 Device: Room air   O2 Flow Rate (L/min): 2 l/min   Temp (24hrs), Av.6 °F (36.4 °C), Min:97 °F (36.1 °C), Max:98.2 °F (36.8 °C)     10:02 AM  Intake/Output:   Last shift:         Last 3 shifts:  1901 - 05/15 0700  In: 200 [P.O.:200]  Out: -   No intake or output data in the 24 hours ending 05/15/17 1002      Physical Exam:    General: in no apparent distress, in no respiratory distress and acyanotic and alert    HEENT: pupils equal, no ear discharge   Neck: No abnormally enlarged lymph nodes. , no JDV   Mouth: MMM no lesions    Chest: normal, no breast masses   Lungs: normal air entry   Heart: Regular rate and rhythm   Abdomen: abdomen is soft without significant tenderness, masses, organomegaly or guarding   Extremity: negative   Neuro: alert    Skin: Skin color, texture, turgor normal. No rashes or lesions    Data Review:    Labs: Results:       Chemistry Recent Labs      17   1247   GLU  98   NA  140   K  3.7   CL  100   CO2  33*   BUN  22*   CREA  0.88   CA  8.9   AGAP  7   BUCR  25*      CBC w/Diff No results for input(s): WBC, RBC, HGB, HCT, PLT, GRANS, LYMPH, EOS, HGBEXT, HCTEXT, PLTEXT in the last 72 hours. Cardiac Enzymes No results for input(s): CPK, CKND1, DORIS in the last 72 hours. No lab exists for component: CKRMB, TROIP   Coagulation No results for input(s): PTP, INR, APTT in the last 72 hours.     No lab exists for component: INREXT    Lipid Panel Lab Results   Component Value Date/Time    Cholesterol, total 251 09/22/2010 08:04 AM    HDL Cholesterol 73 09/22/2010 08:04 AM    LDL, calculated 159.2 09/22/2010 08:04 AM    VLDL, calculated 18.8 09/22/2010 08:04 AM    Triglyceride 94 09/22/2010 08:04 AM    CHOL/HDL Ratio 3.4 09/22/2010 08:04 AM      BNP No results for input(s): BNPP in the last 72 hours. Liver Enzymes No results for input(s): TP, ALB, TBILI, AP, SGOT, ALT, CBIL in the last 72 hours. Thyroid Studies No results found for: T4, T3U, TSH, TSHEXT       Procedures/imaging: see electronic medical records for all procedures, Xrays and details which were not copied into this note but were reviewed. Allergies: Allergies   Allergen Reactions    Latex Itching    Asmanex Hfa [Mometasone] Itching    Cleocin [Clindamycin Hcl] Itching and Swelling     Lips and tongue    Pcn [Penicillins] Swelling    Robitussin [Guaifenesin] Rash       Home Medications:  Prior to Admission Medications   Prescriptions Last Dose Informant Patient Reported? Taking? HYDROcodone-acetaminophen (NORCO) 5-325 mg per tablet 5/6/2017 at Unknown time  No Yes   Sig: Take 1 Tab by mouth two (2) times a day. Max Daily Amount: 2 Tabs. albuterol-ipratropium (DUO-NEB) 2.5 mg-0.5 mg/3 ml nebu 5/6/2017 at Unknown time  No Yes   Sig: 3 mL by Nebulization route three (3) times daily. amLODIPine (NORVASC) 5 mg tablet 4/6/2017 at Unknown time  No Yes   Sig: Take 1 Tab by mouth daily. Indications: HYPERTENSION   aspirin  mg tablet 5/5/2017 at Unknown time  Yes Yes   Sig: Take 650 mg by mouth every six (6) hours as needed for Pain. cetirizine (ZYRTEC) 10 mg tablet 5/5/2017 at Unknown time  Yes Yes   Sig: Take 10 mg by mouth daily.    diphenhydrAMINE (BENADRYL ALLERGY) 25 mg tablet 5/5/2017 at Unknown time  Yes Yes   Sig: Take 25 mg by mouth every six (6) hours as needed for Sleep.   furosemide (LASIX) 40 mg tablet 5/5/2017 at Unknown time  No Yes   Sig: Take 1 Tab by mouth daily. Indications: EDEMA   hydroxychloroquine (PLAQUENIL) 200 mg tablet 2017 at Unknown time  Yes Yes   Sig: Take 200 mg by mouth daily. naproxen sodium (ALEVE) 220 mg cap 5/3/2017  Yes No   Sig: Take  by mouth as needed. omeprazole (PRILOSEC) 20 mg capsule 2017 at Unknown time  Yes Yes   Sig: Take 20 mg by mouth daily. potassium chloride (KLOR-CON M20) 20 mEq tablet 2017 at Unknown time  Yes Yes   Sig: Take 20 mEq by mouth two (2) times a day. triamcinolone (NASACORT AQ) 55 mcg nasal inhaler 2017 at Unknown time  Yes Yes   Si Sprays daily as needed. zolpidem (AMBIEN) 10 mg tablet 2017 at Unknown time  Yes Yes   Sig: Take 10 mg by mouth nightly as needed for Sleep.       Facility-Administered Medications: None       Current Medications:  Current Facility-Administered Medications   Medication Dose Route Frequency    predniSONE (DELTASONE) tablet 10 mg  10 mg Oral DAILY WITH LUNCH    chlorpheniramine-HYDROcodone (TUSSIONEX) oral suspension 5 mL  5 mL Oral Q12H PRN    furosemide (LASIX) tablet 40 mg  40 mg Oral DAILY    traZODone (DESYREL) tablet 50 mg  50 mg Oral QHS    phenol throat spray (CHLORASEPTIC) 1 Spray  1 Spray Oral PRN    tiotropium (SPIRIVA) inhalation capsule 18 mcg  1 Cap Inhalation DAILY    albuterol (PROVENTIL VENTOLIN) nebulizer solution 2.5 mg  2.5 mg Nebulization Q6H PRN    HYDROcodone-acetaminophen (NORCO) 5-325 mg per tablet 1 Tab  1 Tab Oral Q6H PRN    insulin lispro (HUMALOG) injection   SubCUTAneous AC&HS    glucose chewable tablet 16 g  16 g Oral PRN    glucagon (GLUCAGEN) injection 1 mg  1 mg IntraMUSCular PRN    dextrose (D50W) injection syrg 12.5-25 g  25-50 mL IntraVENous PRN    benzonatate (TESSALON) capsule 100 mg  100 mg Oral TID PRN    amLODIPine (NORVASC) tablet 5 mg  5 mg Oral DAILY    pantoprazole (PROTONIX) tablet 40 mg  40 mg Oral ACB    hydroxychloroquine (PLAQUENIL) tablet 200 mg  200 mg Oral DAILY    potassium chloride (K-DUR, KLOR-CON) SR tablet 20 mEq  20 mEq Oral BID    sodium chloride (NS) flush 5-10 mL  5-10 mL IntraVENous Q8H    sodium chloride (NS) flush 5-10 mL  5-10 mL IntraVENous PRN    heparin (porcine) injection 5,000 Units  5,000 Units SubCUTAneous Q8H    budesonide-formoterol (SYMBICORT) 160-4.5 mcg/actuation HFA inhaler 2 Puff  2 Puff Inhalation BID RT       Chart and notes reviewed. Data reviewed. I have evaluated and examined the patient.         IMPRESSION:   Patient Active Problem List   Diagnosis Code    Cirrhosis of liver ([facility] [address] K74.60    Systemic lupus erythematosus ([facility] [address] M32.9    Rheumatoid arthritis ([facility] [address] M06.9    HTN (hypertension) I10    Dyslipidemia E78.5    COPD exacerbation (HCC) J44.1    Pulmonary HTN (HCC) I27.2    Acute bronchitis J20.9 ·         PLAN:/DISCUSSION:   · Dc on Tussionex, f/u with Dr Mary Mcmanus  · [facility] lee for breakthrough cough        Rosita Powers MD  5/15/2017, 10:02 AM

## 2017-05-15 NOTE — PROGRESS NOTES
[facility]   Progress Note      Name: Good Hernandes   :    MRN: [id number]   Date: 5/15/2017      [x]I have reviewed the flowsheet and previous days notes. Events overnight reviewed and discussed with nursing staff. Vital signs and records reviewed. Good Hernandes is a 77 y.o. female with PMHx significant for chronic interstitial lung disease, lupus, RA, and pulmonary hypertension who came to the ED after developing a worsening cough. Her last PFT was in 2016 from which she had a normal baseline FEV1. Subjective:  No new events overnight. Breathing has improved; still has coughing episodes which have also improved - able to expectorate sputum. Oxygenating fine on room air - able to walk to nurses' station at 2am without difficulty. Slept better on tussionex.                   ROS:  Constitutional: Positive for fatigue  Eyes: negative  Ears, nose, mouth, throat, and face  Respiratory: Positive for cough and dyspnea on exertion  Cardiovascular: negative  Gastrointestinal: negative  Genitourinary:negative  Integument/breast: negative  Hematologic/lymphatic: negative  Musculoskeletal:negative  Neurological: negative  Behavioral/Psych: negative      Vital Signs:    Visit Vitals    BP 98/50 (BP 1 Location: Right arm, BP Patient Position: At rest)    Pulse 72    Temp 97.1 °F (36.2 °C)    Resp 17    Ht 5' 3\" (1.6 m)    Wt 67.3 kg (148 lb 5.9 oz)    SpO2 98%    Breastfeeding No    BMI 26.28 kg/m2       O2 Device: Room air   O2 Flow Rate (L/min): 2 l/min   Temp (24hrs), Av.6 °F (36.4 °C), Min:97 °F (36.1 °C), Max:98.2 °F (36.8 °C)       Intake/Output:   Last shift:         Last 3 shifts:  1901 - 05/15 0700  In: 200 [P.O.:200]  Out: -   No intake or output data in the 24 hours ending 05/15/17 1026      Physical Exam:    General: Appears comfortable, NAD  HEENT:  Anicteric sclerae; pink palpebral conjunctivae; mucosa moist  Resp:  Symmetrical chest expansion, no accessory muscle use; good airway entry;  No wheeze, crackles at posterior bases  CV:  S1, S2 present; regular rate and rhythm  GI:  Abdomen soft, non-tender; (+) active bowel sounds  Extremities:  +2 pulses on all extremities; no edema/ cyanosis/ clubbing noted  Skin:  Warm; no rashes/ lesions noted  Neurologic:  Non-focal  DATA:     Current Facility-Administered Medications   Medication Dose Route Frequency    predniSONE (DELTASONE) tablet 10 mg  10 mg Oral DAILY WITH LUNCH    chlorpheniramine-HYDROcodone (TUSSIONEX) oral suspension 5 mL  5 mL Oral Q12H PRN    furosemide (LASIX) tablet 40 mg  40 mg Oral DAILY    traZODone (DESYREL) tablet 50 mg  50 mg Oral QHS    phenol throat spray (CHLORASEPTIC) 1 Spray  1 Spray Oral PRN    tiotropium (SPIRIVA) inhalation capsule 18 mcg  1 Cap Inhalation DAILY    albuterol (PROVENTIL VENTOLIN) nebulizer solution 2.5 mg  2.5 mg Nebulization Q6H PRN    HYDROcodone-acetaminophen (NORCO) 5-325 mg per tablet 1 Tab  1 Tab Oral Q6H PRN    insulin lispro (HUMALOG) injection   SubCUTAneous AC&HS    glucose chewable tablet 16 g  16 g Oral PRN    glucagon (GLUCAGEN) injection 1 mg  1 mg IntraMUSCular PRN    dextrose (D50W) injection syrg 12.5-25 g  25-50 mL IntraVENous PRN    benzonatate (TESSALON) capsule 100 mg  100 mg Oral TID PRN    amLODIPine (NORVASC) tablet 5 mg  5 mg Oral DAILY    pantoprazole (PROTONIX) tablet 40 mg  40 mg Oral ACB    hydroxychloroquine (PLAQUENIL) tablet 200 mg  200 mg Oral DAILY    potassium chloride (K-DUR, KLOR-CON) SR tablet 20 mEq  20 mEq Oral BID    sodium chloride (NS) flush 5-10 mL  5-10 mL IntraVENous Q8H    sodium chloride (NS) flush 5-10 mL  5-10 mL IntraVENous PRN    heparin (porcine) injection 5,000 Units  5,000 Units SubCUTAneous Q8H    budesonide-formoterol (SYMBICORT) 160-4.5 mcg/actuation HFA inhaler 2 Puff  2 Puff Inhalation BID RT         Labs: Results:       Chemistry Recent Labs      05/13/17   1247   GLU  98   NA  140   K 3.7   CL  100   CO2  33*   BUN  22*   CREA  0.88   CA  8.9   AGAP  7   BUCR  25*      CBC w/Diff No results for input(s): WBC, RBC, HGB, HCT, PLT, GRANS, LYMPH, EOS, HGBEXT, HCTEXT, PLTEXT, HGBEXT, HCTEXT, PLTEXT in the last 72 hours. Coagulation No results for input(s): PTP, INR, APTT in the last 72 hours. No lab exists for component: INREXT, INREXT    Liver Enzymes No results for input(s): TP, ALB, TBIL, AP, SGOT, GPT in the last 72 hours. No lab exists for component: DBIL   ABG No results found for: PH, PHI, PCO2, PCO2I, PO2, PO2I, HCO3, HCO3I, FIO2, FIO2I   Microbiology No results for input(s): CULT in the last 72 hours. Imaging: No new imaging      IMPRESSION:   · Acute hypoxemic respiratory failure - ?acute exacerbation of underlying fibrotic disease vs acute respiratory infection- improved - completed 5 days levaquin   · Pulmonary HTN - RV pressure 50 mmHg on 5/3/17; likely group 3 though with Hx of SLE would consider CTEPH (group 4) if evidence of VTE  · Lupus on Plaquenil   · Upper-lobe predominant ILD: ddx includes HP, sarcoid, occupational disease (silicosis), EG (inconsistent), CEP (inconsistent), TB (inconsistent), CF (inconsistent), AS (inconsistent). First 2 seem most likely. CTD usually results in lower-lobe predominant disease, though this could be atypical fibrotic NSIP  · Chronic anemia  · Hx tobacco abuse, stopped in 1976 (1pack per week x 16 years)  · GENIE        PLAN:   · Oxygenating well on room air  · Plan for discharge today - RT to do 6-minute walk test  · Continue spiriva, symbicort  · Continue prednisone 10 mg daily  · HP panel pending- can follow up as OP; ACE level -41 , vitamin D levels - Vit D 25 9.2 (L), calcitrol - 93. 1(H). V/Q scan with low probability for PE. Last PFT 9/2016- normal study. · Follow up with Pulmonary clinic- appointment set-up with Dr. Adenike Correa, May 23 at 330pm.   · Would benefit from sleep study as OP.   · ID - Completed Levaquin X 5 day treatment. Strep- negative , legionella Ag -negative.  IgE -WNL, Mycoplasma - normal IgM with elevated IgG;   · Prophylaxis - DVT, GI- Heparin/protonix            January-Danette ANTHONY PA-C

## 2017-05-15 NOTE — HOME CARE
[employer] rep Sergey Lemus provided pt with Nebulizer machine for discharge today ; pt's  had a nebulizer machine and neb meds in pt's room that was ordered a few days ago by pt's son who he states is a physcian , pt's  states he will have his son (physcian)  return nebulizer that was ordered by pt's son back to the company that it came from. Home O2 and Nebulizer ordered from [employer] and portable O2 concentrator and nebulizer   provided to pt before discharged from [employer] , pt's  will transport pt home at discharge today. DAYANARA MENEZES.

## 2017-05-15 NOTE — PROGRESS NOTES
Patient complaining of a headache and pain in her hip. Pain medication given. I stated to patient that I need to check her oxygen level while walking and wearing oxygen. Patient stated \"I don't feel like walking now. Can you try back later? \" I stated I will come back and walk with her. Oxygen tanks are at her bedside.

## 2017-05-15 NOTE — ROUTINE PROCESS
Bedside and Verbal shift change report [facility] [address] [facility] (oncoming nurse) by Elmer Valdez, RN (offgoing nurse). Report included the following information SBAR, Kardex, MAR and Recent Results.     SITUATION:    Code Status: Full Code   Reason for Admission: COPD exacerbation ([address]    [facility] day: 5   Problem List:       Hospital Problems  Date Reviewed: 5/11/2017          Codes Class Noted POA    Pulmonary HTN ([facility] (Chronic) ICD-10-CM: I27.2  ICD-9-CM: 416.8  5/11/2017 Yes        Acute bronchitis ICD-10-CM: J20.9  ICD-9-CM: 466.0  5/11/2017 Yes        COPD exacerbation ([facility] ICD-10-CM: J44.1  ICD-9-CM: 491.21  5/6/2017 Unknown        Systemic lupus erythematosus ([facility] (Chronic) ICD-10-CM: M32.9  ICD-9-CM: 710.0  8/5/2016 Yes        Rheumatoid arthritis ([facility] (Chronic) ICD-10-CM: M06.9  ICD-9-CM: 714.0  8/5/2016 Yes        HTN (hypertension) (Chronic) ICD-10-CM: I10  ICD-9-CM: 401.9  8/5/2016 Yes        Dyslipidemia (Chronic) ICD-10-CM: E78.5  ICD-9-CM: 272.4  8/5/2016 Yes        Cir[facility]osis [facility] liver ([facility] ICD-10-CM: K74.60  ICD-9-CM: 571.5  6/8/2014 Yes              BACKGROUND:    Past Medical History:   Past Medical History:   Diagnosis Date    Asthma     Chronic lung disease     Lupus ([facility]     Pulmonary hypertension ([facility]     Rheumatoid arthritis ([facility]          Patient taking anticoagulants no     ASSESSMENT:    Changes in Assessment Throughout Shift: No     Patient has Central Line: no Reasons if yes: No   Patient has Phillips Cath: no Reasons if yes: No      Last Vitals:     Vitals:    05/14/17 0950 05/14/17 1209 05/14/17 1604 05/14/17 2037   BP:  111/65 120/67 124/66   Pulse:  72 70 74   Resp:  16 16 18   Temp:  98.1 °F (36.7 °C) 97.7 °F (36.5 °C) 98.2 °F (36.8 °C)   SpO2: 97% 96% 96% 98%   Weight:       Height:            IV and DRAINS (will only show if present)   Peripheral IV 05/07/17 Right Forearm-Site Assessment: Clean, dry, & intact  [REMOVED] Peripheral IV 05/06/17 Right Arm-Site Assessment: Clean, dry, & intact     WOUND (if present)   Wound Type:  none   Dressing present Dressing Present : No   Wound Concerns/Notes:  none     PAIN    Pain Assessment    Pain Intensity 1: 5 (05/14/17 1700)    Pain Location 1: Knee    Pain Intervention(s) 1: Medication (see MAR)    Patient Stated Pain Goal: 4  o Interventions for Pain:  none  o Intervention effective: no  o Time of last intervention: 0700   o Reassessment Completed: no      Last 3 Weights:  Last 3 Recorded Weights in this Encounter    05/11/17 0407 05/12/17 0343 05/13/17 0733   Weight: 67.6 kg (149 lb) 68 kg (150 lb) 67.3 kg (148 lb 5.9 oz)     Weight change:      INTAKE/OUPUT    Current Shift:      Last three shifts: 05/13 0701 - 05/14 1900  In: 1160 [P.O.:1160]  Out: 300 [Urine:300]     LAB RESULTS     Recent Labs      05/12/17   0420   WBC  9.5   HGB  10.4*   HCT  34.3*   PLT  270        Recent Labs      05/13/17   1247   NA  140   K  3.7   GLU  98   BUN  22*   CREA  0.88   CA  8.9       RECOMMENDATIONS AND DISCHARGE PLANNING     1. Pending tests/procedures/ Plan of Care or Other Needs: TBD     2. Discharge plan for patient and Needs/Barriers: TBD    3. Estimated Discharge Date: TBd Posted on Whiteboard in Patients Room: no      4. The patient's care plan was reviewed with the oncoming nurse. \"HEALS\" SAFETY CHECK      Fall Risk    Total Score: 1    Safety Measures: Safety Measures: Bed/Chair alarm on, Bed/Chair-Wheels locked, Bed in low position, Call light within reach, Fall prevention (comment), Side rails X 3    A safety check occurred in the patient's room between off going nurse and oncoming nurse listed above.     The safety check included the below items  Area Items   H  High Alert Medications - Verify all high alert medication drips (heparin, PCA, etc.)   E  Equipment - Suction is set up for ALL patients (with yanker)  - Red plugs utilized for all equipment (IV pumps, etc.)  - WOWs wiped down at end of shift.  - Room stocked with oxygen, suction, and other unit-specific supplies   A  Alarms - Bed alarm is set for fall risk patients  - Ensure chair alarm is in place and activated if patient is up in a chair   L  Lines - Check IV for any infiltration  - Phillips bag is empty if patient has a Phillips   - Tubing and IV bags are labeled   S  Safety   - Room is clean, patient is clean, and equipment is clean. - Hallways are clear from equipment besides carts. - Fall bracelet on for fall risk patients  - Ensure room is clear and free of clutter  - Suction is set up for ALL patients (with federico)  - Hallways are clear from equipment besides carts.    - Isolation precautions followed, supplies available outside room, sign posted     Jc Williamson RN

## 2017-05-15 NOTE — ROUTINE PROCESS
3316: Stable. Coughing every once in a  While. Cough medication given. No SOB. Vital signs WNL. Walking in the hallway and in the room. Pain managed with po medication. Will continue with plan of care and treatment.

## 2017-05-17 ENCOUNTER — HOME CARE VISIT (OUTPATIENT)
Dept: HOME HEALTH SERVICES | Facility: HOME HEALTH | Age: 77
End: 2017-05-17

## 2017-05-18 ENCOUNTER — HOME CARE VISIT (OUTPATIENT)
Dept: HOME HEALTH SERVICES | Facility: HOME HEALTH | Age: 77
End: 2017-05-18

## 2017-05-24 ENCOUNTER — OFFICE VISIT (OUTPATIENT)
Dept: PULMONOLOGY | Age: 77
End: 2017-05-24

## 2017-05-24 VITALS
WEIGHT: 154.5 LBS | TEMPERATURE: 98.2 F | OXYGEN SATURATION: 98 % | SYSTOLIC BLOOD PRESSURE: 130 MMHG | BODY MASS INDEX: 27.38 KG/M2 | RESPIRATION RATE: 18 BRPM | HEIGHT: 63 IN | DIASTOLIC BLOOD PRESSURE: 70 MMHG | HEART RATE: 76 BPM

## 2017-05-24 DIAGNOSIS — I27.20 PULMONARY HYPERTENSION (HCC): Primary | ICD-10-CM

## 2017-05-24 DIAGNOSIS — J84.10 PULMONARY FIBROSIS (HCC): ICD-10-CM

## 2017-05-24 DIAGNOSIS — J47.9 BRONCHIECTASIS WITHOUT COMPLICATION (HCC): ICD-10-CM

## 2017-05-24 NOTE — PATIENT INSTRUCTIONS
Discontinue Symbicort  Finish course of prednisone this week  Nebulizer treatments every 4 hours as needed only and if you require nebulizer treatments to offer the control respiratory symptoms call the office for severe symptoms go to the emergency room  Always call for symptoms such as increasing shortness of breath or cough productive of discolored mucus  Oxygen is not required with sedentary activity or mild to moderate exercise

## 2017-05-24 NOTE — PROGRESS NOTES
[facility]  Pulmonary, Critical Care, and Sleep Medicine      Chief complaint:  Cough shortness of breath    HPI:  Johny Briggs is 69years old and returns to the office today for followup after being admitted the hospital for a cough and being treated with steroids bronchodilators and antibiotics. The patient relates now she has little cough and when she coughs the mucus is clear and the purulent mucus has resolved. She denies chest pain and says her shortness of breath is improved. She denies leg pain or swelling. She says she is been taking Symbicort since her hospitalization and prednisone. She also was prescribed supplemental oxygen  Allergies   Allergen Reactions    Latex Itching    Asmanex Hfa [Mometasone] Itching    Cleocin [Clindamycin Hcl] Itching and Swelling     Lips and tongue    Pcn [Penicillins] Swelling    Robitussin [Guaifenesin] Rash     Current Outpatient Prescriptions   Medication Sig    Oxygen 2 Each by Nasal route. 2 L [employer]    HYDROcodone-acetaminophen (NORCO) 5-325 mg per tablet Take 1 Tab by mouth every six (6) hours as needed. Max Daily Amount: 4 Tabs.  zolpidem (AMBIEN) 10 mg tablet Take 1 Tab by mouth nightly as needed for Sleep. Max Daily Amount: 10 mg.    budesonide-formoterol (SYMBICORT) 160-4.5 mcg/actuation HFA inhaler Take 2 Puffs by inhalation two (2) times a day. Indications: BRONCHOSPASM PREVENTION WITH COPD, MAINTENANCE THERAPY FOR ASTHMA    predniSONE (DELTASONE) 10 mg tablet Take 1 Tab by mouth daily (with lunch).  albuterol-ipratropium (DUO-NEB) 2.5 mg-0.5 mg/3 ml nebu 3 mL by Nebulization route three (3) times daily.  furosemide (LASIX) 40 mg tablet Take 1 Tab by mouth daily. Indications: EDEMA    omeprazole (PRILOSEC) 20 mg capsule Take 20 mg by mouth daily.  hydroxychloroquine (PLAQUENIL) 200 mg tablet Take 200 mg by mouth daily.     potassium chloride (KLOR-CON M20) 20 mEq tablet Take 20 mEq by mouth two (2) times a day.  chlorpheniramine-HYDROcodone (TUSSIONEX) 10-8 mg/5 mL suspension Take 5 mL by mouth every twelve (12) hours as needed for Cough. Max Daily Amount: 10 mL.  tiotropium (SPIRIVA) 18 mcg inhalation capsule Take 1 Cap by inhalation daily.  traZODone (DESYREL) 50 mg tablet Take 1 Tab by mouth nightly.  amLODIPine (NORVASC) 5 mg tablet Take 1 Tab by mouth daily. Indications: HYPERTENSION     No current facility-administered medications for this visit. Past Medical History:   Diagnosis Date    Asthma     Chronic lung disease     [facility]     Pulmonary hypertension (HCC)     Rheumatoid arthritis ([facility]      No past surgical history on file. Social History     Social History    Marital status: UNKNOWN     Spouse name: N/A    Number of children: N/A    Years of education: N/A     Occupational History    Not on file.      Social History Main Topics    Smoking status: Former Smoker     Packs/day: 0.20     Years: 16.00     Types: Cigarettes     Quit date: 9/1/1974    Smokeless tobacco: Never Used    Alcohol use No    Drug use: Not on file    Sexual activity: Not on file     Other Topics Concern    Not on file     Social History Narrative     Family History   Problem Relation Age of Onset    Heart Disease Mother     Asthma Mother     Asthma Father     Parkinson's Disease Father        Review of systems:  She denies fever chills poor appetite or weight loss    Physical Exam:  Visit Vitals    /70 (BP 1 Location: Left arm, BP Patient Position: Sitting)    Pulse 76    Temp 98.2 °F (36.8 °C) (Oral)    Resp 18    Ht 5' 3\" (1.6 m)    Wt 70.1 kg (154 lb 8 oz)    SpO2 98%    BMI 27.37 kg/m2       Well-developed elderly  HEENT: WNL  Lymph node exam: Supraclavicular cervical lymph nodes negative  Chest: Equal symmetrical expansion no dullness no wheezes rales rubs  Heart: Regular rhythm no gallop no murmur no JVD no peripheral edema  Extremities: No cyanosis clubbing or calf tenderness  Neurological: Alert and oriented    LABS:  Echocardiogram 5/5/17: Pulmonary hypertension with pulmonary systolic artery pressure of 50 mmHg  O2 sat room air at rest 98% and with walking up proximally 600 feet O2 sat remained at 90% or above  CT of the chest 5/7/17, personally reviewed: Bilateral upper lobe fibro-cystic changes and bronchiectasis    Impression:   Exacerbation of bronchiectasis which is resolving  There is no evidence of COPD in a patient who has never smoked or had asthma and has a normal spirometry exam.  Therefore will discontinue Symbicort which may be harmful in patients without airway reactivity with  Bronchiectasis. Pulmonary hypertension which may be related to severe fibrosis and bronchiectasis and  Oxygen requirements especially at night needs to be evaluated  Plan:  Discontinue Symbicort and supplemental oxygen with rest and activity  Overnight oximetry on room air  When necessary nebulization treatments for now  From treatment of respiratory infections  Followup in 8 weeks    Patrick Ware MD , [facility]    CC: Nicholas Mijares MD     [address] [facility] [address]  [place], [address] [address]     P: [phone]     F: [phone]

## 2017-05-24 NOTE — PROGRESS NOTES
Chief Complaint   Patient presents with    Infiltrate     Pulmonary    Pulmonary Fibrosis     Patient here for follow up for Pulmonary Infiltrate and Pulmonary Fibrosis. Pt admitted to [facility] 5/6/17 - 5/15/17. Pt had ECHO 5/3/17 [facility].      PATIENT'S O2 SATS:   97% Room Air Rest, 78 Heart Rate                                          90% Room Air Activity, 90 Heart Rate - Patient Walked 550 Ft

## 2017-06-05 ENCOUNTER — TELEPHONE (OUTPATIENT)
Dept: PULMONOLOGY | Age: 77
End: 2017-06-05

## 2017-06-05 NOTE — TELEPHONE ENCOUNTER
[facility] [id number]([phone]). SHE WOULD LIKE TO TALK  A CLINICAL PERSON REGARDING PT'S PROGRESS.

## 2017-06-05 NOTE — TELEPHONE ENCOUNTER
Pt's Hilaria  calling re: pt. Deferred to pt. She states she hasn't been able to reach pt.  She will continue calling to touch base with her

## 2017-06-19 NOTE — TELEPHONE ENCOUNTER
Pt needs refill of albuterol solution for her nebulizer sent to the pharmacy. Pt uses [facility] on [address].  Please call [phone]

## 2017-06-21 RX ORDER — IPRATROPIUM BROMIDE AND ALBUTEROL SULFATE 2.5; .5 MG/3ML; MG/3ML
3 SOLUTION RESPIRATORY (INHALATION)
Qty: 50 NEBULE | Refills: 1 | Status: SHIPPED | OUTPATIENT
Start: 2017-06-21 | End: 2018-03-01 | Stop reason: SDUPTHER

## 2017-09-16 ENCOUNTER — APPOINTMENT (OUTPATIENT)
Dept: CT IMAGING | Age: 77
End: 2017-09-16
Attending: PHYSICIAN ASSISTANT
Payer: MEDICARE

## 2017-09-16 ENCOUNTER — APPOINTMENT (OUTPATIENT)
Dept: GENERAL RADIOLOGY | Age: 77
End: 2017-09-16
Attending: PHYSICIAN ASSISTANT
Payer: MEDICARE

## 2017-09-16 ENCOUNTER — HOSPITAL ENCOUNTER (EMERGENCY)
Age: 77
Discharge: HOME | End: 2017-09-16
Attending: EMERGENCY MEDICINE
Payer: MEDICARE

## 2017-09-16 VITALS
DIASTOLIC BLOOD PRESSURE: 95 MMHG | OXYGEN SATURATION: 98 % | BODY MASS INDEX: 25.68 KG/M2 | HEIGHT: 61 IN | HEART RATE: 69 BPM | SYSTOLIC BLOOD PRESSURE: 126 MMHG | WEIGHT: 136 LBS | TEMPERATURE: 98.3 F | RESPIRATION RATE: 20 BRPM

## 2017-09-16 DIAGNOSIS — M25.512 PAIN OF BOTH SHOULDER JOINTS: Primary | ICD-10-CM

## 2017-09-16 DIAGNOSIS — M25.511 PAIN OF BOTH SHOULDER JOINTS: Primary | ICD-10-CM

## 2017-09-16 DIAGNOSIS — M25.561 ACUTE PAIN OF BOTH KNEES: ICD-10-CM

## 2017-09-16 DIAGNOSIS — S09.90XA MINOR HEAD INJURY, INITIAL ENCOUNTER: ICD-10-CM
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
## 2017-09-16 DIAGNOSIS — M25.562 ACUTE PAIN OF BOTH KNEES: ICD-10-CM

## 2017-09-16 LAB
ALBUMIN SERPL-MCNC: 3.2 G/DL (ref 3.4–5)
ALBUMIN/GLOB SERPL: 0.8 {RATIO} (ref 0.8–1.7)
ALP SERPL-CCNC: 85 U/L (ref 45–117)
ALT SERPL-CCNC: 19 U/L (ref 13–56)
ANION GAP SERPL CALC-SCNC: 3 MMOL/L (ref 3–18)
AST SERPL-CCNC: 21 U/L (ref 15–37)
BASOPHILS # BLD: 0 K/UL (ref 0–0.1)
BASOPHILS NFR BLD: 1 % (ref 0–2)
BILIRUB SERPL-MCNC: 0.4 MG/DL (ref 0.2–1)
BUN SERPL-MCNC: 8 MG/DL (ref 7–18)
BUN/CREAT SERPL: 10 (ref 12–20)
CALCIUM SERPL-MCNC: 8.2 MG/DL (ref 8.5–10.1)
CHLORIDE SERPL-SCNC: 112 MMOL/L (ref 100–108)
CK MB CFR SERPL CALC: NORMAL % (ref 0–4)
CK MB SERPL-MCNC: <1 NG/ML (ref 5–25)
CK SERPL-CCNC: 94 U/L (ref 26–192)
CO2 SERPL-SCNC: 28 MMOL/L (ref 21–32)
CREAT SERPL-MCNC: 0.78 MG/DL (ref 0.6–1.3)
DIFFERENTIAL METHOD BLD: ABNORMAL
EOSINOPHIL # BLD: 0.5 K/UL (ref 0–0.4)
EOSINOPHIL NFR BLD: 8 % (ref 0–5)
ERYTHROCYTE [DISTWIDTH] IN BLOOD BY AUTOMATED COUNT: 15.9 % (ref 11.6–14.5)
GLOBULIN SER CALC-MCNC: 3.8 G/DL (ref 2–4)
GLUCOSE SERPL-MCNC: 97 MG/DL (ref 74–99)
HCT VFR BLD AUTO: 31.6 % (ref 35–45)
HGB BLD-MCNC: 10.4 G/DL (ref 12–16)
LYMPHOCYTES # BLD: 0.9 K/UL (ref 0.9–3.6)
LYMPHOCYTES NFR BLD: 16 % (ref 21–52)
MAGNESIUM SERPL-MCNC: 1.9 MG/DL (ref 1.6–2.6)
MCH RBC QN AUTO: 24.9 PG (ref 24–34)
MCHC RBC AUTO-ENTMCNC: 32.9 G/DL (ref 31–37)
MCV RBC AUTO: 75.8 FL (ref 74–97)
MONOCYTES # BLD: 0.4 K/UL (ref 0.05–1.2)
MONOCYTES NFR BLD: 7 % (ref 3–10)
NEUTS SEG # BLD: 3.9 K/UL (ref 1.8–8)
NEUTS SEG NFR BLD: 68 % (ref 40–73)
PLATELET # BLD AUTO: 175 K/UL (ref 135–420)
PMV BLD AUTO: 11.1 FL (ref 9.2–11.8)
POTASSIUM SERPL-SCNC: 3.4 MMOL/L (ref 3.5–5.5)
PROT SERPL-MCNC: 7 G/DL (ref 6.4–8.2)
RBC # BLD AUTO: 4.17 M/UL (ref 4.2–5.3)
SODIUM SERPL-SCNC: 143 MMOL/L (ref 136–145)
TROPONIN I SERPL-MCNC: <0.02 NG/ML (ref 0–0.04)
WBC # BLD AUTO: 5.8 K/UL (ref 4.6–13.2)

## 2017-09-16 PROCEDURE — 99285 EMERGENCY DEPT VISIT HI MDM: CPT

## 2017-09-16 PROCEDURE — 70450 CT HEAD/BRAIN W/O DYE: CPT

## 2017-09-16 PROCEDURE — 85025 COMPLETE CBC W/AUTO DIFF WBC: CPT | Performed by: PHYSICIAN ASSISTANT

## 2017-09-16 PROCEDURE — 83735 ASSAY OF MAGNESIUM: CPT | Performed by: PHYSICIAN ASSISTANT

## 2017-09-16 PROCEDURE — 93005 ELECTROCARDIOGRAM TRACING: CPT

## 2017-09-16 PROCEDURE — 82550 ASSAY OF CK (CPK): CPT | Performed by: PHYSICIAN ASSISTANT

## 2017-09-16 PROCEDURE — 72125 CT NECK SPINE W/O DYE: CPT

## 2017-09-16 PROCEDURE — 73030 X-RAY EXAM OF SHOULDER: CPT

## 2017-09-16 PROCEDURE — 80053 COMPREHEN METABOLIC PANEL: CPT | Performed by: PHYSICIAN ASSISTANT

## 2017-09-17 LAB
ATRIAL RATE: 68 BPM
CALCULATED P AXIS, ECG09: 58 DEGREES
CALCULATED R AXIS, ECG10: -38 DEGREES
CALCULATED T AXIS, ECG11: -19 DEGREES
DIAGNOSIS, 93000: NORMAL
P-R INTERVAL, ECG05: 138 MS
Q-T INTERVAL, ECG07: 436 MS
QRS DURATION, ECG06: 134 MS
QTC CALCULATION (BEZET), ECG08: 463 MS
VENTRICULAR RATE, ECG03: 68 BPM

## 2017-09-17 NOTE — ED NOTES
Pt arrived to the ED with co fall, bilateral shoulder pain, and bilateral knee pain. Pt states, \"I think it was tuesday. I injured my shoulder and my knee. I was on the floor for about 7 hours. \" Pt alert and in no apparent distress. Pt ambulatory.

## 2017-09-17 NOTE — ED PROVIDER NOTES
HPI Comments: Irwin Durán is a 69 y.o. female that presents to the ED with her daughter following a fall at home 4 days ago. Pt states that she is unsure why she fell but remembers being between bed and wall and falling on knees and then hitting head and shoulders on furniture. Pt denies LOC but states that she was on the ground for a few hours before she was able to get up. Daughter states that she called and spoke to mother on phone and she sounded \"different\", she called multiple times on the next day and states that her mothers affect seemed different each time. Daughter was concerned and flew home to bring her to the ED. Mother admits to hx of lupus, pulmonary HTN, RA, Asthma and HTN. She admits to intermittent nausea and dizziness but states it has been present for some time and she has adressed it with her PCP. She is not dizzy or nauseous at this time. Denies LOC, lightheaded, vomiting, fever, urinary sx, HA. Past Medical History:   Diagnosis Date    Asthma     Chronic lung disease     [facility]up[facility] ([facility]     Pulmonary hypertension (HCC)     Rheumatoid arthritis ([facility]        No past surgical history on file. Family History:   Problem Relation Age of Onset    Heart Disease Mother     Asthma Mother     Asthma Father     Parkinson's Disease Father        Social History     Social History    Marital status: UNKNOWN     Spouse name: N/A    Number of children: N/A    Years of education: N/A     Occupational History    Not on file. Social History Main Topics    Smoking status: Former Smoker     Packs/day: 0.20     Years: 16.00     Types: Cigarettes     Quit date: 9/1/1974    Smokeless tobacco: Never Used    Alcohol use No    Drug use: Not on file    Sexual activity: Not on file     Other Topics Concern    Not on file     Social History Narrative         ALLERGIES: Latex; Asmanex hfa [mometasone];  Cleocin [clindamycin hcl]; Pcn [penicillins]; and Robitussin [guaifenesin]    Review of Systems   Constitutional: Negative for fatigue and fever. Respiratory: Negative for shortness of breath. Cardiovascular: Negative for chest pain and leg swelling. Gastrointestinal: Positive for nausea. Negative for constipation, diarrhea and vomiting. Genitourinary: Negative for difficulty urinating. Musculoskeletal: Positive for arthralgias and myalgias. Skin: Negative for rash and wound. Neurological: Negative for dizziness and headaches. All other systems reviewed and are negative. Vitals:    09/16/17 2104   BP: (!) 172/95   Pulse: 81   Resp: 18   Temp: 98.3 °F (36.8 °C)   SpO2: 95%   Weight: 61.7 kg (136 lb)   Height: 5' 1\" (1.549 m)            Physical Exam   Constitutional: She is oriented to person, place, and time. She appears well-developed and well-nourished. No distress. HENT:   Head: Normocephalic and atraumatic. Nose: Nose normal.   Eyes: Conjunctivae are normal. Pupils are equal, round, and reactive to light. Neck: Normal range of motion. Cardiovascular: Normal rate, regular rhythm and normal heart sounds. Pulmonary/Chest: Effort normal and breath sounds normal.   Abdominal: Soft. Bowel sounds are normal.   Musculoskeletal: Normal range of motion. Right shoulder: Normal.        Left shoulder: Normal.   Neurological: She is alert and oriented to person, place, and time. She has normal strength. No cranial nerve deficit. GCS eye subscore is 4. GCS verbal subscore is 5. GCS motor subscore is 6. Skin: Skin is warm and dry. Vitiligo noted to face arms and legs   Psychiatric: She has a normal mood and affect. Her behavior is normal.   Vitals reviewed.        MDM  Number of Diagnoses or Management Options  Acute pain of both knees:   Fall, initial encounter:   Minor head injury, initial encounter:   Pain of both shoulder joints:   Diagnosis management comments: Labs Reviewed  CBC WITH AUTOMATED DIFF - Abnormal; Notable for the following: RBC                           4.17 (*)               HGB                           10.4 (*)               HCT                           31.6 (*)               RDW                           15.9 (*)               LYMPHOCYTES                   16 (*)                 EOSINOPHILS                   8 (*)                  ABS. EOSINOPHILS              0.5 (*)             All other components within normal limits  METABOLIC PANEL, COMPREHENSIVE - Abnormal; Notable for the following:      Potassium                     3.4 (*)                Chloride                      112 (*)                BUN/Creatinine ratio          10 (*)                 Calcium                       8.2 (*)                Albumin                       3.2 (*)             All other components within normal limits  CARDIAC PANEL,(CK, CKMB & TROPONIN)  MAGNESIUM      CT Results (most recent):  Results from Hospital Encounter encounter on 09/16/17    CT SPINE CERV WO CONT    Narrative  EXAM:  CT SPINE CERV WO CONT    INDICATION:   fall neck pain    COMPARISON: None. TECHNIQUE: Unenhanced multislice helical CT of the cervical spine was performed  in the axial plane and sagittal and coronal reformations were generated. CT  dose reduction was achieved through use of a standardized protocol tailored for  this examination and automatic exposure control for dose modulation. FINDINGS:    The alignment of the cervical spine is normal.     Vertebral body heights are maintained. Multilevel endplate osteophytosis and  disc height loss most significant at C4/5 and C5/6 with associated high-grade  central canal stenosis at these levels. There is also multilevel high-grade  neural foraminal stenosis due to uncovertebral spurring C3-C7. There is no fracture or subluxation. The prevertebral soft tissues are normal.    The odontoid process is intact. Visualized upper lungs demonstrate bilateral lateral traction bronchiectasis and  fibrotic changes. Upper mediastinum demonstrate aortic atherosclerosis. Impression  IMPRESSION:     No evidence of acute cervical spine fracture or subluxation. Multilevel advanced degenerative disc disease. High-grade central canal stenosis  C4/5 and C5/6. Multilevel high-grade neural foraminal stenosis C3-C7. Head CT without contrast     HISTORY: Fall head injury  COMPARISON: CT head 6/4/14     Technique: CT images of the head were obtained. All CT scans at this facility  are performed using dose optimization technique as appropriate to the performed  exam, to include automated exposure control, adjustment of the mA and/or kV  according to patient's size (Including appropriate matching for site-specific  examinations), or use of iterative reconstruction technique.     FINDINGS:   No intracranial hemorrhage, extra-axial fluid collection or mass effect. No  shift of midline structures. No evidence for acute ischemia. Mild  age-appropriate volume loss. Mild periventricular and subcortical white matter  low attenuation, nonspecific, but most consistent with chronic small vessel  ischemic disease. Intact osseous structures.   The visualized paranasal air sinuses are aerated.     IMPRESSION  IMPRESSION:     No CT evidence of acute intracranial abnormality.     Mild atrophy and mild burden of white matter presumed chronic small vessel  ischemic changes        Thank you for this referral.    EKG Results     Procedure 720 Value Units Date/Time    EKG, 12 LEAD, INITIAL ([id number]) Collected:  09/16/17 2208    Order Status:  Completed Updated:  09/16/17 2209     Ventricular Rate 68 BPM      Atrial Rate 68 BPM      P-R Interval 138 ms      QRS Duration 134 ms      Q-T Interval 436 ms      QTC Calculation (Bezet) 463 ms      Calculated P Axis 58 degrees      Calculated R Axis -38 degrees      Calculated T Axis -19 degrees      Diagnosis --     Normal sinus rhythm  Left axis deviation  Right bundle branch block  Minimal voltage criteria for LVH, may be normal variant  Abnormal ECG  When compared with ECG of 06-MAY-2017 11:33,  No significant change was found        11:03 PM pt resting comfortably in room talking with daughter. Vitals stable and in NAD. Impression: shoulder pain, knee pain,   fall    Plan: discharge home  Resume home medications  Tylenol/Motrin for pain  Follow up with PCP           Amount and/or Complexity of Data Reviewed  Clinical lab tests: ordered and reviewed  Tests in the radiology section of CPT®: ordered and reviewed  Tests in the medicine section of CPT®: ordered and reviewed    Risk of Complications, Morbidity, and/or Mortality  Presenting problems: moderate  Diagnostic procedures: moderate  Management options: moderate    Patient Progress  Patient progress: stable    ED Course       Procedures             Vitals:  Patient Vitals for the past 12 hrs:   Temp Pulse Resp BP SpO2   09/16/17 2130 - 72 16 151/87 99 %   09/16/17 2125 - 74 19 - 99 %   09/16/17 2124 - 73 18 - 98 %   09/16/17 2123 - 73 20 - 97 %   09/16/17 2122 - 75 18 - 98 %   09/16/17 2121 - 77 17 - 98 %   09/16/17 2120 - 78 16 - 99 %   09/16/17 2119 - 76 16 - 99 %   09/16/17 2118 - 75 16 - 98 %   09/16/17 2104 98.3 °F (36.8 °C) 81 18 (!) 172/95 95 %         Medications ordered:   Medications - No data to display      Lab findings:  No results found for this or any previous visit (from the past 12 hour(s)). X-Ray, CT or other radiology findings or impressions:  XR SHOULDER RT AP/LAT MIN 2 V    (Results Pending)   XR SHOULDER LT AP/LAT MIN 2 V    (Results Pending)   CT HEAD WO CONT    (Results Pending)   CT SPINE CERV WO CONT    (Results Pending)       Progress notes, Consult notes or additional Procedure notes:       Disposition:  Diagnosis: No diagnosis found.     Disposition:  discharge    Follow-up Information     None           Patient's Medications   Start Taking    No medications on file   Continue Taking    ALBUTEROL-IPRATROPIUM (DUO-NEB) 2.5 MG-0.5 MG/3 ML NEBU    3 mL by Nebulization route every four (4) hours as needed. ICD 10: I27.2, J84.10    AMLODIPINE (NORVASC) 5 MG TABLET    Take 1 Tab by mouth daily. Indications: HYPERTENSION    BUDESONIDE-FORMOTEROL (SYMBICORT) 160-4.5 MCG/ACTUATION HFA INHALER    Take 2 Puffs by inhalation two (2) times a day. Indications: BRONCHOSPASM PREVENTION WITH COPD, MAINTENANCE THERAPY FOR ASTHMA    CHLORPHENIRAMINE-HYDROCODONE (TUSSIONEX) 10-8 MG/5 ML SUSPENSION    Take 5 mL by mouth every twelve (12) hours as needed for Cough. Max Daily Amount: 10 mL. FUROSEMIDE (LASIX) 40 MG TABLET    Take 1 Tab by mouth daily. Indications: EDEMA    HYDROCODONE-ACETAMINOPHEN (NORCO) 5-325 MG PER TABLET    Take 1 Tab by mouth every six (6) hours as needed. Max Daily Amount: 4 Tabs. HYDROXYCHLOROQUINE (PLAQUENIL) 200 MG TABLET    Take 200 mg by mouth daily. OMEPRAZOLE (PRILOSEC) 20 MG CAPSULE    Take 20 mg by mouth daily. OXYGEN    2 Each by Nasal route. 2 L [employer]    POTASSIUM CHLORIDE (KLOR-CON M20) 20 MEQ TABLET    Take 20 mEq by mouth two (2) times a day. PREDNISONE (DELTASONE) 10 MG TABLET    Take 1 Tab by mouth daily (with lunch). TRAZODONE (DESYREL) 50 MG TABLET    Take 1 Tab by mouth nightly. ZOLPIDEM (AMBIEN) 10 MG TABLET    Take 1 Tab by mouth nightly as needed for Sleep. Max Daily Amount: 10 mg.    These Medications have changed    No medications on file   Stop Taking    No medications on file

## 2017-09-17 NOTE — ED NOTES
Reviewed DC instructions with patient. Pt verbalized an understanding. Pt instructed to follow up with PCP this week. Pt signed paper DC instructions; Rn placed in scan bin. Pt left ED with her daughter. Pt left ED with all belongings and no distress noted.

## 2017-09-17 NOTE — DISCHARGE INSTRUCTIONS
Joint Pain: Care Instructions  Your Care Instructions  Many people have small aches and pains from overuse or injury to muscles and joints. Joint injuries often happen during sports or recreation, work tasks, or projects around the home. An overuse injury can happen when you put too much stress on a joint or when you do an activity that stresses the joint over and over, such as using the computer or rowing a boat. You can take action at home to help your muscles and joints get better. You should feel better in 1 to 2 weeks, but it can take 3 months or more to heal completely. Follow-up care is a key part of your treatment and safety. Be sure to make and go to all appointments, and call your doctor if you are having problems. It's also a good idea to know your test results and keep a list of the medicines you take. How can you care for yourself at home? · Do not put weight on the injured joint for at least a day or two. · For the first day or two after an injury, do not take hot showers or baths, and do not use hot packs. The heat could make swelling worse. · Put ice or a cold pack on the sore joint for 10 to 20 minutes at a time. Try to do this every 1 to 2 hours for the next 3 days (when you are awake) or until the swelling goes down. Put a thin cloth between the ice and your skin. · Wrap the injury in an elastic bandage. Do not wrap it too tightly because this can cause more swelling. · Prop up the sore joint on a pillow when you ice it or anytime you sit or lie down during the next 3 days. Try to keep it above the level of your heart. This will help reduce swelling. · Take an over-the-counter pain medicine, such as acetaminophen (Tylenol), ibuprofen (Advil, Motrin), or naproxen (Aleve). Read and follow all instructions on the label. · After 1 or 2 days of rest, begin moving the joint gently.  While the joint is still healing, you can begin to exercise using activities that do not strain or hurt the painful joint. When should you call for help? Call your doctor now or seek immediate medical care if:  · You have signs of infection, such as:  ¨ Increased pain, swelling, warmth, and redness. ¨ Red streaks leading from the joint. ¨ A fever. Watch closely for changes in your health, and be sure to contact your doctor if:  · Your movement or symptoms are not getting better after 1 to 2 weeks of home treatment. Where can you learn more? Go to http://brian-peyman.info/. Enter P205 in the search box to learn more about \"Joint Pain: Care Instructions. \"  Current as of: March 21, 2017  Content Version: 11.3  © 7065-2232 [employer]. Care instructions adapted under license by [employer] (which disclaims liability or warranty for this information). If you have questions about a medical condition or this instruction, always ask your healthcare professional. [place] [address] any warranty or liability for your use of this information. Head Injury: Care Instructions  Your Care Instructions  Most injuries to the head are minor. Bumps, cuts, and scrapes on the head and face usually heal well and can be treated the same as injuries to other parts of the body. Although it's rare, once in a while a more serious problem shows up after you are home. So it's good to be on the lookout for symptoms for a day or two. Follow-up care is a key part of your treatment and safety. Be sure to make and go to all appointments, and call your doctor if you are having problems. It's also a good idea to know your test results and keep a list of the medicines you take. How can you care for yourself at home? · Follow your doctor's instructions. He or she will tell you if you need someone to watch you closely for the next 24 hours or longer. · Take it easy for the next few days or more if you are not feeling well.   · Ask your doctor when it's okay for you to go back to activities like driving a car, riding a bike, or operating machinery. When should you call for help? Call 911 anytime you think you may need emergency care. For example, call if:  · You have a seizure. · You passed out (lost consciousness). · You are confused or can't stay awake. Call your doctor now or seek immediate medical care if:  · You have new or worse vomiting. · You feel less alert. · You have new weakness or numbness in any part of your body. Watch closely for changes in your health, and be sure to contact your doctor if:  · You do not get better as expected. · You have new symptoms, such as headaches, trouble concentrating, or changes in mood. Where can you learn more? Go to http://brian-peyman.info/. Enter L743 in the search box to learn more about \"Head Injury: Care Instructions. \"  Current as of: October 14, 2016  Content Version: 11.3  © 9700-6706 [employer]. Care instructions adapted under license by [employer] (which disclaims liability or warranty for this information). If you have questions about a medical condition or this instruction, always ask your healthcare professional. [address] any warranty or liability for your use of this information.

## 2017-09-18 ENCOUNTER — HOSPITAL ENCOUNTER (OUTPATIENT)
Dept: GENERAL RADIOLOGY | Age: 77
Discharge: HOME | End: 2017-09-18
Payer: MEDICARE

## 2017-09-18 DIAGNOSIS — Z11.1 SCREENING EXAMINATION FOR PULMONARY TUBERCULOSIS: ICD-10-CM

## 2017-09-18 PROCEDURE — 71020 XR CHEST PA LAT: CPT

## 2018-01-16 ENCOUNTER — OFFICE VISIT (OUTPATIENT)
Dept: PULMONOLOGY | Age: 78
End: 2018-01-16

## 2018-01-16 VITALS
HEIGHT: 61 IN | TEMPERATURE: 98.4 F | RESPIRATION RATE: 16 BRPM | HEART RATE: 66 BPM | WEIGHT: 151 LBS | BODY MASS INDEX: 28.51 KG/M2 | OXYGEN SATURATION: 97 % | DIASTOLIC BLOOD PRESSURE: 70 MMHG | SYSTOLIC BLOOD PRESSURE: 142 MMHG

## 2018-01-16 DIAGNOSIS — I27.21 PAH (PULMONARY ARTERIAL HYPERTENSION) WITH PORTAL HYPERTENSION (HCC): ICD-10-CM

## 2018-01-16 DIAGNOSIS — J84.10 PULMONARY FIBROSIS (HCC): ICD-10-CM

## 2018-01-16 DIAGNOSIS — J47.9 BRONCHIECTASIS WITHOUT COMPLICATION (HCC): Primary | ICD-10-CM

## 2018-01-16 DIAGNOSIS — K76.6 PAH (PULMONARY ARTERIAL HYPERTENSION) WITH PORTAL HYPERTENSION (HCC): ICD-10-CM

## 2018-01-16 RX ORDER — ALBUTEROL SULFATE 90 UG/1
2 AEROSOL, METERED RESPIRATORY (INHALATION)
Qty: 1 INHALER | Refills: 2 | Status: SHIPPED | OUTPATIENT
Start: 2018-01-16 | End: 2018-10-19 | Stop reason: SDUPTHER

## 2018-01-16 NOTE — PATIENT INSTRUCTIONS
Use your albuterol hand-held inhaler or nebulizer when you wheeze and  only use the albuterol every 4 hours as needed.   If you find yourself using the albuterol too often to control respiratory symptoms call the office for severe symptoms go to the emergency room    Always call for symptoms such as increasing shortness of breath or a cough productive of discolored mucus

## 2018-01-16 NOTE — PROGRESS NOTES
Ms. Veronica Espinoza has a reminder for a \"due or due soon\" health maintenance. I have asked that she contact her primary care provider for follow-up on this health maintenance. Chief Complaint   Patient presents with    Cough     1. Have you been to the ER, urgent care clinic since your last visit? Hospitalized since your last visit? No    2. Have you seen or consulted any other health care providers outside of the [address] [place] [address] since your last visit? Include any pap smears or colon screening.  No  PATIENT'S O2 SATS:   98% Room Air Rest, 61 Heart Rate                                          92% Room Air Activity, 85 Heart Rate - Patient Walked 550 Ft

## 2018-01-16 NOTE — MR AVS SNAPSHOT
[address] [facility], [address] [address] 
[phone] Patient: Carlos Wagner MRN: [id number] Visit Information Date & Time Provider Department Dept. Phone Encounter #  
 1/16/2018  3:15 PM Anna Beck MD [facility] Cherise Quinn [id number] Follow-up Instructions Return in about 3 months (around 4/16/2018). Follow-up and Disposition History Upcoming Health Maintenance Date Due DTaP/Tdap/Td series (1 - Tdap) 5/15/1962 ZOSTER VACCINE AGE 60> 3/15/2001 GLAUCOMA SCREENING Q2Y 5/15/2006 Pneumococcal 65+ Low/Medium Risk (1 of 2 - PCV13) 5/15/2006 MEDICARE YEARLY EXAM 5/15/2006 Influenza Age 5 to Adult 8/1/2017 Allergies as of 1/16/2018  Review Complete On: 1/16/2018 By: Kristina Sanchez LPN Severity Noted Reaction Type Reactions Latex  06/04/2014    Itching Asmanex Hfa [Mometasone]  07/31/2016    Itching Cleocin [Clindamycin Hcl]  06/04/2014    Itching, Swelling Lips and tongue Pcn [Penicillins]  06/04/2014    Swelling Robitussin [Guaifenesin]  07/31/2016    Rash Current Immunizations  Reviewed on 9/29/2016 No immunizations on file. Not reviewed this visit You Were Diagnosed With   
  
 Codes Comments Bron[facility]ctasis without complication [facility]    -  Primary ICD-10-CM: J47.9 ICD-9-CM: 494.0 Pulmonary fibrosis ([facility]     ICD-10-CM: J84.10 ICD-9-CM: 186 PAH (pulmonary arterial hypertension) with portal hypertension (HCC)     ICD-10-CM: I27.21, K76.6 ICD-9-CM: 416.8 Vitals BP Pulse Temp Resp Height(growth percentile) Weight(growth percentile) 142/70 (BP 1 Location: Left arm, BP Patient Position: Sitting) 66 98.4 °F (36.9 °C) (Oral) 16 5' 1\" (1.549 m) 151 lb (68.5 kg) SpO2 BMI OB Status Smoking Status 97% 28.53 kg/m2 Menopause Former Smoker BMI and BSA Data Body Mass Index Body Surface Area 28.53 kg/m 2 1.72 m 2 Preferred Pharmacy Pharmacy Name Phone [address], [address] [place] [address] [phone] Your Updated Medication List  
  
   
This list is accurate as of: 1/16/18  4:39 PM.  Always use your most recent med list.  
  
  
  
  
 albuterol-ipratropium 2.5 mg-0.5 mg/3 ml Nebu Commonly known as:  DUO-NEB  
3 mL by Nebulization route every four (4) hours as needed. ICD 10: I27.2, J84.10  
  
 amLODIPine 5 mg tablet Commonly known as:  Jennifer Earlanahy Take 1 Tab by mouth daily. Indications: HYPERTENSION  
  
 budesonide-formoterol 160-4.5 mcg/actuation Hfaa Commonly known as:  SYMBICORT Take 2 Puffs by inhalation two (2) times a day. Indications: BRONCHOSPASM PREVENTION WITH COPD, MAINTENANCE THERAPY FOR ASTHMA  
  
 chlorpheniramine-HYDROcodone 10-8 mg/5 mL suspension Commonly known as:  Stacy Devries Take 5 mL by mouth every twelve (12) hours as needed for Cough. Max Daily Amount: 10 mL.  
  
 furosemide 40 mg tablet Commonly known as:  LASIX Take 1 Tab by mouth daily. Indications: EDEMA HYDROcodone-acetaminophen 5-325 mg per tablet Commonly known as:  [facility] Take 1 Tab by mouth every six (6) hours as needed. Max Daily Amount: 4 Tabs. KLOR-CON M20 20 mEq tablet Generic drug:  potassium chloride Take 20 mEq by mouth two (2) times a day. omeprazole 20 mg capsule Commonly known as:  PRILOSEC Take 20 mg by mouth daily. Oxygen 2 Each by Nasal route. 2 L [employer] PLAQUENIL 200 mg tablet Generic drug:  hydroxychloroquine Take 200 mg by mouth daily. predniSONE 10 mg tablet Commonly known as:  Iris Addy Take 1 Tab by mouth daily (with lunch). traZODone 50 mg tablet Commonly known as:  Mariaelena Montoyainer Take 1 Tab by mouth nightly. zolpidem 10 mg tablet Commonly known as:  AMBIEN  
 Take 1 Tab by mouth nightly as needed for Sleep. Max Daily Amount: 10 mg. We Performed the Following AMB SUPPLY ORDER [[id number] Custom] Comments:  
 Overnight oxygen study on room air Follow-up Instructions Return in about 3 months (around 4/16/2018). To-Do List   
 Around 01/16/2018 Lab:  MIGUEL ANGEL COMPREHENSIVE PLUS PANEL Around 01/16/2018 Lab:  RHEUMATOID FACTOR, QL Around 01/17/2018 Lab:  SCLERODERMA (SCL-[id number]) AB, IGG Patient Instructions Use your albuterol hand-held inhaler or nebulizer when you wheeze and  only use the albuterol every 4 hours as needed. If you find yourself using the albuterol too often to control respiratory symptoms call the office for severe symptoms go to the emergency room Always call for symptoms such as increasing shortness of breath or a cough productive of discolored mucus Please provide this summary of care documentation to your next provider. Your primary care clinician is listed as Christ Becker. If you have any questions after today's visit, please call [phone].

## 2018-01-16 NOTE — PROGRESS NOTES
[facility]  Pulmonary, Critical Care, and Sleep Medicine      Chief complaint:  Bronchiectasis with pulmonary fibrosis and pulmonary hypertension    HPI:    Samara De La Cruz    is 69years old and returns the office today relating that she wheezes at times and takes Symbicort and this is maybe twice a month of 3 times a month and sometimes she uses her albuterol. She says she has a cough which is nonproductive and mild and denies any chest pain but becomes very short of breath when she walks a long distance such as through a airport      Allergies   Allergen Reactions    Latex Itching    Asmanex Hfa [Mometasone] Itching    Cleocin [Clindamycin Hcl] Itching and Swelling     Lips and tongue    Pcn [Penicillins] Swelling    Robitussin [Guaifenesin] Rash     Current Outpatient Prescriptions   Medication Sig    albuterol-ipratropium (DUO-NEB) 2.5 mg-0.5 mg/3 ml nebu 3 mL by Nebulization route every four (4) hours as needed. ICD 10: I27.2, J84.10    zolpidem (AMBIEN) 10 mg tablet Take 1 Tab by mouth nightly as needed for Sleep. Max Daily Amount: 10 mg.  predniSONE (DELTASONE) 10 mg tablet Take 1 Tab by mouth daily (with lunch).  amLODIPine (NORVASC) 5 mg tablet Take 1 Tab by mouth daily. Indications: HYPERTENSION    furosemide (LASIX) 40 mg tablet Take 1 Tab by mouth daily. Indications: EDEMA    hydroxychloroquine (PLAQUENIL) 200 mg tablet Take 200 mg by mouth daily.  potassium chloride (KLOR-CON M20) 20 mEq tablet Take 20 mEq by mouth two (2) times a day.  Oxygen 2 Each by Nasal route. 2 L [employer]    HYDROcodone-acetaminophen (NORCO) 5-325 mg per tablet Take 1 Tab by mouth every six (6) hours as needed. Max Daily Amount: 4 Tabs.  budesonide-formoterol (SYMBICORT) 160-4.5 mcg/actuation HFA inhaler Take 2 Puffs by inhalation two (2) times a day.  Indications: BRONCHOSPASM PREVENTION WITH COPD, MAINTENANCE THERAPY FOR ASTHMA    chlorpheniramine-HYDROcodone (TUSSIONEX) 10-8 mg/5 mL suspension Take 5 mL by mouth every twelve (12) hours as needed for Cough. Max Daily Amount: 10 mL.  traZODone (DESYREL) 50 mg tablet Take 1 Tab by mouth nightly.  omeprazole (PRILOSEC) 20 mg capsule Take 20 mg by mouth daily. No current facility-administered medications for this visit. Past Medical History:   Diagnosis Date    Asthma     Chronic lung disease     Hypertension     Lupus     Pulmonary hypertension     Rheumatoid arthritis(714.0)      History reviewed. No pertinent surgical history. Social History     Social History    Marital status: UNKNOWN     Spouse name: N/A    Number of children: N/A    Years of education: N/A     Occupational History    Not on file.      Social History Main Topics    Smoking status: Former Smoker     Packs/day: 0.20     Years: 16.00     Types: Cigarettes     Quit date: 9/1/1974    Smokeless tobacco: Never Used    Alcohol use No    Drug use: Not on file    Sexual activity: Not on file     Other Topics Concern    Not on file     Social History Narrative     Family History   Problem Relation Age of Onset    Heart Disease Mother     Asthma Mother     Asthma Father     Parkinson's Disease Father        Review of systems:  She denies fever chills poor appetite or weight loss    Physical Exam:  Visit Vitals    /70 (BP 1 Location: Left arm, BP Patient Position: Sitting)    Pulse 66    Temp 98.4 °F (36.9 °C) (Oral)    Resp 16    Ht 5' 1\" (1.549 m)    Wt 68.5 kg (151 lb)    SpO2 97%  Comment: RA Rest    BMI 28.53 kg/m2       Well-developed and elderly  HEENT: WNL  Lymph node exam: Supraclavicular cervical lymph nodes negative  Chest: Equal symmetrical expansion no dullness no wheezes rales rubs  Heart: Regular rhythm no gallop no murmur no JVD no peripheral edema  Extremities: No cyanosis clubbing or calf tenderness  Neurological: Alert and oriented    Labs:    O2 sat room air at rest 97% and with walking approximately 600 feet O2 sat was 93%    Impression:     Bronchiectasis by history physical exam stable  Pulmonary fibrosis the etiology is not clear but possibly related to her bronchiectasis. Other possibilities could be related to connective tissue disease  Pulmonary hypertension the cause is not clear the patient has not yet obtained her overnight oximetry  Plan:     MIGUEL ANGEL titer rheumatoid factor and overnight oximetry with follow-up in 3 months or sooner if needed and will contact the patient concerning studies which were ordered if needed    Connor Don MD , [facility]    CC: Oli Man MD     [address] [place] [address]. [facility] [address]  [place], [address] [address]     P: [phone]     F: [phone]

## 2018-01-17 ENCOUNTER — HOSPITAL ENCOUNTER (OUTPATIENT)
Dept: LAB | Age: 78
Discharge: HOME | End: 2018-01-17
Payer: MEDICARE

## 2018-01-17 LAB — RHEUMATOID FACT SER QL LA: NEGATIVE

## 2018-01-17 PROCEDURE — 86225 DNA ANTIBODY NATIVE: CPT | Performed by: INTERNAL MEDICINE

## 2018-01-17 PROCEDURE — 36415 COLL VENOUS BLD VENIPUNCTURE: CPT | Performed by: INTERNAL MEDICINE

## 2018-01-17 PROCEDURE — 86235 NUCLEAR ANTIGEN ANTIBODY: CPT | Performed by: INTERNAL MEDICINE

## 2018-01-17 PROCEDURE — 86430 RHEUMATOID FACTOR TEST QUAL: CPT | Performed by: INTERNAL MEDICINE

## 2018-01-18 LAB
CENTROMERE B AB SER-ACNC: <0.2 AI (ref 0–0.9)
CHROMATIN AB SERPL-ACNC: 0.5 AI (ref 0–0.9)
DSDNA AB SER-ACNC: <1 IU/ML (ref 0–9)
ENA JO1 AB SER-ACNC: <0.2 AI (ref 0–0.9)
ENA RNP AB SER-ACNC: <0.2 AI (ref 0–0.9)
ENA SCL70 AB SER-ACNC: <0.2 AI (ref 0–0.9)
ENA SCL70 AB SER-ACNC: <0.2 AI (ref 0–0.9)
ENA SM AB SER-ACNC: <0.2 AI (ref 0–0.9)
ENA SM+RNP AB SER-ACNC: <0.2 AI (ref 0–0.9)
ENA SS-A AB SER-ACNC: <0.2 AI (ref 0–0.9)
ENA SS-B AB SER-ACNC: <0.2 AI (ref 0–0.9)
RIBOSOMAL P AB SER-ACNC: <0.2 AI (ref 0–0.9)
SEE BELOW:, 164879: NORMAL

## 2018-01-26 ENCOUNTER — TELEPHONE (OUTPATIENT)
Dept: PULMONOLOGY | Age: 78
End: 2018-01-26

## 2018-01-26 NOTE — TELEPHONE ENCOUNTER
[address] reports the pt. was unable to do the Overnight Ox. 1/24/18 due to not being able to sleep and frequently using the bathroom. I called [address] [place] [address] of [facility] the day nurse tells me that the pt. Receives sleeping meds and pain meds at night and she isn't quite sure why the Overnight didn't have some results. I told Alanis Chavez that we would need to make another attempt at getting it done. Alanis Chavez believes [employer] [facility] was the one who brought them the oximeter and picked it up. I called [employer] and spoke with Theron Painter. She states the Oximetry was indeed done by them and she will upload it today,  when it is returned to them via their Rep. If, not enough data, yes, they will repeat the Overnight Ox. Anahi Redd

## 2018-02-21 ENCOUNTER — TELEPHONE (OUTPATIENT)
Dept: PULMONOLOGY | Age: 78
End: 2018-02-21

## 2018-02-21 NOTE — TELEPHONE ENCOUNTER
PT CALLED([phone]). PT STATES THAT [facility] TOLD HER TO CALL [facility] TO SEND 2ND ORDER FOR O2. PLEASE CHECK AND CALL.

## 2018-02-21 NOTE — TELEPHONE ENCOUNTER
With review of overnight, Dr. Kj Hammer documented normal study and was not ordering oxygen at this time. Unable to reach pt.  Mail box full

## 2018-02-26 ENCOUNTER — OFFICE VISIT (OUTPATIENT)
Dept: PULMONOLOGY | Age: 78
End: 2018-02-26

## 2018-02-26 VITALS
SYSTOLIC BLOOD PRESSURE: 150 MMHG | BODY MASS INDEX: 29.83 KG/M2 | RESPIRATION RATE: 16 BRPM | DIASTOLIC BLOOD PRESSURE: 86 MMHG | WEIGHT: 158 LBS | TEMPERATURE: 99.1 F | HEIGHT: 61 IN | HEART RATE: 68 BPM | OXYGEN SATURATION: 96 %

## 2018-02-26 DIAGNOSIS — J20.9 ACUTE BRONCHITIS, UNSPECIFIED ORGANISM: Primary | ICD-10-CM

## 2018-02-26 DIAGNOSIS — J44.1 COPD EXACERBATION (HCC): ICD-10-CM

## 2018-02-26 RX ORDER — AZITHROMYCIN 250 MG/1
TABLET, FILM COATED ORAL
Qty: 6 TAB | Refills: 0 | Status: SHIPPED | OUTPATIENT
Start: 2018-02-26 | End: 2018-03-03

## 2018-02-26 RX ORDER — PREDNISONE 20 MG/1
TABLET ORAL
Qty: 10 TAB | Refills: 0 | Status: SHIPPED | OUTPATIENT
Start: 2018-02-26 | End: 2018-04-10 | Stop reason: SDUPTHER

## 2018-02-26 NOTE — PROGRESS NOTES
Chief Complaint   Patient presents with    Generalized Body Aches     patient wheezing producing green mucous  when blowing nose and thick light brown sputum when coughing     Patient here for sick visit c/o of producing a green mucous when blowing nose and a brownish sputum when coughing lasting from 2 weeks ago.

## 2018-02-26 NOTE — PATIENT INSTRUCTIONS
Zithromax 2 tablets the first day then 1 tablet for 4 more days    Prednisone 2 tablets now then 2 tablets every morning with breakfast    Call if your symptoms do not resolve

## 2018-02-26 NOTE — PROGRESS NOTES
[facility]  Pulmonary, Critical Care, and Sleep Medicine      Chief complaint:  Bronchiectasis pulmonary fibrosis COPD    HPI:    Winston Alpers    is 69years old and returns the office today for follow-up for an unscheduled visit because of the recent onset of a cough productive of brownish mucus at times and some nasal drainage which is discolored without facial pain. The patient denies any increasing shortness of breath or chest pain or leg swelling      Allergies   Allergen Reactions    Latex Itching    Asmanex Hfa [Mometasone] Itching    Cleocin [Clindamycin Hcl] Itching and Swelling     Lips and tongue    Pcn [Penicillins] Swelling    Robitussin [Guaifenesin] Rash     Current Outpatient Prescriptions   Medication Sig    ferrous sulfate ER (IRON) 160 mg (50 mg iron) TbER tablet Take 1 Tab by mouth daily. Indications: patient unsure of the mg of the medication    Cetirizine (ZYRTEC) 10 mg cap Take  by mouth.  albuterol (PROVENTIL HFA, VENTOLIN HFA, PROAIR HFA) 90 mcg/actuation inhaler Take 2 Puffs by inhalation every four (4) hours as needed for Wheezing.  albuterol-ipratropium (DUO-NEB) 2.5 mg-0.5 mg/3 ml nebu 3 mL by Nebulization route every four (4) hours as needed. ICD 10: I27.2, J84.10    HYDROcodone-acetaminophen (NORCO) 5-325 mg per tablet Take 1 Tab by mouth every six (6) hours as needed. Max Daily Amount: 4 Tabs.  zolpidem (AMBIEN) 10 mg tablet Take 1 Tab by mouth nightly as needed for Sleep. Max Daily Amount: 10 mg.    budesonide-formoterol (SYMBICORT) 160-4.5 mcg/actuation HFA inhaler Take 2 Puffs by inhalation two (2) times a day. Indications: BRONCHOSPASM PREVENTION WITH COPD, MAINTENANCE THERAPY FOR ASTHMA    predniSONE (DELTASONE) 10 mg tablet Take 1 Tab by mouth daily (with lunch). (Patient taking differently: Take 5 mg by mouth daily (with lunch). )    furosemide (LASIX) 40 mg tablet Take 1 Tab by mouth daily.  Indications: EDEMA    omeprazole (PRILOSEC) 20 mg capsule Take 20 mg by mouth daily.  hydroxychloroquine (PLAQUENIL) 200 mg tablet Take 200 mg by mouth daily.  potassium chloride (KLOR-CON M20) 20 mEq tablet Take 20 mEq by mouth two (2) times a day.  Oxygen 2 Each by Nasal route. 2 L [employer]    chlorpheniramine-HYDROcodone (TUSSIONEX) 10-8 mg/5 mL suspension Take 5 mL by mouth every twelve (12) hours as needed for Cough. Max Daily Amount: 10 mL.  traZODone (DESYREL) 50 mg tablet Take 1 Tab by mouth nightly.  amLODIPine (NORVASC) 5 mg tablet Take 1 Tab by mouth daily. Indications: HYPERTENSION     No current facility-administered medications for this visit. Past Medical History:   Diagnosis Date    Asthma     Chronic lung disease     Hypertension     Lupus     Pulmonary hypertension     Rheumatoid arthritis(714.0)      History reviewed. No pertinent surgical history. Social History     Social History    Marital status: UNKNOWN     Spouse name: N/A    Number of children: N/A    Years of education: N/A     Occupational History    Not on file.      Social History Main Topics    Smoking status: Former Smoker     Packs/day: 0.20     Years: 16.00     Types: Cigarettes     Quit date: 9/1/1974    Smokeless tobacco: Never Used    Alcohol use No    Drug use: Not on file    Sexual activity: Not on file     Other Topics Concern    Not on file     Social History Narrative     Family History   Problem Relation Age of Onset    Heart Disease Mother     Asthma Mother     Asthma Father     Parkinson's Disease Father        Review of systems:  She denies fever chills poor appetite or weight loss    Physical Exam:  Visit Vitals    /86 (BP 1 Location: Left arm, BP Patient Position: Sitting)    Pulse 68    Temp 99.1 °F (37.3 °C) (Oral)    Resp 16    Ht 5' 1\" (1.549 m)    Wt 71.7 kg (158 lb)    SpO2 96%  Comment: [email or web address]    BMI 29.85 kg/m2       Well-developed elderly  HEENT: WNL  Lymph node exam: Supraclavicular cervical lymph nodes negative  Chest: Equal symmetrical expansion no dullness no  rales rubs scattered expiratory wheezes  Heart: Regular rhythm no gallop no murmur no JVD no peripheral edema  Extremities: No cyanosis clubbing or calf tenderness  Neurological: Alert and oriented    Labs:    O2 sat room air at rest 96%    Impression:     An acute exacerbation of bronchiectasis with wheezing    Plan:     Short course of prednisone 40 mg a day for 5 days and empiric treatment with Zithromax  The patient will call if symptoms persist otherwise follow-up usual appointment    Marti Cole MD , [facility]    CC: Tonya Ambrose MD     [address] [facility]. [address]  [place], [address] [address]     P: [phone]     F: [phone]

## 2018-02-27 ENCOUNTER — TELEPHONE (OUTPATIENT)
Dept: PULMONOLOGY | Age: 78
End: 2018-02-27

## 2018-02-27 NOTE — TELEPHONE ENCOUNTER
Paged by Maria Isabel Novoa from [facility] to inform to inform me of medication error. Pt was mistakenly given an extra dose of Prednisone 40 mg at 1700 after receiving dose of Prednisone 40 mg. No apparent adverse reactions noted by staff. Advised giving next dose of Prednisone 24 hrs after latest dose and continue this schedule to completion.

## 2018-02-27 NOTE — TELEPHONE ENCOUNTER
[facility] called because pt was seen yesterday and was given rx for zithromax and prednisone. They said that since she is in their care and that they give her her medications that they need orders for their records faxed to [phone].  Call [phone] and speak with [facility]

## 2018-03-01 RX ORDER — IPRATROPIUM BROMIDE AND ALBUTEROL SULFATE 2.5; .5 MG/3ML; MG/3ML
3 SOLUTION RESPIRATORY (INHALATION)
Qty: 50 NEBULE | Refills: 1 | Status: SHIPPED | OUTPATIENT
Start: 2018-03-01 | End: 2019-09-11 | Stop reason: ALTCHOICE

## 2018-04-10 ENCOUNTER — OFFICE VISIT (OUTPATIENT)
Dept: PULMONOLOGY | Age: 78
End: 2018-04-10

## 2018-04-10 VITALS
WEIGHT: 156 LBS | OXYGEN SATURATION: 96 % | TEMPERATURE: 98.6 F | HEIGHT: 61 IN | SYSTOLIC BLOOD PRESSURE: 140 MMHG | HEART RATE: 78 BPM | BODY MASS INDEX: 29.45 KG/M2 | RESPIRATION RATE: 17 BRPM | DIASTOLIC BLOOD PRESSURE: 80 MMHG

## 2018-04-10 DIAGNOSIS — J20.8 ACUTE BRONCHITIS DUE TO OTHER SPECIFIED ORGANISMS: ICD-10-CM

## 2018-04-10 DIAGNOSIS — J44.1 COPD EXACERBATION (HCC): Primary | ICD-10-CM

## 2018-04-10 DIAGNOSIS — J20.9 ACUTE BRONCHITIS, UNSPECIFIED ORGANISM: ICD-10-CM

## 2018-04-10 DIAGNOSIS — I27.20 PULMONARY HTN (HCC): Chronic | ICD-10-CM

## 2018-04-10 RX ORDER — AZITHROMYCIN 250 MG/1
TABLET, FILM COATED ORAL
Qty: 6 TAB | Refills: 0 | Status: SHIPPED | OUTPATIENT
Start: 2018-04-10 | End: 2018-06-13

## 2018-04-10 RX ORDER — PREDNISONE 20 MG/1
TABLET ORAL
Qty: 10 TAB | Refills: 0 | Status: SHIPPED | OUTPATIENT
Start: 2018-04-10 | End: 2018-05-10 | Stop reason: SDUPTHER

## 2018-04-10 RX ORDER — IPRATROPIUM BROMIDE AND ALBUTEROL SULFATE 2.5; .5 MG/3ML; MG/3ML
3 SOLUTION RESPIRATORY (INHALATION)
Qty: 1 NEBULE | Refills: 0 | Status: SHIPPED | COMMUNITY
Start: 2018-04-10 | End: 2018-04-10

## 2018-04-10 RX ORDER — MONTELUKAST SODIUM 10 MG/1
10 TABLET ORAL DAILY
Qty: 30 TAB | Refills: 3 | Status: SHIPPED | OUTPATIENT
Start: 2018-04-10 | End: 2021-09-09

## 2018-04-10 NOTE — PATIENT INSTRUCTIONS
Instructions:    Continue Symbicort 160/4.5 mcg 2 puffs by inhalation twice daily and remember to exhale fully before inhaling and also remember to wash mouth with water and spit it out after inhaling    Continue Albuterol MDI 2 puffs by  inhalations every 4 hours as needed or Duo neb by nebulizer every 4 hours as needed and if you require too often to control respiratory symptoms call the office for severe symptoms go to the emergency room    Always call for symptoms such as increasing shortness of breath or a cough productive of discolored mucus,    Mucinex OTC if unable to bring mucus(chest rattling like the symphony, start 600 mg twice daily X 7 day then can increase 1200 mg twice daily. Saline flush twice daily, use Nasacort per pt use (already have)    Take Singulair 10 mg daily. Continue Zyrtec and Benadryl alternating, if causing drowsiness no driving. Z Pack with food for 5 days    Prednisone 20 mg 2 tablets daily with breakfast for 5 days then go back to Prednisone maintenance dose for RA.     Airway clearance.

## 2018-04-10 NOTE — PROGRESS NOTES
[facility]  Pulmonary, Critical Care, and Sleep Medicine      Pulmonary Office Progress Notes    Name: Nubia Chester     :      Date: 4/10/2018        Subjective:     Patient is a 69 y.o. female is here for sick visit. Last seen in the office by Dr Beal Many     Interval history:  Pt presented ambulatory on room air. She report productive cough with greenish colored mucus started a wk ago-start using Duo nebl via neb 3X/day for 3 days-report cough is now dry-still persistent 2 days ago. She report intermittent wheezing and chronic LAND such as walking to the kitchen \" I'm panting,\" it gets worse when the weather is cloudy/wet/rainy but once the sun is up my chest chest seems to clear. \"  She report my chest,\" will have loud rattling sounds like a symphony orchestra at times. \"  She denies nocturnal symptoms waking up such SOB or wheezing. She report intermittent choking when \" I'm in a hurry, the food/drink get stuck in my throat. \" I was seen by speech and had barium swallow when hosp. At [facility] but told everything was ok. \"    She report compliant with Symbicort 160/4.5 mcg 2 puffs by inhalation BID-rinse/wash mouth after use. Pt report use to be on Singulair-not sure why she stopped? Run out of refills. She takes Zyrtec alternating with Benadryl PRN. She report also on Nasacort nasal spray PRN      Immunization status:   Influenza vaccine: yearly, reported by patient  Pneumococcal vaccine: had Prevnar 13 (2017)-got sick hosp.  after the vaccine, reported by patient     PPD: negative, reported by patient  TB personal history: no  TB exposure: no    Smoking hx: Not currently, smoked when younger X 5 yrs, stop smoking   Occupation: Retired, employed as employment councillors  Exposure: Asbestos/berillium/mercury/silica/concrete: no                    Molds, Hx of old carpets: no   Travel history: no  Sick contact: no  Lives with:   Pets: None     Seasonal Allergy: Cold/rainy and wet  Animal Allergy: no     Past Medical History:   Diagnosis Date    Asthma     Chronic lung disease     Hypertension     Lupus     Pulmonary hypertension ([facility] [address]     Rheumatoid arthritis(714.0)      Social History     Social History    Marital status: UNKNOWN     Spouse name: N/A    Number of children: N/A    Years of education: N/A     Occupational History    Not on file. Social History Main Topics    Smoking status: Former Smoker     Packs/day: 0.20     Years: 16.00     Types: Cigarettes     Quit date: 9/1/1974    Smokeless tobacco: Never Used    Alcohol use No    Drug use: Not on file    Sexual activity: Not on file     Other Topics Concern    Not on file     Social History Narrative      No past surgical history on file. Allergies   Allergen Reactions    Latex Itching    Asmanex Hfa [Mometasone] Itching    Cleocin [Clindamycin Hcl] Itching and Swelling     Lips and tongue    Pcn [Penicillins] Swelling    Robitussin [Guaifenesin] Rash       Current Outpatient Prescriptions   Medication Sig Dispense Refill    albuterol-ipratropium (DUO-NEB) 2.5 mg-0.5 mg/3 ml nebu 3 mL by Nebulization route every four (4) hours as needed. ICD 10: I27.2, J84.10 50 Nebule 1    ferrous sulfate ER (IRON) 160 mg (50 mg iron) TbER tablet Take 1 Tab by mouth daily. Indications: patient unsure of the mg of the medication      albuterol (PROVENTIL HFA, VENTOLIN HFA, PROAIR HFA) 90 mcg/actuation inhaler Take 2 Puffs by inhalation every four (4) hours as needed for Wheezing. 1 Inhaler 2    zolpidem (AMBIEN) 10 mg tablet Take 1 Tab by mouth nightly as needed for Sleep. Max Daily Amount: 10 mg. 30 Tab 0    budesonide-formoterol (SYMBICORT) 160-4.5 mcg/actuation HFA inhaler Take 2 Puffs by inhalation two (2) times a day. Indications: BRONCHOSPASM PREVENTION WITH COPD, MAINTENANCE THERAPY FOR ASTHMA 1 Inhaler 0    predniSONE (DELTASONE) 10 mg tablet Take 1 Tab by mouth daily (with lunch). (Patient taking differently: Take 5 mg by mouth daily (with lunch). ) 30 Tab 0    traZODone (DESYREL) 50 mg tablet Take 1 Tab by mouth nightly. 30 Tab 0    amLODIPine (NORVASC) 5 mg tablet Take 1 Tab by mouth daily. Indications: HYPERTENSION 30 Tab 1    furosemide (LASIX) 40 mg tablet Take 1 Tab by mouth daily. Indications: EDEMA 30 Tab 1    omeprazole (PRILOSEC) 20 mg capsule Take 20 mg by mouth daily.  hydroxychloroquine (PLAQUENIL) 200 mg tablet Take 200 mg by mouth daily.  potassium chloride (KLOR-CON M20) 20 mEq tablet Take 20 mEq by mouth two (2) times a day.  Cetirizine (ZYRTEC) 10 mg cap Take  by mouth.  predniSONE (DELTASONE) 20 mg tablet 2 tablets now then 2 tablets every morning with breakfast 10 Tab 0    Oxygen 2 Each by Nasal route. 2 L [employer]      HYDROcodone-acetaminophen (NORCO) 5-325 mg per tablet Take 1 Tab by mouth every six (6) hours as needed. Max Daily Amount: 4 Tabs. 60 Tab 0    chlorpheniramine-HYDROcodone (TUSSIONEX) 10-8 mg/5 mL suspension Take 5 mL by mouth every twelve (12) hours as needed for Cough. Max Daily Amount: 10 mL.  115 mL 0     Patient Active Problem List   Diagnosis Code    Cirrhosis of liver ([facility] [address] K74.60    Systemic lupus erythematosus ([facility] [address] M32.9    [facility] M06.9    HTN (hypertension) I10    Dyslipidemia E78.5    COPD exacerbation (HCC) J44.1    Pulmonary HTN (HCC) I27.20    Acute bronchitis J20.9        Review of Systems:  Constitutional: No fever, no chills, no weight loss, no night sweats   HEENT: No epistaxis, no nasal drainage, chronic intermittent difficulty in swallowing, no redness in eyes  Respiratory: as above  Cardiovascular: no chest pain, no palpitations,  chronic bilateral ankle edema, no syncope  Gastrointestinal: no abd pain, no vomiting, no diarrhea, no bleeding symptoms  Genitourinary: No urinary symptoms or hematuria  Integument/breast: No ulcers or rashes  Musculoskeletal:Neg  Neurological: No focal weakness, no seizures, no headaches  Behvioral/Psych: No anxiety, no depression       Objective:     Visit Vitals    /80 (BP 1 Location: Left arm, BP Patient Position: Sitting)    Pulse 78    Temp 98.6 °F (37 °C) (Oral)    Resp 17    Ht 5' 1\" (1.549 m)    Wt 70.8 kg (156 lb)    SpO2 96%    BMI 29.48 kg/m2     Physical Exam:  General/Neurology:  Alert to name, place and time. NAD. Head:  Normocephalic, without obvious abnormality, atraumatic. Eye:  No scleral icterus, no pallor, no cyanosis  Nose:  No sinus tenderness, no erythema, no induration, no discharge  Throat:   No tonsillar enlargement, no erythema, no exudates. No oral thrush. Neck:  Supple, symmetric. No JVD. Thyroid: no enlargement/tenderness/nodule. No lymphadenopathy. Trachea midline  Lung:  Bilateral wheezing, BS decrease. No prolonged expiration. No accessory muscle use. Heart:   Regular rate & rhythm. S1 S2 present. No murmur. Abdomen/: Soft, NT, ND, +BS, no masses, no organomegaly  Extremities:  Bilateral ankle edema R>L. No cyanosis. No clubbing  Pulses:  2+ and symmetric in DP  Lymphatic:  No cervical, supraclavicular palpable lymphadenopathy.                   Musculoskeletal: Chronic joint pain due to RA/arthritis  Skin:  Color, texture, turgor normal. No rashes or lesions    Data review:       Hospital Outpatient Visit on 01/17/2018   Component Date Value Ref Range Status    Anti-DNA (DS) Ab, QT 01/17/2018 <1  0 - 9 IU/mL Final    Comment: (NOTE)                                    Negative      <5                                    Equivocal  5 - 9                                    Positive      >9      RNP Abs 01/17/2018 <0.2  0.0 - 0.9 AI Final    Smith Abs 01/17/2018 <0.2  0.0 - 0.9 AI Final    Vuong/RNP Abs 01/17/2018 <0.2  0.0 - 0.9 AI Final    Scleroderma-70 Ab 01/17/2018 <0.2  0.0 - 0.9 AI Final    Sjogren's Anti-SS-A 01/17/2018 <0.2  0.0 - 0.9 AI Final  Sjogren's Anti-SS-B 2018 <0.2  0.0 - 0.9 AI Final    Antichromatin Ab 2018 0.5  0.0 - 0.9 AI Final    Anti Ribosomal P Ab 2018 <0.2  0.0 - 0.9 AI Final    Anti-Rossi-1 2018 <0.2  0.0 - 0.9 AI Final    Centromere B Ab 2018 <0.2  0.0 - 0.9 AI Final    See below 2018 Comment    Final    Comment: (NOTE)  Autoantibody                       Disease Association  ____________________________________________________________                         Condition                  Frequency  _____________________   ________________________   _________  Antinuclear Antibody,    SLE, mixed connective  Direct (MIGUEL ANGEL-D)           tissue diseases  _____________________   ________________________   _________  dsDNA                    SLE                        40 - 60%  _____________________   ________________________   _________  Chromatin                Drug induced SLE                90%                          SLE                        48 - 97%  _____________________   ________________________   _________  Mary Ann Austin (Ro)                 SLE                        25 - 35%                          Sjogren's Syndrome         40 - 70%                           Lupus                 100%  _____________________   ________________________   _________  SSB (La)                 SLE                                                        10%                          Sjogren's Syndrome              30%  _____________________   _______________________    _________  Sm (anti-Smith)          SLE                        15 - 30%  _____________________   _______________________    _________  RNP                      Mixed Connective Tissue                          Disease                         95%  (U1 nRNP,                SLE                        30 - 50%  anti-ribonucleoprotein)  Polymyositis and/or                          Dermatomyositis                 20%  _____________________ ________________________   _________  Scl-70 (antiDNA          Scleroderma (diffuse)      20 - 35%  topoisomerase)           Crest                           13%  _____________________   ________________________   _________  Rossi-1                     Polymyositis and/or                          Dermatomyositis            20 - 40%  _____________________   ________________________   _________  Joann Avi B             Scleroderma -                            Crest                          variant                         80%  _____________________   ________________________   _________  Ribosomal P              SLE                        10 - 20%  Performed At: [address] [id number]  Onofre Arzate MD DQ:[id number]      Rheumatoid factor, QL 01/17/2018 NEGATIVE   NEG   Final    Scleroderma-70 Ab 01/17/2018 <0.2  0.0 - 0.9 AI Final    Comment: (NOTE)  Performed At: [address] [id number]  Onofre Arzate MD FO:[id number]       Imaging:  I have personally reviewed the patients radiographs and have reviewed the reports:       Results from Hospital Encounter encounter on 09/18/17   XR CHEST PA LAT   Narrative TWO VIEW CHEST RADIOGRAPH     CPT CODE: 39837    INDICATION: Nursing home clearance. COMPARISON: 5/6/2017    FINDINGS:     The trachea is midline. The cardiomediastinal silhouette is within normal  limits. Thoracic aorta is tortuous with calcification. The pulmonary vascular  markings are unremarkable. Lungs are hyperexpanded with persistent coarsening of  interstitial lung markings, unchanged. There is no focal consolidation, pleural  effusion or pneumothorax. Scoliotic curvature of the spine again noted. Degenerative changes present in the spine. No acute osseous abnormality. Impression IMPRESSION:      1. No acute cardiopulmonary process.     2. Persistent coarse interstitial lung markings, suggestive of interstitial lung  disease. No confluent consolidation. Results from [place] encounter on 09/16/17   CT SPINE CERV WO CONT   Narrative EXAM:  CT SPINE CERV WO CONT    INDICATION:   fall neck pain    COMPARISON: None. TECHNIQUE: Unenhanced multislice helical CT of the cervical spine was performed  in the axial plane and sagittal and coronal reformations were generated. CT  dose reduction was achieved through use of a standardized protocol tailored for  this examination and automatic exposure control for dose modulation. FINDINGS:    The alignment of the cervical spine is normal.     Vertebral body heights are maintained. Multilevel endplate osteophytosis and  disc height loss most significant at C4/5 and C5/6 with associated high-grade  central canal stenosis at these levels. There is also multilevel high-grade  neural foraminal stenosis due to uncovertebral spurring C3-C7. There is no fracture or subluxation. The prevertebral soft tissues are normal.    The odontoid process is intact. Visualized upper lungs demonstrate bilateral lateral traction bronchiectasis and  fibrotic changes. Upper mediastinum demonstrate aortic atherosclerosis. Impression IMPRESSION:     No evidence of acute cervical spine fracture or subluxation. Multilevel advanced degenerative disc disease. High-grade central canal stenosis  C4/5 and C5/6. Multilevel high-grade neural foraminal stenosis C3-C7.               Results for orders placed or performed during the hospital encounter of 09/16/17   EKG, 12 LEAD, INITIAL   Result Value Ref Range    Ventricular Rate 68 BPM    Atrial Rate 68 BPM    P-R Interval 138 ms    QRS Duration 134 ms    Q-T Interval 436 ms    QTC Calculation (Bezet) 463 ms    Calculated P Axis 58 degrees    Calculated R Axis -38 degrees    Calculated T Axis -19 degrees    Diagnosis       Normal sinus rhythm  Left axis deviation  Right bundle branch block  Minimal voltage criteria for LVH, may be normal variant  Abnormal ECG  When compared with ECG of 06-MAY-2017 11:33,  No significant change was found  Confirmed by Ian Belcher MD, Angel Becerril ([id number]) on 9/17/2017 1:18:31 PM           No results found for this or any previous visit. Lab Results   Component Value Date/Time    D DIMER 0.50 (H) 07/31/2016 11:53 AM          Ms. Tyron Nguyen has a reminder for a \"due or due soon\" health maintenance. I have asked that she contact her primary care provider for follow-up on this health maintenance. IMPRESSION:   · Bronchiectasis exacerbation   · Seasonal allergy (cold/wet/rainy) likely trigger  · Pt report with Lupus and RA-she see OP Rheumatologists. · Intermittent chronic choking when eating/drinking too fast- possible trigger  · ? Reflux on PPI PRN-possible trigger      RECOMMENDATIONS:   · Duo neb X 1 given, BS improve, pt report feel better  · Z pack empiric for acute bronchitis, med's action and adverse reaction instructed. · Prednisone 40 mg X 5 days, with breakfast x 5 days, instructed s/s of hyperglycemia, for s/s call PCP for monitoring and mgt. Then go back to Prednisone maintenance dose for RA. · Start Singulair 10 mg every day, scrip send  · Continue Symbicort 160/4.5 mcg 2 puffs by inhalation BID, proper use and inhaler technique reviewed, rinse/wash mouth thoroughly after each use. · Continue Albuterol MDI or Duo neb by nebulization every 4 hours for 2-3 days for wheezing, then as needed. If unable to control respiratory symptoms go to the emergency room or call 911. · Discussed avoidance of precipitants/cold weather-wear protective clothing when going out. · Annual preventive vaccination discussed-per pt request will defer to PCP- ? PCV 24 had Prevnar 13 (2017)  · Pulmonary toilette, encouraged deep breathing, judicious airway clearance-if unable to bring mucus up-may use OTC Mucinex-instructed. .  · Instructed chin tuck maneuver and take time/avoid rushing when eating and drinking, if persistent plan to refer speech to evaluate. · Instructed to obtain wedge or 4 inch block block to placed underneath the headboard, keep HOB elevated, avoid eating/drinking at least 2 hrs prior bedtime. · Reviewed all medications and discussed concerns, answered questions. · Follow-up as scheduled  with Dr Harish Ferrer or come back sooner should new symptoms or problems arise.        Carroll Villanueva, NP

## 2018-04-10 NOTE — MR AVS SNAPSHOT
[address] [facility], [address] [address] 
[phone] Patient: Jaci Toledo MRN: [id number] XVY:[birth year] Visit Information Date & Time Provider Department Dept. Phone Encounter #  
 4/10/2018  2:30 PM [facility] [place] [phone] Upcoming Health Maintenance Date Due DTaP/Tdap/Td series (1 - Tdap) 5/15/1962 ZOSTER VACCINE AGE 60> 3/15/2001 GLAUCOMA SCREENING Q2Y 5/15/2006 Pneumococcal 65+ Low/Medium Risk (1 of 2 - PCV13) 5/15/2006 Influenza Age 5 to Adult 8/1/2017 MEDICARE YEARLY EXAM 3/14/2018 Allergies as of 4/10/2018  Review Complete On: 4/10/2018 By: Henrik Doll NP Severity Noted Reaction Type Reactions Latex  06/04/2014    Itching Asmanex Hfa [Mometasone]  07/31/2016    Itching Cleocin [Clindamycin Hcl]  06/04/2014    Itching, Swelling Lips and tongue Pcn [Penicillins]  06/04/2014    Swelling Robitussin [Guaifenesin]  07/31/2016    Rash Current Immunizations  Reviewed on 9/29/2016 No immunizations on file. Not reviewed this visit You Were Diagnosed With   
  
 Codes Comments COPD exacerbation ([facility]    -  Primary ICD-10-CM: J44.1 ICD-9-CM: 491.21 Acute bronchitis due to other specified organisms     ICD-10-CM: J20.8 ICD-9-CM: 466.0 Pulmonary HTN ([facility]     ICD-10-CM: I27.20 ICD-9-CM: 416.8 Vitals BP Pulse Temp Resp Height(growth percentile) Weight(growth percentile) 140/80 (BP 1 Location: Left arm, BP Patient Position: Sitting) 78 98.6 °F (37 °C) (Oral) 17 5' 1\" (1.549 m) 156 lb (70.8 kg) SpO2 BMI OB Status Smoking Status 96% 29.48 kg/m2 Menopause Former Smoker BMI and BSA Data Body Mass Index Body Surface Area  
 29.48 kg/m 2 1.75 m 2 Preferred Pharmacy Pharmacy Name Phone  [facility] [phone] [facility] [phone] Your Updated Medication List  
  
   
This list is accurate as of 4/10/18  3:34 PM.  Always use your most recent med list.  
  
  
  
  
 albuterol 90 mcg/actuation inhaler Commonly known as:  PROVENTIL HFA, VENTOLIN HFA, PROAIR HFA Take 2 Puffs by inhalation every four (4) hours as needed for Wheezing. albuterol-ipratropium 2.5 mg-0.5 mg/3 ml Nebu Commonly known as:  DUO-NEB  
3 mL by Nebulization route every four (4) hours as needed. ICD 10: I27.2, J84.10  
  
 amLODIPine 5 mg tablet Commonly known as:  Sundra Montclair Take 1 Tab by mouth daily. Indications: HYPERTENSION  
  
 azithromycin 250 mg tablet Commonly known as:  Ruthy Maudlin 2 tab today the 1 tab daily  Indications: chronic bronchitis  
  
 budesonide-formoterol 160-4.5 mcg/actuation Hfaa Commonly known as:  SYMBICORT Take 2 Puffs by inhalation two (2) times a day. Indications: BRONCHOSPASM PREVENTION WITH COPD, MAINTENANCE THERAPY FOR ASTHMA  
  
 chlorpheniramine-HYDROcodone 10-8 mg/5 mL suspension Commonly known as:  Emilio Lei Take 5 mL by mouth every twelve (12) hours as needed for Cough. Max Daily Amount: 10 mL.  
  
 furosemide 40 mg tablet Commonly known as:  LASIX Take 1 Tab by mouth daily. Indications: EDEMA HYDROcodone-acetaminophen 5-325 mg per tablet Commonly known as:  Bonita Laity Take 1 Tab by mouth every six (6) hours as needed. Max Daily Amount: 4 Tabs. Iron 160 mg (50 mg iron) Tber tablet Generic drug:  ferrous sulfate ER Take 1 Tab by mouth daily. Indications: patient unsure of the mg of the medication KLOR-CON M20 20 mEq tablet Generic drug:  potassium chloride Take 20 mEq by mouth two (2) times a day. montelukast 10 mg tablet Commonly known as:  SINGULAIR Take 1 Tab by mouth daily. Indications: MAINTENANCE THERAPY FOR ASTHMA  
  
 omeprazole 20 mg capsule Commonly known as:  PRILOSEC Take 20 mg by mouth daily. Oxygen 2 Each by Nasal route. 2 L [employer] PLAQUENIL 200 mg tablet Generic drug:  hydroxychloroquine Take 200 mg by mouth daily. * predniSONE 10 mg tablet Commonly known as:  Tim Ferraris Take 1 Tab by mouth daily (with lunch). * predniSONE 20 mg tablet Commonly known as:  DELTASONE  
2 tablets now then 2 tablets every morning with breakfast  
  
 traZODone 50 mg tablet Commonly known as:  Moris Bibles Take 1 Tab by mouth nightly. zolpidem 10 mg tablet Commonly known as:  AMBIEN Take 1 Tab by mouth nightly as needed for Sleep. Max Daily Amount: 10 mg.  
  
 ZyrTEC 10 mg Cap Generic drug:  Cetirizine Take  by mouth. * Notice: This list has 2 medication(s) that are the same as other medications prescribed for you. Read the directions carefully, and ask your doctor or other care provider to review them with you. Prescriptions Sent to Pharmacy Refills  
 predniSONE (DELTASONE) 20 mg tablet 0 Si tablets now then 2 tablets every morning with breakfast  
 Class: Normal  
 Pharmacy: [address] [place] [address] Ph #: [phone]  
 montelukast (SINGULAIR) 10 mg tablet 3 Sig: Take 1 Tab by mouth daily. Indications: MAINTENANCE THERAPY FOR ASTHMA Class: Normal  
 Pharmacy: [address] Ph #: [phone] Route: Oral  
 azithromycin (ZITHROMAX) 250 mg tablet 0 Si tab today the 1 tab daily  Indications: chronic bronchitis Class: Normal  
 Pharmacy: [address] Ph #: [phone] Patient Instructions Instructions: 
 
Continue Symbicort 160/4.5 mcg 2 puffs by inhalation twice daily and remember to exhale fully before inhaling and also remember to wash mouth with water and spit it out after inhaling Continue Albuterol MDI 2 puffs by  inhalations every 4 hours as needed or Duo neb by nebulizer every 4 hours as needed and if you require too often to control respiratory symptoms call the office for severe symptoms go to the emergency room Always call for symptoms such as increasing shortness of breath or a cough productive of discolored mucus, Mucinex OTC if unable to bring mucus(chest rattling like the symphony, start 600 mg twice daily X 7 day then can increase 1200 mg twice daily. Saline flush twice daily, use Nasacort per pt use (already have) Take Singulair 10 mg daily. Continue Zyrtec and Benadryl alternating, if causing drowsiness no driving. Z Pack with food for 5 days Prednisone 20 mg 2 tablets daily with breakfast for 5 days then go back to Prednisone maintenance dose for RA. Airway clearance. 
 
 
 
 
  
 
 
 
  
Introducing [facility]! [facility] introduces [employer] - Share Your Passion! patient portal. Now you can access parts of your medical record, email your doctor's office, and request medication refills online. 1. In your internet browser, go to [email or web address] 2. Click on the First Time User? Click Here link in the Sign In box. You will see the New Member Sign Up page. 3. Enter your [employer] - Share Your Passion! Access Code exactly as it appears below. You will not need to use this code after youve completed the sign-up process. If you do not sign up before the expiration date, you must request a new code. · [employer] - Share Your Passion! Access Code: [email or web address] Expires: 7/9/2018  2:34 PM 
 
4. Enter the last four digits of your Social Security Number (xxxx) and Date of Birth (mm/dd/yyyy) as indicated and click Submit. You will be taken to the next sign-up page. 5. Create a Ter[employer]itz[employer] ID. This will be your [employer] Share Your Passion! login ID and cannot be changed, so think of one that is secure and easy to remember. 6. Create a [employer] Share Your Passion! password. You can change your password at any time. 7. Enter your Password Reset Question and Answer.  This can be used at a later time if you forget your password. 8. Enter your e-mail address. You will receive e-mail notification when new information is available in [address] 9. Click Sign Up. You can now view and download portions of your medical record. 10. Click the Download Summary menu link to download a portable copy of your medical information. If you have questions, please visit the Frequently Asked Questions section of the [employer] website. Remember, [employer] is NOT to be used for urgent needs. For medical emergencies, dial 911. Now available from your iPhone and Android! Please provide this summary of care documentation to your next provider. Your primary care clinician is listed as Melani Valencia. If you have any questions after today's visit, please call [phone].

## 2018-05-10 ENCOUNTER — OFFICE VISIT (OUTPATIENT)
Dept: PULMONOLOGY | Age: 78
End: 2018-05-10

## 2018-05-10 VITALS
HEIGHT: 61 IN | OXYGEN SATURATION: 96 % | WEIGHT: 160 LBS | HEART RATE: 78 BPM | RESPIRATION RATE: 17 BRPM | SYSTOLIC BLOOD PRESSURE: 130 MMHG | BODY MASS INDEX: 30.21 KG/M2 | TEMPERATURE: 98.1 F | DIASTOLIC BLOOD PRESSURE: 80 MMHG

## 2018-05-10 DIAGNOSIS — I27.20 PULMONARY HTN (HCC): Chronic | ICD-10-CM

## 2018-05-10 DIAGNOSIS — J47.1 BRONCHIECTASIS WITH ACUTE EXACERBATION (HCC): ICD-10-CM

## 2018-05-10 DIAGNOSIS — M06.9 RHEUMATOID ARTHRITIS INVOLVING MULTIPLE SITES, UNSPECIFIED RHEUMATOID FACTOR PRESENCE: Chronic | ICD-10-CM

## 2018-05-10 DIAGNOSIS — R06.09 DOE (DYSPNEA ON EXERTION): ICD-10-CM

## 2018-05-10 DIAGNOSIS — R13.10 DYSPHAGIA, UNSPECIFIED TYPE: ICD-10-CM

## 2018-05-10 DIAGNOSIS — M32.9 SYSTEMIC LUPUS ERYTHEMATOSUS, UNSPECIFIED SLE TYPE, UNSPECIFIED ORGAN INVOLVEMENT STATUS (HCC): Chronic | ICD-10-CM

## 2018-05-10 DIAGNOSIS — J47.1 BRONCHIECTASIS WITH ACUTE EXACERBATION (HCC): Primary | ICD-10-CM

## 2018-05-10 RX ORDER — BUDESONIDE AND FORMOTEROL FUMARATE DIHYDRATE 160; 4.5 UG/1; UG/1
2 AEROSOL RESPIRATORY (INHALATION) 2 TIMES DAILY
Qty: 1 INHALER | Refills: 0 | Status: SHIPPED | COMMUNITY
Start: 2018-05-10 | End: 2019-04-23 | Stop reason: SDUPTHER

## 2018-05-10 RX ORDER — BUDESONIDE AND FORMOTEROL FUMARATE DIHYDRATE 160; 4.5 UG/1; UG/1
2 AEROSOL RESPIRATORY (INHALATION) 2 TIMES DAILY
Qty: 1 INHALER | Refills: 3 | Status: SHIPPED | OUTPATIENT
Start: 2018-05-10 | End: 2018-05-10 | Stop reason: SDUPTHER

## 2018-05-10 RX ORDER — PREDNISONE 20 MG/1
TABLET ORAL
Qty: 10 TAB | Refills: 0 | Status: SHIPPED | OUTPATIENT
Start: 2018-05-10 | End: 2019-07-01 | Stop reason: DRUGHIGH

## 2018-05-10 NOTE — PATIENT INSTRUCTIONS
Instructions:    Symbicort 160/4.5 mcg 2 puffs by  inhalation twice daily and remember to exhale fully before inhaling and also remember to wash mouth with water and spit it out after inhaling    Continue Albuterol (Pro Air) 2 puffs by inhalations every 4 hours as needed or Duo neb by nebulizer every 4 hours as needed and if you require albuterol too often to control respiratory symptoms call the office for severe symptoms go to the emergency room    Always call for symptoms such as increasing shortness of breath or a cough productive of discolored mucus. Prednisone 20 mg 2 tabs daily, with breakfast for 5 days. Mucinex over the counter (OTC) if unable to bring up mucus. Speech referral for intermittent dysphagia-wait for call from speech dept for appt.     Continue nasal saline flush, nasacort    Follow up w/ Dr Anatoliy Gaines 2 wk

## 2018-05-10 NOTE — PROGRESS NOTES
[facility] [place] [place] [facility]  Pulmonary, Critical Care, and Sleep Medicine      Pulmonary Office Progress Notes    Name: Juliana Phillips     :      Date: 5/10/2018        Subjective:     Patient is a 69 y.o. female is here for sick visit. Last seen in the office by me on 4/10/2018 for bronchiectasis excarcerbation and bronchitis- report resolved. Interval history:  Pt presented ambulatory on room air with SpO2 98%, accompanied by . She report for about 2 wk start having  intermittent wheezing, dry cough, fatigue and LAND. She denies mucus or hemoptysis, fever or chills. She report awaken only with coughing spells, no SOB or wheezing. She report yesterday had scary episode, while eating the \" food got stuck\" pointing to the middle of chest, \" I hear loud noise like a drum inside my chest, \" she report resolved after drinking water. She report very careful eating and drinking since then. She denies chest pain, PND or orthopnea (sleep w/1 pillow as baseline) report chronic LE edema-takes diuretic. She report not taking Symbicort 160/4.5 mcg 2 puffs by inhalation BID, showed Pro air MDI-she thought its the same,  She currently use Pro Air MDI  every 2 hr between Duoneb via neb every 4 hrs. Pt report compliant on Singulair 10 g every day -px last visit-report seesm to help. She takes Zyrtec alternating with Benadryl PRN. She report also on Nasacort nasal spray PRN for chronic nasal symptoms      Immunization status:   Influenza vaccine: yearly, reported by patient  Pneumococcal vaccine: had Prevnar 13 (2017)-got sick hosp.  after the vaccine, reported by patient     PPD: negative, reported by patient  TB personal history: no  TB exposure: no    Smoking hx: Not currently, smoked when younger X 5 yrs, stop smoking   Occupation: Retired, employed as employment councillors  Exposure: Asbestos/berillium/mercury/silica/concrete: no                    Molds, Hx of old carpets: no   Travel history: no  Sick contact: no  Lives with:   Pets: None     Seasonal Allergy:  Cold/rainy and wet  Animal Allergy: no     Past Medical History:   Diagnosis Date    Asthma     Chronic lung disease     Hypertension     Lupus     Pulmonary hypertension ([facility] [address]     Rheumatoid arthritis(714.0)      Social History     Social History    Marital status: UNKNOWN     Spouse name: N/A    Number of children: N/A    Years of education: N/A     Occupational History    Not on file. Social History Main Topics    Smoking status: Former Smoker     Packs/day: 0.20     Years: 16.00     Types: Cigarettes     Quit date: 9/1/1974    Smokeless tobacco: Never Used    Alcohol use No    Drug use: Not on file    Sexual activity: Not on file     Other Topics Concern    Not on file     Social History Narrative      No past surgical history on file. Allergies   Allergen Reactions    Latex Itching    Asmanex Hfa [Mometasone] Itching    Cleocin [Clindamycin Hcl] Itching and Swelling     Lips and tongue    Pcn [Penicillins] Swelling    Robitussin [Guaifenesin] Rash       Current Outpatient Prescriptions   Medication Sig Dispense Refill    budesonide-formoterol (SYMBICORT) 160-4.5 mcg/actuation HFAA Take 2 Puffs by inhalation two (2) times a day. Indications: MAINTENANCE THERAPY FOR ASTHMA 1 Inhaler 3    predniSONE (DELTASONE) 20 mg tablet 2 tablets every morning with breakfast 10 Tab 0    montelukast (SINGULAIR) 10 mg tablet Take 1 Tab by mouth daily. Indications: MAINTENANCE THERAPY FOR ASTHMA 30 Tab 3    azithromycin (ZITHROMAX) 250 mg tablet 2 tab today the 1 tab daily  Indications: chronic bronchitis 6 Tab 0    albuterol-ipratropium (DUO-NEB) 2.5 mg-0.5 mg/3 ml nebu 3 mL by Nebulization route every four (4) hours as needed. ICD 10: I27.2, J84.10 50 Nebule 1    ferrous sulfate ER (IRON) 160 mg (50 mg iron) TbER tablet Take 1 Tab by mouth daily.  Indications: patient unsure of the mg of the medication      Cetirizine (ZYRTEC) 10 mg cap Take  by mouth.  albuterol (PROVENTIL HFA, VENTOLIN HFA, PROAIR HFA) 90 mcg/actuation inhaler Take 2 Puffs by inhalation every four (4) hours as needed for Wheezing. 1 Inhaler 2    Oxygen 2 Each by Nasal route. 2 L [employer]      HYDROcodone-acetaminophen (NORCO) 5-325 mg per tablet Take 1 Tab by mouth every six (6) hours as needed. Max Daily Amount: 4 Tabs. 60 Tab 0    zolpidem (AMBIEN) 10 mg tablet Take 1 Tab by mouth nightly as needed for Sleep. Max Daily Amount: 10 mg. 30 Tab 0    chlorpheniramine-HYDROcodone (TUSSIONEX) 10-8 mg/5 mL suspension Take 5 mL by mouth every twelve (12) hours as needed for Cough. Max Daily Amount: 10 mL. 115 mL 0    predniSONE (DELTASONE) 10 mg tablet Take 1 Tab by mouth daily (with lunch). (Patient taking differently: Take 5 mg by mouth daily (with lunch). ) 30 Tab 0    traZODone (DESYREL) 50 mg tablet Take 1 Tab by mouth nightly. 30 Tab 0    amLODIPine (NORVASC) 5 mg tablet Take 1 Tab by mouth daily. Indications: HYPERTENSION 30 Tab 1    furosemide (LASIX) 40 mg tablet Take 1 Tab by mouth daily. Indications: EDEMA 30 Tab 1    omeprazole (PRILOSEC) 20 mg capsule Take 20 mg by mouth daily.  hydroxychloroquine (PLAQUENIL) 200 mg tablet Take 200 mg by mouth daily.  potassium chloride (KLOR-CON M20) 20 mEq tablet Take 20 mEq by mouth two (2) times a day.        Patient Active Problem List   Diagnosis Code    Cirrhosis of liver ([facility] [address] K74.60    Systemic lupus erythematosus ([facility] [address] [facility] M32.9    Rheumatoid arthritis ([facility] M06.9    HTN (hypertension) I10    Dyslipidemia E78.5    COPD exacerbation (HCC) J44.1    Pulmonary HTN (HCC) I27.20    Acute bronchitis J20.9        Review of Systems:  Constitutional: No fever, no chills, no weight loss, no night sweats   HEENT: No epistaxis, no nasal drainage, chronic intermittent difficulty in swallowing, no redness in eyes  Respiratory: as above  Cardiovascular: no chest pain, no palpitations,  chronic bilateral ankle edema, no syncope  Gastrointestinal: no abd pain, no vomiting, no diarrhea, no bleeding symptoms  Genitourinary: No urinary symptoms or hematuria  Integument/breast: No ulcers or rashes  Musculoskeletal:Neg  Neurological: No focal weakness, no seizures, no headaches  Behvioral/Psych: No anxiety, no depression       Objective:     Visit Vitals    /80 (BP 1 Location: Left arm, BP Patient Position: Sitting)    Pulse 78    Temp 98.1 °F (36.7 °C) (Oral)    Resp 17    Ht 5' 1\" (1.549 m)    Wt 72.6 kg (160 lb)    SpO2 96%    BMI 30.23 kg/m2     Physical Exam:  General/Neurology:  Alert to name, place and time. NAD. Head:  Normocephalic, without obvious abnormality, atraumatic. Eye:  No scleral icterus, no pallor, no cyanosis  Nose:  No sinus tenderness, no erythema, no induration, no discharge  Throat:   No tonsillar enlargement, no erythema, no exudates. No oral thrush. Neck:  Supple, symmetric. No JVD. Thyroid: no enlargement/tenderness/nodule. No lymphadenopathy. Trachea midline  Lung:  Bilateral wheezing, BS decrease. No prolonged expiration. No accessory muscle use. Heart:   Regular rate & rhythm. S1 S2 present. No murmur. Abdomen/: Soft, NT, ND, +BS, no masses, no organomegaly  Extremities:  Bilateral ankle edema R>L. No cyanosis. No clubbing  Pulses:  2+ and symmetric in DP  Lymphatic:  No cervical, supraclavicular palpable lymphadenopathy.                   Musculoskeletal: Chronic joint pain due to RA/arthritis  Skin:  Color, texture, turgor normal. No rashes or lesions    Data review:       Hospital Outpatient Visit on 01/17/2018   Component Date Value Ref Range Status    Anti-DNA (DS) Ab, QT 01/17/2018 <1  0 - 9 IU/mL Final    Comment: (NOTE)                                    Negative      <5                                    Equivocal  5 - 9                                    Positive      >9      RNP Abs 01/17/2018 <0.2  0.0 - 0.9 AI Final    Smith Abs 2018 <0.2  0.0 - 0.9 AI Final    Vuong/RNP Abs 2018 <0.2  0.0 - 0.9 AI Final    Scleroderma-70 Ab 2018 <0.2  0.0 - 0.9 AI Final    Sjogren's Anti-SS-A 2018 <0.2  0.0 - 0.9 AI Final    Sjogren's Anti-SS-B 2018 <0.2  0.0 - 0.9 AI Final    Antichromatin Ab 2018 0.5  0.0 - 0.9 AI Final    Anti Ribosomal P Ab 2018 <0.2  0.0 - 0.9 AI Final    Anti-Rossi-1 2018 <0.2  0.0 - 0.9 AI Final    Centromere B Ab 2018 <0.2  0.0 - 0.9 AI Final    See below 2018 Comment    Final    Comment: (NOTE)  Autoantibody                       Disease Association  ____________________________________________________________                         Condition                  Frequency  _____________________   ________________________   _________  Antinuclear Antibody,    SLE, mixed connective  Direct (MIGUEL ANGEL-D)           tissue diseases  _____________________   ________________________   _________  dsDNA                    SLE                        40 - 60%  _____________________   ________________________   _________  Chromatin                Drug induced SLE                90%                          SLE                        48 - 97%  _____________________   ________________________   _________  Mary Ann Faulkner (Ro)                 SLE                        25 - 35%                          Sjogren's Syndrome         40 - 70%                           Lupus                 100%  _____________________   ________________________   _________  SSB (La)                 SLE                                                        10%                          Sjogren's Syndrome              30%  _____________________   _______________________    _________   (anti-Smith)          SLE                        15 - 30%  _____________________   _______________________    _________  RNP                      Mixed Connective Tissue                          Disease 95%  (U1 nRNP,                SLE                        30 - 50%  anti-ribonucleoprotein)  Polymyositis and/or                          Dermatomyositis                 20%  _____________________   ________________________   _________  Scl-70 (antiDNA          Scleroderma (diffuse)      20 - 35%  topoisomerase)           Crest                           13%  _____________________   ________________________   _________  Rossi-1                     Polymyositis and/or                          Dermatomyositis            20 - 40%  _____________________   ________________________   _________  Layvomargaritae Jamaaler B             Scleroderma -                            Crest                          variant                         80%  _____________________   ________________________   _________  Ribosomal P              SLE                        10 - 20%  Performed At: [address] [id number]  Taras Quezada MD SP:[id number]      Rheumatoid factor, QL 01/17/2018 NEGATIVE   NEG   Final    Scleroderma-70 Ab 01/17/2018 <0.2  0.0 - 0.9 AI Final    Comment: (NOTE)  Performed At: [address] [id number]  Taras Quezada MD TT:[id number]       Imaging:  I have personally reviewed the patients radiographs and have reviewed the reports:       Results from Hospital Encounter encounter on 09/18/17   XR CHEST PA LAT   Narrative TWO VIEW CHEST RADIOGRAPH     CPT CODE: 62166    INDICATION: Nursing home clearance. COMPARISON: 5/6/2017    FINDINGS:     The trachea is midline. The cardiomediastinal silhouette is within normal  limits. Thoracic aorta is tortuous with calcification. The pulmonary vascular  markings are unremarkable. Lungs are hyperexpanded with persistent coarsening of  interstitial lung markings, unchanged. There is no focal consolidation, pleural  effusion or pneumothorax.  Scoliotic curvature of the spine again noted.  Degenerative changes present in the spine. No acute osseous abnormality. Impression IMPRESSION:      1. No acute cardiopulmonary process. 2. Persistent coarse interstitial lung markings, suggestive of interstitial lung  disease. No confluent consolidation. Results from [place] encounter on 09/16/17   CT SPINE CERV WO CONT   Narrative EXAM:  CT SPINE CERV WO CONT    INDICATION:   fall neck pain    COMPARISON: None. TECHNIQUE: Unenhanced multislice helical CT of the cervical spine was performed  in the axial plane and sagittal and coronal reformations were generated. CT  dose reduction was achieved through use of a standardized protocol tailored for  this examination and automatic exposure control for dose modulation. FINDINGS:    The alignment of the cervical spine is normal.     Vertebral body heights are maintained. Multilevel endplate osteophytosis and  disc height loss most significant at C4/5 and C5/6 with associated high-grade  central canal stenosis at these levels. There is also multilevel high-grade  neural foraminal stenosis due to uncovertebral spurring C3-C7. There is no fracture or subluxation. The prevertebral soft tissues are normal.    The odontoid process is intact. Visualized upper lungs demonstrate bilateral lateral traction bronchiectasis and  fibrotic changes. Upper mediastinum demonstrate aortic atherosclerosis. Impression IMPRESSION:     No evidence of acute cervical spine fracture or subluxation. Multilevel advanced degenerative disc disease. High-grade central canal stenosis  C4/5 and C5/6. Multilevel high-grade neural foraminal stenosis C3-C7.               Results for orders placed or performed during the hospital encounter of 09/16/17   EKG, 12 LEAD, INITIAL   Result Value Ref Range    Ventricular Rate 68 BPM    Atrial Rate 68 BPM    P-R Interval 138 ms    QRS Duration 134 ms    Q-T Interval 436 ms    QTC Calculation (Bezet) 463 ms    Calculated P Axis 58 degrees    Calculated R Axis -38 degrees    Calculated T Axis -19 degrees    Diagnosis       Normal sinus rhythm  Left axis deviation  Right bundle branch block  Minimal voltage criteria for LVH, may be normal variant  Abnormal ECG  When compared with ECG of 06-MAY-2017 11:33,  No significant change was found  Confirmed by Rodney Ramos MD, Delfin Calderon ([id number]) on 9/17/2017 1:18:31 PM           No results found for this or any previous visit. Lab Results   Component Value Date/Time    D DIMER 0.50 (H) 07/31/2016 11:53 AM          Ms. Uriah Israel has a reminder for a \"due or due soon\" health maintenance. I have asked that she contact her primary care provider for follow-up on this health maintenance. IMPRESSION:   · Bronchiectasis exacerbation   · Seasonal allergy (cold/wet/rainy) likely trigger  · Chronic nasal symptoms likely allergy in origin  · Cough likely variant asthma  · Pt report with Lupus and RA-she see OP Rheumatologists. On Maintenance Prednisone 5 mg every day   · Dysphagia-iIntermittent chronic choking when eating/drinking too fast- possible trigger  · ? Reflux on PPI PRN-possible trigger      RECOMMENDATIONS:   · Prednisone 40  Mg with breakfast x 5 days, instructed s/s of hyperglycemia, for s/s call PCP for monitoring and mgt. Then go back to Prednisone maintenance dose for RA.(currently 5 mg). · Continue Singulair 10 mg every day, antihistamine, nasal steroid and nasal saline flush  · Re start Symbicort 160/4.5 mcg 2 puffs by inhalation BID, proper use and inhaler technique reviewed, rinse/wash mouth thoroughly after each use. Sample # 1 given and script send. · Continue Albuterol MDI or Duo neb by nebulization every 4 hours for 2-3 days for wheezing, then as needed. If unable to control respiratory symptoms go to the emergency room or call 911. · Discussed avoidance of precipitants/cold weather-wear protective clothing when going out.    · Annual preventive vaccination discussed-per pt request will defer to PCP- ? PCV 24 had Prevnar 13 (2017)  · Pulmonary toilette, encouraged deep breathing, judicious airway clearance-if unable to bring mucus up-may use OTC Mucinex-instructed. .  · Referral to speech to evaluate/treat for dysphagia, reinforced chin tuck maneuver and take time/avoid rushing when eating and drinking, if persistent plan to refer speech to evaluate. · Instructed to obtain wedge or 4 inch block block to placed underneath the headboard, keep HOB elevated, avoid eating/drinking at least 2 hrs prior bedtime. · Recommend to follow up with PCP regarding chr  · Reviewed all medications and discussed concerns, answered questions. · Follow-up as scheduled  with Dr Kadi Johnson with PFT and 6 min walk v or come back sooner should new symptoms or problems arise.        Teressa Kaminski NP

## 2018-05-18 ENCOUNTER — HOSPITAL ENCOUNTER (OUTPATIENT)
Dept: PHYSICAL THERAPY | Age: 78
Discharge: HOME | End: 2018-05-18
Payer: MEDICARE

## 2018-05-18 PROCEDURE — 92610 EVALUATE SWALLOWING FUNCTION: CPT

## 2018-05-18 PROCEDURE — G8996 SWALLOW CURRENT STATUS: HCPCS

## 2018-05-18 PROCEDURE — G8997 SWALLOW GOAL STATUS: HCPCS

## 2018-05-18 PROCEDURE — 92526 ORAL FUNCTION THERAPY: CPT

## 2018-05-18 NOTE — PROGRESS NOTES
[facility] [place] [facility]  [address] [employer] [facility]  [phone] fax    Plan of Care/ Statement of Necessity for Speech Therapy Services    Patient name: Oleta Ahumada Start of Care: 2018   Referral source: Kori Rangel NP : [id number]    Medical Diagnosis: Dysphagia [R13.10]  Bronchiectasis with (acute) exacerbation [J47.1]   Onset Date:May 2017    Treatment Diagnosis: Oropharyngeal Dysphagia    Prior Hospitalization: see medical history Provider#: [id number]   Medications: Verified on Patient summary List    Comorbidities: Pneumonia, Hypertension, Systemic lupus erythematosus, Dyslipidemia, COPD, Pulmonary HTN, Acute bronchitis    Prior Level of Function: Independent, Regular solids/thin liquids        The Plan of Care and following information is based on the information from the initial evaluation. Assessment/ key information:   Pt is a 64-year-old female referred for speech therapy evaluation to address Pt's swallowing difficulty. Pt alert and oriented x4; reports onset of swallowing issues May 2017 with most recent event occurring 1 week ago. Pt complaint of intermittent globus sensation accompanied with pain located at the level of her sternum during meals. Pt reports significant history of pneumonia stating \"I have had it 7 times before\". Most recently diagnosed with pneumonia in 2017. She reports having Lupus secondary to history of pneumonia. Pt endorses a regular solid diet with thin liquids at home. Recently her MD recommended Pt to utilize chin tuck during meals and Pt stated \"It has been helpful these last 2 weeks\". Pt also reported \"sometimes I can't swallow and it's hard to get it down\". She presented with a hoarse voice and decreased vocal intensity on this date. Pt is interested in having a voice evaluation to be completed at a later date.  She is a retired , lives with her , and spends her time reading and playing the piano. Oral motor exam revealed Pt oral motor structures grossly intact for mastication and deglutition. This day, Pt accepted self-fed thin, nectar-thick, honey-thick liquids (individual sips and consecutive swallows), regular solids, and mechanical soft diet trials. Inadequate oral bolus prep and timely anterior posterior transit noted with regular diet trials. Noted lingual stasis >15% with regular diet trials, which cleared provided verbal cues to clear residue with her tongue and second swallow. Pt had delayed swallow initiation >1 second. Decreased hyolaryngeal excursion to palpation for all PO trials. Pt exhibited s/sx of aspiration/penetration c/b delayed cough (regular diet trials), rhinorrhea (regular diet, thin liquids, and nectar-think liquids), and throat clear (thin liquid and nectar-thick liquid). Audible swallow and multiple swallows noted. Pt had least difficulty consuming honey thick liquids and a mechanical soft diet as Pt reported greater comfort and SLP-CF noted no throat clearing following honey-thick liquid consistency. Pt educated on how to thicken liquids to honey consistency. Pt required cues to decreased rate and size of intake during PO trials. Pt presents with moderate oropharyngeal dysphagia, as evidenced above, which places Pt at risk for aspiration/recurrent aspiration pneumonia and malnutrition. Skilled speech intervention for dysphagia warranted to facilitate effective swallow of least restrictive diet for meeting nutritional needs, dining in social situations, and QOL. SLP-CF recommending honey-thick liquid diet and mechanical soft diet at this time. MBS is also recommended to further assess Pt's swallow function, confirm aspiration/penetration, and to determine least restrictive diet and postural and compensatory strategies to approve the safety of Pt's swallow. Pt may benefit from GI consult to further assess Pt's complaint of globus sensation.      Problem List: []aphasia                           []dysarthria                                  [x]dysphagia     []alexia                              []agraphia                                     []dysphonia     []dysfluency                     []Cognitive-Linguistic Disorder     [x]other:  Pt is interested in having a voice evaluation to be completed at a later date. Treatment Plan may include any combination of the following: Oral Motor Therapeutic Excerise, Dysphagia Treatment, Treatment of Swallowing and Patient Education      Patient / Family readiness to learn indicated by: asking questions, trying to perform skills and interest    Persons(s) to be included in education: patient (P)    Barriers to Learning/Limitations: yes;  physical    Patient Goal (s): I'm hoping to know what's causing it (globus sensation)    Patient Self Reported Health Status: poor    Rehabilitation Potential: good    Short Term Goals: To be accomplished in 6 weeks:  1.) Pt will perform pharyngeal exercises in structured therapy setting to improve functional swallow provided Mod A in 9:10 trials to promote carryover of HEP.  2.) Pt will tolerate nectar thick liquids with 0 s/sx of aspiration/ penetration over 10 trials with Mod A over 2 therapy sessions in order to improve Pt safety with liquids and increase overall QOL. 3.) Pt will tolerate regular diet trials with 0 s/sx of aspiration/ penetration over 10 trials with Mod A over 2 therapy sessions in order to reduce risk for aspiration. 4.) Pt will utilizing safe swallowing strategies and compensatory techniques across 9:10 PO trials of regular diet and nectar thick liquids with Mod A in order to reduce risk for aspiration. 5.) Pt will tolerate a regular diet with no instances of oral residue or pocketing with Min A over 10 trials in order to reduce risk for aspiration and increase overall QOL. Long Term Goals:  To be accomplished in 8 weeks:  1.) Pt will tolerate nectar thick liquids with regular diet with 0 s/sx of aspiration/ penetration in order to maintain nutrition/hydration needs and increase overall QOL. 2.) Pt will tolerate a regular diet with no instances of oral residue or pocketing Avelino in order to maintain nutrition/hydration needs and increase overall QOL. Frequency / Duration: Patient to be seen 3 times per week for 8 weeks: 5/18/2018 to 7/17/2018    Patient/ Caregiver education and instruction: Diagnosis, prognosis, diet modification sheet, thickening liquids handout, recommended MBS, educated on safe swallowing strategies/compensatory techniques, educated how to thicken liquids to honey thick consistency     G Codes:   Swallow Current Status CK= 40-59%   Swallow Goal Status CJ= 20-39%    The severity rating is based on the following outcomes:    National Outcomes Measures (NOMS) = 4 and professional judgment       MICHAEL Steward. SLP-CF   5/18/2018 2:59 PM  ________________________________________________________________________    I certify that the above Therapy Services are being furnished while the patient is under my care. I agree with the treatment plan and certify that this therapy is necessary.     Physician's Signature:____________________  Date:___________Time:_________    Please sign and return to [facility]  [address] [place] [address]  [phone] fax     Thank you

## 2018-05-18 NOTE — PROGRESS NOTES
ST DAILY TREATMENT NOTE    Patient Name: Mykel Sample  Date:2018  : 1940  [x]  Patient  Verified  Payor: Giovanni Sotomayor / Plan: [facility] [id number]  W / Product Type: Managed Care Medicare /   In time: 3:00  Out time:4:00  Total Treatment Time (min): 60  Visit #: 1 of 24    SUBJECTIVE  Pain Level (0-10 scale): 0  Any medication changes, allergies to medications, adverse drug reactions, diagnosis change, or new procedure performed?: [x] No    [] Yes (see summary sheet for update)  Subjective functional status/changes:   [x] No changes reported  Date of Onset: May 2017  Social History: She is a retired , lives with her , and spends her time reading and playing the piano. Prior Functional level: independent  Radiology: see [facility]  Current diet:  positioning: regular solids/thin liquids  Mental Status:  [x]alert [x]lethargic []confused  Orientation: [x]person [x]place [x]time [x]situation  [facility] Directions: [x]1-step [x]2-step [x]3-step [x]complex  Motivation: []excellent [x]good  []fair  []poor   Barriers to learning: []none []aphasia []? cognition []lethargy/motivation [][place]   []?vision []language [x]Fatigue/pain []psych factors compensate with:   Dentition: [x]normal []abnormal []edentulous []dentures   Respiratory Status: [x]WFL []SOB  []O2 L/min:  []NC []Mask               []Trach Tube:                     []Excess secretions  Lips:  [x]Symmetrical []asymmetrical  Retraction [x]WFL  []?min []?mod []?max  Protrusion [x]WFL  []?min []?mod []?max  Strength [x]WFL  []?min []?mod []?max  Puff  [x]WFL  []?min []?mod []?max  Tongue:  [x]Symmetrical []asymmetrical  Protrusion [x]WFL  []?min []?mod []?max  Elevation [x]WFL  []?min []?mod []?max  Depression [x]WFL  []?min []?mod []?max  Lateralization [x]WFL  []?min []?mod []?max  Strength [x]WFL  []?min []?mod []?max  Velum:  [x]Symmetrical []asymmetrical  Gag Reflex:  []Present []absent [x]DNT  Sensation:  [x]Intact []Diminished  Specify:  Voice:  []Normal [x]Hoarse []harsh  []Breathy []Hypernasal []hyponasal  []Gurgly Other:   Swallow:  [x]Volitional []absent  [x]Reflexive []absent  Cough   Strength : []WNL [x]Diminished  [x]Volitional []absent  [x]Reflexive []absent  OBJECTIVE    OP SWALLOW EVALUATION    Observation of Swallow:  Thin Nectar Honey Puree  applesauce Solids  Mechanical soft fruit and grain bar/regular peanut butter cracker nabs Other  Mixed consistency canned fruit cocktail   Oral Phase    DNT  DNT   Anterior loss of bolus  (decreased labial seal [])         Decreased bolus formation  (?lingual ROM/Coord[])     /X    Increased mastication         Increased oral transit time  (?A-P bolus transit[])     X/X    Abnormal chewing         Tongue pumping/tremors         Pocketing  (?labial/buccal tension/lingual control)     /X    Other         Oral Pharyngeal Phase         Delayed swallow initiation X X   X/X    Absent swallow         Stasis on lingual surface  (?lingual movement)     /X    Adherence to hard palate   (? lingual elevation)         Pharyngeal Phase         Multiple swallows  (residue in pharynx []) X X X  X/X    Coughing post swallow     /X    Throat clear post swallow X X   X/X    Wet vocal quality  (residue on vocal cords [])         Reduced laryngeal elevation X X X  X/X    Nasal Regurgitation  (? velopharyngeal closure)         Other           [] No symptoms of dysphagia evidenced  [x] Symptoms of dysphagia observed  [x] Patient at risk for aspiration  [] Other:     Recommendations:  Diet:  [] NPO    [] Pureed [] Ground [x] MechSoft [] Regular  Liquids: [x] Water  [] Regular [] Thickened   [] Nectar  [x] Honeythick  [] Pudding  Presentation: [] Cup    [] Spoon [] Straw [] Alternate liquids and solids  Monitor: [x] Sitting up at 90 deg [] Reclined to:  [] Head turned to:    [] Chin tuck  [] Head tilt to:      [x] Seated upright post meals (min): 45  Document: [x] Coughing [] Temperatures [] Lung Sounds    Videoflouroscopy: [x] Yes [] No  Dysphagia Treatment: [x] Yes [] No Sessions per week: 2 to 3  Other:    Remediation Techniques:  C = Compensatory techniques to use during meal      F = Facilitation/treatment techniques by SLP    [x] Supraglottic Swallow (c,f)    [] Oral motor exercises (f)  [x] Super-supraglottic swallow (c,f)   [] Labial closure  [] Compensations for pocketing (c)   [] Lingual elevation  [x] Sweep mouth with tongue    [] Lingual lateralization  [] Sweep mouth with finger    [] Lingual anterior-posterior  [] External pressure to check   [] Lingual base of tongue  [] Rinse mouth/expel after meal   [] Vocal Fold Exercises (f)  [x] Alternate liquid swallows every _ bites (c)  [x] Falsetto/laryngeal elevation exercises (f)  [] Discourage liquid wash between bites (c)  [] Thermal application (c,f)  [x] Multiple swallows     [] Sour bolus (f)  [] Patient needs cues     [] Cold bolus (f)  [] Patient does not need cues   [x] Pharyngeal exercise (f)  [x] Mendelsohn Maneuver (c,f)    [x] Breath hold  [] Encourage/stimulate lip closure (c)   [x] Effortful Swallow (c,f)  [x] Empty mouth before next bite (c)   [x] Tongue base retraction  [x] Cue patient to slow down (c)    [] Tongue hold  [] Encourage coughing (c)    [x] Laryngeal closure  []Chin tuck (c)      Patient/Caregiver instruction/education: [x] Review HEP    [] Progressed/Changed    HEP/Handouts given: diet modification sheet, thickening liquids handout    Pain Level (0-10 scale) post treatment: 0    ASSESSMENT  [x]  See Plan of Care    Short Term Goals:  To be accomplished in 6 weeks:  1.) Pt will perform pharyngeal exercises in structured therapy setting to improve functional swallow provided Mod A in 9:10 trials to promote carryover of HEP.  2.) Pt will tolerate nectar thick liquids with 0 s/sx of aspiration/ penetration over 10 trials with Mod A over 2 therapy sessions in order to improve Pt safety with liquids and increase overall QOL.     3. ) Pt will tolerate regular diet trials with 0 s/sx of aspiration/ penetration over 10 trials with Mod A over 2 therapy sessions in order to reduce risk for aspiration. 4.) Pt will utilizing safe swallowing strategies and compensatory techniques across 9:10 PO trials of regular diet and nectar thick liquids with Mod A in order to reduce risk for aspiration. 5.) Pt will tolerate a regular diet with no instances of oral residue or pocketing with Min A over 10 trials in order to reduce risk for aspiration and increase overall QOL.    Long Term Goals: To be accomplished in 8 weeks:  1.) Pt will tolerate nectar thick liquids with regular diet with 0 s/sx of aspiration/ penetration in order to maintain nutrition/hydration needs and increase overall QOL.     2.) Pt will tolerate a regular diet with no instances of oral residue or pocketing Avelino in order to maintain nutrition/hydration needs and increase overall QOL.    PLAN  []  Upgrade activities as tolerated     []  Continue plan of care  []  Discharge due to:__  [x] Other: See Plan of Care written on this date.       Will Ahuja M.S. Ed. SLP-CF   5/18/2018  2:59 PM

## 2018-05-25 ENCOUNTER — APPOINTMENT (OUTPATIENT)
Dept: PHYSICAL THERAPY | Age: 78
End: 2018-05-25
Payer: MEDICARE

## 2018-05-29 ENCOUNTER — APPOINTMENT (OUTPATIENT)
Dept: PHYSICAL THERAPY | Age: 78
End: 2018-05-29
Payer: MEDICARE

## 2018-06-06 ENCOUNTER — APPOINTMENT (OUTPATIENT)
Dept: PHYSICAL THERAPY | Age: 78
End: 2018-06-06

## 2018-06-07 ENCOUNTER — HOSPITAL ENCOUNTER (OUTPATIENT)
Dept: GENERAL RADIOLOGY | Age: 78
Discharge: HOME | End: 2018-06-07
Attending: INTERNAL MEDICINE
Payer: MEDICARE

## 2018-06-07 DIAGNOSIS — R13.10 DYSPHAGIA, UNSPECIFIED TYPE: ICD-10-CM

## 2018-06-07 DIAGNOSIS — R13.10 DIFFICULTY IN SWALLOWING: ICD-10-CM

## 2018-06-07 PROCEDURE — 74230 X-RAY XM SWLNG FUNCJ C+: CPT

## 2018-06-07 PROCEDURE — 74011000255 HC RX REV CODE- 255: Performed by: INTERNAL MEDICINE

## 2018-06-07 PROCEDURE — 92611 MOTION FLUOROSCOPY/SWALLOW: CPT

## 2018-06-07 RX ADMIN — BARIUM SULFATE 30 ML: 400 PASTE ORAL at 15:00

## 2018-06-07 RX ADMIN — BARIUM SULFATE 700 MG: 700 TABLET ORAL at 15:00

## 2018-06-07 RX ADMIN — BARIUM SULFATE 75 G: 960 POWDER, FOR SUSPENSION ORAL at 15:00

## 2018-06-07 NOTE — PROGRESS NOTES
Outpatient Modified Barium Swallow Evaluation    Patient: Lucila Ham (42 y.o. female)  Date: 6/7/2018  Primary Diagnosis: Difficulty in swallowing [R13.10]        Precautions: aspiration       Videofluoroscopy Results  MBS completed with results yielding oropharyngeal swallow fxn to be essentially [facility]. Pt tolerated reg solid, puree, thin liquid +/- straw, and 13 mm Ba pill with thin liquid wash without aspiration/penetration events. Bolus manipulation, tongue base retraction, laryngeal elevation, and overall pharyngeal motility/sensation appeared functional throughout study. Min vallecular/pyriform residuals were noted throughout study post swallow, but were cleared with second volitional swallow. Rec reg solid diet with thin liquids, aspiration precautions, oral care TID, and meds as tolerated. Pt may benefit from a GI consult to further assess esophageal swallow fxn. Results/recommendations d/w pt in detail utilizing video feedback with verbalized understanding. No further skilled SLP services are indicated at this time. Please re-consult if needed. Video Flouroscopic Procedures  [x] Lateral View   [] A-P View [] Scanned to level of Sternum    [x] Seated at 90 deg.   [] Other:    Presentation:    [x] Spoon   [x] Cup   [x] Straw   [] Syringe   [] Consecutive Swallows  [] Other:    Consistencies:   [x] Ba+ liquid   [] Ba+ liquid (nectar)   [] Ba+ liquid (honey)   [x] Ba+ puree [x] Ba+ cookie [x] 13 mm Ba pill with thin Ba wash    Treatment Techniques Attempted  [] Head Turn: [] Right [] Left     [] Head Tilt: [] Right [] Left     [] Chin Down:  [] Small Sips/bites:  [] Effortful swallow:  [] Double swallow:  [] Other:    Results  Dysphagia Present:     [] Yes  [x] No    Ratings of Dysphagia:     [] Mild  [] Moderate  [] Severe    Stages of Breakdown:   [] Oral  [] Pharyngeal  [] Esophageal    Aspiration:   [] Yes    [] No  [] At Risk     Cough: [] Yes      [] No     Penetration:   [] Yes    [] No     Cough: [] Yes      [] No   [] Flash/trace   [] Mod   [] To Chords          Consistency Aspirated:   Consistency Penetrated:   [] Thin Liquid     [] Thin Liquid  [] Nectar-thick Liquid    [] Nectar-thick Liquid   [] Honey-thick Liquid    [] Honey-thick Liquid   [] Puree     [] Puree  [] Solid     [] Solid     Motility Problems with:  [] Lip Closure:    [] Mastication:   [] Bolus Formation/control:   [] A-P Transport:  [] Tongue Base Retraction:  [] Swallow Response (delayed):  [] Velopharyngeal Closure:  [] Pharyngeal Aspirations:  [] Laryngeal Elevation/adduction:  [] Epiglottic Inversion:  [] Pharyngeal motility/sensation:  [] Cricopharyngeal Relaxation:  [] Esophageal Peristalsis:  [] Other:    Timing of Aspiration/Penetration:  [] Before Swallow:  [] During Swallow:  [] After Swallow:    Transit Time Delay:  [] >1 Second  Oral  [] >1 Second Pharyngeal  [] >20 Second Esophageal     Residuals:  [x] Vallecula:    [x] Mild  [] Mod  [] Severe  [x] Pyriform Sinus:   [x] Mild  [] Mod  [] Severe  [] Posterior Pharyngeal Wall:  [] Mild  [] Mod  [] Severe    GCODES(GN): GCODESwallowing:  Swallow Current Status CI= 1-19%   Swallow Goal Status CI= 1-19%   Swallow D/C Status CI= 1-19%    Thank you for this referral.    Aicha Gong M.S. CCC-SLP/L  Speech-Language Pathologist

## 2018-06-08 ENCOUNTER — APPOINTMENT (OUTPATIENT)
Dept: PHYSICAL THERAPY | Age: 78
End: 2018-06-08

## 2018-06-12 ENCOUNTER — TELEPHONE (OUTPATIENT)
Dept: PULMONOLOGY | Age: 78
End: 2018-06-12

## 2018-06-12 NOTE — TELEPHONE ENCOUNTER
Called patient, no answer, unable to leave a message due to patient's voice mail being full. Will try again. Called and spoke with patient, she has c/o mostly nonproductive cough(when it productive though she has thick yellow sputum), increased SOB with activity, wheezing, and pressure in center of chest. Patient states she has been using her Symbicort and Albuterol regularly, as well as taking benadryl, and nothing is helping. Patient currently taking 5 mg Prednisone daily for Lupus maintenance. Message forwarded to Dr. Shelby Donahue for further review. Patient requests I call her back after 2 PM due to her phone needing to be charged. Dr. Shelby Donahue has called Levaquin 500 mg x 10 days, called patient and notified.

## 2018-06-13 ENCOUNTER — APPOINTMENT (OUTPATIENT)
Dept: PHYSICAL THERAPY | Age: 78
End: 2018-06-13

## 2018-06-13 RX ORDER — LEVOFLOXACIN 500 MG/1
500 TABLET, FILM COATED ORAL DAILY
Qty: 10 TAB | Refills: 0 | Status: SHIPPED | OUTPATIENT
Start: 2018-06-13 | End: 2018-06-27 | Stop reason: ALTCHOICE

## 2018-06-15 ENCOUNTER — APPOINTMENT (OUTPATIENT)
Dept: PHYSICAL THERAPY | Age: 78
End: 2018-06-15

## 2018-06-27 ENCOUNTER — OFFICE VISIT (OUTPATIENT)
Dept: PULMONOLOGY | Age: 78
End: 2018-06-27

## 2018-06-27 VITALS
WEIGHT: 167 LBS | TEMPERATURE: 98.7 F | OXYGEN SATURATION: 96 % | HEART RATE: 82 BPM | SYSTOLIC BLOOD PRESSURE: 120 MMHG | HEIGHT: 63 IN | BODY MASS INDEX: 29.59 KG/M2 | DIASTOLIC BLOOD PRESSURE: 70 MMHG | RESPIRATION RATE: 22 BRPM

## 2018-06-27 DIAGNOSIS — I27.20 PULMONARY HTN (HCC): Chronic | ICD-10-CM

## 2018-06-27 DIAGNOSIS — J47.1 BRONCHIECTASIS WITH ACUTE EXACERBATION (HCC): ICD-10-CM

## 2018-06-27 DIAGNOSIS — R07.9 CHEST PAIN, UNSPECIFIED TYPE: Primary | ICD-10-CM

## 2018-06-27 DIAGNOSIS — R06.09 DOE (DYSPNEA ON EXERTION): ICD-10-CM

## 2018-06-27 RX ORDER — AZITHROMYCIN 250 MG/1
TABLET, FILM COATED ORAL
Qty: 6 TAB | Refills: 0 | Status: SHIPPED | OUTPATIENT
Start: 2018-06-27 | End: 2018-07-02

## 2018-06-27 NOTE — PATIENT INSTRUCTIONS
Continue Symbicort 2 inhalations twice daily and remember to wash mouth with water and spit it out after inhaling    Albuterol 2 inhalations every 4 hours as needed only if you require albuterol too often to control respiratory symptoms call the office    Always call for symptoms such as increasing shortness of breath or cough productive of discolored mucus

## 2018-06-27 NOTE — MR AVS SNAPSHOT
[facility], [address] [address] 
[phone] Patient: Kathia Senior MRN: [id number] QMC:[birth year]23 Visit Information Date & Time Provider Department Dept. Phone Encounter #  
 6/27/2018 10:15 AM Debby Krishna MD [facility] Cherise Quinn [id number] Follow-up Instructions Return in about 4 weeks (around 7/25/2018). Follow-up and Disposition History Your Appointments 7/24/2018  2:15 PM  
Follow Up with Debby Krishna MD  
[address] ([address]) Appt Note: from 6/27/18  
 [address] [place] [address], [address] [address]  
[phone]  
  
   
 [address] [place] [address], [address] [facility] [address] Upcoming Health Maintenance Date Due DTaP/Tdap/Td series (1 - Tdap) 5/15/1962 ZOSTER VACCINE AGE 60> 3/15/2001 GLAUCOMA SCREENING Q2Y 5/15/2006 Pneumococcal 65+ Low/Medium Risk (1 of 2 - PCV13) 5/15/2006 MEDICARE YEARLY EXAM 3/14/2018 Influenza Age 5 to Adult 8/1/2018 Allergies as of 6/27/2018  Review Complete On: 6/27/2018 By: Maximino Gee RT Severity Noted Reaction Type Reactions Latex  06/04/2014    Itching Asmanex Hfa [Mometasone]  07/31/2016    Itching Cleocin [Clindamycin Hcl]  06/04/2014    Itching, Swelling Lips and tongue Pcn [Penicillins]  06/04/2014    Swelling Robitussin [Guaifenesin]  07/31/2016    Rash Current Immunizations  Reviewed on 9/29/2016 No immunizations on file. Not reviewed this visit You Were Diagnosed With   
  
 Codes Comments Chest pain, unspecified type    -  Primary ICD-10-CM: R07.9 ICD-9-CM: 786.50 Bronchiectasis with acute exacerbation ([facility]     ICD-10-CM: J47.1 ICD-9-CM: 494.1 LAND (dyspnea on exertion)     ICD-10-CM: R06.09 
ICD-9-CM: 786.09   
 Pulmonary HTN ([facility]     ICD-10-CM: I27.20 ICD-9-CM: 416.8 Vitals BP Pulse Temp Resp Height(growth percentile) Weight(growth percentile) 120/70 (BP 1 Location: Left arm, BP Patient Position: At rest) 82 98.7 °F (37.1 °C) (Oral) 22 5' 3\" (1.6 m) 167 lb (75.8 kg) SpO2 BMI OB Status Smoking Status 96% 29.58 kg/m2 Postmenopausal Former Smoker BMI and BSA Data Body Mass Index Body Surface Area  
 29.58 kg/m 2 1.84 m 2 Preferred Pharmacy Pharmacy Name Phone [address], [address] [place] [address] [phone] Your Updated Medication List  
  
   
This list is accurate as of 6/27/18 11:05 AM.  Always use your most recent med list.  
  
  
  
  
 albuterol 90 mcg/actuation inhaler Commonly known as:  PROVENTIL HFA, VENTOLIN HFA, PROAIR HFA Take 2 Puffs by inhalation every four (4) hours as needed for Wheezing. albuterol-ipratropium 2.5 mg-0.5 mg/3 ml Nebu Commonly known as:  DUO-NEB  
3 mL by Nebulization route every four (4) hours as needed. ICD 10: I27.2, J84.10  
  
 amLODIPine 5 mg tablet Commonly known as:  Cardenas Inks Take 1 Tab by mouth daily. Indications: HYPERTENSION  
  
 azithromycin 250 mg tablet Commonly known as:  Thorndike [place] 2 tablets  first day then 1 tablet daily  
  
 budesonide-formoterol 160-4.5 mcg/actuation Hfaa Commonly known as:  SYMBICORT Take 2 Puffs by inhalation two (2) times a day. Indications: MAINTENANCE THERAPY FOR ASTHMA  
  
 chlorpheniramine-HYDROcodone 10-8 mg/5 mL suspension Commonly known as:  Ileana President Take 5 mL by mouth every twelve (12) hours as needed for Cough. Max Daily Amount: 10 mL.  
  
 furosemide 40 mg tablet Commonly known as:  LASIX Take 1 Tab by mouth daily. Indications: EDEMA HYDROcodone-acetaminophen 5-325 mg per tablet Commonly known as:  Verlee Shack Take 1 Tab by mouth every six (6) hours as needed. Max Daily Amount: 4 Tabs. Iron 160 mg (50 mg iron) Tber tablet Generic drug:  ferrous sulfate ER  
 Take 1 Tab by mouth daily. Indications: patient unsure of the mg of the medication KLOR-CON M20 20 mEq tablet Generic drug:  potassium chloride Take 20 mEq by mouth two (2) times a day. montelukast 10 mg tablet Commonly known as:  SINGULAIR Take 1 Tab by mouth daily. Indications: MAINTENANCE THERAPY FOR ASTHMA  
  
 omeprazole 20 mg capsule Commonly known as:  PRILOSEC Take 20 mg by mouth daily. Oxygen 2 Each by Nasal route. 2 L [employer] PLAQUENIL 200 mg tablet Generic drug:  hydroxychloroquine Take 200 mg by mouth daily. * predniSONE 10 mg tablet Commonly known as:  Teodoro Fast Take 1 Tab by mouth daily (with lunch). * predniSONE 20 mg tablet Commonly known as:  DELTASONE  
2 tablets every morning with breakfast  
  
 traZODone 50 mg tablet Commonly known as:  Illene Dus Take 1 Tab by mouth nightly. zolpidem 10 mg tablet Commonly known as:  AMBIEN Take 1 Tab by mouth nightly as needed for Sleep. Max Daily Amount: 10 mg.  
  
 ZyrTEC 10 mg Cap Generic drug:  Cetirizine Take  by mouth. * Notice: This list has 2 medication(s) that are the same as other medications prescribed for you. Read the directions carefully, and ask your doctor or other care provider to review them with you. Prescriptions Sent to Pharmacy Refills  
 azithromycin (ZITHROMAX) 250 mg tablet 0 Si tablets  first day then 1 tablet daily Class: Normal  
 Pharmacy: [facility], [address] [place] [address] #: [phone] We Performed the Following AMB POC PFT COMPLETE W/BRONCHODILATOR [63157 CPT(R)] DIFFUSING CAPACITY [id number] CPT(R)] GAS DILUT/WASHOUT LUNG VOL W/WO DISTRIB VENT&VOL [14753 CPT(R)] RT--OXIMETRY 6 MINUTE WALK [id number] CPT(R)] Follow-up Instructions Return in about 4 weeks (around 2018). To-Do List   
 Around 2018 [place] Stress:  NUCLEAR CARDIAC STRESS TEST Patient Instructions Continue Symbicort 2 inhalations twice daily and remember to wash mouth with water and spit it out after inhaling Albuterol 2 inhalations every 4 hours as needed only if you require albuterol too often to control respiratory symptoms call the office Always call for symptoms such as increasing shortness of breath or cough productive of discolored mucus Introducing [facility] [place] SERVICES! Vane Valderrama introduces [employer] patient portal. Now you can access parts of your medical record, email your doctor's office, and request medication refills online. 1. In your internet browser, go to [email or web address] 2. Click on the First Time User? Click Here link in the Sign In box. You will see the New Member Sign Up page. 3. Enter your [employer] Access Code exactly as it appears below. You will not need to use this code after youve completed the sign-up process. If you do not sign up before the expiration date, you must request a new code. · [employer] Access Code: [email or web address] Expires: 7/9/2018  2:34 PM 
 
4. Enter the last four digits of your Social Security Number (xxxx) and Date of Birth (mm/dd/yyyy) as indicated and click Submit. You will be taken to the next sign-up page. 5. Create a [employer] ID. This will be your [employer] login ID and cannot be changed, so think of one that is secure and easy to remember. 6. Create a [employer] password. You can change your password at any time. 7. Enter your Password Reset Question and Answer. This can be used at a later time if you forget your password. 8. Enter your e-mail address. You will receive e-mail notification when new information is available in [address] 9. Click Sign Up. You can now view and download portions of your medical record. 10. Click the Download Summary menu link to download a portable copy of your medical information. If you have questions, please visit the Frequently Asked Questions section of the [email or web address] website. Remember, [facility] [facility] is NOT to be used for urgent needs. For medical emergencies, dial 911. Now available from your iPhone and Android! Please provide this summary of care documentation to your next provider. Your primary care clinician is listed as Candelario Donald. If you have any questions after today's visit, please call [phone].

## 2018-06-27 NOTE — PROGRESS NOTES
[facility]  Pulmonary, Critical Care, and Sleep Medicine      Chief complaint:  Bronchiectasis pulmonary hypertension    HPI:    Irwin Durán    is 69years old returns the office today relating that she feels congested but has difficulty bringing up mucus though she did cough up a small amount of yellow mucus. She also reports some chest discomfort when she walks primarily at tightness in her chest which resolved spontaneously. She denies daytime sleepiness and sleeps well. She also has no significant leg swelling but has profound dyspnea on exertion. She takes Symbicort faithfully and albuterol about twice a day      Allergies   Allergen Reactions    Latex Itching    Asmanex Hfa [Mometasone] Itching    Cleocin [Clindamycin Hcl] Itching and Swelling     Lips and tongue    Pcn [Penicillins] Swelling    Robitussin [Guaifenesin] Rash     Current Outpatient Prescriptions   Medication Sig    azithromycin (ZITHROMAX) 250 mg tablet 2 tablets  first day then 1 tablet daily    budesonide-formoterol (SYMBICORT) 160-4.5 mcg/actuation HFAA Take 2 Puffs by inhalation two (2) times a day. Indications: MAINTENANCE THERAPY FOR ASTHMA    montelukast (SINGULAIR) 10 mg tablet Take 1 Tab by mouth daily. Indications: MAINTENANCE THERAPY FOR ASTHMA    albuterol-ipratropium (DUO-NEB) 2.5 mg-0.5 mg/3 ml nebu 3 mL by Nebulization route every four (4) hours as needed. ICD 10: I27.2, J84.10    Cetirizine (ZYRTEC) 10 mg cap Take  by mouth.  albuterol (PROVENTIL HFA, VENTOLIN HFA, PROAIR HFA) 90 mcg/actuation inhaler Take 2 Puffs by inhalation every four (4) hours as needed for Wheezing.  zolpidem (AMBIEN) 10 mg tablet Take 1 Tab by mouth nightly as needed for Sleep. Max Daily Amount: 10 mg.  predniSONE (DELTASONE) 10 mg tablet Take 1 Tab by mouth daily (with lunch). (Patient taking differently: Take 5 mg by mouth daily (with lunch). )    furosemide (LASIX) 40 mg tablet Take 1 Tab by mouth daily. Indications: EDEMA    omeprazole (PRILOSEC) 20 mg capsule Take 20 mg by mouth daily.  hydroxychloroquine (PLAQUENIL) 200 mg tablet Take 200 mg by mouth daily.  potassium chloride (KLOR-CON M20) 20 mEq tablet Take 20 mEq by mouth two (2) times a day.  predniSONE (DELTASONE) 20 mg tablet 2 tablets every morning with breakfast    ferrous sulfate ER (IRON) 160 mg (50 mg iron) TbER tablet Take 1 Tab by mouth daily. Indications: patient unsure of the mg of the medication    Oxygen 2 Each by Nasal route. 2 L [employer]    HYDROcodone-acetaminophen (NORCO) 5-325 mg per tablet Take 1 Tab by mouth every six (6) hours as needed. Max Daily Amount: 4 Tabs.  chlorpheniramine-HYDROcodone (TUSSIONEX) 10-8 mg/5 mL suspension Take 5 mL by mouth every twelve (12) hours as needed for Cough. Max Daily Amount: 10 mL.  traZODone (DESYREL) 50 mg tablet Take 1 Tab by mouth nightly.  amLODIPine (NORVASC) 5 mg tablet Take 1 Tab by mouth daily. Indications: HYPERTENSION     No current facility-administered medications for this visit. Past Medical History:   Diagnosis Date    Asthma     Chronic lung disease     Hypertension     Lupus     Pulmonary arterial hypertension (HCC)     Pulmonary hypertension (HCC)     Rheumatoid arthritis(714.0)      History reviewed. No pertinent surgical history. Social History     Social History    Marital status: UNKNOWN     Spouse name: N/A    Number of children: N/A    Years of education: N/A     Occupational History    Not on file.      Social History Main Topics    Smoking status: Former Smoker     Packs/day: 0.50     Years: 16.00     Types: Cigarettes     Quit date: 9/1/1974    Smokeless tobacco: Never Used    Alcohol use No    Drug use: Not on file    Sexual activity: Not on file     Other Topics Concern    Not on file     Social History Narrative     Family History   Problem Relation Age of Onset    Heart Disease Mother     Asthma Mother    [facility] Asthma Father     Parkinson's Disease Father        Review of systems:  She denies fever chills poor appetite or weight loss    Physical Exam:  Visit Vitals    /70 (BP 1 Location: Left arm, BP Patient Position: At rest)    Pulse 82    Temp 98.7 °F (37.1 °C) (Oral)    Resp 22    Ht 5' 3\" (1.6 m)    Wt 75.8 kg (167 lb)    SpO2 96%    BMI 29.58 kg/m2       Well-developed well-nourished  HEENT: WNL  Lymph node exam: Supraclavicular cervical lymph nodes negative  Chest: Equal symmetrical expansion no dullness scattered expiratory and inspiratory rhonchi and occasional wheeze noted  Heart: Regular rhythm no gallop no murmur no JVD no peripheral edema  Extremities: No cyanosis clubbing or calf tenderness  Neurological: Alert and oriented     Labs:    O2 sat room air at rest 96% but after walking only 120 feet O2 sat fell to 87% and was corrected with 2 L of oxygen  Lung volumes revealed a normal vital capacity and reduced residual volume and spirometry revealed no significant airway disease patient was unable to generate a good forced vital capacity making the results unreliable she also had a significantly reduced diffusion capacity of 43%    Impression:     Bronchiectasis which is most likely cause of her pulmonary fibrosis are present symptoms of coughing and wheezing and shortness of breath and which are resulting in her reduced diffusion capacity  Chest discomfort rule out ischemia    Plan:     Nuclear medicine stress test  Empiric treatment with  antibiotics continuing the Symbicort and if no improvement consider supplemental oxygen  Follow-up in 3-4 weeks    Blanche Chung MD , [facility]    CC: Xiao Garber MD     [address] [facility]  [place], [address] [facility] [address]     P: [phone]     F: [phone]

## 2018-06-27 NOTE — PROGRESS NOTES
[facility]    [address] [place] [address], [address]      SIMPLE PULMONARY STRESS TEST - 6 MINUTE WALK    PATIENT NAME: Mykel Sample    DATE: 6/27/2018     YOB: 1940     AGE: 69 y.o. DIAGNOSIS:   Encounter Diagnoses   Name Primary?  Bronchiectasis with acute exacerbation (HCC)     LAND (dyspnea on exertion)     Pulmonary HTN (HCC)          TECHNICIAN: Rudy Malloy, RT         PHYSICIAN: Dr Forest Delgado MD      Visit Vitals    /70 (BP 1 Location: Left arm, BP Patient Position: At rest)    Pulse 82    Temp 98.7 °F (37.1 °C) (Oral)    Resp 22    Ht 5' 3\" (1.6 m)    Wt 167 lb (75.8 kg)    SpO2 96%    BMI 29.58 kg/m2        RESTING DATA:  Dyspnea Scale (1-10):    4 SOB    EXERCISE DATA:   6 MINUTE WALK - HALLWAY (34 METERS)      1   RA    95 % SAT   74 HR   4 SOB    1 Laps x 34m = 34m  2   RA    87 % SAT   83 HR   5 SOB    1 Laps x 34m = 34m  3   2L    92 % SAT   84 HR   4 SOB    1 Laps x 34m = 34m  4   2L    91 % SAT   85 HR   4 SOB    1 Laps x 34m = 34m  5   2L    91 % SAT   86 HR   4 SOB    1 Laps x 34m = 34m  6   2L    92 % SAT   87 HR   4 SOB    1 Laps x 34m = 34m    TOTAL DISTANCE:   204 M    RECOVERY DATA:      1   2L    92 % SAT   86 HR   24 RR   130/70 BP   4 SOB  2   RA    95 % SAT   74 HR   22 RR   120/70 BP   4 SOB  3   RA   96 % SAT   73 HR   22 RR   120/70 BP   4 SOB      TECHNICIAN COMMENTS: Patient tolerated 6 minute walk fairly well. Patient had some pain while walking and said it was from scoliosis. On room air patient's 02 saturation level decreased after 2 minutes to 87%. Patient placed on nasal cannula at 2/lpm and 02 saturation level remained stable in the low 90's ( 91-92%), while heart rate increased to a high of 87 BPM. Patient voiced no complaints other than back pain during walk.

## 2018-06-27 NOTE — PROGRESS NOTES
Chief Complaint   Patient presents with    COPD     1. Have you been to the ER, urgent care clinic since your last visit? Hospitalized since your last visit? No    2. Have you seen or consulted any other health care providers outside of the [address] [place] [address] since your last visit? Include any pap smears or colon screening.  Yes Where: Dr [facility]

## 2018-07-06 ENCOUNTER — HOSPITAL ENCOUNTER (OUTPATIENT)
Dept: NON INVASIVE DIAGNOSTICS | Age: 78
Discharge: HOME | End: 2018-07-06
Attending: INTERNAL MEDICINE
Payer: MEDICARE

## 2018-07-06 VITALS
DIASTOLIC BLOOD PRESSURE: 104 MMHG | BODY MASS INDEX: 29.59 KG/M2 | WEIGHT: 167 LBS | HEIGHT: 63 IN | SYSTOLIC BLOOD PRESSURE: 164 MMHG

## 2018-07-06 DIAGNOSIS — R07.9 CHEST PAIN, UNSPECIFIED TYPE: ICD-10-CM

## 2018-07-06 LAB
NUC REST EJECTION FRACTION: 75 %
STRESS BASELINE DIAS BP: 104 MMHG
STRESS BASELINE HR: 89 BPM
STRESS BASELINE SYS BP: 164 MMHG
STRESS ESTIMATED WORKLOAD: 1 METS
STRESS EXERCISE DUR MIN: NORMAL
STRESS PEAK DIAS BP: 90 MMHG
STRESS PEAK SYS BP: 184 MMHG
STRESS POST PEAK HR: 96 BPM
STRESS RATE PRESSURE PRODUCT: NORMAL BPM*MMHG
STRESS ST DEPRESSION: 0 MM
STRESS ST ELEVATION: 0 MM
STRESS STAGE 1 DURATION: 1 MIN:SEC
STRESS STAGE 1 HR: 96 BPM
STRESS STAGE 2 BP: NORMAL MMHG
STRESS STAGE 2 DURATION: 1 MIN:SEC
STRESS STAGE 2 HR: 96 BPM
STRESS STAGE 3 BP: NORMAL MMHG
STRESS STAGE 3 DURATION: 1 MIN:SEC
STRESS STAGE 3 HR: 94 BPM
STRESS STAGE 4 BP: NORMAL MMHG
STRESS STAGE 4 DURATION: 1 MIN:SEC
STRESS STAGE 4 HR: 88 BPM
STRESS STAGE RECOVERY 1 BP: NORMAL MMHG
STRESS STAGE RECOVERY 1 DURATION: 2 MIN:SEC
STRESS STAGE RECOVERY 1 HR: 87 BPM
STRESS TARGET HR: 122 BPM
TID: 0.89

## 2018-07-06 PROCEDURE — A9500 TC99M SESTAMIBI: HCPCS

## 2018-07-06 PROCEDURE — 74011250636 HC RX REV CODE- 250/636: Performed by: INTERNAL MEDICINE

## 2018-07-06 RX ORDER — SODIUM CHLORIDE 9 MG/ML
250 INJECTION, SOLUTION INTRAVENOUS ONCE
Status: COMPLETED | OUTPATIENT
Start: 2018-07-06 | End: 2018-07-06

## 2018-07-06 RX ADMIN — SODIUM CHLORIDE 250 ML/HR: 900 INJECTION, SOLUTION INTRAVENOUS at 10:55

## 2018-07-06 RX ADMIN — REGADENOSON 0.4 MG: 0.08 INJECTION, SOLUTION INTRAVENOUS at 10:55

## 2018-07-06 NOTE — PROGRESS NOTES
Patient was given 10.33 milliCuries of 99mTc-Sestamibi for the resting images. Patient was also given 32.6 milliCuries of 99mTc-Sestamibi for the stress images. Injected with 0.4mg Lexiscan. Patient's armband was discarded and shredded.

## 2018-07-24 ENCOUNTER — OFFICE VISIT (OUTPATIENT)
Dept: PULMONOLOGY | Age: 78
End: 2018-07-24

## 2018-07-24 VITALS
HEART RATE: 69 BPM | BODY MASS INDEX: 29.23 KG/M2 | SYSTOLIC BLOOD PRESSURE: 130 MMHG | DIASTOLIC BLOOD PRESSURE: 88 MMHG | RESPIRATION RATE: 12 BRPM | TEMPERATURE: 98 F | WEIGHT: 165 LBS | HEIGHT: 63 IN | OXYGEN SATURATION: 95 %

## 2018-07-24 DIAGNOSIS — J47.9 BRONCHIECTASIS WITHOUT COMPLICATION (HCC): ICD-10-CM

## 2018-07-24 DIAGNOSIS — J20.9 ACUTE BRONCHITIS, UNSPECIFIED ORGANISM: Primary | ICD-10-CM

## 2018-07-24 RX ORDER — PREDNISONE 5 MG/1
2.5 TABLET ORAL DAILY
COMMUNITY
End: 2019-04-23 | Stop reason: SDUPTHER

## 2018-07-24 RX ORDER — NAPROXEN SODIUM 220 MG
220 TABLET ORAL 2 TIMES DAILY WITH MEALS
COMMUNITY
End: 2021-09-09

## 2018-07-24 RX ORDER — LEVOFLOXACIN 500 MG/1
500 TABLET, FILM COATED ORAL DAILY
Qty: 7 TAB | Refills: 0 | Status: SHIPPED | OUTPATIENT
Start: 2018-07-24 | End: 2019-01-22 | Stop reason: ALTCHOICE

## 2018-07-24 RX ORDER — ASPIRIN 325 MG
325 TABLET ORAL DAILY
COMMUNITY
End: 2020-12-29

## 2018-07-24 RX ORDER — PREDNISONE 20 MG/1
20 TABLET ORAL
Qty: 14 TAB | Refills: 0 | Status: SHIPPED | OUTPATIENT
Start: 2018-07-24 | End: 2019-07-01 | Stop reason: DRUGHIGH

## 2018-07-24 NOTE — PROGRESS NOTES
[facility]  Pulmonary, Critical Care, and Sleep Medicine      Chief complaint:  Cough and shortness of breath    HPI:    Jt Soto    is 69years old returns the office today for a persisting cough and shortness of breath she says despite taking the antibiotics and prednisone she continues to have shortness of breath with thick mucus congestion which is at times discolored. She denies chest pain or leg swelling but has significant dyspnea with exertion. Allergies   Allergen Reactions    Latex Itching    Asmanex Hfa [Mometasone] Itching    Cleocin [Clindamycin Hcl] Itching and Swelling     Lips and tongue    Pcn [Penicillins] Swelling    Robitussin [Guaifenesin] Rash     Current Outpatient Prescriptions   Medication Sig    predniSONE (DELTASONE) 5 mg tablet Take 2.5 mg by mouth daily. Takes 1/2 of 5 mg tab    naproxen sodium (ALEVE) 220 mg tablet Take 220 mg by mouth two (2) times daily (with meals).  aspirin (ASPIRIN) 325 mg tablet Take 325 mg by mouth daily.  budesonide-formoterol (SYMBICORT) 160-4.5 mcg/actuation HFAA Take 2 Puffs by inhalation two (2) times a day. Indications: MAINTENANCE THERAPY FOR ASTHMA    montelukast (SINGULAIR) 10 mg tablet Take 1 Tab by mouth daily. Indications: MAINTENANCE THERAPY FOR ASTHMA    albuterol-ipratropium (DUO-NEB) 2.5 mg-0.5 mg/3 ml nebu 3 mL by Nebulization route every four (4) hours as needed. ICD 10: I27.2, J84.10    Cetirizine (ZYRTEC) 10 mg cap Take  by mouth.  albuterol (PROVENTIL HFA, VENTOLIN HFA, PROAIR HFA) 90 mcg/actuation inhaler Take 2 Puffs by inhalation every four (4) hours as needed for Wheezing.  zolpidem (AMBIEN) 10 mg tablet Take 1 Tab by mouth nightly as needed for Sleep. Max Daily Amount: 10 mg.    amLODIPine (NORVASC) 5 mg tablet Take 1 Tab by mouth daily. Indications: HYPERTENSION    furosemide (LASIX) 40 mg tablet Take 1 Tab by mouth daily.  Indications: EDEMA    omeprazole (PRILOSEC) 20 mg capsule Take 20 mg by mouth daily.  hydroxychloroquine (PLAQUENIL) 200 mg tablet Take 200 mg by mouth daily.  potassium chloride (KLOR-CON M20) 20 mEq tablet Take 20 mEq by mouth two (2) times a day.  predniSONE (DELTASONE) 20 mg tablet 2 tablets every morning with breakfast    ferrous sulfate ER (IRON) 160 mg (50 mg iron) TbER tablet Take 1 Tab by mouth daily. Indications: patient unsure of the mg of the medication    Oxygen 2 Each by Nasal route. 2 L [employer]    HYDROcodone-acetaminophen (NORCO) 5-325 mg per tablet Take 1 Tab by mouth every six (6) hours as needed. Max Daily Amount: 4 Tabs.  chlorpheniramine-HYDROcodone (TUSSIONEX) 10-8 mg/5 mL suspension Take 5 mL by mouth every twelve (12) hours as needed for Cough. Max Daily Amount: 10 mL.  predniSONE (DELTASONE) 10 mg tablet Take 1 Tab by mouth daily (with lunch). (Patient taking differently: Take 5 mg by mouth daily (with lunch). )    traZODone (DESYREL) 50 mg tablet Take 1 Tab by mouth nightly. No current facility-administered medications for this visit. Past Medical History:   Diagnosis Date    Asthma     Chronic lung disease     Hypertension     Lupus     Pulmonary arterial hypertension (HCC)     Pulmonary hypertension (HCC)     Rheumatoid arthritis(714.0)      No past surgical history on file. Social History     Social History    Marital status: UNKNOWN     Spouse name: N/A    Number of children: N/A    Years of education: N/A     Occupational History    Not on file.      Social History Main Topics    Smoking status: Former Smoker     Packs/day: 0.50     Years: 16.00     Types: Cigarettes     Quit date: 9/1/1974    Smokeless tobacco: Never Used    Alcohol use No    Drug use: Not on file    Sexual activity: Not on file     Other Topics Concern    Not on file     Social History Narrative     Family History   Problem Relation Age of Onset    Heart Disease Mother     Asthma Mother     Asthma Father     Parkinson's Disease Father        Review of systems:  She denies fever chills poor appetite or weight loss    Physical Exam:  Visit Vitals    /88 (BP 1 Location: Left arm, BP Patient Position: Sitting)    Pulse 69    Temp 98 °F (36.7 °C) (Oral)    Resp 12    Ht 5' 3\" (1.6 m)    Wt 74.8 kg (165 lb)    SpO2 95%    BMI 29.23 kg/m2       Well-developed well-nourished  HEENT: WNL  Lymph node exam: Supraclavicular cervical lymph nodes negative  Chest: Equal symmetrical expansion no dullness no  rales rubs inspiratory and expiratory rhonchi and expiratory wheezing some which clears with deep breathing and cough  Heart: Regular rhythm no gallop no murmur no JVD no peripheral edema  Extremities: No cyanosis clubbing or calf tenderness  Neurological: Alert and oriented    Labs:    O2 sat 95% room air at rest  The patient produce sputum which was thick off-white    Impression:     Wheezing and I noted in the past and eosinophil percentage that was a might suggest a diagnosis of asthma    Plan:   Empiric treatment with prednisone for wheezing  Empiric treatment with antibiotic Levaquin in a pen allergic patient not responsive to azithromycin  Follow-up in 4 weeks or sooner if needed    Michelle Tejada MD , [facility]    CC: Nate Carlos MD     [address] [facility]  [place], [address] [address]     P: [phone]     F: [phone]

## 2018-07-24 NOTE — PATIENT INSTRUCTIONS
Continue Symbicort 2 inhalations twice daily    Continue albuterol by nebulization or by hand-held inhaler (2 puffs) every 4 hours as needed and if you require albuterol too often to control respiratory symptoms call the office    Prednisone 2 tablets (40 mg) every morning with breakfast    Levaquin 1 tablet daily for 7 days    Call for worsening symptoms

## 2018-07-27 NOTE — TELEPHONE ENCOUNTER
PT CALLED([phone]). DOES NOT WANT TO TAKE LEVAQUIN DUE TO THE SIDE EFFECTS. SHE WOULD LIKE [facility] TO SEND SCRIPT FOR CIPRO TO [facility] [phone]. PLEASE CHECK AND CALL HER BACK.

## 2018-07-27 NOTE — TELEPHONE ENCOUNTER
Pt ready the side effects to levaquin and does not want to take it. She wants Dr. Ana Mcpherson to put her on Cipro if possible.  She understands Dr. Ana Mcpherson will be back in office monday

## 2018-07-30 NOTE — TELEPHONE ENCOUNTER
Per verbal order from Dr. Srikanth Gale, Levaquin best drug for treatment and Cipro has the same side effects pt is worried about with tendons.  Pt understands and states she will fill Levaquin Rx

## 2018-08-09 NOTE — TELEPHONE ENCOUNTER
PT CALLED([phone]). NEEDS F TO SEND SCRIPT FOR IPRATROPIUM BROMIDE & ALBUTEROL SULFATE TO [facility] [phone].

## 2018-08-09 NOTE — TELEPHONE ENCOUNTER
The pt. Is going OOT early tomorrow morning. I asked he to call Dr. Brent Atwood to see if, he could do a month supply of the Neb. Sols. that she could  right away. We don't have an MD in the office at present and the pt. can't wait until tomorrow.

## 2018-08-28 ENCOUNTER — OFFICE VISIT (OUTPATIENT)
Dept: PULMONOLOGY | Age: 78
End: 2018-08-28

## 2018-08-28 VITALS
OXYGEN SATURATION: 95 % | DIASTOLIC BLOOD PRESSURE: 64 MMHG | RESPIRATION RATE: 16 BRPM | SYSTOLIC BLOOD PRESSURE: 116 MMHG | WEIGHT: 167 LBS | BODY MASS INDEX: 29.59 KG/M2 | TEMPERATURE: 98.2 F | HEART RATE: 75 BPM | HEIGHT: 63 IN

## 2018-08-28 DIAGNOSIS — K76.6 PAH (PULMONARY ARTERIAL HYPERTENSION) WITH PORTAL HYPERTENSION (HCC): ICD-10-CM

## 2018-08-28 DIAGNOSIS — J84.10 PULMONARY FIBROSIS (HCC): ICD-10-CM

## 2018-08-28 DIAGNOSIS — I27.21 PAH (PULMONARY ARTERIAL HYPERTENSION) WITH PORTAL HYPERTENSION (HCC): ICD-10-CM

## 2018-08-28 DIAGNOSIS — J47.9 BRONCHIECTASIS WITHOUT COMPLICATION (HCC): Primary | ICD-10-CM

## 2018-08-28 NOTE — PATIENT INSTRUCTIONS
Submit sputum for culture to hospital laboratory Continue Symbicort 2 inhalations twice daily Continue DuoNeb by nebulization every 4 hours as needed or albuterol (Ventolin) 2 puffs every 4 hours as needed Always call for symptoms such as increasing shortness of breath

## 2018-08-28 NOTE — PROGRESS NOTES
Chief Complaint Patient presents with  Bronchiectasis  Cough  Shortness of Breath 1. Have you been to the ER, urgent care clinic since your last visit? Hospitalized since your last visit? No 
 
2. Have you seen or consulted any other health care providers outside of the [address] [place] [address] since your last visit? Include any pap smears or colon screening.  No

## 2018-08-28 NOTE — PROGRESS NOTES
[facility] Pulmonary, Critical Care, and Sleep Medicine Chief complaint: 
Chronic bronchiectasis pulmonary hypertension HPI: 
 
Kevin German    is 69years old and returns the office today for follow-up concerning chronic rhonchi ectasis pulmonary hypertension and relates that she still has dyspnea on exertion but is able to do light housework and shopping if she walks well. She denies chest pain significant leg swelling but does report a chronic cough which is worse at night and she coughs up clear to discolored mucus without blood. She continues to take Symbicort albuterol as needed Allergies Allergen Reactions  Latex Itching  Asmanex Hfa [Mometasone] Itching  Cleocin [Clindamycin Hcl] Itching and Swelling Lips and tongue  Pcn [Penicillins] Swelling  Robitussin [Guaifenesin] Rash Current Outpatient Prescriptions Medication Sig  
 naproxen sodium (ALEVE) 220 mg tablet Take 220 mg by mouth two (2) times daily (with meals).  aspirin (ASPIRIN) 325 mg tablet Take 325 mg by mouth daily.  budesonide-formoterol (SYMBICORT) 160-4.5 mcg/actuation HFAA Take 2 Puffs by inhalation two (2) times a day. Indications: MAINTENANCE THERAPY FOR ASTHMA  albuterol-ipratropium (DUO-NEB) 2.5 mg-0.5 mg/3 ml nebu 3 mL by Nebulization route every four (4) hours as needed. ICD 10: I27.2, J84.10  Cetirizine (ZYRTEC) 10 mg cap Take  by mouth.  albuterol (PROVENTIL HFA, VENTOLIN HFA, PROAIR HFA) 90 mcg/actuation inhaler Take 2 Puffs by inhalation every four (4) hours as needed for Wheezing.  HYDROcodone-acetaminophen (NORCO) 5-325 mg per tablet Take 1 Tab by mouth every six (6) hours as needed. Max Daily Amount: 4 Tabs.  zolpidem (AMBIEN) 10 mg tablet Take 1 Tab by mouth nightly as needed for Sleep. Max Daily Amount: 10 mg.  
 amLODIPine (NORVASC) 5 mg tablet Take 1 Tab by mouth daily.  Indications: HYPERTENSION  
  furosemide (LASIX) 40 mg tablet Take 1 Tab by mouth daily. Indications: EDEMA  omeprazole (PRILOSEC) 20 mg capsule Take 20 mg by mouth daily.  hydroxychloroquine (PLAQUENIL) 200 mg tablet Take 200 mg by mouth daily.  potassium chloride (KLOR-CON M20) 20 mEq tablet Take 20 mEq by mouth two (2) times a day.  predniSONE (DELTASONE) 5 mg tablet Take 2.5 mg by mouth daily. Takes 1/2 of 5 mg tab  predniSONE (DELTASONE) 20 mg tablet Take 1 Tab by mouth daily (with breakfast).  levoFLOXacin (LEVAQUIN) 500 mg tablet Take 1 Tab by mouth daily.  predniSONE (DELTASONE) 20 mg tablet 2 tablets every morning with breakfast  
 montelukast (SINGULAIR) 10 mg tablet Take 1 Tab by mouth daily. Indications: MAINTENANCE THERAPY FOR ASTHMA  ferrous sulfate ER (IRON) 160 mg (50 mg iron) TbER tablet Take 1 Tab by mouth daily. Indications: patient unsure of the mg of the medication  Oxygen 2 Each by Nasal route. 2 L [employer]  chlorpheniramine-HYDROcodone (TUSSIONEX) 10-8 mg/5 mL suspension Take 5 mL by mouth every twelve (12) hours as needed for Cough. Max Daily Amount: 10 mL.  predniSONE (DELTASONE) 10 mg tablet Take 1 Tab by mouth daily (with lunch). (Patient taking differently: Take 5 mg by mouth daily (with lunch). )  traZODone (DESYREL) 50 mg tablet Take 1 Tab by mouth nightly. No current facility-administered medications for this visit. Past Medical History:  
Diagnosis Date  Asthma  Chronic lung disease  Hypertension  Lupus  Pulmonary arterial hypertension ([address]  Pulmonary hypertension ([address]  Rheumatoid arthritis(714.0) History reviewed. No pertinent surgical history. Social History Social History  Marital status: UNKNOWN Spouse name: N/A  
 Number of children: N/A  
 Years of education: N/A Occupational History  Not on file. Social History Main Topics  Smoking status: Former Smoker Packs/day: 0.50 Years: 16.00 Types: Cigarettes Quit date: 9/1/1974  Smokeless tobacco: Never Used  Alcohol use No  
 Drug use: Not on file  Sexual activity: Not on file Other Topics Concern  Not on file Social History Narrative Family History Problem Relation Age of Onset  Heart Disease Mother  Asthma Mother  Asthma Father  Parkinson's Disease Father Review of systems: 
She denies fever chills and says she has a modest appetite which is stable and office records indicate her weight is stable Physical Exam: 
Visit Vitals  /64 (BP 1 Location: Left arm, BP Patient Position: Sitting)  Pulse 75  Temp 98.2 °F (36.8 °C) (Oral)  Resp 16  
 Ht 5' 3\" (1.6 m)  Wt 75.8 kg (167 lb)  SpO2 95%  BMI 29.58 kg/m2 Well-developed well-nourished HEENT: WNL Lymph node exam: Supraclavicular cervical lymph nodes negative Chest: Equal symmetrical expansion no dullness no wheezes rales rubs Heart: Regular rhythm no gallop no murmur no JVD no peripheral edema Extremities: No cyanosis clubbing or calf tenderness Neurological: Alert and oriented Labs: 
 
O2 sat 95% room air at rest 
 
Impression:  
 
Chronic cough rule out mycobacterial infection Pulmonary hypertension reevaluate with repeat echo Continue treatment for mild COPD by PFTs with Symbicort and as needed albuterol if the troponin Plan:  
 
Sputum for AFB Repeat echocardiography Continue Symbicort and DuoNeb's Follow-up in 8 weeks Heidy Vazquez MD , [facility] 
 
CC: Sarika Abrams MD  
 
[address] [facility], [address] [address]     P: [phone]     F: [phone]

## 2018-09-27 NOTE — PROGRESS NOTES
[facility] - Martine Moreira  [address] [facility]  [phone] fax    Speech Therapy Discharge Summary    Patient name: Ghazala Avila Start of Care: 2018   Referral source: Nohelia Chaves NP : [id number]   Medical/Treatment Diagnosis: Dysphagia [R13.10]  Bronchiectasis with (acute) exacerbation [J47.1] Onset Date:May 2017     Prior Hospitalization: see medical history Provider#: [id number]   Medications: Verified on Patient Summary List    Comorbidities: Pneumonia, Hypertension, Systemic lupus erythematosus, Dyslipidemia, COPD, Pulmonary HTN, Acute bronchitis    Prior Level of Function: Independent, Regular solids/thin liquids                           Visits from Start of Care: 1    Missed Visits: 0    Reporting Period : 2018 to 2018    Summary of Care:  Goals:  1.) Pt will perform pharyngeal exercises in structured therapy setting to improve functional swallow provided Mod A in 9:10 trials to promote carryover of HEP.  2.) Pt will tolerate nectar thick liquids with 0 s/sx of aspiration/ penetration over 10 trials with Mod A over 2 therapy sessions in order to improve Pt safety with liquids and increase overall QOL.     3. ) Pt will tolerate regular diet trials with 0 s/sx of aspiration/ penetration over 10 trials with Mod A over 2 therapy sessions in order to reduce risk for aspiration. 4.) Pt will utilizing safe swallowing strategies and compensatory techniques across 9:10 PO trials of regular diet and nectar thick liquids with Mod A in order to reduce risk for aspiration. 5.) Pt will tolerate a regular diet with no instances of oral residue or pocketing with Min A over 10 trials in order to reduce risk for aspiration and increase overall QOL. Status at last note/certification: ALL GOALS: Initial evaluation Pt presented with s/sx of aspiration   Status at discharge:  ALL GOALS: No goals met as Pt did not return for therapy after initial evaluation    ASSESSMENT:   Initial evaluation: \"Oral motor exam revealed Pt oral motor structures grossly intact for mastication and deglutition. This day, Pt accepted self-fed thin, nectar-thick, honey-thick liquids (individual sips and consecutive swallows), regular solids, and mechanical soft diet trials. Inadequate oral bolus prep and timely anterior posterior transit noted with regular diet trials. Noted lingual stasis >15% with regular diet trials, which cleared provided verbal cues to clear residue with her tongue and second swallow. Pt had delayed swallow initiation >1 second. Decreased hyolaryngeal excursion to palpation for all PO trials. Pt exhibited s/sx of aspiration/penetration c/b delayed cough (regular diet trials), rhinorrhea (regular diet, thin liquids, and nectar-think liquids), and throat clear (thin liquid and nectar-thick liquid). Audible swallow and multiple swallows noted. Pt had least difficulty consuming honey thick liquids and a mechanical soft diet as Pt reported greater comfort and SLP-CF noted no throat clearing following honey-thick liquid consistency. Pt educated on how to thicken liquids to honey consistency. Pt required cues to decreased rate and size of intake during PO trials.       Pt presents with moderate oropharyngeal dysphagia, as evidenced above, which places Pt at risk for aspiration/recurrent aspiration pneumonia and malnutrition. Skilled speech intervention for dysphagia warranted to facilitate effective swallow of least restrictive diet for meeting nutritional needs, dining in social situations, and QOL. SLP-CF recommending honey-thick liquid diet and mechanical soft diet at this time. MBS is also recommended to further assess Pt's swallow function, confirm aspiration/penetration, and to determine least restrictive diet and postural and compensatory strategies to approve the safety of Pt's swallow.  Pt may benefit from GI consult to further assess Pt's complaint of globus sensation. \"    Pt completed MBS on 6/7/2018 with the following results: \"MBS completed with results yielding oropharyngeal swallow function to be essentially [facility]. Pt tolerated reg solid, puree, thin liquid +/- straw, and 13 mm Ba pill with thin liquid wash without aspiration/penetration events. Bolus manipulation, tongue base retraction, laryngeal elevation, and overall pharyngeal motility/sensation appeared functional throughout study. Min vallecular/pyriform residuals were noted throughout study post swallow, but were cleared with second volitional swallow. Recommending regular solid diet with thin liquids, aspiration precautions, oral care TID, and medications as tolerated. Pt may benefit from a GI consult to further assess esophageal swallow function. Results/recommendations d/w pt in detail utilizing video feedback with verbalized understanding. No further skilled SLP services are indicated at this time. Please re-consult if needed. \"     Pt did not return for ST services after initial evaluation. D/C at this time. RECOMMENDATIONS:  [x]Discontinue therapy: []Patient has reached or is progressing toward set goals      [x]Patient did not return for services after initial evaluation      []Due to lack of appreciable progress towards set goals     No GCodes provided as Pt did not return for therapy after initial evaluation.      [address], CCC-SLP  9/27/2018 9:47 AM

## 2018-10-18 DIAGNOSIS — K76.6 PAH (PULMONARY ARTERIAL HYPERTENSION) WITH PORTAL HYPERTENSION (HCC): ICD-10-CM

## 2018-10-18 DIAGNOSIS — I27.21 PAH (PULMONARY ARTERIAL HYPERTENSION) WITH PORTAL HYPERTENSION (HCC): ICD-10-CM

## 2018-10-22 RX ORDER — ALBUTEROL SULFATE 90 UG/1
AEROSOL, METERED RESPIRATORY (INHALATION)
Qty: 1 INHALER | Refills: 1 | Status: SHIPPED | OUTPATIENT
Start: 2018-10-22 | End: 2020-12-29 | Stop reason: SDUPTHER

## 2018-10-23 ENCOUNTER — OFFICE VISIT (OUTPATIENT)
Dept: PULMONOLOGY | Age: 78
End: 2018-10-23

## 2018-10-23 VITALS
TEMPERATURE: 98.7 F | WEIGHT: 158 LBS | HEART RATE: 75 BPM | SYSTOLIC BLOOD PRESSURE: 120 MMHG | OXYGEN SATURATION: 94 % | HEIGHT: 63 IN | BODY MASS INDEX: 28 KG/M2 | RESPIRATION RATE: 20 BRPM | DIASTOLIC BLOOD PRESSURE: 60 MMHG

## 2018-10-23 DIAGNOSIS — I27.20 PULMONARY HTN (HCC): Primary | ICD-10-CM

## 2018-10-23 DIAGNOSIS — J47.9 BRONCHIECTASIS WITHOUT COMPLICATION (HCC): ICD-10-CM

## 2018-10-23 DIAGNOSIS — M32.9 SYSTEMIC LUPUS ERYTHEMATOSUS, UNSPECIFIED SLE TYPE, UNSPECIFIED ORGAN INVOLVEMENT STATUS (HCC): ICD-10-CM

## 2018-10-23 NOTE — PROGRESS NOTES
The pt. C/o chest tightness with lying flat. It lasts approx. 10 mins. .  Dr. Ce Krishnamurthy asked her to sleep on two pillows which did alleviate the problem somewhat. No ED nor Urgent Care visits recently. She states [employer] sent her a letter hinting that she should cease having so many tests done. She provides the letter for us to copy. She had her Echo done at [facility]/Dr. Manuel Blanco. I contacted the office to obtain the report.

## 2019-01-22 ENCOUNTER — OFFICE VISIT (OUTPATIENT)
Dept: PULMONOLOGY | Age: 79
End: 2019-01-22

## 2019-01-22 VITALS
HEIGHT: 63 IN | TEMPERATURE: 97.6 F | HEART RATE: 73 BPM | WEIGHT: 166 LBS | OXYGEN SATURATION: 94 % | RESPIRATION RATE: 20 BRPM | SYSTOLIC BLOOD PRESSURE: 118 MMHG | DIASTOLIC BLOOD PRESSURE: 74 MMHG | BODY MASS INDEX: 29.41 KG/M2

## 2019-01-22 DIAGNOSIS — R06.02 SOB (SHORTNESS OF BREATH): Primary | ICD-10-CM

## 2019-01-22 NOTE — PROGRESS NOTES
[facility] Pulmonary, Critical Care, and Sleep Medicine Chief complaint: 
Shortness of breath in a patient with pulmonary fibrosis probably related to chronic infections HPI: 
 
Gregoria Marrero    is 69years old and comes to the office today for follow-up and relates that she still has profound dyspnea with almost any kind of activity although it has improved somewhat in that she is less dyspneic with exertion. She still has a chronic cough but rarely brings up mucus and denies hemoptysis chest pain or recent leg swelling. Allergies Allergen Reactions  Latex Itching  Asmanex Hfa [Mometasone] Itching  Cleocin [Clindamycin Hcl] Itching and Swelling Lips and tongue  Pcn [Penicillins] Swelling  Robitussin [Guaifenesin] Rash Current Outpatient Medications Medication Sig  
 PROAIR HFA 90 mcg/actuation inhaler inhale 2 puffs by mouth every 4 hours if needed for wheezing  predniSONE (DELTASONE) 5 mg tablet Take 2.5 mg by mouth daily. Takes 1/2 of 5 mg tab  naproxen sodium (ALEVE) 220 mg tablet Take 220 mg by mouth two (2) times daily (with meals).  aspirin (ASPIRIN) 325 mg tablet Take 325 mg by mouth daily.  budesonide-formoterol (SYMBICORT) 160-4.5 mcg/actuation HFAA Take 2 Puffs by inhalation two (2) times a day. Indications: MAINTENANCE THERAPY FOR ASTHMA  albuterol-ipratropium (DUO-NEB) 2.5 mg-0.5 mg/3 ml nebu 3 mL by Nebulization route every four (4) hours as needed. ICD 10: I27.2, J84.10  Cetirizine (ZYRTEC) 10 mg cap Take  by mouth.  zolpidem (AMBIEN) 10 mg tablet Take 1 Tab by mouth nightly as needed for Sleep. Max Daily Amount: 10 mg.  
 amLODIPine (NORVASC) 5 mg tablet Take 1 Tab by mouth daily. Indications: HYPERTENSION  furosemide (LASIX) 40 mg tablet Take 1 Tab by mouth daily. Indications: EDEMA  omeprazole (PRILOSEC) 20 mg capsule Take 20 mg by mouth daily.  hydroxychloroquine (PLAQUENIL) 200 mg tablet Take 200 mg by mouth daily.  potassium chloride (KLOR-CON M20) 20 mEq tablet Take 20 mEq by mouth two (2) times a day.  predniSONE (DELTASONE) 20 mg tablet Take 1 Tab by mouth daily (with breakfast).  predniSONE (DELTASONE) 20 mg tablet 2 tablets every morning with breakfast  
 montelukast (SINGULAIR) 10 mg tablet Take 1 Tab by mouth daily. Indications: MAINTENANCE THERAPY FOR ASTHMA  ferrous sulfate ER (IRON) 160 mg (50 mg iron) TbER tablet Take 1 Tab by mouth daily. Indications: patient unsure of the mg of the medication  Oxygen 2 Each by Nasal route. 2 L [employer]  HYDROcodone-acetaminophen (NORCO) 5-325 mg per tablet Take 1 Tab by mouth every six (6) hours as needed. Max Daily Amount: 4 Tabs.  chlorpheniramine-HYDROcodone (TUSSIONEX) 10-8 mg/5 mL suspension Take 5 mL by mouth every twelve (12) hours as needed for Cough. Max Daily Amount: 10 mL.  predniSONE (DELTASONE) 10 mg tablet Take 1 Tab by mouth daily (with lunch). (Patient taking differently: Take 5 mg by mouth daily (with lunch). )  traZODone (DESYREL) 50 mg tablet Take 1 Tab by mouth nightly. No current facility-administered medications for this visit. Past Medical History:  
Diagnosis Date  Asthma  Chronic lung disease  Hypertension  Lupus  Pulmonary arterial hypertension ([facility] [address]  Pulmonary hypertension ([facility] [address]  Rheumatoid arthritis(714.0) History reviewed. No pertinent surgical history. Social History Socioeconomic History  Marital status: UNKNOWN Spouse name: Not on file  Number of children: Not on file  Years of education: Not on file  Highest education level: Not on file Social Needs  Financial resource strain: Not on file  Food insecurity - worry: Not on file  Food insecurity - inability: Not on file  Transportation needs - medical: Not on file  Transportation needs - non-medical: Not on file Occupational History  Not on file Tobacco Use  Smoking status: Former Smoker Packs/day: 0.50 Years: 16.00 Pack years: 8.00 Types: Cigarettes Last attempt to quit: 1974 Years since quittin.4  Smokeless tobacco: Never Used Substance and Sexual Activity  Alcohol use: No  
 Drug use: Not on file  Sexual activity: Not on file Other Topics Concern  Not on file Social History Narrative  Not on file Family History Problem Relation Age of Onset  Heart Disease Mother  Asthma Mother  Asthma Father  Parkinson's Disease Father Review of systems: 
She denies fever chills poor appetite or weight loss Physical Exam: 
Visit Vitals /74 (BP 1 Location: Left arm, BP Patient Position: Sitting) Pulse 73 Temp 97.6 °F (36.4 °C) (Oral) Resp 20 Ht 5' 3\" (1.6 m) Wt 75.3 kg (166 lb) SpO2 94% BMI 29.41 kg/m² Well-developed well-nourished HEENT: WNL Lymph node exam: Supraclavicular cervical lymph nodes negative Chest: Equal symmetrical expansion no dullness no wheezes rales or rubs Heart: Regular rhythm no gallop no murmur no JVD no peripheral edema Extremities: No cyanosis clubbing or calf tenderness Neurological: Alert and oriented Labs: 
Cardiac catheterization 18: Normal left ventricular function and borderline elevated pulmonary pressure of 24 mmHg mean pressure O2 sat on room air at rest 94% and with walking 250 feet O2 sat was 92% Impression: The cause of the patient's worsening shortness of breath is not clear pulmonary hypertension has been ruled out. Plan: Further evaluation with high-res CT and complete complete pulmonary function tests for comparison with follow-up visit in 3-4 weeks or sooner if needed Marleny Nassar MD , [facility] 
 
CC: Radha Metzger MD  
 
[address] [facility]. [address]  [place], [address] [address]     P: [phone]     F: [phone]

## 2019-01-22 NOTE — PROGRESS NOTES
Chief Complaint Patient presents with  Bronchiectasis  Cough  Other Pulm HTN 1. Have you been to the ER, urgent care clinic since your last visit? Hospitalized since your last visit? No 
 
2. Have you seen or consulted any other health care providers outside of the [address] [place] [address] since your last visit? Include any pap smears or colon screening. No  
 
[facility] [address] [facility] [address] [place], [address] [facility]) [phone] ([address]) [phone] Simple walk test done in office today. Qualifying O2 sats:  
 
O2 Sat at rest on room air is: 96 %, Pulse 75, SOB scale 0 Walked: 29   m on Room Air : 97 %, Pulse 81, SOB scale 0 
    68   m on Room Air : 92 %, Pulse 78, SOB scale 6 O2 Sat at rest on room air O2 is : 93 %, Pulse 81, SOB scale 6 Tech comments regarding testing: Patient unable to complete simple walk test d/t unsteady gait. Patient's oxygen remained at 92% or above. Patient c/o moderate shortness of breath 6/10, no distress noted.

## 2019-02-06 ENCOUNTER — OFFICE VISIT (OUTPATIENT)
Dept: PULMONOLOGY | Age: 79
End: 2019-02-06

## 2019-02-06 VITALS
BODY MASS INDEX: 28.35 KG/M2 | HEART RATE: 81 BPM | OXYGEN SATURATION: 95 % | WEIGHT: 160 LBS | SYSTOLIC BLOOD PRESSURE: 128 MMHG | RESPIRATION RATE: 20 BRPM | TEMPERATURE: 97.7 F | DIASTOLIC BLOOD PRESSURE: 72 MMHG | HEIGHT: 63 IN

## 2019-02-06 DIAGNOSIS — R05.9 COUGH: Primary | ICD-10-CM

## 2019-02-06 NOTE — PROGRESS NOTES
Chief Complaint Patient presents with  Cough  Shortness of Breath 1. Have you been to the ER, urgent care clinic since your last visit? Hospitalized since your last visit? No 
 
2. Have you seen or consulted any other health care providers outside of the [address] [place] [address] since your last visit? Include any pap smears or colon screening.  No

## 2019-02-06 NOTE — PROGRESS NOTES
[facility] Pulmonary, Critical Care, and Sleep Medicine Chief complaint: 
Cough HPI: 
 
Nathan Boothe    is 69years old and comes to the office today for follow-up concerning a cough which is lasted about 2-1/2 weeks. Patient relates the mucus is purulent and she also has shortness of breath with exertion but says she can still easily walk several 100 feet without significant problems and she denies chest pain and has mild leg swelling which is unchanged Allergies Allergen Reactions  Latex Itching  Asmanex Hfa [Mometasone] Itching  Cleocin [Clindamycin Hcl] Itching and Swelling Lips and tongue  Pcn [Penicillins] Swelling  Robitussin [Guaifenesin] Rash Current Outpatient Medications Medication Sig  
 PROAIR HFA 90 mcg/actuation inhaler inhale 2 puffs by mouth every 4 hours if needed for wheezing  predniSONE (DELTASONE) 5 mg tablet Take 2.5 mg by mouth daily. Takes 1/2 of 5 mg tab  naproxen sodium (ALEVE) 220 mg tablet Take 220 mg by mouth two (2) times daily (with meals).  aspirin (ASPIRIN) 325 mg tablet Take 325 mg by mouth daily.  budesonide-formoterol (SYMBICORT) 160-4.5 mcg/actuation HFAA Take 2 Puffs by inhalation two (2) times a day. Indications: MAINTENANCE THERAPY FOR ASTHMA  albuterol-ipratropium (DUO-NEB) 2.5 mg-0.5 mg/3 ml nebu 3 mL by Nebulization route every four (4) hours as needed. ICD 10: I27.2, J84.10  Cetirizine (ZYRTEC) 10 mg cap Take  by mouth.  HYDROcodone-acetaminophen (NORCO) 5-325 mg per tablet Take 1 Tab by mouth every six (6) hours as needed. Max Daily Amount: 4 Tabs.  zolpidem (AMBIEN) 10 mg tablet Take 1 Tab by mouth nightly as needed for Sleep. Max Daily Amount: 10 mg.  
 amLODIPine (NORVASC) 5 mg tablet Take 1 Tab by mouth daily. Indications: HYPERTENSION  furosemide (LASIX) 40 mg tablet Take 1 Tab by mouth daily. Indications: EDEMA  omeprazole (PRILOSEC) 20 mg capsule Take 20 mg by mouth daily.  hydroxychloroquine (PLAQUENIL) 200 mg tablet Take 200 mg by mouth daily.  potassium chloride (KLOR-CON M20) 20 mEq tablet Take 20 mEq by mouth two (2) times a day.  predniSONE (DELTASONE) 20 mg tablet Take 1 Tab by mouth daily (with breakfast).  predniSONE (DELTASONE) 20 mg tablet 2 tablets every morning with breakfast  
 montelukast (SINGULAIR) 10 mg tablet Take 1 Tab by mouth daily. Indications: MAINTENANCE THERAPY FOR ASTHMA  ferrous sulfate ER (IRON) 160 mg (50 mg iron) TbER tablet Take 1 Tab by mouth daily. Indications: patient unsure of the mg of the medication  Oxygen 2 Each by Nasal route. 2 L [employer]  chlorpheniramine-HYDROcodone (TUSSIONEX) 10-8 mg/5 mL suspension Take 5 mL by mouth every twelve (12) hours as needed for Cough. Max Daily Amount: 10 mL.  predniSONE (DELTASONE) 10 mg tablet Take 1 Tab by mouth daily (with lunch). (Patient taking differently: Take 5 mg by mouth daily (with lunch). )  traZODone (DESYREL) 50 mg tablet Take 1 Tab by mouth nightly. No current facility-administered medications for this visit. Past Medical History:  
Diagnosis Date  Asthma  Chronic lung disease  Hypertension  Lupus  Pulmonary arterial hypertension ([facility] [address]  Pulmonary hypertension ([facility] [address] [facility]  Rheumatoid arthritis(714.0) No past surgical history on file. Social History Socioeconomic History  Marital status: UNKNOWN Spouse name: Not on file  Number of children: Not on file  Years of education: Not on file  Highest education level: Not on file Social Needs  Financial resource strain: Not on file  Food insecurity - worry: Not on file  Food insecurity - inability: Not on file  Transportation needs - medical: Not on file  Transportation needs - non-medical: Not on file Occupational History  Not on file Tobacco Use  
  Smoking status: Former Smoker Packs/day: 0.50 Years: 16.00 Pack years: 8.00 Types: Cigarettes Last attempt to quit: 1974 Years since quittin.4  Smokeless tobacco: Never Used Substance and Sexual Activity  Alcohol use: No  
 Drug use: Not on file  Sexual activity: Not on file Other Topics Concern  Not on file Social History Narrative  Not on file Family History Problem Relation Age of Onset  Heart Disease Mother  Asthma Mother  Asthma Father  Parkinson's Disease Father Review of systems: 
She denies fever chills poor appetite or weight loss Physical Exam: 
Visit Vitals /72 (BP 1 Location: Right arm, BP Patient Position: Sitting) Pulse 81 Temp 97.7 °F (36.5 °C) (Oral) Resp 20 Ht 5' 3\" (1.6 m) Wt 72.6 kg (160 lb) SpO2 95% BMI 28.34 kg/m² Well-developed elderly HEENT: WNL Lymph node exam: Supraclavicular cervical lymph nodes negative Chest: Equal symmetrical expansion no dullness no wheezes rales rubs Heart no gallop no murmur no JVD no peripheral edema Extremities: No cyanosis clubbing or calf tenderness Neurological: Alert and oriented Labs: 
Echocardiogram 10/18/18: Pulmonary hypertension with peak pressure estimated at 54 mmHgO2 sat room air at rest 95% Impression: The cause of the patient's cough is not clear but it sounds like a bacterial infection purulent mucus. She is allergic to penicillin Plan:  
Consider evaluation of pulmonary hypertension in the near future once the infection is improved in a patient who is MIGUEL ANGEL negative now, who had a history of possible connective tissue disorder Obtain sputum culture for specific treatment Follow-up in 1 month or sooner if needed Sammy Manning MD , [facility] 
 
CC: Kassi Cedillo MD  
 
 [facility]. [address] [place], [address] [address]     P: [phone]     F: [phone]

## 2019-02-19 ENCOUNTER — TELEPHONE (OUTPATIENT)
Dept: PULMONOLOGY | Age: 79
End: 2019-02-19

## 2019-02-19 NOTE — TELEPHONE ENCOUNTER
Pt states she saw her pcp, Dr. Kary Winslow and he suggests she be on a low dose of Viagra for her Pulmonary Hypertension. He wants Dr. Robbert Goodpasture to prescribe it for her. Please call pt at [phone].

## 2019-02-21 ENCOUNTER — HOSPITAL ENCOUNTER (OUTPATIENT)
Dept: LAB | Age: 79
Discharge: HOME | End: 2019-02-21
Payer: MEDICARE

## 2019-02-21 DIAGNOSIS — R05.9 COUGH: ICD-10-CM

## 2019-02-21 PROCEDURE — 87070 CULTURE OTHR SPECIMN AEROBIC: CPT

## 2019-02-23 LAB
BACTERIA SPEC CULT: NORMAL
GRAM STN SPEC: NORMAL
SERVICE CMNT-IMP: NORMAL

## 2019-03-15 ENCOUNTER — HOSPITAL ENCOUNTER (OUTPATIENT)
Dept: CT IMAGING | Age: 79
Discharge: HOME | End: 2019-03-15
Attending: INTERNAL MEDICINE
Payer: MEDICARE

## 2019-03-15 DIAGNOSIS — R06.02 SOB (SHORTNESS OF BREATH): ICD-10-CM

## 2019-03-15 PROCEDURE — 71250 CT THORAX DX C-: CPT

## 2019-04-23 ENCOUNTER — OFFICE VISIT (OUTPATIENT)
Dept: PULMONOLOGY | Age: 79
End: 2019-04-23

## 2019-04-23 VITALS
HEART RATE: 81 BPM | BODY MASS INDEX: 29.77 KG/M2 | SYSTOLIC BLOOD PRESSURE: 124 MMHG | RESPIRATION RATE: 20 BRPM | DIASTOLIC BLOOD PRESSURE: 80 MMHG | WEIGHT: 168 LBS | HEIGHT: 63 IN | OXYGEN SATURATION: 95 % | TEMPERATURE: 98 F

## 2019-04-23 DIAGNOSIS — J47.1 BRONCHIECTASIS WITH ACUTE EXACERBATION (HCC): ICD-10-CM

## 2019-04-23 DIAGNOSIS — M06.9 RHEUMATOID ARTHRITIS INVOLVING MULTIPLE SITES, UNSPECIFIED RHEUMATOID FACTOR PRESENCE: Chronic | ICD-10-CM

## 2019-04-23 DIAGNOSIS — M32.9 SYSTEMIC LUPUS ERYTHEMATOSUS, UNSPECIFIED SLE TYPE, UNSPECIFIED ORGAN INVOLVEMENT STATUS (HCC): Chronic | ICD-10-CM

## 2019-04-23 DIAGNOSIS — I27.20 PULMONARY HTN (HCC): Chronic | ICD-10-CM

## 2019-04-23 RX ORDER — BUDESONIDE AND FORMOTEROL FUMARATE DIHYDRATE 160; 4.5 UG/1; UG/1
2 AEROSOL RESPIRATORY (INHALATION) 2 TIMES DAILY
Qty: 1 INHALER | Refills: 0 | Status: SHIPPED | OUTPATIENT
Start: 2019-04-23 | End: 2019-10-11 | Stop reason: ALTCHOICE

## 2019-04-23 RX ORDER — ALBUTEROL SULFATE 90 UG/1
2 AEROSOL, METERED RESPIRATORY (INHALATION)
Qty: 1 INHALER | Refills: 5 | Status: SHIPPED | OUTPATIENT
Start: 2019-04-23

## 2019-04-23 NOTE — PATIENT INSTRUCTIONS
Always call for worsening symptoms of shortness of breath Use 2 L of supplemental oxygen with activity such as walking greater than 300 feet Obtain equipment for overnight oxygen study and return at the next day

## 2019-04-23 NOTE — PROGRESS NOTES
[facility] Pulmonary, Critical Care, and Sleep Medicine Chief complaint: 
Shortness of breath borderline pulmonary hypertension HPI: 
 
Naif Sanchez    is 69years old and returns the office today for follow-up concerning shortness of breath and relates she has significant dyspnea on exertion without chest pain or leg swelling or significant cough. She takes Symbicort and Pro Air. And relates she feels significant improvement especially with Pro Air or the nebulized albuterol despite the fact that her pulmonary function test showed no significant bronchial reactivity Allergies Allergen Reactions  Latex Itching  Asmanex Hfa [Mometasone] Itching  Cleocin [Clindamycin Hcl] Itching and Swelling Lips and tongue  Pcn [Penicillins] Swelling  Robitussin [Guaifenesin] Rash Current Outpatient Medications Medication Sig  
 PROAIR HFA 90 mcg/actuation inhaler inhale 2 puffs by mouth every 4 hours if needed for wheezing  naproxen sodium (ALEVE) 220 mg tablet Take 220 mg by mouth two (2) times daily (with meals).  budesonide-formoterol (SYMBICORT) 160-4.5 mcg/actuation HFAA Take 2 Puffs by inhalation two (2) times a day. Indications: MAINTENANCE THERAPY FOR ASTHMA  albuterol-ipratropium (DUO-NEB) 2.5 mg-0.5 mg/3 ml nebu 3 mL by Nebulization route every four (4) hours as needed. ICD 10: I27.2, J84.10  Cetirizine (ZYRTEC) 10 mg cap Take  by mouth.  zolpidem (AMBIEN) 10 mg tablet Take 1 Tab by mouth nightly as needed for Sleep. Max Daily Amount: 10 mg.  
 amLODIPine (NORVASC) 5 mg tablet Take 1 Tab by mouth daily. Indications: HYPERTENSION  furosemide (LASIX) 40 mg tablet Take 1 Tab by mouth daily. Indications: EDEMA  omeprazole (PRILOSEC) 20 mg capsule Take 20 mg by mouth daily.  hydroxychloroquine (PLAQUENIL) 200 mg tablet Take 200 mg by mouth daily.  potassium chloride (KLOR-CON M20) 20 mEq tablet Take 20 mEq by mouth two (2) times a day.  aspirin (ASPIRIN) 325 mg tablet Take 325 mg by mouth daily.  predniSONE (DELTASONE) 20 mg tablet Take 1 Tab by mouth daily (with breakfast).  predniSONE (DELTASONE) 20 mg tablet 2 tablets every morning with breakfast  
 montelukast (SINGULAIR) 10 mg tablet Take 1 Tab by mouth daily. Indications: MAINTENANCE THERAPY FOR ASTHMA  ferrous sulfate ER (IRON) 160 mg (50 mg iron) TbER tablet Take 1 Tab by mouth daily. Indications: patient unsure of the mg of the medication  Oxygen 2 Each by Nasal route. 2 L [employer] [facility]  HYDROcodone-acetaminophen (NORCO) 5-325 mg per tablet Take 1 Tab by mouth every six (6) hours as needed. Max Daily Amount: 4 Tabs.  chlorpheniramine-HYDROcodone (TUSSIONEX) 10-8 mg/5 mL suspension Take 5 mL by mouth every twelve (12) hours as needed for Cough. Max Daily Amount: 10 mL.  predniSONE (DELTASONE) 10 mg tablet Take 1 Tab by mouth daily (with lunch). (Patient taking differently: Take 5 mg by mouth daily (with lunch). )  traZODone (DESYREL) 50 mg tablet Take 1 Tab by mouth nightly. No current facility-administered medications for this visit. Past Medical History:  
Diagnosis Date  Asthma  Chronic lung disease  Hypertension  Lupus ([facility]  Pulmonary arterial hypertension ([facility]  Pulmonary hypertension ([facility]  Rheumatoid arthritis(714.0) No past surgical history on file. Social History Socioeconomic History  Marital status: UNKNOWN Spouse name: Not on file  Number of children: Not on file  Years of education: Not on file  Highest education level: Not on file Occupational History  Not on file Social Needs  Financial resource strain: Not on file  Food insecurity:  
  Worry: Not on file Inability: Not on file  Transportation needs:  
  Medical: Not on file Non-medical: Not on file Tobacco Use  Smoking status: Former Smoker Packs/day: 0.50   Years: 16.00  
 Pack years: 8.00 Types: Cigarettes Last attempt to quit: 1974 Years since quittin.6  Smokeless tobacco: Never Used Substance and Sexual Activity  Alcohol use: No  
 Drug use: Not on file  Sexual activity: Not on file Lifestyle  Physical activity:  
  Days per week: Not on file Minutes per session: Not on file  Stress: Not on file Relationships  Social connections:  
  Talks on phone: Not on file Gets together: Not on file Attends Rastafarian service: Not on file Active member of club or organization: Not on file Attends meetings of clubs or organizations: Not on file Relationship status: Not on file  Intimate partner violence:  
  Fear of current or ex partner: Not on file Emotionally abused: Not on file Physically abused: Not on file Forced sexual activity: Not on file Other Topics Concern  Not on file Social History Narrative  Not on file Family History Problem Relation Age of Onset  Heart Disease Mother  Asthma Mother  Asthma Father  Parkinson's Disease Father Review of systems: 
She denies fever chills poor appetite or weight loss Physical Exam: 
Visit Vitals /80 (BP 1 Location: Left arm, BP Patient Position: Sitting) Pulse 81 Temp 98 °F (36.7 °C) Resp 20 Ht 5' 3\" (1.6 m) Wt 76.2 kg (168 lb) SpO2 95% BMI 29.76 kg/m² Well-developed well-nourished HEENT: WNL Lymph node exam: Supraclavicular cervical lymph nodes negative Chest: Equal symmetrical expansion no dullness no wheezes rales rubs Heart: Regular rhythm no gallop no murmur no JVD no peripheral edema Extremities: No cyanosis clubbing or calf tenderness Neurological: Alert and oriented Labs: 
 
O2 sat room air at rest 95% after walking several 100 feet O2 sat fell to 88% and was corrected with supplemental oxygen 2 L Impression: Hypoxia with activity and the most likely cause is pulmonary hypertension despite the last right heart cath The patient has a history of lupus and rheumatoid arthritis but is MIGUEL ANGEL negative at this time. She also has had elevated pulmonary artery pressures noted on echocardiogram however her right heart cath showed a mean pressure of 24 1 mm per Hg lower than required for a diagnosis of pulmonary hypertension. Questionable asthma on albuterol and Symbicort Plan:  
Continue Symbicort and as needed albuterol for now Repeat echocardiogram 
Follow-up in 4 months or sooner if needed Supplemental oxygen with activity greater than walking 300 feet Overnight oximetry to ensure she is not hypoxic at night Geremias Byrne MD , [facility] 
 
CC: Neyda Dalton MD  
 
[facility], [address] [address]     P: [phone]     F: [phone]

## 2019-04-23 NOTE — PROGRESS NOTES
Six Minute Walk Test (6MWT) recording form Bernadette Bynum       [id number]                                    @ @ female [phone] []  Medical history checked 
[]  Medical clearance provided for the patient to participate in exercise testing Contraindications to 6MWT: 
[] Resting heart rate > 120 beats / min after 10 minutes rest (relative contraindication) [] Systolic blood pressure > 180 mm Hg +/- diastolic blood pressure > 100 mm Hg (relative contraindication) [] Resting SpO2 < 85% on room air or on prescribed level of supplemental oxygen 
[] Physical disability preventing safe performance 
[] No contraindications identified 6MWT        2019  1:20 PM 
  
 
Supplemental Oxygen ROOM AIR the first 102 meters  Mobility Aid 
N/A Time Mins BP SP02 HR RPE Distance Walked Rests/Comments Rest 124/80 94% 68 22  0/10 
  
1  91% 89   0/10 
  
2  89% 88    
3/10  
3  88% 92   4/10 O2 @ 2L@ Rest  98% 79   0/10 
  
1  96% 87   2/10 
  
2 
 
3 
 
4 
 
5 
 
6  92% 94% 91% 91% 91% 89 
 
89 
 
97 
 
95 
 
95   2/10 
 
2/10 
 
4/10 
 
5/10 
 
6/10 Recovery 1 O2 @ 2 L 98% 93   3/10 Recovery 2 130/70 99% 88   0/10 Total distance:        102 m on RA and 204 m on O2 @ 2 Liters                               Symptom recovery:                                       HR recovery:                                            
Limiting factor:           Pt. Desat to 88%  At 102 meters  And was placed on O2 @ 2 liters Was test terminated: [] No   [x] Yes  If yes, when? At 102 Meters the pt. Was placed on O2 @ 2 liters and the 6 MW was restarted. 6MWT Termination Criteria: 
[] Chest pain or angina-like symptons [] Heart rate > Predicted HR max. [] Evolving mental confusioin, light-headedness or incoordination 
[x] Physical or verbal severe fatigue [] Intolerable dyspnea, unrelieved by rest 
[] Persistent SP02 < 85% (Note pending clinical presentation) [x] Abnormal gait pattern (leg cramps, staggering, ataxia) [] Other clinically warranted reason INTERPRETATION: 
 
The pt. desated at 102 Meters, was placed on O2 @ 2 Liters and the 6 MW test was restarted. She tolerated 204 meters on O2 @ 2 Liters with an O2 Sat of 91% RECOMMENDATION: 
 
 
 
 
 
The pt. And her  decided that they will price an [employer] @ the [employer] O2. They will call back to inform us where to send the orders. She is also, scheduled for an Overnight Oximetry.  
 
 
Christen Guerra LPN

## 2019-04-23 NOTE — PROGRESS NOTES
The pt. Expresses concern re: latest CT result. She also c/o persistent SOB with exertion. Naomy Real presents today for Chief Complaint Patient presents with  Breathing Problem F/U to 2/6 CT 3/15 Is someone accompanying this pt? Yes  Her  Shelley . Is the patient using any DME equipment during OV? Nebulizer 
 -DME Company Unknown. Previously had [facility] in past for O2. Depression Screening: 
3 most recent PHQ Screens 4/23/2019 Little interest or pleasure in doing things Not at all Feeling down, depressed, irritable, or hopeless Not at all Total Score PHQ 2 0 Learning Assessment: 
Learning Assessment 9/29/2016 PRIMARY LEARNER Patient PRIMARY LANGUAGE ENGLISH  
LEARNER PREFERENCE PRIMARY DEMONSTRATION  
  LISTENING  
  READING  
ANSWERED BY Patient RELATIONSHIP SELF Abuse Screening: No flowsheet data found. Fall Risk Fall Risk Assessment, last 12 mths 4/23/2019 Able to walk? Yes Fall in past 12 months? No  
 
 
 
Coordination of Care: 1. Have you been to the ER, urgent care clinic since your last visit? Hospitalized since your last visit? No 
 
2. Have you seen or consulted any other health care providers outside of the [address] [place] [address] since your last visit? She sees a PCP and Rheumatology. Medication variance in dosage/sig per patient as follows: per Med. Req. She needs a DME for nebulizer supplies; she received it on her last hosp., disch. Татьяна Resendiz

## 2019-04-24 ENCOUNTER — TELEPHONE (OUTPATIENT)
Dept: PULMONOLOGY | Age: 79
End: 2019-04-24

## 2019-04-24 DIAGNOSIS — I27.21 PAH (PULMONARY ARTERY HYPERTENSION) (HCC): ICD-10-CM

## 2019-04-24 DIAGNOSIS — R06.02 SHORTNESS OF BREATH ON EXERTION: Primary | ICD-10-CM

## 2019-04-24 DIAGNOSIS — J98.4 CHRONIC RESTRICTIVE LUNG DISEASE: ICD-10-CM

## 2019-04-24 NOTE — TELEPHONE ENCOUNTER
Pt states she wanted to inform Dr. Lane Mins nurse that she has spoken w/ [employer] regarding renting portable o2 and is waiting to receive further info. Please advise [phone].

## 2019-04-24 NOTE — TELEPHONE ENCOUNTER
Spoke with pt. She does not have oxygen at this time and not sure if she will need. She is still having testing done.

## 2019-04-30 ENCOUNTER — CLINICAL SUPPORT (OUTPATIENT)
Dept: PULMONOLOGY | Age: 79
End: 2019-04-30

## 2019-04-30 DIAGNOSIS — I27.20 PULMONARY HTN (HCC): Primary | ICD-10-CM

## 2019-04-30 DIAGNOSIS — J84.10 PULMONARY FIBROSIS (HCC): ICD-10-CM

## 2019-04-30 DIAGNOSIS — R06.02 SOB (SHORTNESS OF BREATH): ICD-10-CM

## 2019-05-06 ENCOUNTER — TELEPHONE (OUTPATIENT)
Dept: PULMONOLOGY | Age: 79
End: 2019-05-06

## 2019-05-06 LAB
O2 AMOUNT, POCO2: NORMAL
POC PULSE OXIMETRY, POCSPO2: NORMAL %

## 2019-05-06 RX ORDER — AZITHROMYCIN 500 MG/1
500 TABLET, FILM COATED ORAL DAILY
Qty: 3 TAB | Refills: 0 | Status: SHIPPED | OUTPATIENT
Start: 2019-05-06 | End: 2019-05-09

## 2019-05-06 RX ORDER — PREDNISONE 20 MG/1
TABLET ORAL
Qty: 6 TAB | Refills: 0 | Status: SHIPPED | OUTPATIENT
Start: 2019-05-06 | End: 2019-07-01 | Stop reason: DRUGHIGH

## 2019-05-06 NOTE — PROGRESS NOTES
Order placed for POC overnight pulse ox on room air, per Verbal Order from Dr. Amol Salcido on 5/6/2019. Last office visit:   4/23/19 Follow up Visit:  Due August 2019 Provider is aware of last office visit and follow up. No further action requested from provider Patient returning overnight pulse oximeter.

## 2019-05-06 NOTE — TELEPHONE ENCOUNTER
The pt. C/o head and chest congestion. She has audible hoarseness. Nasal drainage is clear. She is coughing a lot and has prod. Of clear, thick mucus with occasional yellow bits in it. She states she has had this x 1 week. Symptoms are aggravated by talking. No known elevated temp. [facility]/Abdirizak Velasquez as listed. Dr. Perez Shock orders Zithromax Tri Sascha, Prednisone and wishes her to have an Overnight Ox. on O2 @ 2 liters. I contacted the pt. And informed her of the medication orders. I also, checked on whether or not she had oxygen and at this time she doesn't. We  will locate the placed order and call them tomorrow.

## 2019-05-10 ENCOUNTER — TELEPHONE (OUTPATIENT)
Dept: PULMONOLOGY | Age: 79
End: 2019-05-10

## 2019-05-10 DIAGNOSIS — K76.6 PAH (PULMONARY ARTERIAL HYPERTENSION) WITH PORTAL HYPERTENSION (HCC): ICD-10-CM

## 2019-05-10 DIAGNOSIS — R06.09 DOE (DYSPNEA ON EXERTION): Primary | ICD-10-CM

## 2019-05-10 DIAGNOSIS — J44.1 COPD EXACERBATION (HCC): ICD-10-CM

## 2019-05-10 DIAGNOSIS — J84.10 PULMONARY FIBROSIS (HCC): ICD-10-CM

## 2019-05-10 DIAGNOSIS — J47.9 BRONCHIECTASIS WITHOUT COMPLICATION (HCC): ICD-10-CM

## 2019-05-10 DIAGNOSIS — I27.21 PAH (PULMONARY ARTERIAL HYPERTENSION) WITH PORTAL HYPERTENSION (HCC): ICD-10-CM

## 2019-05-10 NOTE — TELEPHONE ENCOUNTER
I attempted to reach Mrs. tSephane Whyte re: O2. Her Voice Mail box is full. On 4/23/19 she and her  wanted an [employer] and were going to call the company. Monday, 5/6 she mentioned having no O2 and I didn't ask about their call to [employer]. Meanwhile, the O2 order was faxed to Kyle Varela May 1/2019. Did they contact [employer]?

## 2019-05-10 NOTE — TELEPHONE ENCOUNTER
Stewart Cedillo from [employer] stated she needs to speak w/ Juan Josetta Schooling. Please advise [phone].

## 2019-05-10 NOTE — TELEPHONE ENCOUNTER
Tennis Ramp with [employer] received the order for the POC and home oxygen. They need the POC order to have settings and not Liters. They already have all other notes, testing etc. They are delivering oxygen today for pt.

## 2019-05-14 ENCOUNTER — TELEPHONE (OUTPATIENT)
Dept: PULMONOLOGY | Age: 79
End: 2019-05-14

## 2019-05-14 NOTE — TELEPHONE ENCOUNTER
The pt. was seen 4/30 and was given a Tri-Sascha of Azithromycin plus a Prednisone taper. She states she has finished it and her symptoms of gen. malaise, increased SOB and chest congestion have returned. She is unsure if, she can make an appt. this afternoon. \"My  is at a meeting\". She is told to go to the ED if, she can't make it in today.

## 2019-05-15 ENCOUNTER — TELEPHONE (OUTPATIENT)
Dept: PULMONOLOGY | Age: 79
End: 2019-05-15

## 2019-05-15 NOTE — TELEPHONE ENCOUNTER
RENARD FROM [facility] CALLED([phone]). NEEDS DR Katja Rascon TO SEND SETTINGS FOR POC. PLEASE CHECK AND SEND.

## 2019-05-15 NOTE — TELEPHONE ENCOUNTER
Spoke with Adriana from [employer]. She has found the order that had been faxed for the POC with settings.  POC to be delivered today per Jonathan Albarran

## 2019-05-17 ENCOUNTER — TELEPHONE (OUTPATIENT)
Dept: PULMONOLOGY | Age: 79
End: 2019-05-17

## 2019-05-17 NOTE — TELEPHONE ENCOUNTER
Pt stated [employer] advised her o2 would be delivered yesterday and she still hasn't received it and is very upset. Please advise [phone].

## 2019-05-17 NOTE — TELEPHONE ENCOUNTER
Spoke to Alicia Mak with [employer] [facility]. The order is scheduled for today. She is calling the  Carolyn Pham to call pt to make aware of time to be delivered.    Pt advised

## 2019-05-28 ENCOUNTER — TELEPHONE (OUTPATIENT)
Dept: PULMONOLOGY | Age: 79
End: 2019-05-28

## 2019-05-28 RX ORDER — PREDNISONE 20 MG/1
TABLET ORAL
Qty: 10 TAB | Refills: 0 | Status: SHIPPED | OUTPATIENT
Start: 2019-05-28 | End: 2019-07-01 | Stop reason: DRUGHIGH

## 2019-05-28 NOTE — TELEPHONE ENCOUNTER
Pt c/o cough, chest congestion and wheezing. Pt using all asthma meds. Nebulizer only helping for about 1 1/2 hrs. No fever. Congestion thick.  [address]

## 2019-06-13 ENCOUNTER — TELEPHONE (OUTPATIENT)
Dept: PULMONOLOGY | Age: 79
End: 2019-06-13

## 2019-06-13 NOTE — TELEPHONE ENCOUNTER
Pt states she has had increased off and on wheezing. She uses her nebulizer with other inhalers. Gets relief with nebulizer [place] but later it may come back. She states it is not an asthma exacerbation but a slight wheeze. Pt not due for appt until the end of aug with Dr. Chris Prater. Pt given appt to evaluate the wheezing prior to aug.  Pt informed if sxs worsen to call for sooner appt

## 2019-07-01 ENCOUNTER — OFFICE VISIT (OUTPATIENT)
Dept: PULMONOLOGY | Age: 79
End: 2019-07-01

## 2019-07-01 VITALS
RESPIRATION RATE: 18 BRPM | BODY MASS INDEX: 29.41 KG/M2 | HEIGHT: 63 IN | DIASTOLIC BLOOD PRESSURE: 64 MMHG | SYSTOLIC BLOOD PRESSURE: 116 MMHG | WEIGHT: 166 LBS | TEMPERATURE: 98.6 F | HEART RATE: 75 BPM | OXYGEN SATURATION: 97 %

## 2019-07-01 DIAGNOSIS — J45.901 ACUTE EXACERBATION OF COPD WITH ASTHMA (HCC): ICD-10-CM

## 2019-07-01 DIAGNOSIS — J44.9 COPD WITH ASTHMA (HCC): Primary | ICD-10-CM

## 2019-07-01 DIAGNOSIS — J44.1 ACUTE EXACERBATION OF COPD WITH ASTHMA (HCC): ICD-10-CM

## 2019-07-01 RX ORDER — AZITHROMYCIN 250 MG/1
TABLET, FILM COATED ORAL
Qty: 6 TAB | Refills: 0 | Status: SHIPPED | OUTPATIENT
Start: 2019-07-01 | End: 2019-07-06

## 2019-07-01 RX ORDER — PREDNISONE 20 MG/1
TABLET ORAL
Qty: 14 TAB | Refills: 0 | Status: SHIPPED | OUTPATIENT
Start: 2019-07-01 | End: 2020-12-29 | Stop reason: ALTCHOICE

## 2019-07-01 NOTE — PROGRESS NOTES
[facility]  Pulmonary, Critical Care, and Sleep Medicine      Chief complaint:  COPD asthma pulmonary hypertension chronic respiratory failure    HPI:    Shimon Newberry    is 67years old and returns the office for follow-up and relates that she is wheezing and coughing and short of breath her cough initially was productive of light green mucus and now it is difficult to bring up. She denies any chest pain leg swelling fever chills but her appetite is poor and her weight is stable. She also has allergy symptoms and she takes Zyrtec for this. She also takes Symbicort and as needed albuterol      Allergies   Allergen Reactions    Latex Itching    Asmanex Hfa [Mometasone] Itching    Cleocin [Clindamycin Hcl] Itching and Swelling     Lips and tongue    Pcn [Penicillins] Swelling    Robitussin [Guaifenesin] Rash     Current Outpatient Medications   Medication Sig    albuterol (PROAIR HFA) 90 mcg/actuation inhaler Take 2 Puffs by inhalation every four (4) hours as needed for Wheezing or Shortness of Breath.  budesonide-formoterol (SYMBICORT) 160-4.5 mcg/actuation HFAA Take 2 Puffs by inhalation two (2) times a day. Indications: Controller Medication for Asthma    naproxen sodium (ALEVE) 220 mg tablet Take 220 mg by mouth two (2) times daily (with meals).  albuterol-ipratropium (DUO-NEB) 2.5 mg-0.5 mg/3 ml nebu 3 mL by Nebulization route every four (4) hours as needed. ICD 10: I27.2, J84.10    Cetirizine (ZYRTEC) 10 mg cap Take  by mouth.  Oxygen 2 Each by Nasal route. 2 L [employer] [facility]    zolpidem (AMBIEN) 10 mg tablet Take 1 Tab by mouth nightly as needed for Sleep. Max Daily Amount: 10 mg.    amLODIPine (NORVASC) 5 mg tablet Take 1 Tab by mouth daily. Indications: HYPERTENSION    furosemide (LASIX) 40 mg tablet Take 1 Tab by mouth daily. Indications: EDEMA    omeprazole (PRILOSEC) 20 mg capsule Take 20 mg by mouth daily.     hydroxychloroquine (PLAQUENIL) 200 mg tablet Take 200 mg by mouth daily.  potassium chloride (KLOR-CON M20) 20 mEq tablet Take 20 mEq by mouth two (2) times a day.  predniSONE (DELTASONE) 20 mg tablet 2 tablets now then 2 tablets every morning with breakfast    predniSONE (DELTASONE) 20 mg tablet 2 tablets every morning with breakfast    PROAIR HFA 90 mcg/actuation inhaler inhale 2 puffs by mouth every 4 hours if needed for wheezing    aspirin (ASPIRIN) 325 mg tablet Take 325 mg by mouth daily.  predniSONE (DELTASONE) 20 mg tablet Take 1 Tab by mouth daily (with breakfast).  predniSONE (DELTASONE) 20 mg tablet 2 tablets every morning with breakfast    montelukast (SINGULAIR) 10 mg tablet Take 1 Tab by mouth daily. Indications: MAINTENANCE THERAPY FOR ASTHMA    ferrous sulfate ER (IRON) 160 mg (50 mg iron) TbER tablet Take 1 Tab by mouth daily. Indications: patient unsure of the mg of the medication    HYDROcodone-acetaminophen (NORCO) 5-325 mg per tablet Take 1 Tab by mouth every six (6) hours as needed. Max Daily Amount: 4 Tabs.  chlorpheniramine-HYDROcodone (TUSSIONEX) 10-8 mg/5 mL suspension Take 5 mL by mouth every twelve (12) hours as needed for Cough. Max Daily Amount: 10 mL.  predniSONE (DELTASONE) 10 mg tablet Take 1 Tab by mouth daily (with lunch). (Patient taking differently: Take 5 mg by mouth daily (with lunch). )    traZODone (DESYREL) 50 mg tablet Take 1 Tab by mouth nightly. No current facility-administered medications for this visit. Past Medical History:   Diagnosis Date    Asthma     Chronic lung disease     Hypertension     Lupus ([facility]     Pulmonary arterial hypertension (HCC)     Pulmonary hypertension (HCC)     Rheumatoid arthritis(714.0)      History reviewed. No pertinent surgical history.   Social History     Socioeconomic History    Marital status: UNKNOWN     Spouse name: Not on file    Number of children: Not on file    Years of education: Not on file    Highest education level: Not on file   Occupational History    Not on file   Social Needs    Financial resource strain: Not on file    Food insecurity:     Worry: Not on file     Inability: Not on file    Transportation needs:     Medical: Not on file     Non-medical: Not on file   Tobacco Use    Smoking status: Former Smoker     Packs/day: 0.50     Years: 16.00     Pack years: 8.00     Types: Cigarettes     Last attempt to quit: 1974     Years since quittin.8    Smokeless tobacco: Never Used   Substance and Sexual Activity    Alcohol use: No    Drug use: Not on file    Sexual activity: Not on file   Lifestyle    Physical activity:     Days per week: Not on file     Minutes per session: Not on file    Stress: Not on file   Relationships    Social connections:     Talks on phone: Not on file     Gets together: Not on file     Attends Sikhism service: Not on file     Active member of club or organization: Not on file     Attends meetings of clubs or organizations: Not on file     Relationship status: Not on file    Intimate partner violence:     Fear of current or ex partner: Not on file     Emotionally abused: Not on file     Physically abused: Not on file     Forced sexual activity: Not on file   Other Topics Concern    Not on file   Social History Narrative    Not on file     Family History   Problem Relation Age of Onset    Heart Disease Mother     Asthma Mother     Asthma Father     Parkinson's Disease Father        Review of systems:  She denies fever chills but notes poor appetite and no significant weight loss    Physical Exam:  Visit Vitals  /64 (BP 1 Location: Left arm, BP Patient Position: Sitting)   Pulse 75   Temp 98.6 °F (37 °C) (Oral)   Resp 18   Ht 5' 3\" (1.6 m)   Wt 75.3 kg (166 lb)   SpO2 97% Comment: RA Rest   BMI 29.41 kg/m²       Well-developed well-nourished  HEENT: WNL  Lymph node exam: Supraclavicular cervical lymph nodes negative  Chest: Equal symmetrical expansion no dullness no  rales rubs with faint wheezing on expiration in the right lung anterior  Heart: Regular rhythm no gallop no murmur no JVD no peripheral edema  Extremities: No cyanosis or clubbing  Neurological: Alert and oriented    Labs:    O2 sat room air at rest 97    Impression:     By history physical exam exacerbation of asthma COPD    Plan:     continue Symbicort and as needed albuterol  Prednisone for 7 days and empiric treatment with Zithromax for 5 days  Follow-up in 4 weeks    Sanjay Neves MD , [facility]    CC: Dalia Randall MD     [address] [place] [address]. [address]  [place], [address] [place] [address] [address]     P: [phone]     F: [phone]

## 2019-07-01 NOTE — PATIENT INSTRUCTIONS
continue Symbicort 2 inhalations twice daily and remember to wash mouth with water and spit it out after inhaling Albuterol by nebulizer or by hand-held inhaler 2 puffs every 4 hours as needed if you require albuterol too often to control respiratory symptoms call the office for severe symptoms go to the emergency room Prednisone 2 tablets every morning with breakfast for 7 days Zithromax 2 tablets now then 1 tablet daily for 4 days

## 2019-07-01 NOTE — PROGRESS NOTES
Alicia Dominguez presents today for   Chief Complaint   Patient presents with    Cough    Wheezing    Shortness of Breath       Is someone accompanying this pt? Yes     Is the patient using any DME equipment during OV? No    -DME Company [employer]    Depression Screening:  3 most recent PHQ Screens 4/23/2019   Little interest or pleasure in doing things Not at all   Feeling down, depressed, irritable, or hopeless Not at all   Total Score PHQ 2 0       Learning Assessment:  Learning Assessment 9/29/2016   PRIMARY LEARNER Patient   PRIMARY LANGUAGE ENGLISH   LEARNER PREFERENCE PRIMARY DEMONSTRATION     LISTENING     READING   ANSWERED BY Patient   RELATIONSHIP SELF       Abuse Screening:  No flowsheet data found. Fall Risk  Fall Risk Assessment, last 12 mths 7/1/2019   Able to walk? Yes   Fall in past 12 months? No         Coordination of Care:  1. Have you been to the ER, urgent care clinic since your last visit? Hospitalized since your last visit? No    2. Have you seen or consulted any other health care providers outside of the [address] [place] [address] since your last visit? Include any pap smears or colon screening.  No

## 2019-07-25 NOTE — PROGRESS NOTES
Tata Garcia M.D. PROGRESS NOTE    Name: Linda Yu MRN: [id number]   : 1940 Hospital: [address] [place] [address]   Date: 2017  Admission Date: 2017     Subjective/Objective/Plans  1. Acute bronchitis. 2. Chronic interstitial lung disease. 3. Systemic lupus erythematosus. 4. Rheumatoid arthritis. 5. Pulmonary hypertension. 6. History of multiple pneumonias. 7. Sleep disorder    Cr up 1.3 recently and have reduced Lasix to 40 mg daily  Awaiting repeat BMP  Still SOB  Need apnea link and O2 sat     Vital Signs:  Visit Vitals    /78 (BP 1 Location: Left arm, BP Patient Position: At rest)    Pulse 69    Temp 97.8 °F (36.6 °C)    Resp 18    Ht 5' 3\" (1.6 m)    Wt 67.3 kg (148 lb 5.9 oz)    SpO2 96%    Breastfeeding No    BMI 26.28 kg/m2       O2 Device: Room air   O2 Flow Rate (L/min): 2 l/min   Temp (24hrs), Av °F (36.7 °C), Min:97.6 °F (36.4 °C), Max:98.3 °F (36.8 °C)     1:09 PM  Intake/Output:   Last shift:       07 - 1900  In: 480 [P.O.:480]  Out: -   Last 3 shifts: 1901 -  0700  In: 560 [P.O.:560]  Out: 551 [Urine:550]    Intake/Output Summary (Last 24 hours) at 17 1309  Last data filed at 17 0937   Gross per 24 hour   Intake              680 ml   Output              301 ml   Net              379 ml         Physical Exam:    General: in no respiratory distress and acyanotic and alert    HEENT: pupils equal, no ear discharge   Neck: No abnormally enlarged lymph nodes. , no JDV   Mouth: MMM no lesions    Chest: normal, no breast masses   Lungs: rhonchi   Heart: Regular rate and rhythm   Abdomen: abdomen is soft without significant tenderness, masses, organomegaly or guarding   Extremity: negative   Neuro: alert    Skin: Skin color, texture, turgor normal. No rashes or lesions    Data Review:    Labs: Results:       Chemistry Recent Labs      17   0311   GLU  94   NA  138   K  4.0   CL  101   CO2  27   BUN  28*   CREA  1.34*   CA  8.7 AGAP  10   BUCR  21*      CBC w/Diff Recent Labs      05/12/17   0420   WBC  9.5   RBC  4.69   HGB  10.4*   HCT  34.3*   PLT  270      Cardiac Enzymes No results for input(s): CPK, CKND1, DORIS in the last 72 hours. No lab exists for component: CKRMB, TROIP   Coagulation No results for input(s): PTP, INR, APTT in the last 72 hours. No lab exists for component: INREXT    Lipid Panel Lab Results   Component Value Date/Time    Cholesterol, total 251 09/22/2010 08:04 AM    HDL Cholesterol 73 09/22/2010 08:04 AM    LDL, calculated 159.2 09/22/2010 08:04 AM    VLDL, calculated 18.8 09/22/2010 08:04 AM    Triglyceride 94 09/22/2010 08:04 AM    CHOL/HDL Ratio 3.4 09/22/2010 08:04 AM      BNP No results for input(s): BNPP in the last 72 hours. Liver Enzymes No results for input(s): TP, ALB, TBILI, AP, SGOT, ALT, CBIL in the last 72 hours. Thyroid Studies No results found for: T4, T3U, TSH, TSHEXT       Procedures/imaging: see electronic medical records for all procedures, Xrays and details which were not copied into this note but were reviewed. Allergies: Allergies   Allergen Reactions    Latex Itching    Asmanex Hfa [Mometasone] Itching    Cleocin [Clindamycin Hcl] Itching and Swelling     Lips and tongue    Pcn [Penicillins] Swelling    Robitussin [Guaifenesin] Rash       Home Medications:  Prior to Admission Medications   Prescriptions Last Dose Informant Patient Reported? Taking? HYDROcodone-acetaminophen (NORCO) 5-325 mg per tablet 5/6/2017 at Unknown time  No Yes   Sig: Take 1 Tab by mouth two (2) times a day. Max Daily Amount: 2 Tabs. albuterol-ipratropium (DUO-NEB) 2.5 mg-0.5 mg/3 ml nebu 5/6/2017 at Unknown time  No Yes   Sig: 3 mL by Nebulization route three (3) times daily. amLODIPine (NORVASC) 5 mg tablet 4/6/2017 at Unknown time  No Yes   Sig: Take 1 Tab by mouth daily.  Indications: HYPERTENSION   aspirin  mg tablet 5/5/2017 at Unknown time  Yes Yes   Sig: Take 650 mg by mouth every six (6) hours as needed for Pain. cetirizine (ZYRTEC) 10 mg tablet 2017 at Unknown time  Yes Yes   Sig: Take 10 mg by mouth daily. diphenhydrAMINE (BENADRYL ALLERGY) 25 mg tablet 2017 at Unknown time  Yes Yes   Sig: Take 25 mg by mouth every six (6) hours as needed for Sleep.   furosemide (LASIX) 40 mg tablet 2017 at Unknown time  No Yes   Sig: Take 1 Tab by mouth daily. Indications: EDEMA   hydroxychloroquine (PLAQUENIL) 200 mg tablet 2017 at Unknown time  Yes Yes   Sig: Take 200 mg by mouth daily. naproxen sodium (ALEVE) 220 mg cap 5/3/2017  Yes No   Sig: Take  by mouth as needed. omeprazole (PRILOSEC) 20 mg capsule 2017 at Unknown time  Yes Yes   Sig: Take 20 mg by mouth daily. potassium chloride (KLOR-CON M20) 20 mEq tablet 2017 at Unknown time  Yes Yes   Sig: Take 20 mEq by mouth two (2) times a day. triamcinolone (NASACORT AQ) 55 mcg nasal inhaler 2017 at Unknown time  Yes Yes   Si Sprays daily as needed. zolpidem (AMBIEN) 10 mg tablet 2017 at Unknown time  Yes Yes   Sig: Take 10 mg by mouth nightly as needed for Sleep.       Facility-Administered Medications: None       Current Medications:  Current Facility-Administered Medications   Medication Dose Route Frequency    furosemide (LASIX) tablet 40 mg  40 mg Oral DAILY    predniSONE (DELTASONE) tablet 20 mg  20 mg Oral DAILY WITH LUNCH    traZODone (DESYREL) tablet 50 mg  50 mg Oral QHS    phenol throat spray (CHLORASEPTIC) 1 Spray  1 Spray Oral PRN    tiotropium (SPIRIVA) inhalation capsule 18 mcg  1 Cap Inhalation DAILY    albuterol (PROVENTIL VENTOLIN) nebulizer solution 2.5 mg  2.5 mg Nebulization Q6H PRN    HYDROcodone-acetaminophen (NORCO) 5-325 mg per tablet 1 Tab  1 Tab Oral Q6H PRN    insulin lispro (HUMALOG) injection   SubCUTAneous AC&HS    glucose chewable tablet 16 g  16 g Oral PRN    glucagon (GLUCAGEN) injection 1 mg  1 mg IntraMUSCular PRN    dextrose (D50W) injection syrg 12.5-25 g  25-50 mL IntraVENous PRN    benzonatate (TESSALON) capsule 100 mg  100 mg Oral TID PRN    amLODIPine (NORVASC) tablet 5 mg  5 mg Oral DAILY    pantoprazole (PROTONIX) tablet 40 mg  40 mg Oral ACB    hydroxychloroquine (PLAQUENIL) tablet 200 mg  200 mg Oral DAILY    potassium chloride (K-DUR, KLOR-CON) SR tablet 20 mEq  20 mEq Oral BID    sodium chloride (NS) flush 5-10 mL  5-10 mL IntraVENous Q8H    sodium chloride (NS) flush 5-10 mL  5-10 mL IntraVENous PRN    heparin (porcine) injection 5,000 Units  5,000 Units SubCUTAneous Q8H    budesonide-formoterol (SYMBICORT) 160-4.5 mcg/actuation HFA inhaler 2 Puff  2 Puff Inhalation BID RT       Chart and notes reviewed. Data reviewed. I have evaluated and examined the patient.         IMPRESSION:   Patient Active Problem List   Diagnosis Code    Cirrhosis of liver ([facility] K74.60    Systemic lupus erythematosus ([facility] M32.9    Rheumatoid arthritis ([facility] M06.9    HTN (hypertension) I10    Dyslipidemia E78.5    COPD exacerbation (HCC) J44.1    Pulmonary HTN (HCC) I27.2    Acute bronchitis J20.9 ·         PLAN:/DISCUSSION:   · DC Monday        Christian Stoddard MD  5/13/2017, 1:09 PM Implemented All Universal Safety Interventions:  [place] to call system. Call bell, personal items and telephone within reach. Instruct patient to call for assistance. Room bathroom lighting operational. Non-slip footwear when patient is off stretcher. Physically safe environment: no spills, clutter or unnecessary equipment. Stretcher in lowest position, wheels locked, appropriate side rails in place.

## 2019-08-26 ENCOUNTER — OFFICE VISIT (OUTPATIENT)
Dept: PULMONOLOGY | Age: 79
End: 2019-08-26

## 2019-08-26 VITALS
OXYGEN SATURATION: 91 % | SYSTOLIC BLOOD PRESSURE: 138 MMHG | TEMPERATURE: 99 F | BODY MASS INDEX: 30.56 KG/M2 | DIASTOLIC BLOOD PRESSURE: 80 MMHG | HEIGHT: 63 IN | WEIGHT: 172.5 LBS | RESPIRATION RATE: 20 BRPM | HEART RATE: 76 BPM

## 2019-08-26 DIAGNOSIS — J44.9 COPD WITH ASTHMA (HCC): Primary | ICD-10-CM

## 2019-08-26 DIAGNOSIS — M32.9 SYSTEMIC LUPUS ERYTHEMATOSUS, UNSPECIFIED SLE TYPE, UNSPECIFIED ORGAN INVOLVEMENT STATUS (HCC): ICD-10-CM

## 2019-08-26 DIAGNOSIS — J96.10 CHRONIC RESPIRATORY FAILURE, UNSPECIFIED WHETHER WITH HYPOXIA OR HYPERCAPNIA (HCC): ICD-10-CM

## 2019-08-26 DIAGNOSIS — I27.20 PULMONARY HTN (HCC): ICD-10-CM

## 2019-08-26 NOTE — PROGRESS NOTES
The pt. C/o SOB and fatigue. She cannot tolerate the weight of the small portable O2 tank. She leaves it at home. She was given Spiriva Respimat by her PCP but, is using it as needed. She is given the proper instructions for use of the Spiriva Respimat: two puffs daily. She is informed not to use Ipratropium Bromide in the nebulizer while on Spiriva. She states she hasn't used the nebulizer since going on the spiriva respimat. It has improved her breathing. Bernadette Collier presents today for   Chief Complaint   Patient presents with    Breathing Problem     F/U to 7/1       Is someone accompanying this pt? Her , Miki Castillo. Is the patient using any DME equipment during OV? Nebulizer and O2   -D[employer] [facility] [employer]    Depression Screening:  3 most recent PHQ Screens 4/23/2019   Little interest or pleasure in doing things Not at all   Feeling down, depressed, irritable, or hopeless Not at all   Total Score PHQ 2 0       Learning Assessment:  Learning Assessment 9/29/2016   PRIMARY LEARNER Patient   PRIMARY LANGUAGE ENGLISH   LEARNER PREFERENCE PRIMARY DEMONSTRATION     LISTENING     READING   ANSWERED BY Patient   RELATIONSHIP SELF       Abuse Screening:  No    Fall Risk  Fall Risk Assessment, last 12 mths 7/1/2019   Able to walk? Yes   Fall in past 12 months? No         Coordination of Care:  1. Have you been to the ER, urgent care clinic since your last visit? Hospitalized since your last visit? No    2. Have you seen or consulted any other health care providers outside of the [address] [place] [address] since your last visit? PCP, Rheumatology    Medication variance in dosage/sig per patient as follows: Per Med. Rec.

## 2019-08-26 NOTE — PROGRESS NOTES
[facility]  Pulmonary, Critical Care, and Sleep Medicine      Chief complaint:  COPD asthma    HPI:    Siri Orosco    is 67years old returns the office today relating that her shortness of breath is somewhat better but she still has dyspnea on exertion and fatigue and occasionally has a pressure in her chest when she is sitting but not with activity it is nonradiating can last up to half an hour and she has had it for many years. She says she is not coughing much now and has no leg swelling and she is using Spiriva as well as Symbicort and as needed albuterol but does not use her supplemental oxygen with activity because of the heaviness of the supplemental oxygen container      Allergies   Allergen Reactions    Latex Itching    Prednisone Swelling    Asmanex Hfa [Mometasone] Itching    Cleocin [Clindamycin Hcl] Itching and Swelling     Lips and tongue    Pcn [Penicillins] Swelling    Robitussin [Guaifenesin] Rash     Current Outpatient Medications   Medication Sig    tiotropium bromide (SPIRIVA RESPIMAT) 2.5 mcg/actuation inhaler Take 2 Puffs by inhalation daily.  albuterol (PROAIR HFA) 90 mcg/actuation inhaler Take 2 Puffs by inhalation every four (4) hours as needed for Wheezing or Shortness of Breath.  budesonide-formoterol (SYMBICORT) 160-4.5 mcg/actuation HFAA Take 2 Puffs by inhalation two (2) times a day. Indications: Controller Medication for Asthma    PROAIR HFA 90 mcg/actuation inhaler inhale 2 puffs by mouth every 4 hours if needed for wheezing    naproxen sodium (ALEVE) 220 mg tablet Take 220 mg by mouth two (2) times daily (with meals).  Cetirizine (ZYRTEC) 10 mg cap Take  by mouth.  Oxygen 2 Each by Nasal route. 2 L [employer] [facility]    HYDROcodone-acetaminophen (NORCO) 5-325 mg per tablet Take 1 Tab by mouth every six (6) hours as needed. Max Daily Amount: 4 Tabs.  zolpidem (AMBIEN) 10 mg tablet Take 1 Tab by mouth nightly as needed for Sleep. Max Daily Amount: 10 mg.    furosemide (LASIX) 40 mg tablet Take 1 Tab by mouth daily. Indications: EDEMA    omeprazole (PRILOSEC) 20 mg capsule Take 20 mg by mouth daily.  hydroxychloroquine (PLAQUENIL) 200 mg tablet Take 200 mg by mouth daily.  potassium chloride (KLOR-CON M20) 20 mEq tablet Take 20 mEq by mouth two (2) times a day.  predniSONE (DELTASONE) 20 mg tablet 2 tablets every morning with breakfast for 7 days    aspirin (ASPIRIN) 325 mg tablet Take 325 mg by mouth daily.  montelukast (SINGULAIR) 10 mg tablet Take 1 Tab by mouth daily. Indications: MAINTENANCE THERAPY FOR ASTHMA    albuterol-ipratropium (DUO-NEB) 2.5 mg-0.5 mg/3 ml nebu 3 mL by Nebulization route every four (4) hours as needed. ICD 10: I27.2, J84.10    ferrous sulfate ER (IRON) 160 mg (50 mg iron) TbER tablet Take 1 Tab by mouth daily. Indications: patient unsure of the mg of the medication    chlorpheniramine-HYDROcodone (TUSSIONEX) 10-8 mg/5 mL suspension Take 5 mL by mouth every twelve (12) hours as needed for Cough. Max Daily Amount: 10 mL.  traZODone (DESYREL) 50 mg tablet Take 1 Tab by mouth nightly.  amLODIPine (NORVASC) 5 mg tablet Take 1 Tab by mouth daily. Indications: HYPERTENSION     No current facility-administered medications for this visit. Past Medical History:   Diagnosis Date    Asthma     Chronic lung disease     Hypertension     Lupus ([facility]     Pulmonary arterial hypertension (HCC)     Pulmonary hypertension (HCC)     Rheumatoid arthritis(714.0)      No past surgical history on file.   Social History     Socioeconomic History    Marital status: UNKNOWN     Spouse name: Not on file    Number of children: Not on file    Years of education: Not on file    Highest education level: Not on file   Occupational History    Not on file   Social Needs    Financial resource strain: Not on file    Food insecurity:     Worry: Not on file     Inability: Not on file    Transportation needs: Medical: Not on file     Non-medical: Not on file   Tobacco Use    Smoking status: Former Smoker     Packs/day: 0.50     Years: 16.00     Pack years: 8.00     Types: Cigarettes     Last attempt to quit: 1974     Years since quittin.0    Smokeless tobacco: Never Used   Substance and Sexual Activity    Alcohol use: No    Drug use: Not on file    Sexual activity: Not on file   Lifestyle    Physical activity:     Days per week: Not on file     Minutes per session: Not on file    Stress: Not on file   Relationships    Social connections:     Talks on phone: Not on file     Gets together: Not on file     Attends Oriental orthodox service: Not on file     Active member of club or organization: Not on file     Attends meetings of clubs or organizations: Not on file     Relationship status: Not on file    Intimate partner violence:     Fear of current or ex partner: Not on file     Emotionally abused: Not on file     Physically abused: Not on file     Forced sexual activity: Not on file   Other Topics Concern    Not on file   Social History Narrative    Not on file     Family History   Problem Relation Age of Onset    Heart Disease Mother     Asthma Mother     Asthma Father     Parkinson's Disease Father        Review of systems:  She denies fever chills poor appetite weight loss    Physical Exam:  Visit Vitals  /80 (BP 1 Location: Left arm, BP Patient Position: Sitting)   Pulse 76   Temp 99 °F (37.2 °C)   Resp 20   Ht 5' 3\" (1.6 m)   Wt 78.2 kg (172 lb 8 oz)   SpO2 91%   BMI 30.56 kg/m²       Well-developed well-nourished  HEENT: WNL  Lymph node exam: Supraclavicular cervical lymph nodes negative  Chest: Equal symmetrical expansion no dullness and absence of wheezes rales rubs  Heart: Regular rhythm no gallop no murmur no JVD edema  Extremities: No cyanosis clubbing or calf tenderness  Neurological: Alert and oriented    Labs:    O2 sat on supplemental oxygen 97% at rest but with walking 220 feet O2 sat fell to 84 % and with 2 L of supplemental oxygen O2 sat increased to 92% with walking several 100 feet    Impression:     COPD with chronic respiratory failure stable    Plan:     Switched to  Trelegy 1 inhalation daily and PRN albuterol  Patient requires a lighter supplemental oxygen equipment will apply for a POC  Follow-up in 4 months or sooner if needed    Dudley Osman MD , [facility]    CC: Henrik Goyal MD     [address] [facility], [address] [address]     P: [phone]     F: [phone]

## 2019-08-26 NOTE — PROGRESS NOTES
The pt.'s O2 Sats are checked on Room Air:     O2 Sat Room Air at rest is 95%  P  78  SOB scale 0/10    Ambulated on Room Air:     110 feet O2 Sat is 92%  P  81  SOB scale 0/10     220 feet O2 Sat is 84%  P  89  SOB scale 3/10    She is ambulated on O2 @ 2 liters 110 feet:  O2 Sat is 94%  P  88  SOB scale 2/10

## 2019-08-26 NOTE — PATIENT INSTRUCTIONS
Discontinue Symbicort and Spiriva and duo nebs     Trelegy 1 inhalation daily and remember to exhale fully then inhale hard and fast and holds her breath for 5 to 10 seconds also remember to wash mouth with water and spit it out after inhaling    Albuterol 2 inhalations every 4 hours as needed and if you require albuterol too often to control respiratory symptoms call the office for severe symptoms go to the emergency room    Supplemental oxygen 2 L with sleep and with activity such as walking further than 100 feet    Always call for symptoms such as worsening shortness of breath

## 2019-09-04 NOTE — TELEPHONE ENCOUNTER
Pt states Kevon has been helping her a lot, but is not covered by her insurance. Wanting an alternative. Please advise [phone].

## 2019-09-04 NOTE — TELEPHONE ENCOUNTER
I called [facility] and spoke with pharmacist [facility]. She states the pt' part D is with [employer]. Trelegy not covered.   Preferred Stiolto and Spiriva Resp

## 2019-10-10 ENCOUNTER — TELEPHONE (OUTPATIENT)
Dept: PULMONOLOGY | Age: 79
End: 2019-10-10

## 2019-10-10 DIAGNOSIS — J44.1 COPD EXACERBATION (HCC): Primary | ICD-10-CM

## 2019-10-10 NOTE — TELEPHONE ENCOUNTER
PT CALLED([phone]). [facility] WANTS DR OR NURSE TO CALL REGARDING MORE AUTHORIZATION FOR Dossie Tom. [place] [facility] REVIEW DEPT [phone].

## 2019-10-10 NOTE — TELEPHONE ENCOUNTER
The pt. States that she has medication expense paid by [facility]. Her ID #[id number]    The pt. Is requesting authorization for Trelegy be met. Due to Ins. not covering it, Dr. Yenifer Shaver 9/11/19 had order the preferred formulary of Spiriva Respimat. On her f/u visit 8/26/19, Dr. Yenifer Shaver had given the pt. A sample of Trelegy. She has MD family members who all told her it was much better than her prior inhalers since it contains (3) ingredients. She finds that following use of Symbicort and Spiriva Respimat she shortly afterwards once again has SOB and coughing. With use of the Trelegy sample, she was much improved and didn't suffer resp. symptoms. [employer] is contacted and prior authorization is begun. Trelegy is approved. 9/10/19 - 10/09/20  Case ID [id number] Via Kenia Watkins @ [employer].

## 2019-11-08 ENCOUNTER — TELEPHONE (OUTPATIENT)
Dept: PULMONOLOGY | Age: 79
End: 2019-11-08

## 2019-11-08 NOTE — TELEPHONE ENCOUNTER
Pt c/o cold sxs x 1 month. Will get Musinex and Delsym to see if helps with sxs.  If sxs persist pt to call back first of week to be evaluated

## 2019-12-06 ENCOUNTER — TELEPHONE (OUTPATIENT)
Dept: PULMONOLOGY | Age: 79
End: 2019-12-06

## 2019-12-06 NOTE — TELEPHONE ENCOUNTER
Daughter called back. She states the pt has a POC from [address] [facility] that weights about 8 # maybe and it is too heavy for her to carry. I have advised the daughter she could talk with [employer] but they are about the same weight as the one she has from [employer]. The batteries are pretty heavy. She then is asking about her airline trip to come out where she lives and if I felt she should go. I advised the daughter I can not give recommendation of if she can go or not. I advised she can talk with the airline and have wheelchair assistance since she is worried about if she can commute from one area to another. She will have to make the call on if she can travel or not.

## 2019-12-06 NOTE — TELEPHONE ENCOUNTER
Per pt's daughter, Mary Nurse, pt will be traveling in 2 weeks and wants her mother to have different portable tanks. The one's she has are too heavy. Please call the daughter at [phone].

## 2019-12-10 ENCOUNTER — TELEPHONE (OUTPATIENT)
Dept: PULMONOLOGY | Age: 79
End: 2019-12-10

## 2019-12-10 NOTE — TELEPHONE ENCOUNTER
PT CALLED([phone]). AHMET GAVE HER INFORMATION ON TWO COMPANIES THAT THEY COVER FOR HER TO GET A SMALLER PORTABLE CONCENTRATOR. PLEASE CALL HER AND SHE WILL GIVE US THE COMPANIES.

## 2019-12-10 NOTE — TELEPHONE ENCOUNTER
Pt called back. She has a trip lined up and she has a POC from [employer] that wt is about 8#. She states she called [employer] and they told her 2 companies [employer] and [employer] that she can get a POC that is light weight. I advised the pt the other companies are not any lighter then the [employer] and also they will not give her a POC when she is with another oxygen company. I advised the pt she can call the 2 companies and talk to them about this due to not being sure I am advising her the correct information. Also if she was to completely switch to the new company they would not have a POC for her in late notice. They take months to get. Pt is wanting a 2-4# POC. I have called [employer], they do have the oxyGo that are about 8#.

## 2019-12-12 ENCOUNTER — TELEPHONE (OUTPATIENT)
Dept: PULMONOLOGY | Age: 79
End: 2019-12-12

## 2019-12-12 DIAGNOSIS — J44.1 COPD EXACERBATION (HCC): ICD-10-CM

## 2019-12-12 DIAGNOSIS — R06.09 DOE (DYSPNEA ON EXERTION): ICD-10-CM

## 2019-12-12 DIAGNOSIS — J96.10 CHRONIC RESPIRATORY FAILURE, UNSPECIFIED WHETHER WITH HYPOXIA OR HYPERCAPNIA (HCC): Primary | ICD-10-CM

## 2019-12-12 NOTE — TELEPHONE ENCOUNTER
Per Santosh Colvin with [employer] they only need another POC order since they already have the testing from before in aug when they gave pt the POC before she returned it  Order for POC faxed

## 2019-12-12 NOTE — TELEPHONE ENCOUNTER
Pt states she had a POC with [employer] but didn't like the weight of it so she had them pickup.  Now she wants a POC

## 2019-12-13 ENCOUNTER — TELEPHONE (OUTPATIENT)
Dept: PULMONOLOGY | Age: 79
End: 2019-12-13

## 2019-12-13 NOTE — TELEPHONE ENCOUNTER
Cecil Trinidad from [employer] called. Lucero Lugo misinformed us that pt only needed a POC order again which they received yesterday. The pt turned her POC in back in Oct and has no portable oxygen per patients request. Now to get portable back per Cecil Trinidad she will need a face to face, new walk test for qualifying and an order which I asked Lucero Lugo about yesterday but she stated only the order was needed. Cecil Trinidad is advising her supervisor so they will contact pt to advise. Aslo, was advised if she was to get a quick appt and walk here they can not supply her with a POC before her trip. They have a waiting list now and not sure who many weeks it takes to  Get the machine.

## 2019-12-23 ENCOUNTER — TELEPHONE (OUTPATIENT)
Dept: PULMONOLOGY | Age: 79
End: 2019-12-23

## 2019-12-23 NOTE — TELEPHONE ENCOUNTER
Pt states she is leaving today on airplane at 3pm  and she needs a letter from Dr. Sheila Hansen saying she is certified and she is ok to fly with her mobile O2 pack. Please call pt at [phone].

## 2019-12-27 ENCOUNTER — TELEPHONE (OUTPATIENT)
Dept: PULMONOLOGY | Age: 79
End: 2019-12-27

## 2019-12-27 NOTE — TELEPHONE ENCOUNTER
Spoke with daughter who gave an update on her travels to [place]. Pt is also due for follow up with Dr. Manny Ritchie.  Transferred to  to schedule

## 2019-12-27 NOTE — TELEPHONE ENCOUNTER
Luis Mckeon wants to give nurse an update on how mother is doing now that she's in [address] [place] [address]. Please advise [phone].

## 2020-01-08 ENCOUNTER — TELEPHONE (OUTPATIENT)
Dept: PULMONOLOGY | Age: 80
End: 2020-01-08

## 2020-01-08 NOTE — TELEPHONE ENCOUNTER
[facility] calliing from [place] with update on pt. While she was visiting for [place] the pt was admitted to hospital on 12/29 and is to be discharged this week. Dx for admission was bilateral pneumonia and whooping cough. Daughter wants Dr. Amol Salcido to be aware and she will call for hospital follow up once they know when she will be coming back to [place]. While in hospital they did a ECHO which the daughter has a copy of and is faxing to Dr. Amol Salcido for his records.

## 2020-01-22 ENCOUNTER — TELEPHONE (OUTPATIENT)
Dept: PULMONOLOGY | Age: 80
End: 2020-01-22

## 2020-02-05 ENCOUNTER — TELEPHONE (OUTPATIENT)
Dept: PULMONOLOGY | Age: 80
End: 2020-02-05

## 2020-02-05 NOTE — TELEPHONE ENCOUNTER
Daughter Theron Bhatt calling to update patients condition from [place]. She is going to be D/C'ed from rehab today and will be flying back to [place] tonight accompanied by daughter. appt for hospital f/p scheduled for 2/26/20 with Dr. Loren Ward. Daughter to hand deliver hospital information and some testing they did in [place] hospital tomorrow to office to make sure Dr. Loren Ward gets the information and the patient doesn't forget to bring the day of visit.

## 2020-02-05 NOTE — TELEPHONE ENCOUNTER
PT'S DAUGHTER, Andrzej Adamesor CALLED([phone]. WANTS TO TALK TO [address] REGARDING PT'S DISCHARGE FROM SKILLED NURSING FACILITY IN [place]. PT BEING DISCHARGED TODAY. PLEASE CALL HER BACK. DAUGHTER STATES THAT DAISHA KNOWS WHAT SHE IS CALLING FOR.

## 2020-02-25 RX ORDER — BUDESONIDE AND FORMOTEROL FUMARATE DIHYDRATE 160; 4.5 UG/1; UG/1
2 AEROSOL RESPIRATORY (INHALATION)
COMMUNITY
Start: 2017-05-19 | End: 2020-12-29

## 2020-02-25 RX ORDER — OXYCODONE AND ACETAMINOPHEN 5; 325 MG/1; MG/1
1 TABLET ORAL
COMMUNITY
Start: 2019-12-13 | End: 2020-12-29

## 2020-02-25 RX ORDER — GABAPENTIN 100 MG/1
100 CAPSULE ORAL
COMMUNITY
Start: 2019-12-11 | End: 2020-12-29

## 2020-02-26 ENCOUNTER — OFFICE VISIT (OUTPATIENT)
Dept: PULMONOLOGY | Age: 80
End: 2020-02-26

## 2020-02-26 VITALS
WEIGHT: 150.2 LBS | BODY MASS INDEX: 26.61 KG/M2 | DIASTOLIC BLOOD PRESSURE: 61 MMHG | SYSTOLIC BLOOD PRESSURE: 114 MMHG | HEART RATE: 78 BPM | OXYGEN SATURATION: 92 % | HEIGHT: 63 IN | TEMPERATURE: 96.5 F | RESPIRATION RATE: 20 BRPM

## 2020-02-26 DIAGNOSIS — J44.1 COPD EXACERBATION (HCC): Primary | ICD-10-CM

## 2020-02-26 RX ORDER — ALBUTEROL SULFATE 0.83 MG/ML
2.5 SOLUTION RESPIRATORY (INHALATION)
Qty: 30 NEBULE | Refills: 3 | Status: SHIPPED | OUTPATIENT
Start: 2020-02-26 | End: 2020-07-13

## 2020-02-26 NOTE — PROGRESS NOTES
Solange Kidney presents today for   Chief Complaint   Patient presents with   [facility] Follow Up     Follow up from hospital in [place]. CXR 1/5.  Cough     Pt states she has had a productive cough with green sputum that will not go away       Is someone accompanying this pt? , Cristopher Guerin    Is the patient using any DME equipment during OV? Oxygen at night. Pt states 3.3L    -[employer]. Depression Screening:  3 most recent PHQ Screens 2/26/2020   Little interest or pleasure in doing things Not at all   Feeling down, depressed, irritable, or hopeless Not at all   Total Score PHQ 2 0       Learning Assessment:  Learning Assessment 9/29/2016   PRIMARY LEARNER Patient   PRIMARY LANGUAGE ENGLISH   LEARNER PREFERENCE PRIMARY DEMONSTRATION     LISTENING     READING   ANSWERED BY Patient   RELATIONSHIP SELF       Abuse Screening:  No flowsheet data found. Fall Risk  Fall Risk Assessment, last 12 mths 2/26/2020   Able to walk? Yes   Fall in past 12 months? No         Coordination of Care:  1. Have you been to the ER, urgent care clinic since your last visit? Hospitalized since your last visit? Yes; Name: [facility], 12/2019.    2. Have you seen or consulted any other health care providers outside of the [address] [place] [address] since your last visit? Include any pap smears or colon screening. No.    Pt does not know many of her medications.

## 2020-02-26 NOTE — PATIENT INSTRUCTIONS
Obtain sputum specimen and take it to the hospital laboratory Continue Trelegy 1 inhalation daily and remember to exhale fully before inhaling and to wash mouth with water and spit it out after inhaling Albuterol 2 puffs every 4 hours as needed only or albuterol by nebulizer every 4 hours as needed only Discontinue the DuoNeb nebulizer medicine made up of ipratropium and albuterol Always call for symptoms such as worsening shortness of breath

## 2020-02-26 NOTE — PROGRESS NOTES
[facility]  Pulmonary, Critical Care, and Sleep Medicine      Chief complaint:  COPD    HPI:    Denys Ye    is 67years old and comes to the office today for follow-up and relates that she was hospitalized in [place] for pneumonia and now she is home and relates that she has a persistent cough productive of gray-yellow and green mucus without blood. She denies chest pain or leg swelling and says she is able to walk only short distances without dyspnea. She continues to use Trelegy regularly and duo nebs at least twice a day which he says was the way that was prescribed      Allergies   Allergen Reactions    Latex Itching    Prednisone Swelling    Asmanex Hfa [Mometasone] Itching    Cleocin [Clindamycin Hcl] Itching and Swelling     Lips and tongue    Pcn [Penicillins] Swelling    Robitussin [Guaifenesin] Rash     Current Outpatient Medications   Medication Sig    gabapentin (NEURONTIN) 100 mg capsule Take 100 mg by mouth.  albuterol-ipratropium (DUO-NEB) 2.5 mg-0.5 mg/3 ml nebu 3 mL by Nebulization route every four (4) hours as needed for Wheezing.  naproxen sodium (ALEVE) 220 mg tablet Take 220 mg by mouth two (2) times daily (with meals).  aspirin (ASPIRIN) 325 mg tablet Take 325 mg by mouth daily.  Oxygen 2 Each by Nasal route. 2 L [employer]    zolpidem (AMBIEN) 10 mg tablet Take 1 Tab by mouth nightly as needed for Sleep. Max Daily Amount: 10 mg.    amLODIPine (NORVASC) 5 mg tablet Take 1 Tab by mouth daily. Indications: HYPERTENSION    furosemide (LASIX) 40 mg tablet Take 1 Tab by mouth daily. Indications: EDEMA    omeprazole (PRILOSEC) 20 mg capsule Take 20 mg by mouth daily.  hydroxychloroquine (PLAQUENIL) 200 mg tablet Take 200 mg by mouth daily.  potassium chloride (KLOR-CON M20) 20 mEq tablet Take 20 mEq by mouth two (2) times a day.  budesonide-formoteroL (SYMBICORT) 160-4.5 mcg/actuation HFAA Take 2 Puffs by inhalation.     mometasone-formoterol (DULERA) 100-5 mcg/actuation HFA inhaler Take 2 Puffs by inhalation.  oxyCODONE-acetaminophen (PERCOCET) 5-325 mg per tablet Take 1 Tab by mouth.  fluticasone-umeclidinium-vilanterol (TRELEGY ELLIPTA) 100-62.5-25 mcg inhaler Take 1 Puff by inhalation daily.  predniSONE (DELTASONE) 20 mg tablet 2 tablets every morning with breakfast for 7 days    albuterol (PROAIR HFA) 90 mcg/actuation inhaler Take 2 Puffs by inhalation every four (4) hours as needed for Wheezing or Shortness of Breath.  PROAIR HFA 90 mcg/actuation inhaler inhale 2 puffs by mouth every 4 hours if needed for wheezing    montelukast (SINGULAIR) 10 mg tablet Take 1 Tab by mouth daily. Indications: MAINTENANCE THERAPY FOR ASTHMA    ferrous sulfate ER (IRON) 160 mg (50 mg iron) TbER tablet Take 1 Tab by mouth daily. Indications: patient unsure of the mg of the medication    Cetirizine (ZYRTEC) 10 mg cap Take  by mouth.  HYDROcodone-acetaminophen (NORCO) 5-325 mg per tablet Take 1 Tab by mouth every six (6) hours as needed. Max Daily Amount: 4 Tabs.  chlorpheniramine-HYDROcodone (TUSSIONEX) 10-8 mg/5 mL suspension Take 5 mL by mouth every twelve (12) hours as needed for Cough. Max Daily Amount: 10 mL.  traZODone (DESYREL) 50 mg tablet Take 1 Tab by mouth nightly. No current facility-administered medications for this visit. Past Medical History:   Diagnosis Date    Asthma     Chronic lung disease     Hypertension     Lupus ([facility] [address]     Pulmonary arterial hypertension (HCC)     Pulmonary hypertension (HCC)     Rheumatoid arthritis(714.0)      History reviewed. No pertinent surgical history.   Social History     Socioeconomic History    Marital status: UNKNOWN     Spouse name: Not on file    Number of children: Not on file    Years of education: Not on file    Highest education level: Not on file   Occupational History    Not on file   Social Needs    Financial resource strain: Not on file   Kasi Food insecurity:     Worry: Not on file     Inability: Not on file    Transportation needs:     Medical: Not on file     Non-medical: Not on file   Tobacco Use    Smoking status: Former Smoker     Packs/day: 0.50     Years: 16.00     Pack years: 8.00     Types: Cigarettes     Last attempt to quit: 1974     Years since quittin.5    Smokeless tobacco: Never Used   Substance and Sexual Activity    Alcohol use: No    Drug use: Not on file    Sexual activity: Not on file   Lifestyle    Physical activity:     Days per week: Not on file     Minutes per session: Not on file    Stress: Not on file   Relationships    Social connections:     Talks on phone: Not on file     Gets together: Not on file     Attends Gnosticist service: Not on file     Active member of club or organization: Not on file     Attends meetings of clubs or organizations: Not on file     Relationship status: Not on file    Intimate partner violence:     Fear of current or ex partner: Not on file     Emotionally abused: Not on file     Physically abused: Not on file     Forced sexual activity: Not on file   Other Topics Concern    Not on file   Social History Narrative    Not on file     Family History   Problem Relation Age of Onset    Heart Disease Mother     Asthma Mother     Asthma Father     Parkinson's Disease Father        Review of systems:  She denies fever chills poor appetite weight loss and has left ear pain which goes to her throat    Physical Exam:  Visit Vitals  /61 (BP 1 Location: Right arm, BP Patient Position: Sitting)   Pulse 78   Temp 96.5 °F (35.8 °C) (Oral)   Resp 20   Ht 5' 3\" (1.6 m)   Wt 68.1 kg (150 lb 3.2 oz)   SpO2 92%   BMI 26.61 kg/m²       Well-developed well-nourished  HEENT: WNL left ear was examined because of complaint of ear pain and there appeared to be impacted mucus and very dark mucus or a small area of blood I could not view her tympanic membrane well  Lymph node exam: Supraclavicular cervical lymph nodes negative oropharynx appeared normal  Chest: No dullness to percussion equal expansion and and absence of wheezes rales or rubs  Heart: Regular rhythm with irregular beats no gallop no murmur no JVD no peripheral edema  Extremities: No cyanosis clubbing or calf tenderness  Neurological: Alert and oriented  Labs:    92% on room air at rest and with walking 140 m O2 sat was 91%    Impression:   Ear pain patient was referred to ENT and says she will go next week  Irregular heartbeats will notify her physician  COPD with exacerbation suggested by the purulent mucus in the increased shortness of breath. Because the patient has had multiple antibiotics and been hospitalized we will obtain a sputum culture before treating her respiratory infection    Plan:   Notified Dr. Denton Urrutia office concerning her irregular heartbeat  Continue Trelegy and PRN albuterol discontinue duo nebs  Follow-up in 6 weeks or sooner if needed  Michelle Tejada MD , [facility]    CC: Mame Alejandra MD     [address] [facility]  [place], [address] [facility] [address]     P: [phone]     F: [phone]

## 2020-02-27 ENCOUNTER — HOSPITAL ENCOUNTER (OUTPATIENT)
Dept: LAB | Age: 80
Discharge: HOME | End: 2020-02-27
Payer: MEDICARE

## 2020-02-27 PROCEDURE — 87186 SC STD MICRODIL/AGAR DIL: CPT

## 2020-02-27 PROCEDURE — 87070 CULTURE OTHR SPECIMN AEROBIC: CPT

## 2020-02-27 PROCEDURE — 87077 CULTURE AEROBIC IDENTIFY: CPT

## 2020-02-29 LAB
BACTERIA SPEC CULT: ABNORMAL
BACTERIA SPEC CULT: ABNORMAL
GRAM STN SPEC: ABNORMAL
SERVICE CMNT-IMP: ABNORMAL

## 2020-03-11 RX ORDER — SULFAMETHOXAZOLE AND TRIMETHOPRIM 800; 160 MG/1; MG/1
1 TABLET ORAL 2 TIMES DAILY
Qty: 20 TAB | Refills: 0 | Status: SHIPPED | OUTPATIENT
Start: 2020-03-11 | End: 2020-12-29 | Stop reason: ALTCHOICE

## 2020-04-07 ENCOUNTER — VIRTUAL VISIT (OUTPATIENT)
Dept: PULMONOLOGY | Age: 80
End: 2020-04-07

## 2020-04-07 DIAGNOSIS — K76.6 PAH (PULMONARY ARTERIAL HYPERTENSION) WITH PORTAL HYPERTENSION (HCC): ICD-10-CM

## 2020-04-07 DIAGNOSIS — I27.20 PULMONARY HTN (HCC): ICD-10-CM

## 2020-04-07 DIAGNOSIS — M32.9 SYSTEMIC LUPUS ERYTHEMATOSUS, UNSPECIFIED SLE TYPE, UNSPECIFIED ORGAN INVOLVEMENT STATUS (HCC): Primary | ICD-10-CM

## 2020-04-07 DIAGNOSIS — J84.10 PULMONARY FIBROSIS (HCC): ICD-10-CM

## 2020-04-07 DIAGNOSIS — I27.21 PAH (PULMONARY ARTERIAL HYPERTENSION) WITH PORTAL HYPERTENSION (HCC): ICD-10-CM

## 2020-04-07 RX ORDER — PREDNISONE 10 MG/1
10 TABLET ORAL DAILY
COMMUNITY
End: 2020-12-29 | Stop reason: ALTCHOICE

## 2020-04-09 ENCOUNTER — TELEPHONE (OUTPATIENT)
Dept: PULMONOLOGY | Age: 80
End: 2020-04-09

## 2020-04-09 NOTE — TELEPHONE ENCOUNTER
Pt called([phone]). Dr Blake Juarez told her to call and let him know what she has coughed up. She coughed up thick muddy colored phlegm. She was supposed to call him yesterday but couldn't get anythng up. Please call back.

## 2020-04-13 DIAGNOSIS — J40 BRONCHITIS: Primary | ICD-10-CM

## 2020-05-15 ENCOUNTER — TELEPHONE (OUTPATIENT)
Dept: PULMONOLOGY | Age: 80
End: 2020-05-15

## 2020-05-15 NOTE — TELEPHONE ENCOUNTER
Critical Care Progress Note        NAME: Reta Mckeon - ROOM: 4480/2329-J - MRN: [id number] - Age: 78year old - : 9/15/1952  Date of Admission: 3/23/2020  8:48 AM  Admission Diagnosis: Hypoxia [R09.02]  Nosocomial pneumonia [J18.9, Y95]      Las Pt called([phone]). Coughing and wheezing. She has been using her nebulizer and Pro-Air and they have not helped. Please call her back. HCl, benzonatate, guaiFENesin-codeine, Albuterol Sulfate HFA, HYDROmorphone HCl **OR** HYDROmorphone HCl, acetaminophen, ondansetron HCl, zolpidem, HYDROcodone-acetaminophen, Labetalol HCl     Lungs: clear to auscultation bilaterally and audible cuff leak

## 2020-05-15 NOTE — TELEPHONE ENCOUNTER
Patient c/o cough, congestion, SOB and wheezing x 7 days. No fever. Past 2 days the sxs have gotten worse. She is using her Nebulizer but not helping much. Pt states she is still sob within 2 hrs of the treatment. Pt to go to urgent care to be evaluated.  Pt agrees with plan and verbalizes understanding

## 2020-05-26 ENCOUNTER — VIRTUAL VISIT (OUTPATIENT)
Dept: PULMONOLOGY | Age: 80
End: 2020-05-26

## 2020-05-26 DIAGNOSIS — J84.10 PULMONARY FIBROSIS (HCC): ICD-10-CM

## 2020-05-26 DIAGNOSIS — I27.20 PULMONARY HTN (HCC): ICD-10-CM

## 2020-05-26 DIAGNOSIS — G47.34 NOCTURNAL HYPOXIA: ICD-10-CM

## 2020-05-26 DIAGNOSIS — M32.9 SYSTEMIC LUPUS ERYTHEMATOSUS, UNSPECIFIED SLE TYPE, UNSPECIFIED ORGAN INVOLVEMENT STATUS (HCC): Primary | ICD-10-CM

## 2020-05-26 DIAGNOSIS — J45.909 UNCOMPLICATED ASTHMA, UNSPECIFIED ASTHMA SEVERITY, UNSPECIFIED WHETHER PERSISTENT: ICD-10-CM

## 2020-05-26 DIAGNOSIS — J45.901 EXACERBATION OF ASTHMA, UNSPECIFIED ASTHMA SEVERITY, UNSPECIFIED WHETHER PERSISTENT: ICD-10-CM

## 2020-05-26 RX ORDER — PREDNISONE 20 MG/1
TABLET ORAL
Qty: 12 TAB | Refills: 0 | Status: SHIPPED | OUTPATIENT
Start: 2020-05-26 | End: 2020-12-29 | Stop reason: ALTCHOICE

## 2020-05-26 RX ORDER — PREDNISONE 20 MG/1
TABLET ORAL
Qty: 12 TAB | Refills: 0 | Status: CANCELLED | OUTPATIENT
Start: 2020-05-26

## 2020-05-26 NOTE — PROGRESS NOTES
Shonna Scruggs is a 79 y.o. female evaluated via audio only technology on 5/26/2020. Consent: She and/or her health care decision maker is aware that she may receive a bill for this audio only encounter, depending on her insurance coverage, and has provided verbal consent to proceed: Yes    I communicated with the patient and/or health care decision maker about the nature and details of the following:  Assessment & Plan:   Assessment: The patient relates that she has some mild increase in dyspnea over her usual dyspnea on exertion which is related to a cough productive of primarily clear mucus but occasionally discolored mucus suggesting that she has not completely recovered from her pneumonia and is having recurrent bouts of bronchitis or asthma. Her absence of fever and persistent purulent mucus suggested is not related to an infection. Plans and suggestions  Prednisone for 6 days 40 mg a day and the patient will call and let us know if she is improved  The patient develops persistent purulent mucus treat with antibiotics empirically  When it is feasible we will obtain overnight oxygen study on 2 L of oxygen to ensure that the patient is getting enough oxygen to treat her pulmonary hypertension and also to reassess for pulmonary hypertension and if it persists we will consider adding specific therapy  Pulmonary fibrosis related to lupus without a recent evaluation will require in the future a CAT scan and pulmonary function test  Follow-up in 8 weeks or sooner if needed    I spent approximately 25 minutes with the patient on a virtual phone visit. At least 50% of that time we discussed treatment and management of her condition. 12  Subjective:   Shonna Scruggs is a 79 y.o. female who was seen for Cough  She relates that recently she has been coughing severely and bringing up clear mucus but at times it is muddy looking.   She says she has no chest pain except when she coughs and that she has no leg swelling but she has shortness of breath periodically which is mildly worse than usual she is taking only albuterol for relief of her cough and shortness of breath    Prior to Admission medications    Medication Sig Start Date End Date Taking? Authorizing Provider   albuterol (PROVENTIL VENTOLIN) 2.5 mg /3 mL (0.083 %) nebu 3 mL by Nebulization route every four (4) hours as needed for Wheezing. 2/26/20  Yes Barbette Halsted, MD   albuterol [facility] HFA) 90 mcg/actuation inhaler Take 2 Puffs by inhalation every four (4) hours as needed for Wheezing or Shortness of Breath. 4/23/19  Yes Barbette Halsted, MD   aspirin (ASPIRIN) 325 mg tablet Take 325 mg by mouth daily. Yes Provider, Historical   Cetirizine (ZYRTEC) 10 mg cap Take  by mouth. Yes Provider, Historical   zolpidem (AMBIEN) 10 mg tablet Take 1 Tab by mouth nightly as needed for Sleep. Max Daily Amount: 10 mg. 5/15/17  Yes Nir Johnson MD   amLODIPine (NORVASC) 5 mg tablet Take 1 Tab by mouth daily. Indications: HYPERTENSION 8/15/16  Yes Nir Johnson MD   furosemide (LASIX) 40 mg tablet Take 1 Tab by mouth daily. Indications: EDEMA 8/15/16  Yes Nir Johnson MD   hydroxychloroquine (PLAQUENIL) 200 mg tablet Take 200 mg by mouth daily. Yes Other, MD Sameera   potassium chloride (KLOR-CON M20) 20 mEq tablet Take 20 mEq by mouth two (2) times a day. Yes Other, MD Sameera   predniSONE (DELTASONE) 10 mg tablet Take 10 mg by mouth daily. Provider, Historical   trimethoprim-sulfamethoxazole (BACTRIM DS, SEPTRA DS) 160-800 mg per tablet Take 1 Tab by mouth two (2) times a day. 3/11/20   Barbette Halsted, MD   budesonide-formoteroL [facility]) 160-4.5 mcg/actuation HFAA Take 2 Puffs by inhalation. 5/19/17   Provider, Historical   gabapentin (NEURONTIN) 100 mg capsule Take 100 mg by mouth. 12/11/19   Provider, Historical   mometasone-formoterol (DULERA) 100-5 mcg/actuation HFA inhaler Take 2 Puffs by inhalation.  3/3/16   Provider, Historical   oxyCODONE-acetaminophen (PERCOCET) 5-325 mg per tablet Take 1 Tab by mouth. 12/13/19   Provider, Historical   albuterol-ipratropium (DUO-NEB) 2.5 mg-0.5 mg/3 ml nebu 3 mL by Nebulization route every four (4) hours as needed for Wheezing. 2/14/20   Tisha Nava MD   fluticasone-umeclidinium-vilanterol (TRELEGY ELLIPTA) 100-62.5-25 mcg inhaler Take 1 Puff by inhalation daily. 10/11/19   Selma Davis NP   predniSONE (DELTASONE) 20 mg tablet 2 tablets every morning with breakfast for 7 days 7/1/19   Wandy Yee MD   [facility] HFA 90 mcg/actuation inhaler inhale 2 puffs by mouth every 4 hours if needed for wheezing 10/22/18   Wandy Yee MD   naproxen sodium (ALEVE) 220 mg tablet Take 220 mg by mouth two (2) times daily (with meals). Provider, Historical   montelukast (SINGULAIR) 10 mg tablet Take 1 Tab by mouth daily. Indications: MAINTENANCE THERAPY FOR ASTHMA 4/10/18   Emily MORROW NP   ferrous sulfate ER (IRON) 160 mg (50 mg iron) TbER tablet Take 1 Tab by mouth daily. Indications: patient unsure of the mg of the medication    Provider, Historical   Oxygen 2 Each by Nasal route. 2 L [employer]    Provider, Historical   HYDROcodone-acetaminophen (NORCO) 5-325 mg per tablet Take 1 Tab by mouth every six (6) hours as needed. Max Daily Amount: 4 Tabs. 5/15/17   Louis Garner MD   chlorpheniramine-HYDROcodone (TUSSIONEX) 10-8 mg/5 mL suspension Take 5 mL by mouth every twelve (12) hours as needed for Cough. Max Daily Amount: 10 mL. 5/15/17   Louis Garner MD   traZODone (DESYREL) 50 mg tablet Take 1 Tab by mouth nightly. 5/15/17   Louis Garner MD   omeprazole (PRILOSEC) 20 mg capsule Take 20 mg by mouth daily.     Other, MD Sameera     Allergies   Allergen Reactions    Latex Itching    Prednisone Swelling    Asmanex Hfa [Mometasone] Itching    Cleocin [Clindamycin Hcl] Itching and Swelling     Lips and tongue    Pcn [Penicillins] Swelling    Robitussin [Guaifenesin] Rash           ROS she denies fever chills but reports a wavering appetite and a 8 pound weight loss in the last 4 months    I affirm this is a Patient-Initiated Episode with a Patient who has not had a related appointment within my department in the past 7 days or scheduled within the next 24 hours.     Total Time: minutes: 21-30 minutes    Note: not billable if this call serves to triage the patient into an appointment for the relevant concern      Margaret Romero MD

## 2020-05-26 NOTE — PATIENT INSTRUCTIONS
Prednisone 2 tablets now then 2 tablets every morning for 5 more days Call if your symptoms are not improving or worsening severe symptoms go to the emergency room

## 2020-06-08 ENCOUNTER — TELEPHONE (OUTPATIENT)
Dept: PULMONOLOGY | Age: 80
End: 2020-06-08

## 2020-06-08 DIAGNOSIS — R06.2 WHEEZING: Primary | ICD-10-CM

## 2020-06-08 RX ORDER — PREDNISONE 20 MG/1
TABLET ORAL
Qty: 10 TAB | Refills: 0 | Status: SHIPPED | OUTPATIENT
Start: 2020-06-08 | End: 2020-12-29 | Stop reason: ALTCHOICE

## 2020-06-08 NOTE — TELEPHONE ENCOUNTER
Per pt she has been wheezing really bad for 3 days. She has been using her nebulizer and inhaler and neither are helping. Please call [phone].

## 2020-06-30 ENCOUNTER — TELEPHONE (OUTPATIENT)
Dept: PULMONOLOGY | Age: 80
End: 2020-06-30

## 2020-06-30 NOTE — TELEPHONE ENCOUNTER
The pt. States her symptoms started the first of May and have worsened over the past month. Chest congestion with prod. Of green sputum and green nasal drainage. Outcomes are \"bubbly\". SOB scale 7/10. She is not on Prednisone at present. Uses the [employer] on [address] as listed on chart.

## 2020-07-01 DIAGNOSIS — J40 BRONCHITIS: Primary | ICD-10-CM

## 2020-07-01 RX ORDER — LEVOFLOXACIN 500 MG/1
500 TABLET, FILM COATED ORAL DAILY
Qty: 10 TAB | Refills: 0 | Status: SHIPPED | OUTPATIENT
Start: 2020-07-01 | End: 2020-12-29 | Stop reason: ALTCHOICE

## 2020-07-01 RX ORDER — PREDNISONE 20 MG/1
TABLET ORAL
Qty: 10 TAB | Refills: 0 | Status: SHIPPED | OUTPATIENT
Start: 2020-07-01 | End: 2020-12-29 | Stop reason: ALTCHOICE

## 2020-07-10 NOTE — TELEPHONE ENCOUNTER
Pt called requesting refill of albuterol solution be sent to [facility] on [address]. Pt said that she is completely out of her medicine and does not have any refills.  Please call [phone]

## 2020-07-13 ENCOUNTER — TELEPHONE (OUTPATIENT)
Dept: PULMONOLOGY | Age: 80
End: 2020-07-13

## 2020-07-13 RX ORDER — ALBUTEROL SULFATE 0.83 MG/ML
SOLUTION RESPIRATORY (INHALATION)
Qty: 75 ML | Refills: 1 | Status: SHIPPED | OUTPATIENT
Start: 2020-07-13 | End: 2021-03-04

## 2020-07-14 RX ORDER — ALBUTEROL SULFATE 0.83 MG/ML
SOLUTION RESPIRATORY (INHALATION)
Qty: 75 ML | Refills: 1 | OUTPATIENT
Start: 2020-07-14

## 2020-07-16 ENCOUNTER — TELEPHONE (OUTPATIENT)
Dept: PULMONOLOGY | Age: 80
End: 2020-07-16

## 2020-07-16 NOTE — TELEPHONE ENCOUNTER
Pt states she is wheezing really bad and panting. She is also still coughing at night. She has finished all of her medicine that was prescribed. Please call [phone].

## 2020-07-24 ENCOUNTER — TELEPHONE (OUTPATIENT)
Dept: PULMONOLOGY | Age: 80
End: 2020-07-24

## 2020-07-24 NOTE — TELEPHONE ENCOUNTER
Patient states she has a very bad deep cough. After coughing she feels worn out. No fever. Pt advised to go to urgent care or [facility] ER to be evaluated.  Pt agrees with plan and is going to [facility] ER.

## 2020-09-17 DIAGNOSIS — J47.9 BRONCHIECTASIS WITHOUT COMPLICATION (HCC): ICD-10-CM

## 2020-09-17 DIAGNOSIS — J44.9 COPD WITH ASTHMA (HCC): ICD-10-CM

## 2020-09-17 DIAGNOSIS — I27.20 PULMONARY HTN (HCC): Primary | ICD-10-CM

## 2020-09-17 DIAGNOSIS — J84.10 PULMONARY FIBROSIS (HCC): ICD-10-CM

## 2020-09-17 NOTE — PROGRESS NOTES
Order placed for covid-19, per Verbal Order from Dr. Brandie Phan on 9/17/2020. Last office visit: 4/7/2020  Follow up Visit: 9/29/2020    Provider is aware of last office visit and follow up. No further action requested from provider.

## 2020-10-27 ENCOUNTER — TELEPHONE (OUTPATIENT)
Dept: PULMONOLOGY | Age: 80
End: 2020-10-27

## 2020-10-27 NOTE — TELEPHONE ENCOUNTER
Patient calling and c/o congestion with a cough. Pt saw her PCP last week and was given a zpak. She states she feels better as far as an infection but very congested and nebulizer tx's not helping much. Pt advised to call the PCP back to see if they could see her again to treat. She needs to be re evaluated.  Pt agrees with plan

## 2020-11-13 ENCOUNTER — DOCUMENTATION ONLY (OUTPATIENT)
Dept: PULMONOLOGY | Age: 80
End: 2020-11-13

## 2020-12-11 ENCOUNTER — HOSPITAL ENCOUNTER (OUTPATIENT)
Dept: LAB | Age: 80
Discharge: HOME | End: 2020-12-11
Payer: MEDICARE

## 2020-12-11 DIAGNOSIS — J44.9 COPD WITH ASTHMA (HCC): ICD-10-CM

## 2020-12-11 DIAGNOSIS — J84.10 PULMONARY FIBROSIS (HCC): ICD-10-CM

## 2020-12-11 DIAGNOSIS — I27.20 PULMONARY HTN (HCC): ICD-10-CM

## 2020-12-11 DIAGNOSIS — J47.9 BRONCHIECTASIS WITHOUT COMPLICATION (HCC): ICD-10-CM

## 2020-12-11 PROCEDURE — 87635 SARS-COV-2 COVID-19 AMP PRB: CPT

## 2020-12-13 LAB — SARS-COV-2, COV2NT: NOT DETECTED

## 2020-12-29 ENCOUNTER — APPOINTMENT (OUTPATIENT)
Dept: GENERAL RADIOLOGY | Age: 80
End: 2020-12-29
Attending: EMERGENCY MEDICINE
Payer: MEDICARE

## 2020-12-29 ENCOUNTER — HOSPITAL ENCOUNTER (EMERGENCY)
Age: 80
Discharge: HOME | End: 2020-12-29
Attending: EMERGENCY MEDICINE
Payer: MEDICARE

## 2020-12-29 VITALS
DIASTOLIC BLOOD PRESSURE: 64 MMHG | TEMPERATURE: 98.2 F | WEIGHT: 130 LBS | BODY MASS INDEX: 25.52 KG/M2 | RESPIRATION RATE: 24 BRPM | HEART RATE: 73 BPM | OXYGEN SATURATION: 97 % | HEIGHT: 60 IN | SYSTOLIC BLOOD PRESSURE: 141 MMHG

## 2020-12-29 DIAGNOSIS — E87.6 HYPOKALEMIA: ICD-10-CM

## 2020-12-29 DIAGNOSIS — J44.1 COPD EXACERBATION (HCC): Primary | ICD-10-CM

## 2020-12-29 LAB
ANION GAP SERPL CALC-SCNC: 4 MMOL/L (ref 3–18)
ATRIAL RATE: 68 BPM
BASOPHILS # BLD: 0 K/UL (ref 0–0.1)
BASOPHILS NFR BLD: 1 % (ref 0–2)
BUN SERPL-MCNC: 15 MG/DL (ref 7–18)
BUN/CREAT SERPL: 22 (ref 12–20)
CALCIUM SERPL-MCNC: 8.2 MG/DL (ref 8.5–10.1)
CALCULATED P AXIS, ECG09: 27 DEGREES
CALCULATED R AXIS, ECG10: -65 DEGREES
CALCULATED T AXIS, ECG11: -15 DEGREES
CHLORIDE SERPL-SCNC: 100 MMOL/L (ref 100–111)
CO2 SERPL-SCNC: 36 MMOL/L (ref 21–32)
CREAT SERPL-MCNC: 0.69 MG/DL (ref 0.6–1.3)
DIAGNOSIS, 93000: NORMAL
DIFFERENTIAL METHOD BLD: ABNORMAL
EOSINOPHIL # BLD: 0.6 K/UL (ref 0–0.4)
EOSINOPHIL NFR BLD: 9 % (ref 0–5)
ERYTHROCYTE [DISTWIDTH] IN BLOOD BY AUTOMATED COUNT: 12.9 % (ref 11.6–14.5)
GLUCOSE SERPL-MCNC: 79 MG/DL (ref 74–99)
HCT VFR BLD AUTO: 36.1 % (ref 35–45)
HGB BLD-MCNC: 11.6 G/DL (ref 12–16)
LYMPHOCYTES # BLD: 0.6 K/UL (ref 0.9–3.6)
LYMPHOCYTES NFR BLD: 9 % (ref 21–52)
MAGNESIUM SERPL-MCNC: 1.8 MG/DL (ref 1.6–2.6)
MCH RBC QN AUTO: 27.4 PG (ref 24–34)
MCHC RBC AUTO-ENTMCNC: 32.1 G/DL (ref 31–37)
MCV RBC AUTO: 85.3 FL (ref 74–97)
MONOCYTES # BLD: 0.5 K/UL (ref 0.05–1.2)
MONOCYTES NFR BLD: 7 % (ref 3–10)
NEUTS SEG # BLD: 5 K/UL (ref 1.8–8)
NEUTS SEG NFR BLD: 74 % (ref 40–73)
P-R INTERVAL, ECG05: 144 MS
PLATELET # BLD AUTO: 163 K/UL (ref 135–420)
PMV BLD AUTO: 10.7 FL (ref 9.2–11.8)
POTASSIUM SERPL-SCNC: 2.9 MMOL/L (ref 3.5–5.5)
Q-T INTERVAL, ECG07: 472 MS
QRS DURATION, ECG06: 152 MS
QTC CALCULATION (BEZET), ECG08: 501 MS
RBC # BLD AUTO: 4.23 M/UL (ref 4.2–5.3)
SODIUM SERPL-SCNC: 140 MMOL/L (ref 136–145)
VENTRICULAR RATE, ECG03: 68 BPM
WBC # BLD AUTO: 6.6 K/UL (ref 4.6–13.2)

## 2020-12-29 PROCEDURE — 74011000250 HC RX REV CODE- 250: Performed by: EMERGENCY MEDICINE

## 2020-12-29 PROCEDURE — 94640 AIRWAY INHALATION TREATMENT: CPT

## 2020-12-29 PROCEDURE — 80048 BASIC METABOLIC PNL TOTAL CA: CPT

## 2020-12-29 PROCEDURE — 71045 X-RAY EXAM CHEST 1 VIEW: CPT

## 2020-12-29 PROCEDURE — 74011250637 HC RX REV CODE- 250/637: Performed by: EMERGENCY MEDICINE

## 2020-12-29 PROCEDURE — 74011636637 HC RX REV CODE- 636/637: Performed by: EMERGENCY MEDICINE

## 2020-12-29 PROCEDURE — 93005 ELECTROCARDIOGRAM TRACING: CPT

## 2020-12-29 PROCEDURE — 85025 COMPLETE CBC W/AUTO DIFF WBC: CPT

## 2020-12-29 PROCEDURE — 83735 ASSAY OF MAGNESIUM: CPT

## 2020-12-29 PROCEDURE — 99285 EMERGENCY DEPT VISIT HI MDM: CPT

## 2020-12-29 RX ORDER — POTASSIUM CHLORIDE 20 MEQ/1
40 TABLET, EXTENDED RELEASE ORAL
Status: COMPLETED | OUTPATIENT
Start: 2020-12-29 | End: 2020-12-29

## 2020-12-29 RX ORDER — PREDNISONE 20 MG/1
20 TABLET ORAL DAILY
Qty: 5 TAB | Refills: 0 | Status: SHIPPED | OUTPATIENT
Start: 2020-12-29 | End: 2021-01-03

## 2020-12-29 RX ORDER — PREDNISONE 20 MG/1
60 TABLET ORAL
Status: COMPLETED | OUTPATIENT
Start: 2020-12-29 | End: 2020-12-29

## 2020-12-29 RX ORDER — IPRATROPIUM BROMIDE AND ALBUTEROL SULFATE 2.5; .5 MG/3ML; MG/3ML
3 SOLUTION RESPIRATORY (INHALATION) ONCE
Status: COMPLETED | OUTPATIENT
Start: 2020-12-29 | End: 2020-12-29

## 2020-12-29 RX ADMIN — PREDNISONE 60 MG: 20 TABLET ORAL at 16:49

## 2020-12-29 RX ADMIN — POTASSIUM CHLORIDE 40 MEQ: 1500 TABLET, EXTENDED RELEASE ORAL at 18:54

## 2020-12-29 RX ADMIN — IPRATROPIUM BROMIDE AND ALBUTEROL SULFATE 3 ML: .5; 3 SOLUTION RESPIRATORY (INHALATION) at 16:50

## 2020-12-29 NOTE — ED PROVIDER NOTES
HPI patient presents with a 7 to 10-day history of weakness cough and dyspnea especially dyspnea on exertion. She says she has a history of pulmonary fibrosis and COPD and she feels her respiratory status has declined recently. She says she is had a worsening cough that was initially a dry cough but now has become a productive cough of thick sputum. She says she has home oxygen at home that she uses on a as needed basis and she is using it more often now. She says she gets very short of breath doing light housework and has to use her oxygen to correct those symptoms. She denies any chest pain. She denies any fever or chills. She says she contacted her PCP today and was referred to the emergency room for further evaluation. No other complaints given at this time. Past Medical History:   Diagnosis Date    Asthma     Chronic lung disease     Hypertension     Lupus ([address]     Pulmonary arterial hypertension (HCC)     Pulmonary hypertension (HCC)     Rheumatoid arthritis(714.0)        History reviewed. No pertinent surgical history.       Family History:   Problem Relation Age of Onset    Heart Disease Mother     Asthma Mother     Asthma Father     Parkinson's Disease Father        Social History     Socioeconomic History    Marital status: UNKNOWN     Spouse name: Not on file    Number of children: Not on file    Years of education: Not on file    Highest education level: Not on file   Occupational History    Not on file   Social Needs    Financial resource strain: Not on file    Food insecurity     Worry: Not on file     Inability: Not on file    Transportation needs     Medical: Not on file     Non-medical: Not on file   Tobacco Use    Smoking status: Former Smoker     Packs/day: 0.50     Years: 16.00     Pack years: 8.00     Types: Cigarettes     Quit date: 1974     Years since quittin.3    Smokeless tobacco: Never Used   Substance and Sexual Activity    Alcohol use: No    Drug use: Not on file    Sexual activity: Not on file   Lifestyle    Physical activity     Days per week: Not on file     Minutes per session: Not on file    Stress: Not on file   Relationships    Social connections     Talks on phone: Not on file     Gets together: Not on file     Attends Amish service: Not on file     Active member of club or organization: Not on file     Attends meetings of clubs or organizations: Not on file     Relationship status: Not on file    Intimate partner violence     Fear of current or ex partner: Not on file     Emotionally abused: Not on file     Physically abused: Not on file     Forced sexual activity: Not on file   Other Topics Concern    Not on file   Social History Narrative    Not on file         ALLERGIES: Latex, Prednisone, Asmanex hfa [mometasone], Cleocin [clindamycin hcl], Pcn [penicillins], and Robitussin [guaifenesin]    Review of Systems   Constitutional: Negative. HENT: Negative. Eyes: Negative. Respiratory: Positive for cough and shortness of breath. Cardiovascular: Negative. Gastrointestinal: Negative. Genitourinary: Negative. Musculoskeletal: Negative. Skin: Negative. Neurological: Negative. Psychiatric/Behavioral: Negative. Vitals:    12/29/20 1517   BP: 138/68   Pulse: 76   Resp: 20   Temp: 98.2 °F (36.8 °C)   SpO2: 93%   Weight: 59 kg (130 lb)   Height: 5' (1.524 m)            Physical Exam  Vitals signs and nursing note reviewed. Constitutional:       Appearance: She is well-developed. HENT:      Head: Normocephalic and atraumatic. Nose: Nose normal.      Mouth/Throat:      Mouth: Mucous membranes are moist.   Eyes:      Conjunctiva/sclera: Conjunctivae normal.      Pupils: Pupils are equal, round, and reactive to light. Neck:      Musculoskeletal: Normal range of motion and neck supple. Cardiovascular:      Rate and Rhythm: Normal rate and regular rhythm.    Pulmonary:      Effort: Pulmonary effort is normal.      Breath sounds: Rhonchi present. Comments: Decreased breath sounds at the bases bilaterally with scattered rhonchi bilaterally and normal work of breathing. During the exam the patient is on supplemental nasal cannula oxygen with a pulse ox of 95%  Abdominal:      Palpations: Abdomen is soft. Musculoskeletal: Normal range of motion. Skin:     General: Skin is warm and dry. Neurological:      Mental Status: She is alert and oriented to person, place, and time. Psychiatric:         Mood and Affect: Mood normal.          MDM  Number of Diagnoses or Management Options  Diagnosis management comments: Patient says she is feeling much better after nebulizer treatment and is ready to go home. She understands and agrees with the disposition and follow-up plan.   Prieto Ramesh MD 6:45 PM         Procedures

## 2020-12-29 NOTE — ED TRIAGE NOTES
Pt presents with shortness of breath, cough and chest pressure x 2 weeks. States sent here by pcp office for low spo2 level in 80's, reports was not initially on o2 as has been for last 2 weeks when spo2 was checked. Placed on o2 at md office and per pt spo2 increased. Per pt has hx of asthma. Ankles noted to be swollen at time of triage, per pt on dieretic but has not taken today.

## 2020-12-30 NOTE — ED NOTES
I have reviewed discharge instructions with the patient. The patient verbalized understanding. Arm band removed and shredded. Current Discharge Medication List      START taking these medications    Details   predniSONE (DELTASONE) 20 mg tablet Take 20 mg by mouth daily for 5 days.   Qty: 5 Tab, Refills: 0

## 2021-01-18 ENCOUNTER — TELEPHONE (OUTPATIENT)
Dept: PULMONOLOGY | Age: 81
End: 2021-01-18

## 2021-01-18 RX ORDER — LEVOFLOXACIN 750 MG/1
750 TABLET ORAL DAILY
Qty: 7 TAB | Refills: 0 | Status: SHIPPED | OUTPATIENT
Start: 2021-01-18 | End: 2021-03-08

## 2021-01-18 RX ORDER — PREDNISONE 10 MG/1
TABLET ORAL
Qty: 20 TAB | Refills: 0 | Status: SHIPPED | OUTPATIENT
Start: 2021-01-18 | End: 2021-03-08

## 2021-01-18 NOTE — TELEPHONE ENCOUNTER
Prednisone and levaquin course given due to acute exacerbation with underlying ILD and bronchiectasis. Nursing verfied with pt that allergy to prednisone is actually a side effect (round face and increased appetite in the setting of prolonged therapy), pt denies any swelling.       Margaux Ayoub MD/MPH     Pulmonary, Critical Care Medicine  [facility]

## 2021-01-18 NOTE — TELEPHONE ENCOUNTER
Spoke with patient re Prednisone reactions in past. Pt states she can take if for short periods of time. If she is on long term she gets round face and increased appetite.  She states she DOES NOT have swelling, itching, rash or tongue swelling

## 2021-01-18 NOTE — TELEPHONE ENCOUNTER
Patient states for the past 2 weeks she has had cough, congestion with green sputum and sob. Symptoms have worsen over the 2 weeks. No fever. Pt has asthma and pul fibrosis.  Pt would like rx for prednisone and antibiotic to [facility] [address]

## 2021-03-04 ENCOUNTER — TELEPHONE (OUTPATIENT)
Dept: PULMONOLOGY | Age: 81
End: 2021-03-04

## 2021-03-04 RX ORDER — ALBUTEROL SULFATE 0.83 MG/ML
SOLUTION RESPIRATORY (INHALATION)
Qty: 75 ML | Refills: 1 | Status: SHIPPED | OUTPATIENT
Start: 2021-03-04 | End: 2021-03-05 | Stop reason: SDUPTHER

## 2021-03-05 RX ORDER — ALBUTEROL SULFATE 0.83 MG/ML
SOLUTION RESPIRATORY (INHALATION)
Qty: 75 NEBULE | Refills: 1 | Status: SHIPPED | OUTPATIENT
Start: 2021-03-05 | End: 2021-09-09

## 2021-03-08 ENCOUNTER — APPOINTMENT (OUTPATIENT)
Dept: GENERAL RADIOLOGY | Age: 81
End: 2021-03-08
Attending: PHYSICIAN ASSISTANT
Payer: MEDICARE

## 2021-03-08 ENCOUNTER — HOSPITAL ENCOUNTER (EMERGENCY)
Age: 81
Discharge: HOME | End: 2021-03-08
Attending: EMERGENCY MEDICINE
Payer: MEDICARE

## 2021-03-08 VITALS
TEMPERATURE: 98.2 F | WEIGHT: 125 LBS | SYSTOLIC BLOOD PRESSURE: 121 MMHG | RESPIRATION RATE: 20 BRPM | BODY MASS INDEX: 24.54 KG/M2 | HEIGHT: 60 IN | DIASTOLIC BLOOD PRESSURE: 56 MMHG | HEART RATE: 82 BPM | OXYGEN SATURATION: 97 %

## 2021-03-08 DIAGNOSIS — J44.1 COPD EXACERBATION (HCC): Primary | ICD-10-CM

## 2021-03-08 LAB
ALBUMIN SERPL-MCNC: 2.8 G/DL (ref 3.4–5)
ALBUMIN/GLOB SERPL: 0.7 {RATIO} (ref 0.8–1.7)
ALP SERPL-CCNC: 121 U/L (ref 45–117)
ALT SERPL-CCNC: 12 U/L (ref 13–56)
ANION GAP SERPL CALC-SCNC: 5 MMOL/L (ref 3–18)
AST SERPL-CCNC: 18 U/L (ref 10–38)
ATRIAL RATE: 77 BPM
BASOPHILS # BLD: 0 K/UL (ref 0–0.1)
BASOPHILS NFR BLD: 0 % (ref 0–2)
BILIRUB SERPL-MCNC: 0.7 MG/DL (ref 0.2–1)
BUN SERPL-MCNC: 11 MG/DL (ref 7–18)
BUN/CREAT SERPL: 15 (ref 12–20)
CALCIUM SERPL-MCNC: 7.8 MG/DL (ref 8.5–10.1)
CALCULATED P AXIS, ECG09: 39 DEGREES
CALCULATED R AXIS, ECG10: -44 DEGREES
CALCULATED T AXIS, ECG11: -21 DEGREES
CHLORIDE SERPL-SCNC: 106 MMOL/L (ref 100–111)
CK MB CFR SERPL CALC: NORMAL % (ref 0–4)
CK MB SERPL-MCNC: <1 NG/ML (ref 5–25)
CK SERPL-CCNC: 65 U/L (ref 26–192)
CO2 SERPL-SCNC: 30 MMOL/L (ref 21–32)
COVID-19 RAPID TEST, COVR: NOT DETECTED
CREAT SERPL-MCNC: 0.74 MG/DL (ref 0.6–1.3)
DIAGNOSIS, 93000: NORMAL
DIFFERENTIAL METHOD BLD: ABNORMAL
EOSINOPHIL # BLD: 0.7 K/UL (ref 0–0.4)
EOSINOPHIL NFR BLD: 10 % (ref 0–5)
ERYTHROCYTE [DISTWIDTH] IN BLOOD BY AUTOMATED COUNT: 15 % (ref 11.6–14.5)
GLOBULIN SER CALC-MCNC: 4 G/DL (ref 2–4)
GLUCOSE SERPL-MCNC: 81 MG/DL (ref 74–99)
HCT VFR BLD AUTO: 37.1 % (ref 35–45)
HGB BLD-MCNC: 11.7 G/DL (ref 12–16)
LYMPHOCYTES # BLD: 1.1 K/UL (ref 0.9–3.6)
LYMPHOCYTES NFR BLD: 16 % (ref 21–52)
MAGNESIUM SERPL-MCNC: 1.9 MG/DL (ref 1.6–2.6)
MCH RBC QN AUTO: 26.4 PG (ref 24–34)
MCHC RBC AUTO-ENTMCNC: 31.5 G/DL (ref 31–37)
MCV RBC AUTO: 83.7 FL (ref 74–97)
MONOCYTES # BLD: 0.5 K/UL (ref 0.05–1.2)
MONOCYTES NFR BLD: 7 % (ref 3–10)
NEUTS SEG # BLD: 4.7 K/UL (ref 1.8–8)
NEUTS SEG NFR BLD: 67 % (ref 40–73)
P-R INTERVAL, ECG05: 134 MS
PLATELET # BLD AUTO: 248 K/UL (ref 135–420)
PMV BLD AUTO: 10.4 FL (ref 9.2–11.8)
POTASSIUM SERPL-SCNC: 3.2 MMOL/L (ref 3.5–5.5)
PROT SERPL-MCNC: 6.8 G/DL (ref 6.4–8.2)
Q-T INTERVAL, ECG07: 436 MS
QRS DURATION, ECG06: 142 MS
QTC CALCULATION (BEZET), ECG08: 493 MS
RBC # BLD AUTO: 4.43 M/UL (ref 4.2–5.3)
SARS-COV-2, COV2: NORMAL
SODIUM SERPL-SCNC: 141 MMOL/L (ref 136–145)
SOURCE, COVRS: NORMAL
TROPONIN I SERPL-MCNC: 0.02 NG/ML (ref 0–0.04)
VENTRICULAR RATE, ECG03: 77 BPM
WBC # BLD AUTO: 7 K/UL (ref 4.6–13.2)

## 2021-03-08 PROCEDURE — 94640 AIRWAY INHALATION TREATMENT: CPT

## 2021-03-08 PROCEDURE — 93005 ELECTROCARDIOGRAM TRACING: CPT

## 2021-03-08 PROCEDURE — 83735 ASSAY OF MAGNESIUM: CPT

## 2021-03-08 PROCEDURE — 74011000250 HC RX REV CODE- 250: Performed by: PHYSICIAN ASSISTANT

## 2021-03-08 PROCEDURE — 80053 COMPREHEN METABOLIC PANEL: CPT

## 2021-03-08 PROCEDURE — 82553 CREATINE MB FRACTION: CPT

## 2021-03-08 PROCEDURE — 87635 SARS-COV-2 COVID-19 AMP PRB: CPT

## 2021-03-08 PROCEDURE — 74011250637 HC RX REV CODE- 250/637: Performed by: PHYSICIAN ASSISTANT

## 2021-03-08 PROCEDURE — 96365 THER/PROPH/DIAG IV INF INIT: CPT

## 2021-03-08 PROCEDURE — 85025 COMPLETE CBC W/AUTO DIFF WBC: CPT

## 2021-03-08 PROCEDURE — 74011250636 HC RX REV CODE- 250/636: Performed by: PHYSICIAN ASSISTANT

## 2021-03-08 PROCEDURE — 71045 X-RAY EXAM CHEST 1 VIEW: CPT

## 2021-03-08 PROCEDURE — 99285 EMERGENCY DEPT VISIT HI MDM: CPT

## 2021-03-08 RX ORDER — DEXAMETHASONE 2 MG/1
6 TABLET ORAL
Qty: 12 TAB | Refills: 0 | Status: SHIPPED | OUTPATIENT
Start: 2021-03-08 | End: 2021-03-12

## 2021-03-08 RX ORDER — POTASSIUM CHLORIDE 20 MEQ/1
40 TABLET, EXTENDED RELEASE ORAL
Status: COMPLETED | OUTPATIENT
Start: 2021-03-08 | End: 2021-03-08

## 2021-03-08 RX ORDER — DOXYCYCLINE 100 MG/1
100 CAPSULE ORAL
Status: COMPLETED | OUTPATIENT
Start: 2021-03-08 | End: 2021-03-08

## 2021-03-08 RX ORDER — IPRATROPIUM BROMIDE AND ALBUTEROL SULFATE 2.5; .5 MG/3ML; MG/3ML
3 SOLUTION RESPIRATORY (INHALATION) ONCE
Status: COMPLETED | OUTPATIENT
Start: 2021-03-08 | End: 2021-03-08

## 2021-03-08 RX ORDER — IPRATROPIUM BROMIDE AND ALBUTEROL SULFATE 2.5; .5 MG/3ML; MG/3ML
3 SOLUTION RESPIRATORY (INHALATION)
Qty: 30 NEBULE | Refills: 0 | Status: SHIPPED | OUTPATIENT
Start: 2021-03-08 | End: 2021-03-18

## 2021-03-08 RX ORDER — DEXAMETHASONE SODIUM PHOSPHATE 4 MG/ML
10 INJECTION, SOLUTION INTRA-ARTICULAR; INTRALESIONAL; INTRAMUSCULAR; INTRAVENOUS; SOFT TISSUE
Status: COMPLETED | OUTPATIENT
Start: 2021-03-08 | End: 2021-03-08

## 2021-03-08 RX ORDER — DOXYCYCLINE 100 MG/1
100 CAPSULE ORAL 2 TIMES DAILY
Qty: 14 CAP | Refills: 0 | Status: SHIPPED | OUTPATIENT
Start: 2021-03-08 | End: 2021-03-15

## 2021-03-08 RX ORDER — MAGNESIUM SULFATE HEPTAHYDRATE 40 MG/ML
2 INJECTION, SOLUTION INTRAVENOUS
Status: COMPLETED | OUTPATIENT
Start: 2021-03-08 | End: 2021-03-08

## 2021-03-08 RX ADMIN — POTASSIUM CHLORIDE 40 MEQ: 1500 TABLET, EXTENDED RELEASE ORAL at 18:51

## 2021-03-08 RX ADMIN — IPRATROPIUM BROMIDE AND ALBUTEROL SULFATE 3 ML: .5; 3 SOLUTION RESPIRATORY (INHALATION) at 17:37

## 2021-03-08 RX ADMIN — MAGNESIUM SULFATE 2 G: 2 INJECTION INTRAVENOUS at 18:51

## 2021-03-08 RX ADMIN — DEXAMETHASONE SODIUM PHOSPHATE 10 MG: 4 INJECTION, SOLUTION INTRA-ARTICULAR; INTRALESIONAL; INTRAMUSCULAR; INTRAVENOUS; SOFT TISSUE at 17:37

## 2021-03-08 RX ADMIN — IPRATROPIUM BROMIDE AND ALBUTEROL SULFATE 3 ML: .5; 3 SOLUTION RESPIRATORY (INHALATION) at 18:51

## 2021-03-08 RX ADMIN — DOXYCYCLINE HYCLATE 100 MG: 100 CAPSULE ORAL at 18:51

## 2021-03-08 NOTE — ED TRIAGE NOTES
Patient states she started with a sinus infection and now the congestion has moved down to her chest.  She c/o cough and SOB with history of bronchial fibrosis and asthma. She denies fever. She states she is on oxygen at home as needed.

## 2021-03-08 NOTE — ED PROVIDER NOTES
[address] EMERGENCY DEPT    Date: 3/8/2021  Patient Name: Beth Bragg    History of Presenting Illness     Chief Complaint   Patient presents with    Chest Congestion    Shortness of Breath     79 y.o. female with a PMH of Pulmonary Fibrosis, Lupus, Asthma, Pulmonary hypertension, and RA presents to the ED complaining of shortness of breath and cough worsening over the past month. Patient states she initially had some nasal congestion like a sinus infection at the beginning of February. She notes having worsening congestion and now a cough that brings up green and brown sputum. Patient does not recall any Covid contacts. Denies any fever, chills, chest pain, leg pain or swelling, other symptoms. Patient denies any other associated signs or symptoms. Patient denies any other complaints. Nursing notes regarding the HPI and triage nursing notes were reviewed. Prior medical records were reviewed. Current Facility-Administered Medications   Medication Dose Route Frequency Provider Last Rate Last Admin    magnesium sulfate 2 g/50 ml IVPB (premix or compounded)  2 g IntraVENous NOW Cristina Skinner PA 50 mL/hr at 03/08/21 1851 2 g at 03/08/21 1851     Current Outpatient Medications   Medication Sig Dispense Refill    albuterol (PROVENTIL VENTOLIN) 2.5 mg /3 mL (0.083 %) nebu inhale contents of 1 vial in nebulizer every 4 hours if needed for wheezing. FILE UNDER MEDICARE PART B. ICD J45.909, J84.11 75 Nebule 1    albuterol-ipratropium (DUO-NEB) 2.5 mg-0.5 mg/3 ml nebu 3 mL by Nebulization route every four (4) hours as needed for Wheezing. 50 Nebule 2    albuterol (PROAIR HFA) 90 mcg/actuation inhaler Take 2 Puffs by inhalation every four (4) hours as needed for Wheezing or Shortness of Breath. 1 Inhaler 5    naproxen sodium (ALEVE) 220 mg tablet Take 220 mg by mouth two (2) times daily (with meals).  montelukast (SINGULAIR) 10 mg tablet Take 1 Tab by mouth daily.  Indications: MAINTENANCE THERAPY FOR ASTHMA 30 Tab 3    Oxygen 2 Each by Nasal route. 2 L [employer]      amLODIPine (NORVASC) 5 mg tablet Take 1 Tab by mouth daily. Indications: HYPERTENSION 30 Tab 1    furosemide (LASIX) 40 mg tablet Take 1 Tab by mouth daily. Indications: EDEMA 30 Tab 1    omeprazole (PRILOSEC) 20 mg capsule Take 20 mg by mouth daily.  hydroxychloroquine (PLAQUENIL) 200 mg tablet Take 200 mg by mouth daily.  potassium chloride (KLOR-CON M20) 20 mEq tablet Take 20 mEq by mouth two (2) times a day. Past History     Past Medical History:  Past Medical History:   Diagnosis Date    Asthma     Chronic lung disease     Hypertension     [facility] [address] [address]     Pulmonary arterial hypertension [address] [address]     Pulmonary hypertension (HCC)     Rheumatoid arthritis(714.0)        Past Surgical History:  No past surgical history on file. Family History:  Family History   Problem Relation Age of Onset    Heart Disease Mother     Asthma Mother     Asthma Father     Parkinson's Disease Father        Social History:  Social History     Tobacco Use    Smoking status: Former Smoker     Packs/day: 0.50     Years: 16.00     Pack years: 8.00     Types: Cigarettes     Quit date: 1974     Years since quittin.5    Smokeless tobacco: Never Used   Substance Use Topics    Alcohol use: No    Drug use: Not on file       Allergies: Allergies   Allergen Reactions    Latex Itching    Prednisone Swelling    Asmanex Hfa [Mometasone] Itching    Cleocin [Clindamycin Hcl] Itching and Swelling     Lips and tongue    Pcn [Penicillins] Swelling    Robitussin [Guaifenesin] Rash       Patient's primary care provider (as noted in EPIC): Jyoti Juares MD    Review of Systems   Constitutional:  Denies malaise, fever, chills. Head:  Denies injury. Face:  Denies injury or pain. ENMT: + nasal congestion. Denies sore throat. Cardiac:  Denies chest pain or palpitations.    Respiratory: + productive cough, SOB. GI/ABD:  Denies injury, pain, distention, nausea, vomiting, diarrhea. Neuro:  Denies headache, LOC, dizziness, neurologic symptoms/deficits/paresthesias. Skin: Denies injury, rash, itching or skin changes. All other systems negative as reviewed. Visit Vitals  /79   Pulse 71   Temp 98.2 °F (36.8 °C)   Resp 23   Ht 5' (1.524 m)   Wt 56.7 kg (125 lb)   SpO2 100%   BMI 24.41 kg/m²       PHYSICAL EXAM:    CONSTITUTIONAL:  Alert, in no apparent distress;  well developed;  well nourished. HEAD:  Normocephalic, atraumatic. EYES:  EOMI. Non-icteric sclera. Normal conjunctiva. NECK:  Supple  RESPIRATORY:  Wheezes noted throughout. Good air movement. Without rales. CARDIOVASCULAR:  Regular rate and rhythm. No murmurs, rubs, or gallops. UPPER EXT:  Normal inspection. LOWER EXT:  No edema, no calf tenderness. NEURO:  Moves all four extremities, and grossly normal motor exam.  SKIN:  No rashes;  Normal for age. PSYCH:  Alert and normal affect. ED COURSE:      Recent Results (from the past 12 hour(s))   CBC WITH AUTOMATED DIFF    Collection Time: 03/08/21  5:04 PM   Result Value Ref Range    WBC 7.0 4.6 - 13.2 K/uL    RBC 4.43 4.20 - 5.30 M/uL    HGB 11.7 (L) 12.0 - 16.0 g/dL    HCT 37.1 35.0 - 45.0 %    MCV 83.7 74.0 - 97.0 FL    MCH 26.4 24.0 - 34.0 PG    MCHC 31.5 31.0 - 37.0 g/dL    RDW 15.0 (H) 11.6 - 14.5 %    PLATELET 432 896 - 108 K/uL    MPV 10.4 9.2 - 11.8 FL    NEUTROPHILS 67 40 - 73 %    LYMPHOCYTES 16 (L) 21 - 52 %    MONOCYTES 7 3 - 10 %    EOSINOPHILS 10 (H) 0 - 5 %    BASOPHILS 0 0 - 2 %    ABS. NEUTROPHILS 4.7 1.8 - 8.0 K/UL    ABS. LYMPHOCYTES 1.1 0.9 - 3.6 K/UL    ABS. MONOCYTES 0.5 0.05 - 1.2 K/UL    ABS. EOSINOPHILS 0.7 (H) 0.0 - 0.4 K/UL    ABS.  BASOPHILS 0.0 0.0 - 0.1 K/UL    DF AUTOMATED     METABOLIC PANEL, COMPREHENSIVE    Collection Time: 03/08/21  5:04 PM   Result Value Ref Range    Sodium 141 136 - 145 mmol/L    Potassium 3.2 (L) 3.5 - 5.5 mmol/L Chloride 106 100 - 111 mmol/L    CO2 30 21 - 32 mmol/L    Anion gap 5 3.0 - 18 mmol/L    Glucose 81 74 - 99 mg/dL    BUN 11 7.0 - 18 MG/DL    Creatinine 0.74 0.6 - 1.3 MG/DL    BUN/Creatinine ratio 15 12 - 20      GFR est AA >60 >60 ml/min/1.73m2    GFR est non-AA >60 >60 ml/min/1.73m2    Calcium 7.8 (L) 8.5 - 10.1 MG/DL    Bilirubin, total 0.7 0.2 - 1.0 MG/DL    ALT (SGPT) 12 (L) 13 - 56 U/L    AST (SGOT) 18 10 - 38 U/L    Alk. phosphatase 121 (H) 45 - 117 U/L    Protein, total 6.8 6.4 - 8.2 g/dL    Albumin 2.8 (L) 3.4 - 5.0 g/dL    Globulin 4.0 2.0 - 4.0 g/dL    A-G Ratio 0.7 (L) 0.8 - 1.7     CARDIAC PANEL,(CK, CKMB & TROPONIN)    Collection Time: 03/08/21  5:04 PM   Result Value Ref Range    CK - MB <1.0 <3.6 ng/ml    CK-MB Index  0.0 - 4.0 %     CALCULATION NOT PERFORMED WHEN RESULT IS BELOW LINEAR LIMIT    CK 65 26 - 192 U/L    Troponin-I, QT 0.02 0.0 - 0.045 NG/ML   MAGNESIUM    Collection Time: 03/08/21  5:04 PM   Result Value Ref Range    Magnesium 1.9 1.6 - 2.6 mg/dL   EKG, 12 LEAD, INITIAL    Collection Time: 03/08/21  5:04 PM   Result Value Ref Range    Ventricular Rate 77 BPM    Atrial Rate 77 BPM    P-R Interval 134 ms    QRS Duration 142 ms    Q-T Interval 436 ms    QTC Calculation (Bezet) 493 ms    Calculated P Axis 39 degrees    Calculated R Axis -44 degrees    Calculated T Axis -21 degrees    Diagnosis       Normal sinus rhythm  Left axis deviation  Right bundle branch block  T wave abnormality, consider lateral ischemia  Abnormal ECG    Confirmed by Zonia Vásquez MD, Kun Martinez ([id number]) on 3/8/2021 5:23:36 PM     SARS-COV-2    Collection Time: 03/08/21  5:15 PM   Result Value Ref Range    SARS-CoV-2 Please find results under separate order        18:10 rechecked patient. She notes feeling much better. Pt still wheezing, 2 more duonebs ordered with mag. K of 3.2, given kdur 40. CXR: no acute process noted, fibrosis present. Patient notes having a cough with green/brown sputum with hypoxia. Remainder of vitals look well. Covid pending. Plan to treat with doxycycline, given dose here. 6:56 PM : Pt care transferred to Dr. Hattie Rinaldi, ED provider. History of patient complaint(s), available diagnostic reports and current treatment plan has been discussed thoroughly.      JESUSITA Freeman

## 2021-03-09 ENCOUNTER — DOCUMENTATION ONLY (OUTPATIENT)
Dept: PULMONOLOGY | Age: 81
End: 2021-03-09

## 2021-03-09 NOTE — PROGRESS NOTES
Unable to lv vm-mlbx full-will send letter-moved pt's apt from 5/4 d/t dr out to 5/7 at 230pm w/Dr. Luda Mccarthy

## 2021-03-09 NOTE — ROUTINE PROCESS
Lynda Diamond is a 79 y.o. female that was discharged in stable. Pt was accompanied by spouse. Pt is not driving. The patients diagnosis, condition and treatment were explained to  patient and aftercare instructions were given. The patient verbalized understanding. Patient armband removed and shredded.

## 2021-03-09 NOTE — ED NOTES
Assumed care for the patient from PA used to reevaluate after nebs. Patient was feeling much better, is still faint expiratory wheezing and satting 100% on 1 L nasal cannula and mid 90s on room air. The patient has oxygen at home and she also has a nebulizer machine at home. Will refill her nebs and instructed her to use it every 4-6 hours as needed, oxygen as needed and continue to take the steroids and antibiotics. Patient has a pulse ox and can check her oxygen at home. She was also given strict return precautions.
How Severe Is Your Rash?: mild
Patient placed on 2 LPM nasal cannula. Pt room air sat low 80s.
Is This A New Presentation, Or A Follow-Up?: Rash
Additional History: Patient states rash started after walking in grass, thought it may have initially been insect bites.

## 2021-03-10 ENCOUNTER — TELEPHONE (OUTPATIENT)
Dept: PULMONOLOGY | Age: 81
End: 2021-03-10

## 2021-03-10 RX ORDER — IPRATROPIUM BROMIDE AND ALBUTEROL SULFATE 2.5; .5 MG/3ML; MG/3ML
3 SOLUTION RESPIRATORY (INHALATION)
Qty: 60 NEBULE | Refills: 2 | Status: SHIPPED | OUTPATIENT
Start: 2021-03-10 | End: 2021-04-21 | Stop reason: SDUPTHER

## 2021-04-21 ENCOUNTER — HOSPITAL ENCOUNTER (EMERGENCY)
Age: 81
Discharge: HOME | End: 2021-04-21
Attending: EMERGENCY MEDICINE
Payer: MEDICARE

## 2021-04-21 ENCOUNTER — APPOINTMENT (OUTPATIENT)
Dept: GENERAL RADIOLOGY | Age: 81
End: 2021-04-21
Attending: PHYSICIAN ASSISTANT
Payer: MEDICARE

## 2021-04-21 VITALS
HEART RATE: 89 BPM | SYSTOLIC BLOOD PRESSURE: 140 MMHG | BODY MASS INDEX: 22.19 KG/M2 | RESPIRATION RATE: 31 BRPM | OXYGEN SATURATION: 100 % | HEIGHT: 60 IN | TEMPERATURE: 98.9 F | WEIGHT: 113 LBS | DIASTOLIC BLOOD PRESSURE: 65 MMHG

## 2021-04-21 DIAGNOSIS — R79.89 ELEVATED BRAIN NATRIURETIC PEPTIDE (BNP) LEVEL: ICD-10-CM

## 2021-04-21 DIAGNOSIS — J44.1 ACUTE EXACERBATION OF CHRONIC OBSTRUCTIVE PULMONARY DISEASE (COPD) (HCC): Primary | ICD-10-CM

## 2021-04-21 LAB
ANION GAP SERPL CALC-SCNC: 6 MMOL/L (ref 3–18)
ATRIAL RATE: 81 BPM
BASOPHILS # BLD: 0.1 K/UL (ref 0–0.1)
BASOPHILS NFR BLD: 1 % (ref 0–2)
BNP SERPL-MCNC: 2532 PG/ML (ref 0–1800)
BUN SERPL-MCNC: 14 MG/DL (ref 7–18)
BUN/CREAT SERPL: 16 (ref 12–20)
CALCIUM SERPL-MCNC: 9.1 MG/DL (ref 8.5–10.1)
CALCULATED P AXIS, ECG09: 51 DEGREES
CALCULATED R AXIS, ECG10: -101 DEGREES
CALCULATED T AXIS, ECG11: -21 DEGREES
CHLORIDE SERPL-SCNC: 104 MMOL/L (ref 100–111)
CO2 SERPL-SCNC: 30 MMOL/L (ref 21–32)
CREAT SERPL-MCNC: 0.85 MG/DL (ref 0.6–1.3)
DIAGNOSIS, 93000: NORMAL
DIFFERENTIAL METHOD BLD: ABNORMAL
EOSINOPHIL # BLD: 0.6 K/UL (ref 0–0.4)
EOSINOPHIL NFR BLD: 7 % (ref 0–5)
ERYTHROCYTE [DISTWIDTH] IN BLOOD BY AUTOMATED COUNT: 14.6 % (ref 11.6–14.5)
GLUCOSE SERPL-MCNC: 73 MG/DL (ref 74–99)
HCT VFR BLD AUTO: 38.4 % (ref 35–45)
HGB BLD-MCNC: 12.2 G/DL (ref 12–16)
LYMPHOCYTES # BLD: 0.7 K/UL (ref 0.9–3.6)
LYMPHOCYTES NFR BLD: 9 % (ref 21–52)
MCH RBC QN AUTO: 26.3 PG (ref 24–34)
MCHC RBC AUTO-ENTMCNC: 31.8 G/DL (ref 31–37)
MCV RBC AUTO: 82.8 FL (ref 74–97)
MONOCYTES # BLD: 0.5 K/UL (ref 0.05–1.2)
MONOCYTES NFR BLD: 5 % (ref 3–10)
NEUTS SEG # BLD: 6.6 K/UL (ref 1.8–8)
NEUTS SEG NFR BLD: 78 % (ref 40–73)
P-R INTERVAL, ECG05: 142 MS
PLATELET # BLD AUTO: 239 K/UL (ref 135–420)
PMV BLD AUTO: 9.9 FL (ref 9.2–11.8)
POTASSIUM SERPL-SCNC: 4.1 MMOL/L (ref 3.5–5.5)
Q-T INTERVAL, ECG07: 426 MS
QRS DURATION, ECG06: 132 MS
QTC CALCULATION (BEZET), ECG08: 494 MS
RBC # BLD AUTO: 4.64 M/UL (ref 4.2–5.3)
SODIUM SERPL-SCNC: 140 MMOL/L (ref 136–145)
TROPONIN I SERPL-MCNC: <0.02 NG/ML (ref 0–0.04)
VENTRICULAR RATE, ECG03: 81 BPM
WBC # BLD AUTO: 8.5 K/UL (ref 4.6–13.2)

## 2021-04-21 PROCEDURE — 80048 BASIC METABOLIC PNL TOTAL CA: CPT

## 2021-04-21 PROCEDURE — 93005 ELECTROCARDIOGRAM TRACING: CPT

## 2021-04-21 PROCEDURE — 74011000250 HC RX REV CODE- 250: Performed by: PHYSICIAN ASSISTANT

## 2021-04-21 PROCEDURE — 94640 AIRWAY INHALATION TREATMENT: CPT

## 2021-04-21 PROCEDURE — 99285 EMERGENCY DEPT VISIT HI MDM: CPT

## 2021-04-21 PROCEDURE — 74011636637 HC RX REV CODE- 636/637: Performed by: PHYSICIAN ASSISTANT

## 2021-04-21 PROCEDURE — 85025 COMPLETE CBC W/AUTO DIFF WBC: CPT

## 2021-04-21 PROCEDURE — 71045 X-RAY EXAM CHEST 1 VIEW: CPT

## 2021-04-21 PROCEDURE — 84484 ASSAY OF TROPONIN QUANT: CPT

## 2021-04-21 PROCEDURE — 83880 ASSAY OF NATRIURETIC PEPTIDE: CPT

## 2021-04-21 RX ORDER — IPRATROPIUM BROMIDE AND ALBUTEROL SULFATE 2.5; .5 MG/3ML; MG/3ML
3 SOLUTION RESPIRATORY (INHALATION)
Qty: 30 NEBULE | Refills: 0 | Status: SHIPPED | OUTPATIENT
Start: 2021-04-21

## 2021-04-21 RX ORDER — IPRATROPIUM BROMIDE AND ALBUTEROL SULFATE 2.5; .5 MG/3ML; MG/3ML
3 SOLUTION RESPIRATORY (INHALATION) ONCE
Status: COMPLETED | OUTPATIENT
Start: 2021-04-21 | End: 2021-04-21

## 2021-04-21 RX ORDER — DEXAMETHASONE SODIUM PHOSPHATE 4 MG/ML
10 INJECTION, SOLUTION INTRA-ARTICULAR; INTRALESIONAL; INTRAMUSCULAR; INTRAVENOUS; SOFT TISSUE
Status: DISCONTINUED | OUTPATIENT
Start: 2021-04-21 | End: 2021-04-21

## 2021-04-21 RX ORDER — METHYLPREDNISOLONE 4 MG/1
TABLET ORAL
Qty: 1 DOSE PACK | Refills: 0 | Status: SHIPPED | OUTPATIENT
Start: 2021-04-21 | End: 2021-08-25

## 2021-04-21 RX ORDER — PREDNISONE 20 MG/1
60 TABLET ORAL
Status: COMPLETED | OUTPATIENT
Start: 2021-04-21 | End: 2021-04-21

## 2021-04-21 RX ADMIN — IPRATROPIUM BROMIDE AND ALBUTEROL SULFATE 3 ML: .5; 3 SOLUTION RESPIRATORY (INHALATION) at 17:53

## 2021-04-21 RX ADMIN — PREDNISONE 60 MG: 20 TABLET ORAL at 17:27

## 2021-04-21 RX ADMIN — IPRATROPIUM BROMIDE AND ALBUTEROL SULFATE 3 ML: .5; 3 SOLUTION RESPIRATORY (INHALATION) at 17:27

## 2021-04-21 NOTE — ED PROVIDER NOTES
EMERGENCY DEPARTMENT HISTORY AND PHYSICAL EXAM    4:45 PM      Date: 4/21/2021  Patient Name: Robert Bradford    History of Presenting Illness     Chief Complaint   Patient presents with    Shortness of Breath         History Provided By: Patient,     Additional History (Context): Robert Bradford is a 79 y.o. female with hx of pulmonary hypertension, bronchiectasis, COPD, lupus, and other noted PMH who presents with complaint of shortness of breath, dry cough, and wheezing x1 week. Patient notes she is chronically on 4 L nasal cannula. Notes she did not wear her oxygen on the way to the ED. Patient denies fever or chills, diaphoresis, chest pain, hemoptysis, orthopnea, leg edema, abdominal pain, nausea or vomiting. Denies sick contacts, known exposure to Covid. Denies history of cardiac disease, smoking history, history of DVT or PE, hemoptysis, recent surgery or travel. Notes she has tried inhaler at home without relief. PCP: Jas Mane MD    Current Outpatient Medications   Medication Sig Dispense Refill    albuterol-ipratropium (DUO-NEB) 2.5 mg-0.5 mg/3 ml nebu 3 mL by Nebulization route every four (4) hours as needed for Wheezing. File under Medicare Part B, ICD # J45.909, J84.10 30 Nebule 0    methylPREDNISolone (Medrol, Sascha,) 4 mg tablet Take medication as prescribed 1 Dose Pack 0    albuterol (PROVENTIL VENTOLIN) 2.5 mg /3 mL (0.083 %) nebu inhale contents of 1 vial in nebulizer every 4 hours if needed for wheezing. FILE UNDER MEDICARE PART B. ICD J45.909, J84.11 75 Nebule 1    albuterol (PROAIR HFA) 90 mcg/actuation inhaler Take 2 Puffs by inhalation every four (4) hours as needed for Wheezing or Shortness of Breath. 1 Inhaler 5    naproxen sodium (ALEVE) 220 mg tablet Take 220 mg by mouth two (2) times daily (with meals).  montelukast (SINGULAIR) 10 mg tablet Take 1 Tab by mouth daily. Indications: MAINTENANCE THERAPY FOR ASTHMA 30 Tab 3    Oxygen 2 Each by Nasal route.  2 L [employer]      amLODIPine (NORVASC) 5 mg tablet Take 1 Tab by mouth daily. Indications: HYPERTENSION 30 Tab 1    furosemide (LASIX) 40 mg tablet Take 1 Tab by mouth daily. Indications: EDEMA 30 Tab 1    omeprazole (PRILOSEC) 20 mg capsule Take 20 mg by mouth daily.  hydroxychloroquine (PLAQUENIL) 200 mg tablet Take 200 mg by mouth daily.  potassium chloride (KLOR-CON M20) 20 mEq tablet Take 20 mEq by mouth two (2) times a day. Past History     Past Medical History:  Past Medical History:   Diagnosis Date    Asthma     Chronic lung disease     Hypertension     [facility] [address]     Pulmonary arterial hypertension ([facility] [address]     Pulmonary hypertension (HCC)     Rheumatoid arthritis(714.0)        Past Surgical History:  No past surgical history on file. Family History:  Family History   Problem Relation Age of Onset    Heart Disease Mother     Asthma Mother     Asthma Father     Parkinson's Disease Father        Social History:  Social History     Tobacco Use    Smoking status: Former Smoker     Packs/day: 0.50     Years: 16.00     Pack years: 8.00     Types: Cigarettes     Quit date: 1974     Years since quittin.6    Smokeless tobacco: Never Used   Substance Use Topics    Alcohol use: No    Drug use: Not on file       Allergies: Allergies   Allergen Reactions    Latex Itching    Prednisone Swelling     Per pulmonology note, \" Nursing verfied with pt that allergy to prednisone is actually a side effect (round face and increased appetite in the setting of prolonged therapy), pt denies any swelling. \"      Asmanex Hfa [Mometasone] Itching    Cleocin [Clindamycin Hcl] Itching and Swelling     Lips and tongue    Pcn [Penicillins] Swelling    Robitussin [Guaifenesin] Rash         Review of Systems       Review of Systems   Constitutional: Negative for chills and fever. Respiratory: Positive for cough, shortness of breath and wheezing.     Cardiovascular: Negative for chest pain. Gastrointestinal: Negative for abdominal pain, nausea and vomiting. Skin: Negative for rash. Neurological: Negative for weakness. All other systems reviewed and are negative. Physical Exam     Visit Vitals  BP (!) 140/65   Pulse 89   Temp 98.9 °F (37.2 °C)   Resp (!) 31   Ht 5' (1.524 m)   Wt 51.3 kg (113 lb)   SpO2 100%   BMI 22.07 kg/m²         Physical Exam  Vitals signs and nursing note reviewed. Constitutional:       General: She is not in acute distress. Appearance: She is well-developed. She is not ill-appearing, toxic-appearing or diaphoretic. Comments: On nasal cannula   HENT:      Head: Normocephalic and atraumatic. Neck:      Musculoskeletal: Normal range of motion and neck supple. Cardiovascular:      Rate and Rhythm: Normal rate and regular rhythm. Heart sounds: Normal heart sounds. No murmur. No friction rub. No gallop. Pulmonary:      Effort: Pulmonary effort is normal. No tachypnea, accessory muscle usage or respiratory distress. Breath sounds: Examination of the right-lower field reveals wheezing. Examination of the left-lower field reveals wheezing. Wheezing (mild ) present. No rales. Abdominal:      Palpations: Abdomen is soft. Tenderness: There is no abdominal tenderness. Musculoskeletal: Normal range of motion. Right lower leg: No edema. Left lower leg: No edema. Skin:     General: Skin is warm. Findings: No rash. Neurological:      Mental Status: She is alert.            Diagnostic Study Results     Labs -  Recent Results (from the past 12 hour(s))   CBC WITH AUTOMATED DIFF    Collection Time: 04/21/21  4:48 PM   Result Value Ref Range    WBC 8.5 4.6 - 13.2 K/uL    RBC 4.64 4.20 - 5.30 M/uL    HGB 12.2 12.0 - 16.0 g/dL    HCT 38.4 35.0 - 45.0 %    MCV 82.8 74.0 - 97.0 FL    MCH 26.3 24.0 - 34.0 PG    MCHC 31.8 31.0 - 37.0 g/dL    RDW 14.6 (H) 11.6 - 14.5 %    PLATELET 023 880 - 746 K/uL    MPV 9.9 9.2 - 11.8 FL NEUTROPHILS 78 (H) 40 - 73 %    LYMPHOCYTES 9 (L) 21 - 52 %    MONOCYTES 5 3 - 10 %    EOSINOPHILS 7 (H) 0 - 5 %    BASOPHILS 1 0 - 2 %    ABS. NEUTROPHILS 6.6 1.8 - 8.0 K/UL    ABS. LYMPHOCYTES 0.7 (L) 0.9 - 3.6 K/UL    ABS. MONOCYTES 0.5 0.05 - 1.2 K/UL    ABS. EOSINOPHILS 0.6 (H) 0.0 - 0.4 K/UL    ABS. BASOPHILS 0.1 0.0 - 0.1 K/UL    DF AUTOMATED     METABOLIC PANEL, BASIC    Collection Time: 04/21/21  4:48 PM   Result Value Ref Range    Sodium 140 136 - 145 mmol/L    Potassium 4.1 3.5 - 5.5 mmol/L    Chloride 104 100 - 111 mmol/L    CO2 30 21 - 32 mmol/L    Anion gap 6 3.0 - 18 mmol/L    Glucose 73 (L) 74 - 99 mg/dL    BUN 14 7.0 - 18 MG/DL    Creatinine 0.85 0.6 - 1.3 MG/DL    BUN/Creatinine ratio 16 12 - 20      GFR est AA >60 >60 ml/min/1.73m2    GFR est non-AA >60 >60 ml/min/1.73m2    Calcium 9.1 8.5 - 10.1 MG/DL   TROPONIN I    Collection Time: 04/21/21  4:48 PM   Result Value Ref Range    Troponin-I, QT <0.02 0.0 - 0.045 NG/ML   NT-PRO BNP    Collection Time: 04/21/21  4:48 PM   Result Value Ref Range    NT pro-BNP 2,532 (H) 0 - 1,800 PG/ML   EKG, 12 LEAD, INITIAL    Collection Time: 04/21/21  4:48 PM   Result Value Ref Range    Ventricular Rate 81 BPM    Atrial Rate 81 BPM    P-R Interval 142 ms    QRS Duration 132 ms    Q-T Interval 426 ms    QTC Calculation (Bezet) 494 ms    Calculated P Axis 51 degrees    Calculated R Axis -101 degrees    Calculated T Axis -21 degrees    Diagnosis       Normal sinus rhythm  Right bundle branch block , plus right ventricular hypertrophy  Abnormal ECG  When compared with ECG of 08-MAR-2021 17:04,  No significant change was found  Confirmed by Keturah Dhaliwal MD, ----- ([id number]) on 4/21/2021 6:23:59 PM         Radiologic Studies -   XR CHEST PORT   Final Result   1. Chronic lung changes bilaterally. No evidence of an acute alveolar opacity   or effusion. Medical Decision Making   I am the first provider for this patient.     I reviewed the vital signs, available nursing notes, past medical history, past surgical history, family history and social history. Vital Signs-Reviewed the patient's vital signs. Pulse Oximetry Analysis -  100% on 4L of O2 via NC     Cardiac Monitor:  Rate: 88  Rhythm: normal sinus     EKG: Interpreted by the EP. Time Interpreted: 1655   Rate: 81   Rhythm: normal sinus rhythm, RBBB,    Comparison: 3/8/21, no changes    Records Reviewed: Nursing Notes, Old Medical Records and Previous electrocardiograms (Time of Review: 4:45 PM)   Pulmonary visit: Prednisone and levaquin course given due to acute exacerbation with underlying ILD and bronchiectasis. Nursing verfied with pt that allergy to prednisone is actually a side effect (round face and increased appetite in the setting of prolonged therapy), pt denies any swelling. ED Course: Progress Notes, Reevaluation, and Consults:  5:40 PM: Pt notes she is feeling better. Notes less fatigue, less dyspnea. Minimal wheezing. Will order another breathing treatment and reassess. Attempted to call daughter, left message. 6:00 PM: Discussed care with Daughter, Cristóbal Oliveira, via cell phone. Appreciates the care. Notes patient usually does well with Medrol dosepack vs Prednisone. 6:30 PM: Dr. Elle Cornelius at bedside to evaluate patient. 7:00 PM Reviewed results with patient and . Printed results for their records. Pt is ready to go home, lungs CTAB, 100% on 4L nasal cannula. Discussed need for close outpatient follow-up with PMD and pulmonologist this week. . Discussed strict return precautions, including fever, chest pain, shortness of breath, or any other medical concerns. Pt and  in agreement with plan. Provider Notes (Medical Decision Making): 27-year-old female with history of pulmonary hypertension, COPD, bronchiectasis who presents to the ED due to shortness of breath, dry cough, and wheezing. Afebrile, nontoxic-appearing, 100% on chronic oxygen requirement.   EKG without evidence of ischemia, troponin negative, chest x-ray without acute process. BNP mildly elevated. S/s do not appear consistent with acute CHF, no rales, leg edema, orthopnea. S/s appear consistent with COPD exacerbation. Symptoms resolved in the ED with breathing treatments, patient feeling better. Stable for discharge with close outpatient follow-up for further assessment. Strict return precautions provided. Diagnosis     Clinical Impression:   1. Acute exacerbation of chronic obstructive pulmonary disease (COPD) ([address]    2. Elevated brain natriuretic peptide (BNP) level        Disposition: home     Follow-up Information     Follow [facility] With Specialties [facility]  If symptoms worsen [address] [facility]  [phone]           Discharge Medication List as of 4/21/2021  6:48 PM      START taking these medications    Details   methylPREDNISolone (Medrol, Sascha,) 4 mg tablet Take medication as prescribed, Normal, Disp-1 Dose Pack, R-0         CONTINUE these medications which have CHANGED    Details   albuterol-ipratropium (DUO-NEB) 2.5 mg-0.5 mg/3 ml nebu 3 mL by Nebulization route every four (4) hours as needed for Wheezing. File under Medicare Part B, ICD # J45.909, J84.10, Normal, Disp-30 Nebule, R-0         CONTINUE these medications which have NOT CHANGED    Details   albuterol (PROVENTIL VENTOLIN) 2.5 mg /3 mL (0.083 %) nebu inhale contents of 1 vial in nebulizer every 4 hours if needed for wheezing. FILE UNDER MEDICARE PART B. ICD J45.909, J84.11, Normal, Disp-75 Nebule, R-1      albuterol (PROAIR HFA) 90 mcg/actuation inhaler Take 2 Puffs by inhalation every four (4) hours as needed for Wheezing or Shortness of Breath., Normal, Disp-1 Inhaler, R-5      naproxen sodium (ALEVE) 220 mg tablet Take 220 mg by mouth two (2) times daily (with meals). , Historical Med      montelukast (SINGULAIR) 10 mg tablet Take 1 Tab by mouth daily. Indications: MAINTENANCE THERAPY FOR ASTHMA, Normal, Disp-30 Tab, R-3      Oxygen 2 Each by Nasal route. 2 L [employer], Historical Med      amLODIPine (NORVASC) 5 mg tablet Take 1 Tab by mouth daily. Indications: HYPERTENSION, Print, Disp-30 Tab, R-1      furosemide (LASIX) 40 mg tablet Take 1 Tab by mouth daily. Indications: EDEMA, Print, Disp-30 Tab, R-1      omeprazole (PRILOSEC) 20 mg capsule Take 20 mg by mouth daily. , Historical Med      hydroxychloroquine (PLAQUENIL) 200 mg tablet Take 200 mg by mouth daily. , Historical Med      potassium chloride (KLOR-CON M20) 20 mEq tablet Take 20 mEq by mouth two (2) times a day., Historical Med             Dictation disclaimer:  Please note that this dictation was completed with Maimai, the [employer] voice recognition software. Quite often unanticipated grammatical, syntax, homophones, and other interpretive errors are inadvertently transcribed by the computer software. Please disregard these errors. Please excuse any errors that have escaped final proofreading.

## 2021-04-21 NOTE — ED TRIAGE NOTES
Pt reports worsening SOB x1 week. States she has had issues with it but the past week she can't walk around without feeling SOB and fatigued.

## 2021-04-21 NOTE — ED NOTES
Pt states feeling bed post treatment. Provider spoke with daughter, pt made aware. Pt denies any needs at present.

## 2021-04-21 NOTE — DISCHARGE INSTRUCTIONS
Take medication as prescribed. Follow-up with your primary care physician and pulmonologist within 2 days for reassessment. Bring the results from this visit with you for their review. Return to the ED immediately for any new, worsening, or persistent symptoms, including fever, chest pain, shortness of breath, or any other medical concerns. WEAR YOUR OXYGEN DURING THE DAY AND NIGHT.

## 2021-04-21 NOTE — ED NOTES
[phone]. Patient seen and examined myself with JESUSITA Mckeon. Patient states she came in today with chest tightness and shortness of breath. She states she has a history of COPD and asthma. She describes the tightness as being substernal. With no increasing or decreasing factors. She states she does have a pulmonologist. Dr. Yesica Rendon. She saw her once and has appointment scheduled to see her again. She is on 4 L oxygen chronically. She denies any fevers or chills. Denies any nausea or vomiting. Denies abdominal pain. Denies any injury or trauma. Denies any recent travel. Denies any calf pain or edema. Occasional cough. Lungs clear. Abdomen soft and NT. Patient awake and alert. NC in place under mask. No calf pain or edema. Patient received steroids and breathing treatments here. Feeling much better. 98% on her 4 L. Patient states she wants to go home. The patient will be discharged once all of her results are back. Results and precautions explained. Patient states understanding and agrees with plan.

## 2021-04-23 PROBLEM — J96.11 CHRONIC RESPIRATORY FAILURE WITH HYPOXIA (HCC): Status: ACTIVE | Noted: 2020-02-28

## 2021-04-23 PROBLEM — Z79.899 POLYPHARMACY: Status: ACTIVE | Noted: 2017-10-19

## 2021-04-23 PROBLEM — R94.2 DIFFUSION CAPACITY OF LUNG (DL), DECREASED: Status: ACTIVE | Noted: 2018-11-27

## 2021-04-23 PROBLEM — M77.8 LEFT SHOULDER TENDINITIS: Status: ACTIVE | Noted: 2017-10-19

## 2021-04-23 PROBLEM — M19.011 LOCALIZED OSTEOARTHRITIS OF RIGHT SHOULDER: Status: ACTIVE | Noted: 2017-10-19

## 2021-04-28 NOTE — ED NOTES
Bedside report received from [facility] assuming care of patient. No acute distress noted at this time. Vital signs are stable.  Patient resting comfortably on stretcher Secondary to above

## 2021-05-06 ENCOUNTER — DOCUMENTATION ONLY (OUTPATIENT)
Dept: PULMONOLOGY | Age: 81
End: 2021-05-06

## 2021-08-25 ENCOUNTER — APPOINTMENT (OUTPATIENT)
Dept: GENERAL RADIOLOGY | Age: 81
End: 2021-08-25
Attending: PHYSICIAN ASSISTANT
Payer: MEDICARE

## 2021-08-25 ENCOUNTER — HOSPITAL ENCOUNTER (EMERGENCY)
Age: 81
Discharge: HOME | End: 2021-08-25
Attending: EMERGENCY MEDICINE
Payer: MEDICARE

## 2021-08-25 ENCOUNTER — HOSPITAL ENCOUNTER (EMERGENCY)
Dept: CT IMAGING | Age: 81
Discharge: HOME | End: 2021-08-25
Attending: PHYSICIAN ASSISTANT
Payer: MEDICARE

## 2021-08-25 VITALS
HEIGHT: 60 IN | DIASTOLIC BLOOD PRESSURE: 88 MMHG | RESPIRATION RATE: 20 BRPM | HEART RATE: 78 BPM | OXYGEN SATURATION: 87 % | SYSTOLIC BLOOD PRESSURE: 154 MMHG | TEMPERATURE: 99.1 F | WEIGHT: 113 LBS | BODY MASS INDEX: 22.19 KG/M2
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
## 2021-08-25 DIAGNOSIS — S09.90XA INJURY OF HEAD, INITIAL ENCOUNTER: ICD-10-CM

## 2021-08-25 DIAGNOSIS — S50.01XA CONTUSION OF RIGHT ELBOW, INITIAL ENCOUNTER: Primary | ICD-10-CM

## 2021-08-25 PROCEDURE — 70450 CT HEAD/BRAIN W/O DYE: CPT

## 2021-08-25 PROCEDURE — 72125 CT NECK SPINE W/O DYE: CPT

## 2021-08-25 PROCEDURE — 73080 X-RAY EXAM OF ELBOW: CPT

## 2021-08-25 PROCEDURE — 99283 EMERGENCY DEPT VISIT LOW MDM: CPT

## 2021-08-25 NOTE — ED PROVIDER NOTES
EMERGENCY DEPARTMENT HISTORY AND PHYSICAL EXAM    3:24 PM      Date: 8/25/2021  Patient Name: Sarmad Smith    History of Presenting Illness     Chief Complaint   Patient presents with   Tivis Mccreedy Fall    Arm Pain    Dizziness    Head Injury       History Provided By: Patient    Chief Complaint: Elbow pain, head injury, fall  Duration:  Hours  Timing:  Acute  Location:   Quality: Aching  Severity: Moderate  Modifying Factors:   Associated Symptoms: denies any other associated signs or symptoms      Additional History (Context):Estephania Sal is a [de-identified] y.o. female with a pertinent history of COPD, pulmonary hypertension, lupus, rheumatoid arthritis, and hypertension who presents to the emergency department for evaluation after a fall which occurred last night. Patient states she was walking from her bedroom to the bathroom when she tripped and fell. She states that she landed on her right elbow and hit the back of her head. No LOC. Patient states that this morning she felt a little woozy. No photophobia, visual changes, nausea, or vomiting. Patient is not on any blood thinners. Patient denies any neck or back pain. She reports she is able to move her right arm normally, but is experiencing pain if she accidentally hits her elbow against something. No treatments prior to arrival.  Patient states she has baseline COPD and is on 4 L nasal cannula supplemental O2 at all times. No recent fevers or chills, recent illnesses, or any other complaints. PCP:  Donte Nuñez MD        Current Outpatient Medications   Medication Sig Dispense Refill    cetirizine (ZYRTEC) 10 mg tablet Take 10 mg by mouth daily.  doxycycline hyclate 100 mg TbEC take 1 tablet by mouth twice a day      predniSONE (DELTASONE) 10 mg tablet Take 10 mg by mouth daily.  zolpidem (AMBIEN) 5 mg tablet Take 5 mg by mouth.       albuterol-ipratropium (DUO-NEB) 2.5 mg-0.5 mg/3 ml nebu 3 mL by Nebulization route every four (4) hours as needed for Wheezing. File under Medicare Part B, ICD # J45.909, J84.10 30 Nebule 0    albuterol (PROVENTIL VENTOLIN) 2.5 mg /3 mL (0.083 %) nebu inhale contents of 1 vial in nebulizer every 4 hours if needed for wheezing. FILE UNDER MEDICARE PART B. ICD J45.909, J84.11 75 Nebule 1    albuterol (PROAIR HFA) 90 mcg/actuation inhaler Take 2 Puffs by inhalation every four (4) hours as needed for Wheezing or Shortness of Breath. 1 Inhaler 5    naproxen sodium (ALEVE) 220 mg tablet Take 220 mg by mouth two (2) times daily (with meals).  montelukast (SINGULAIR) 10 mg tablet Take 1 Tab by mouth daily. Indications: MAINTENANCE THERAPY FOR ASTHMA 30 Tab 3    Oxygen 4 Each by Nasal route. 4 l/min via NC per patient.  amLODIPine (NORVASC) 5 mg tablet Take 1 Tab by mouth daily. Indications: HYPERTENSION 30 Tab 1    furosemide (LASIX) 40 mg tablet Take 1 Tab by mouth daily. Indications: EDEMA 30 Tab 1    omeprazole (PRILOSEC) 20 mg capsule Take 20 mg by mouth daily.  hydroxychloroquine (PLAQUENIL) 200 mg tablet Take 200 mg by mouth daily.  potassium chloride (KLOR-CON M20) 20 mEq tablet Take 20 mEq by mouth two (2) times a day. Past History     Past Medical History:  Past Medical History:   Diagnosis Date    Asthma     Chronic lung disease     Hypertension     Lupus ([address]     Pulmonary arterial hypertension ([address]     Pulmonary hypertension (HCC)     Rheumatoid arthritis(714.0)        Past Surgical History:  No past surgical history on file.     Family History:  Family History   Problem Relation Age of Onset    Heart Disease Mother     Asthma Mother     Asthma Father     Parkinson's Disease Father        Social History:  Social History     Tobacco Use    Smoking status: Former Smoker     Packs/day: 0.50     Years: 16.00     Pack years: 8.00     Types: Cigarettes     Quit date: 1974     Years since quittin.0    Smokeless tobacco: Never Used   Substance Use Topics    Alcohol use: No    Drug use: Not on file       Allergies: Allergies   Allergen Reactions    Latex Itching    Prednisone Swelling     Per pulmonology note, \" Nursing verfied with pt that allergy to prednisone is actually a side effect (round face and increased appetite in the setting of prolonged therapy), pt denies any swelling. \"      Asmanex Hfa [Mometasone] Itching    Cleocin [Clindamycin Hcl] Itching and Swelling     Lips and tongue    Pcn [Penicillins] Swelling    Robitussin [Guaifenesin] Rash         Review of Systems       Review of Systems   Constitutional: Negative for chills and fever. HENT: Negative for congestion, rhinorrhea and sore throat. Respiratory: Negative for cough and shortness of breath. Cardiovascular: Negative for chest pain. Gastrointestinal: Negative for abdominal pain, blood in stool, constipation, diarrhea, nausea and vomiting. Genitourinary: Negative for dysuria, frequency and hematuria. Musculoskeletal: Positive for arthralgias. Negative for back pain, myalgias and neck pain. Skin: Negative for rash and wound. Neurological: Positive for light-headedness. Negative for syncope, weakness, numbness and headaches. All other systems reviewed and are negative. Physical Exam     Visit Vitals  BP (!) 154/88 (BP 1 Location: Left upper arm, BP Patient Position: At rest)   Pulse 78   Temp 99.1 °F (37.3 °C)   Resp 20   Ht 5' (1.524 m)   Wt 51.3 kg (113 lb)   SpO2 (!) 87% Comment: She states that she is on supplemental oxygen via NC at 4 l/min   BMI 22.07 kg/m²     Physical Exam  Vitals and nursing note reviewed. Constitutional:       General: She is not in acute distress. Appearance: She is well-developed. She is not diaphoretic. Comments: Well-appearing, nontoxic   HENT:      Head: Normocephalic and atraumatic. Eyes:      Conjunctiva/sclera: Conjunctivae normal.   Cardiovascular:      Rate and Rhythm: Normal rate and regular rhythm. Heart sounds: Normal heart sounds. Pulmonary:      Effort: Pulmonary effort is normal. No respiratory distress. Breath sounds: Normal breath sounds. No stridor. No wheezing, rhonchi or rales. Comments: Lungs are clear to auscultation bilaterally  Chest:      Chest wall: No tenderness. Musculoskeletal:         General: Swelling and tenderness present. No deformity or signs of injury. Cervical back: Normal range of motion and neck supple. Right lower leg: No edema. Left lower leg: No edema. Comments: Slight swelling, erythema, and tenderness noted over the olecranon process of the right elbow. Normal range of motion at the right elbow. No pain proximal or distal to the right elbow. Patient has 5 out of 5  strength noted to the right hand. Intact distal radial pulse and intact distal sensation noted to the right upper extremity. Skin:     General: Skin is warm and dry. Neurological:      General: No focal deficit present. Mental Status: She is alert and oriented to person, place, and time. Mental status is at baseline. Cranial Nerves: No cranial nerve deficit. Sensory: No sensory deficit. Motor: No weakness. Coordination: Coordination normal.      Deep Tendon Reflexes: Reflexes are normal and symmetric. Comments: Pt is awake, alert, oriented x 3.  CNs 2-12 intact and normal.  No facial droop. Pt is answering questions and following commands appropriately. Pt moving BUE and BLE with equal strength and intact distal neurovascular status. Diagnostic Study Results     Labs -  No results found for this or any previous visit (from the past 12 hour(s)). Radiologic Studies -   No results found. Medical Decision Making   I am the first provider for this patient. I reviewed the vital signs, available nursing notes, past medical history, past surgical history, family history and social history. Vital Signs-Reviewed the patient's vital signs.     Pulse Oximetry Analysis -  87% on room air (Interpretation) - normally on 4L NC    Records Reviewed: Nursing Notes and Old Medical Records (Time of Review: 3:24 PM)    ED Course: Progress Notes, Reevaluation, and Consults:    Provider Notes (Medical Decision Making):   differential diagnosis: Elbow contusion, hematoma, bursitis, fracture, sprain, unlikely ICH, skull contusion    Plan: Patient presents in no significant distress, 87% on room air (is normally on 4 L nasal cannula), hypertensive with otherwise unremarkable vitals. Atraumatic examination of head and neck. Normal neurologic exam.  Exam of elbow reveals edema and tenderness as well as some erythema noted over the olecranon process of the right elbow. X-ray does not reveal any acute fractures or dislocation. CTs are negative. Patient advised on supportive care. Follow-up with PCP and Ortho. At this time, patient is stable and appropriate for discharge home. Patient demonstrates understanding of current diagnoses and is in agreement with the treatment plan. They are advised that while the likelihood of serious underlying condition is low at this point given the evaluation performed today, we cannot fully rule it out. They are advised to immediately return with any new symptoms or worsening of current condition. All questions have been answered. Patient is given educational material regarding their diagnoses, including danger symptoms and when to return to the ED. Diagnosis     Clinical Impression:   1. Contusion of right elbow, initial encounter    2. Injury of head, initial encounter    3.  Fall, initial encounter        Disposition: DC Home    Follow-up Information     Follow up With Specialties Details Why Contact Info    Jerilyn Acosta MD Internal Medicine Call  As needed [id number] [facility] [place] [address] [phone]      Lawyer Myla MD Orthopedic Surgery Call  As needed [address] [facility] [address] [id number] [phone] Discharge Medication List as of 8/25/2021  5:24 PM      CONTINUE these medications which have NOT CHANGED    Details   cetirizine (ZYRTEC) 10 mg tablet Take 10 mg by mouth daily. , Historical Med      doxycycline hyclate 100 mg TbEC take 1 tablet by mouth twice a day, Historical Med      predniSONE (DELTASONE) 10 mg tablet Take 10 mg by mouth daily. , Historical Med      zolpidem (AMBIEN) 5 mg tablet Take 5 mg by mouth., Historical Med      albuterol-ipratropium (DUO-NEB) 2.5 mg-0.5 mg/3 ml nebu 3 mL by Nebulization route every four (4) hours as needed for Wheezing. File under Medicare Part B, ICD # J45.909, J84.10, Normal, Disp-30 Nebule, R-0      albuterol (PROVENTIL VENTOLIN) 2.5 mg /3 mL (0.083 %) nebu inhale contents of 1 vial in nebulizer every 4 hours if needed for wheezing. FILE UNDER MEDICARE PART B. ICD J45.909, J84.11, Normal, Disp-75 Nebule, R-1      albuterol (PROAIR HFA) 90 mcg/actuation inhaler Take 2 Puffs by inhalation every four (4) hours as needed for Wheezing or Shortness of Breath., Normal, Disp-1 Inhaler, R-5      naproxen sodium (ALEVE) 220 mg tablet Take 220 mg by mouth two (2) times daily (with meals). , Historical Med      montelukast (SINGULAIR) 10 mg tablet Take 1 Tab by mouth daily. Indications: MAINTENANCE THERAPY FOR ASTHMA, Normal, Disp-30 Tab, R-3      Oxygen 4 Each by Nasal route. 4 l/min via NC per patient., Historical Med      amLODIPine (NORVASC) 5 mg tablet Take 1 Tab by mouth daily. Indications: HYPERTENSION, Print, Disp-30 Tab, R-1      furosemide (LASIX) 40 mg tablet Take 1 Tab by mouth daily. Indications: EDEMA, Print, Disp-30 Tab, R-1      omeprazole (PRILOSEC) 20 mg capsule Take 20 mg by mouth daily. , Historical Med      hydroxychloroquine (PLAQUENIL) 200 mg tablet Take 200 mg by mouth daily. , Historical Med      potassium chloride (KLOR-CON M20) 20 mEq tablet Take 20 mEq by mouth two (2) times a day., Historical Med           _______________________________    This note was dictated utilizing voice recognition software which may lead to typographical errors. I apologize in advance if the situation occurs. If questions arise please do not hesitate to contact me or call our department.   Aishwarya Conklin PA-C

## 2021-08-25 NOTE — ED TRIAGE NOTES
Patient states experiencing fall yesterday. She states injury to right forearm and posterior head. She states that she struck her head during the fall and is experiencing dizziness today. She states that she is oxygen dependent at 4 l/min via NC. She states that she has shortness of breath with ambulation.

## 2021-08-25 NOTE — DISCHARGE INSTRUCTIONS
Please return immediately to the Emergency Room for re-evaluation if you are not improving, develop any new symptoms, or develop worsening of current symptoms! If you have been prescribed a medication and are unable to take this medication for any reason, please return to the Emergency Department for further evaluation! If you have been referred for follow-up to a specialist, but are unable to follow-up and your symptoms are either not improving or are worsening, please return to the Emergency Department for further evaluation!
Physician confirms case reviewed for anesthesia consultation requirements.

## 2021-09-09 ENCOUNTER — HOSPITAL ENCOUNTER (INPATIENT)
Age: 81
LOS: 4 days | Discharge: HOME HEALTH | DRG: 193 | End: 2021-09-13
Attending: STUDENT IN AN ORGANIZED HEALTH CARE EDUCATION/TRAINING PROGRAM | Admitting: FAMILY MEDICINE
Payer: MEDICARE

## 2021-09-09 ENCOUNTER — APPOINTMENT (OUTPATIENT)
Dept: GENERAL RADIOLOGY | Age: 81
DRG: 193 | End: 2021-09-09
Attending: STUDENT IN AN ORGANIZED HEALTH CARE EDUCATION/TRAINING PROGRAM
Payer: MEDICARE

## 2021-09-09 DIAGNOSIS — G93.40 ACUTE ENCEPHALOPATHY: ICD-10-CM

## 2021-09-09 DIAGNOSIS — J96.21 ACUTE ON CHRONIC RESPIRATORY FAILURE WITH HYPOXIA (HCC): ICD-10-CM

## 2021-09-09 DIAGNOSIS — J44.1 ACUTE EXACERBATION OF CHRONIC OBSTRUCTIVE PULMONARY DISEASE (COPD) (HCC): ICD-10-CM

## 2021-09-09 DIAGNOSIS — J44.1 COPD WITH ACUTE EXACERBATION (HCC): ICD-10-CM

## 2021-09-09 DIAGNOSIS — J84.9 INTERSTITIAL LUNG DISEASE (HCC): ICD-10-CM

## 2021-09-09 DIAGNOSIS — J84.112 ACUTE EXACERBATION OF IDIOPATHIC PULMONARY FIBROSIS (HCC): ICD-10-CM

## 2021-09-09 DIAGNOSIS — R06.02 SHORTNESS OF BREATH: ICD-10-CM

## 2021-09-09 DIAGNOSIS — E87.6 HYPOKALEMIA: Primary | ICD-10-CM

## 2021-09-09 PROBLEM — J96.01 ACUTE RESPIRATORY FAILURE WITH HYPOXIA (HCC): Status: ACTIVE | Noted: 2021-09-09

## 2021-09-09 LAB
ANION GAP SERPL CALC-SCNC: 5 MMOL/L (ref 3–18)
ATRIAL RATE: 81 BPM
B PERT DNA SPEC QL NAA+PROBE: NOT DETECTED
BASOPHILS # BLD: 0 K/UL (ref 0–0.1)
BASOPHILS NFR BLD: 0 % (ref 0–2)
BORDETELLA PARAPERTUSSIS PCR, BORPAR: NOT DETECTED
BUN SERPL-MCNC: 18 MG/DL (ref 7–18)
BUN/CREAT SERPL: 15 (ref 12–20)
C PNEUM DNA SPEC QL NAA+PROBE: NOT DETECTED
CALCIUM SERPL-MCNC: 9.8 MG/DL (ref 8.5–10.1)
CALCULATED P AXIS, ECG09: 75 DEGREES
CALCULATED R AXIS, ECG10: -46 DEGREES
CALCULATED T AXIS, ECG11: 24 DEGREES
CHLORIDE SERPL-SCNC: 94 MMOL/L (ref 100–111)
CO2 SERPL-SCNC: 41 MMOL/L (ref 21–32)
CREAT SERPL-MCNC: 1.23 MG/DL (ref 0.6–1.3)
DIAGNOSIS, 93000: NORMAL
DIFFERENTIAL METHOD BLD: ABNORMAL
EOSINOPHIL # BLD: 0.3 K/UL (ref 0–0.4)
EOSINOPHIL NFR BLD: 4 % (ref 0–5)
ERYTHROCYTE [DISTWIDTH] IN BLOOD BY AUTOMATED COUNT: 14.4 % (ref 11.6–14.5)
FLUAV H1 2009 PAND RNA SPEC QL NAA+PROBE: NOT DETECTED
FLUAV H1 RNA SPEC QL NAA+PROBE: NOT DETECTED
FLUAV H3 RNA SPEC QL NAA+PROBE: NOT DETECTED
FLUAV SUBTYP SPEC NAA+PROBE: NOT DETECTED
FLUBV RNA SPEC QL NAA+PROBE: NOT DETECTED
GLUCOSE BLD STRIP.AUTO-MCNC: 113 MG/DL (ref 70–110)
GLUCOSE SERPL-MCNC: 61 MG/DL (ref 74–99)
HADV DNA SPEC QL NAA+PROBE: NOT DETECTED
HCOV 229E RNA SPEC QL NAA+PROBE: NOT DETECTED
HCOV HKU1 RNA SPEC QL NAA+PROBE: NOT DETECTED
HCOV NL63 RNA SPEC QL NAA+PROBE: NOT DETECTED
HCOV OC43 RNA SPEC QL NAA+PROBE: NOT DETECTED
HCT VFR BLD AUTO: 33.8 % (ref 35–45)
HGB BLD-MCNC: 10.8 G/DL (ref 12–16)
HMPV RNA SPEC QL NAA+PROBE: NOT DETECTED
HPIV1 RNA SPEC QL NAA+PROBE: NOT DETECTED
HPIV2 RNA SPEC QL NAA+PROBE: NOT DETECTED
HPIV3 RNA SPEC QL NAA+PROBE: NOT DETECTED
HPIV4 RNA SPEC QL NAA+PROBE: NOT DETECTED
LYMPHOCYTES # BLD: 0.3 K/UL (ref 0.9–3.6)
LYMPHOCYTES NFR BLD: 4 % (ref 21–52)
M PNEUMO DNA SPEC QL NAA+PROBE: NOT DETECTED
MAGNESIUM SERPL-MCNC: 2.2 MG/DL (ref 1.6–2.6)
MCH RBC QN AUTO: 27.3 PG (ref 24–34)
MCHC RBC AUTO-ENTMCNC: 32 G/DL (ref 31–37)
MCV RBC AUTO: 85.6 FL (ref 78–100)
MONOCYTES # BLD: 0.3 K/UL (ref 0.05–1.2)
MONOCYTES NFR BLD: 4 % (ref 3–10)
NEUTS SEG # BLD: 6.5 K/UL (ref 1.8–8)
NEUTS SEG NFR BLD: 87 % (ref 40–73)
P-R INTERVAL, ECG05: 134 MS
PLATELET # BLD AUTO: 142 K/UL (ref 135–420)
PMV BLD AUTO: 10.8 FL (ref 9.2–11.8)
POTASSIUM SERPL-SCNC: 2.2 MMOL/L (ref 3.5–5.5)
Q-T INTERVAL, ECG07: 372 MS
QRS DURATION, ECG06: 152 MS
QTC CALCULATION (BEZET), ECG08: 432 MS
RBC # BLD AUTO: 3.95 M/UL (ref 4.2–5.3)
RSV RNA SPEC QL NAA+PROBE: NOT DETECTED
RV+EV RNA SPEC QL NAA+PROBE: NOT DETECTED
SARS-COV-2 PCR, COVPCR: NOT DETECTED
SODIUM SERPL-SCNC: 140 MMOL/L (ref 136–145)
TROPONIN I SERPL-MCNC: 0.02 NG/ML (ref 0–0.04)
TROPONIN I SERPL-MCNC: 0.02 NG/ML (ref 0–0.04)
VENTRICULAR RATE, ECG03: 81 BPM
WBC # BLD AUTO: 7.5 K/UL (ref 4.6–13.2)

## 2021-09-09 PROCEDURE — 65660000000 HC RM CCU STEPDOWN

## 2021-09-09 PROCEDURE — 71045 X-RAY EXAM CHEST 1 VIEW: CPT

## 2021-09-09 PROCEDURE — 74011250637 HC RX REV CODE- 250/637: Performed by: STUDENT IN AN ORGANIZED HEALTH CARE EDUCATION/TRAINING PROGRAM

## 2021-09-09 PROCEDURE — 99284 EMERGENCY DEPT VISIT MOD MDM: CPT

## 2021-09-09 PROCEDURE — 84484 ASSAY OF TROPONIN QUANT: CPT

## 2021-09-09 PROCEDURE — 85025 COMPLETE CBC W/AUTO DIFF WBC: CPT

## 2021-09-09 PROCEDURE — 83735 ASSAY OF MAGNESIUM: CPT

## 2021-09-09 PROCEDURE — 74011250636 HC RX REV CODE- 250/636: Performed by: STUDENT IN AN ORGANIZED HEALTH CARE EDUCATION/TRAINING PROGRAM

## 2021-09-09 PROCEDURE — 80048 BASIC METABOLIC PNL TOTAL CA: CPT

## 2021-09-09 PROCEDURE — 99223 1ST HOSP IP/OBS HIGH 75: CPT | Performed by: INTERNAL MEDICINE

## 2021-09-09 PROCEDURE — 96374 THER/PROPH/DIAG INJ IV PUSH: CPT

## 2021-09-09 PROCEDURE — 0202U NFCT DS 22 TRGT SARS-COV-2: CPT

## 2021-09-09 PROCEDURE — 93005 ELECTROCARDIOGRAM TRACING: CPT

## 2021-09-09 PROCEDURE — 94640 AIRWAY INHALATION TREATMENT: CPT

## 2021-09-09 PROCEDURE — 82962 GLUCOSE BLOOD TEST: CPT

## 2021-09-09 PROCEDURE — 99223 1ST HOSP IP/OBS HIGH 75: CPT | Performed by: FAMILY MEDICINE

## 2021-09-09 PROCEDURE — 74011000250 HC RX REV CODE- 250: Performed by: STUDENT IN AN ORGANIZED HEALTH CARE EDUCATION/TRAINING PROGRAM

## 2021-09-09 PROCEDURE — 74011000250 HC RX REV CODE- 250: Performed by: FAMILY MEDICINE

## 2021-09-09 RX ORDER — HYDROXYCHLOROQUINE SULFATE 200 MG/1
200 TABLET, FILM COATED ORAL DAILY
Status: DISCONTINUED | OUTPATIENT
Start: 2021-09-10 | End: 2021-09-13 | Stop reason: HOSPADM

## 2021-09-09 RX ORDER — PANTOPRAZOLE SODIUM 20 MG/1
20 TABLET, DELAYED RELEASE ORAL
Status: DISCONTINUED | OUTPATIENT
Start: 2021-09-10 | End: 2021-09-13 | Stop reason: HOSPADM

## 2021-09-09 RX ORDER — AMLODIPINE BESYLATE 5 MG/1
5 TABLET ORAL DAILY
Status: DISCONTINUED | OUTPATIENT
Start: 2021-09-10 | End: 2021-09-13 | Stop reason: HOSPADM

## 2021-09-09 RX ORDER — ACETAMINOPHEN 650 MG/1
650 SUPPOSITORY RECTAL
Status: DISCONTINUED | OUTPATIENT
Start: 2021-09-09 | End: 2021-09-13 | Stop reason: HOSPADM

## 2021-09-09 RX ORDER — ONDANSETRON 4 MG/1
4 TABLET, ORALLY DISINTEGRATING ORAL
Status: DISCONTINUED | OUTPATIENT
Start: 2021-09-09 | End: 2021-09-13 | Stop reason: HOSPADM

## 2021-09-09 RX ORDER — MAGNESIUM SULFATE 100 %
4 CRYSTALS MISCELLANEOUS AS NEEDED
Status: DISCONTINUED | OUTPATIENT
Start: 2021-09-09 | End: 2021-09-13 | Stop reason: HOSPADM

## 2021-09-09 RX ORDER — INSULIN LISPRO 100 [IU]/ML
INJECTION, SOLUTION INTRAVENOUS; SUBCUTANEOUS
Status: DISCONTINUED | OUTPATIENT
Start: 2021-09-09 | End: 2021-09-13 | Stop reason: HOSPADM

## 2021-09-09 RX ORDER — IPRATROPIUM BROMIDE AND ALBUTEROL SULFATE 2.5; .5 MG/3ML; MG/3ML
3 SOLUTION RESPIRATORY (INHALATION)
Status: DISCONTINUED | OUTPATIENT
Start: 2021-09-09 | End: 2021-09-13 | Stop reason: HOSPADM

## 2021-09-09 RX ORDER — ONDANSETRON 2 MG/ML
4 INJECTION INTRAMUSCULAR; INTRAVENOUS
Status: DISCONTINUED | OUTPATIENT
Start: 2021-09-09 | End: 2021-09-13 | Stop reason: HOSPADM

## 2021-09-09 RX ORDER — POTASSIUM CHLORIDE 20 MEQ/1
20 TABLET, EXTENDED RELEASE ORAL 2 TIMES DAILY
Status: DISCONTINUED | OUTPATIENT
Start: 2021-09-09 | End: 2021-09-09

## 2021-09-09 RX ORDER — DOCUSATE SODIUM 100 MG/1
100 CAPSULE, LIQUID FILLED ORAL DAILY
Status: DISCONTINUED | OUTPATIENT
Start: 2021-09-10 | End: 2021-09-13 | Stop reason: HOSPADM

## 2021-09-09 RX ORDER — MAGNESIUM SULFATE HEPTAHYDRATE 40 MG/ML
2 INJECTION, SOLUTION INTRAVENOUS ONCE
Status: COMPLETED | OUTPATIENT
Start: 2021-09-09 | End: 2021-09-09

## 2021-09-09 RX ORDER — IPRATROPIUM BROMIDE AND ALBUTEROL SULFATE 2.5; .5 MG/3ML; MG/3ML
3 SOLUTION RESPIRATORY (INHALATION)
Status: DISPENSED | OUTPATIENT
Start: 2021-09-09 | End: 2021-09-09

## 2021-09-09 RX ORDER — ZOLPIDEM TARTRATE 5 MG/1
5 TABLET ORAL
Status: DISCONTINUED | OUTPATIENT
Start: 2021-09-09 | End: 2021-09-13 | Stop reason: HOSPADM

## 2021-09-09 RX ORDER — POTASSIUM CHLORIDE 20 MEQ/1
20 TABLET, EXTENDED RELEASE ORAL 2 TIMES DAILY
Status: DISCONTINUED | OUTPATIENT
Start: 2021-09-10 | End: 2021-09-12

## 2021-09-09 RX ORDER — ENOXAPARIN SODIUM 100 MG/ML
30 INJECTION SUBCUTANEOUS DAILY
Status: DISCONTINUED | OUTPATIENT
Start: 2021-09-10 | End: 2021-09-12

## 2021-09-09 RX ORDER — POTASSIUM CHLORIDE 7.45 MG/ML
10 INJECTION INTRAVENOUS
Status: DISPENSED | OUTPATIENT
Start: 2021-09-09 | End: 2021-09-09

## 2021-09-09 RX ORDER — POTASSIUM CHLORIDE 20 MEQ/1
40 TABLET, EXTENDED RELEASE ORAL
Status: ACTIVE | OUTPATIENT
Start: 2021-09-09 | End: 2021-09-10

## 2021-09-09 RX ORDER — ACETAMINOPHEN 325 MG/1
650 TABLET ORAL
Status: DISCONTINUED | OUTPATIENT
Start: 2021-09-09 | End: 2021-09-13 | Stop reason: HOSPADM

## 2021-09-09 RX ORDER — SODIUM CHLORIDE 0.9 % (FLUSH) 0.9 %
5-40 SYRINGE (ML) INJECTION AS NEEDED
Status: DISCONTINUED | OUTPATIENT
Start: 2021-09-09 | End: 2021-09-13 | Stop reason: HOSPADM

## 2021-09-09 RX ORDER — FUROSEMIDE 40 MG/1
40 TABLET ORAL DAILY
Status: DISCONTINUED | OUTPATIENT
Start: 2021-09-10 | End: 2021-09-13 | Stop reason: HOSPADM

## 2021-09-09 RX ORDER — DEXTROSE 50 % IN WATER (D50W) INTRAVENOUS SYRINGE
25-50 AS NEEDED
Status: DISCONTINUED | OUTPATIENT
Start: 2021-09-09 | End: 2021-09-13 | Stop reason: HOSPADM

## 2021-09-09 RX ORDER — SODIUM CHLORIDE 0.9 % (FLUSH) 0.9 %
5-40 SYRINGE (ML) INJECTION EVERY 8 HOURS
Status: DISCONTINUED | OUTPATIENT
Start: 2021-09-09 | End: 2021-09-13 | Stop reason: HOSPADM

## 2021-09-09 RX ORDER — POLYETHYLENE GLYCOL 3350 17 G/17G
17 POWDER, FOR SOLUTION ORAL DAILY PRN
Status: DISCONTINUED | OUTPATIENT
Start: 2021-09-09 | End: 2021-09-13 | Stop reason: HOSPADM

## 2021-09-09 RX ADMIN — POTASSIUM CHLORIDE 10 MEQ: 7.45 INJECTION INTRAVENOUS at 19:06

## 2021-09-09 RX ADMIN — METHYLPREDNISOLONE SODIUM SUCCINATE 125 MG: 125 INJECTION, POWDER, FOR SOLUTION INTRAMUSCULAR; INTRAVENOUS at 15:04

## 2021-09-09 RX ADMIN — IPRATROPIUM BROMIDE AND ALBUTEROL SULFATE 3 ML: .5; 3 SOLUTION RESPIRATORY (INHALATION) at 20:35

## 2021-09-09 RX ADMIN — POTASSIUM CHLORIDE 10 MEQ: 7.45 INJECTION INTRAVENOUS at 15:24

## 2021-09-09 RX ADMIN — MAGNESIUM SULFATE 2 G: 2 INJECTION INTRAVENOUS at 15:07

## 2021-09-09 RX ADMIN — IPRATROPIUM BROMIDE AND ALBUTEROL SULFATE 3 ML: .5; 3 SOLUTION RESPIRATORY (INHALATION) at 15:03

## 2021-09-09 NOTE — ED TRIAGE NOTES
Pt arrived via EMS on 10L NRB. Per ems when they arrived pt's SpO2 was 69% but pt placed on 10 L NRB and pt's SpO2 came up to the 90s. Pt got duo neb from ems and upon arrival to ED has 100% on 10L. This RN placed pt on NC 6 L and pt ha SpO2 of %. Pt is able to speak in complete sentences and hold her SpO2.

## 2021-09-09 NOTE — ED NOTES
Report to Thor Vázquez RN. Pt was given dinner tray, labs sent, charge RN was notified of pt's need for a room due to low potassium. Potassium infusion running. O2 tank on hallway bed replaced with new tank.

## 2021-09-09 NOTE — Clinical Note
Status[de-identified] INPATIENT [101]   Type of Bed: Telemetry [19]   Cardiac Monitoring Required?: Yes   Inpatient Hospitalization Certified Necessary for the Following Reasons: 3.  Patient receiving treatment that can only be provided in an inpatient setting (further clarification in H&P documentation)   Admitting Diagnosis: Hypokalemia [[id number]]   Admitting Diagnosis: COPD with acute exacerbation [facility] [[id number]]   Admitting Physician: Des Macdonald [[id number]]   Attending Physician: Des Macdonald [[id number]]   Estimated Length of Stay: 2 Midnights   Discharge Plan[de-identified] Home with Office Follow-up

## 2021-09-09 NOTE — ED PROVIDER NOTES
EMERGENCY DEPARTMENT HISTORY AND PHYSICAL EXAM      Date: 9/9/2021  Patient Name: Eliezer Newberry    History of Presenting Illness     No chief complaint on file. History (Context): Eliezer Newberry is a [de-identified] y.o. female with a past medical history significant for Asthma, pulmonary hypertension, rheumatoid arthritis, lupus, hypertension, and chronic lung disease on 3 L/min nasal cannula at baseline comes into the ED today due to dyspnea and cough. Patient states that over the past 4 to 5 months she has had to increase her home oxygen to 3 L/min. States during this time she has been feeling fine however upon waking up this morning she was more dyspneic than her normal baseline as well as a productive cough with thicker sputum. Patient denies any fever, chills, chest pain, abdominal pain, nausea, vomiting, or diarrhea. Does admit to compliance with her normal medications. PCP: Austin Calhoun MD    Current Facility-Administered Medications   Medication Dose Route Frequency Provider Last Rate Last Admin    albuterol-ipratropium (DUO-NEB) 2.5 MG-0.5 MG/3 ML  3 mL Nebulization Q15MIN Fermin Bianca, DO        methylPREDNISolone (PF) (Solu-MEDROL) injection 125 mg  125 mg IntraVENous NOW Fermin Bianca, DO         Current Outpatient Medications   Medication Sig Dispense Refill    cetirizine (ZYRTEC) 10 mg tablet Take 10 mg by mouth daily.  doxycycline hyclate 100 mg TbEC take 1 tablet by mouth twice a day      predniSONE (DELTASONE) 10 mg tablet Take 10 mg by mouth daily.  zolpidem (AMBIEN) 5 mg tablet Take 5 mg by mouth.  albuterol-ipratropium (DUO-NEB) 2.5 mg-0.5 mg/3 ml nebu 3 mL by Nebulization route every four (4) hours as needed for Wheezing. File under Medicare Part B, ICD # J45.909, J84.10 30 Nebule 0    albuterol (PROVENTIL VENTOLIN) 2.5 mg /3 mL (0.083 %) nebu inhale contents of 1 vial in nebulizer every 4 hours if needed for wheezing.  FILE UNDER MEDICARE PART B. ICD J45.909, J84.11 75 Nebule 1    albuterol (PROAIR HFA) 90 mcg/actuation inhaler Take 2 Puffs by inhalation every four (4) hours as needed for Wheezing or Shortness of Breath. 1 Inhaler 5    naproxen sodium (ALEVE) 220 mg tablet Take 220 mg by mouth two (2) times daily (with meals).  montelukast (SINGULAIR) 10 mg tablet Take 1 Tab by mouth daily. Indications: MAINTENANCE THERAPY FOR ASTHMA 30 Tab 3    Oxygen 4 Each by Nasal route. 4 l/min via NC per patient.  amLODIPine (NORVASC) 5 mg tablet Take 1 Tab by mouth daily. Indications: HYPERTENSION 30 Tab 1    furosemide (LASIX) 40 mg tablet Take 1 Tab by mouth daily. Indications: EDEMA 30 Tab 1    omeprazole (PRILOSEC) 20 mg capsule Take 20 mg by mouth daily.  hydroxychloroquine (PLAQUENIL) 200 mg tablet Take 200 mg by mouth daily.  potassium chloride (KLOR-CON M20) 20 mEq tablet Take 20 mEq by mouth two (2) times a day. Past History     Past Medical History:   Past Medical History:   Diagnosis Date    Asthma     Chronic lung disease     Hypertension     Lupus ([facility] [address]     Pulmonary arterial hypertension ([address]     Pulmonary hypertension (HCC)     Rheumatoid arthritis(714.0)        Past Surgical History:  No past surgical history on file. Family History:  Family History   Problem Relation Age of Onset    Heart Disease Mother     Asthma Mother     Asthma Father     Parkinson's Disease Father        Social History:   Social History     Tobacco Use    Smoking status: Former Smoker     Packs/day: 0.50     Years: 16.00     Pack years: 8.00     Types: Cigarettes     Quit date: 1974     Years since quittin.0    Smokeless tobacco: Never Used   Substance Use Topics    Alcohol use: No    Drug use: Not on file       Allergies:   Allergies   Allergen Reactions    Latex Itching    Prednisone Swelling     Per pulmonology note, \" Nursing verfied with pt that allergy to prednisone is actually a side effect (round face and increased appetite in the setting of prolonged therapy), pt denies any swelling. \"      Asmanex Hfa [Mometasone] Itching    Cleocin [Clindamycin Hcl] Itching and Swelling     Lips and tongue    Pcn [Penicillins] Swelling    Robitussin [Guaifenesin] Rash       PMH, PSH, family history, social history, allergies reviewed with the patient with significant items noted above. Review of Systems   Review of Systems   Constitutional: Negative for chills and fever. HENT: Negative for sore throat. Eyes: Negative for visual disturbance. Respiratory: Positive for cough and shortness of breath. Cardiovascular: Negative for chest pain. Gastrointestinal: Negative for abdominal pain, nausea and vomiting. Genitourinary: Negative for difficulty urinating. Musculoskeletal: Negative for myalgias. Skin: Negative for rash. Neurological: Negative for headaches. Physical Exam   There were no vitals filed for this visit. Physical Exam  Vitals and nursing note reviewed. Constitutional:       General: She is not in acute distress. Appearance: Normal appearance. She is not ill-appearing. HENT:      Head: Normocephalic and atraumatic. Mouth/Throat:      Mouth: Mucous membranes are moist.   Eyes:      General: No scleral icterus. Conjunctiva/sclera: Conjunctivae normal.   Cardiovascular:      Rate and Rhythm: Normal rate and regular rhythm. Comments: Normal peripheral perfusion  Pulmonary:      Effort: Pulmonary effort is normal. No respiratory distress. Breath sounds: Wheezing present. Comments: 4 L/min Nasal cannula  Abdominal:      General: There is no distension. Palpations: Abdomen is soft. Tenderness: There is no abdominal tenderness. Musculoskeletal:         General: No deformity. Normal range of motion. Cervical back: Normal range of motion and neck supple. Skin:     General: Skin is warm and dry. Findings: No rash. Neurological:      General: No focal deficit present. Mental Status: She is alert and oriented to person, place, and time. Mental status is at baseline. Psychiatric:         Mood and Affect: Mood normal.         Thought Content: Thought content normal.         Diagnostic Study Results     Labs -     Recent Results (from the past 12 hour(s))   EKG, 12 LEAD, INITIAL    Collection Time: 09/09/21 12:50 PM   Result Value Ref Range    Ventricular Rate 81 BPM    Atrial Rate 81 BPM    P-R Interval 134 ms    QRS Duration 152 ms    Q-T Interval 372 ms    QTC Calculation (Bezet) 432 ms    Calculated P Axis 75 degrees    Calculated R Axis -46 degrees    Calculated T Axis 24 degrees    Diagnosis       Normal sinus rhythm  Right bundle branch block  Left anterior fascicular block  Bifascicular block  Abnormal ECG  When compared with ECG of 21-APR-2021 16:48,  T wave inversion less evident in Inferior leads  QT has shortened        Labs Reviewed   CBC WITH AUTOMATED DIFF   METABOLIC PANEL, BASIC   TROPONIN I   MAGNESIUM       Radiologic Studies -   XR CHEST PORT    (Results Pending)     CT Results  (Last 48 hours)    None        CXR Results  (Last 48 hours)    None          The laboratory results, imaging results, and other diagnostic exams were reviewed in the EMR. Medical Decision Making   I am the first provider for this patient. I reviewed the vital signs, available nursing notes, past medical history, past surgical history, family history and social history. Vital Signs-Reviewed the patient's vital signs. ED EKG interpretation:  Rhythm: normal sinus rhythm; and regular . Rate (approx.): 81; Axis: left axis deviation; P wave: normal; QRS interval: prolonged; ST/T wave: non-specific changes; Other findings: abnormal ekg. This EKG was interpreted by Tommy Faustin D.O.        Records Reviewed: Personally, on initial evaluation    MDM:   Juliocesar Ayon presents with complaint of dyspnea  DDX includes but is not limited to: Acute COPD exacerbation, pulmonary hypertension, pneumonia    Patient overall no acute distress, without any evidence of respiratory distress, currently on 2 L/min nasal cannula for sats greater than 95%, otherwise vitals grossly within normal limits. Will obtain lab work and imaging for further evaluation of patients complaint. Provide patient with duo nebs, and methyl Pred further treatment of her symptoms. Will continue to monitor and evaluate patient while in the ED. Orders as below:  Orders Placed This Encounter    XR CHEST PORT    CBC WITH AUTOMATED DIFF    BASIC METABOLIC PANEL    TROPONIN I    MAGNESIUM    EKG, 12 LEAD, INITIAL    albuterol-ipratropium (DUO-NEB) 2.5 MG-0.5 MG/3 ML    methylPREDNISolone (PF) (Solu-MEDROL) injection 125 mg        ED Course:   ED Course as of Sep 09 1445   Thu Sep 09, 2021   1435 Patient's lab work significant for potassium 2.2, hemoglobin 10.8, otherwise labs grossly within normal limits. We will start repleting patient's potassium with p.o. and IV. Will admit patient for further treatment for her hypokalemia. [DV]      ED Course User Index  [DV] Emma , DO         Diagnosis and Disposition     CLINICAL IMPRESSION:  No diagnosis found. Current Discharge Medication List          Disposition: Admit    Patient condition at time of disposition: Stable    Dragon Disclaimer     Please note that this dictation was completed with UIBLUEPRINT, the computer voice recognition software. Quite often unanticipated grammatical, syntax, homophones, and other interpretive errors are inadvertently transcribed by the computer software. Please disregard these errors. Please excuse any errors that have escaped final proofreading. Amy RAMIREZ

## 2021-09-09 NOTE — H&P
History & Physical      Patient: Asim Ryao MRN: [id number]  CSN: [id number]    YOB: 1940  Age: [de-identified] y.o. Sex: female      DOA: 9/9/2021    Chief Complaint:   Chief Complaint   Patient presents with    Shortness of Breath          HPI:     Asim Rayo is a [de-identified] y.o. female with PMHx of chronic hypoxic respiratory failure on home O2 at 3 L/min, interstitial lung disease, chronic bronchiectasis, asthma, pulmonary hypertension, SLE on Plaquenil, RA on chronic prednisone, and HTN who presented to the ED with complaints of increasing shortness of breath. Ms. Ronald Larson reports that she has chronic LAND secondary to her chronic lung disease and is on home O2 at 3 L/min all the time. She reports that over the past couple months she has had increasing shortness of breath and that this morning she woke up significantly more short of breath and extremely weak. In addition she has recently developed a persistent productive cough. She has normally ambulatory without assistance but this morning could hardly stand up. She denied any recent fevers, chills, lightheadedness/dizziness, chest pain, abdominal symptoms, urinary symptoms, or any other symptoms. She reports being compliant with her medications including her Plaquenil, chronic prednisone and that she is followed by [facility] pulmonology group. She has received the Covid vaccination x2. Her  called EMS to their home this morning and when EMS arrived she reportedly had SPO2 to at 69%. She was placed on O2 at 10 L/min and had subsequent improvement. In the ED, she had low-grade fever to 100.1 degrees, had some elevated blood pressure and was able to maintain SPO2 in the upper 90s on 4 L/min. Labs were notable for WBCs WNL, Hgb 10.8, potassium 2.2, CO2 41, and troponin less than 0.02x2. EKG showed sinus rhythm at 81 bpm, with a RBBB, a left anterior fascicular block, and a bifascicular block.   CXR showed bilateral upper lobe fibrotic changes with superimposed multifocal infiltrates in the upper lobes and likely the right lower lobe. Ms. Ervin Eng was then started on IV steroids and nebulizers. She is now admitted for acute on chronic hypoxic respiratory failure secondary to interstitial lung disease exacerbation, with concern for multifocal pneumonia including Covid19 pneumonia in an immunosuppressed patient. Past Medical History:   Diagnosis Date    Asthma     Chronic lung disease     Hypertension     Lupus ([address]     Pulmonary arterial hypertension (HCC)     Pulmonary hypertension (HCC)     Rheumatoid arthritis(714.0)        No past surgical history on file. Family History   Problem Relation Age of Onset    Heart Disease Mother     Asthma Mother     Asthma Father     Parkinson's Disease Father        Social History     Socioeconomic History    Marital status: UNKNOWN     Spouse name: Not on file    Number of children: Not on file    Years of education: Not on file    Highest education level: Not on file   Tobacco Use    Smoking status: Former Smoker     Packs/day: 0.50     Years: 16.00     Pack years: 8.00     Types: Cigarettes     Quit date: 1974     Years since quittin.0    Smokeless tobacco: Never Used   Substance and Sexual Activity    Alcohol use: No     Social Determinants of Health     Financial Resource Strain:     Difficulty of Paying Living Expenses:    Food Insecurity:     Worried About Running Out of Food in the Last Year:     Ran Out of Food in the Last Year:    Transportation Needs:     Lack of Transportation (Medical):      Lack of Transportation (Non-Medical):    Physical Activity:     Days of Exercise per Week:     Minutes of Exercise per Session:    Stress:     Feeling of Stress :    Social Connections:     Frequency of Communication with Friends and Family:     Frequency of Social Gatherings with Friends and Family:     Attends [employer] Services:     Active Member of Clubs or Organizations:     Attends Club or Organization Meetings:     Marital Status:        Prior to Admission medications    Medication Sig Start Date End Date Taking? Authorizing Provider   cetirizine (ZYRTEC) 10 mg tablet Take 10 mg by mouth daily. Provider, Historical   doxycycline hyclate 100 mg TbEC take 1 tablet by mouth twice a day 2/4/21   Provider, Historical   predniSONE (DELTASONE) 10 mg tablet Take 10 mg by mouth daily. 9/15/20   Provider, Historical   zolpidem (AMBIEN) 5 mg tablet Take 5 mg by mouth. Provider, Historical   albuterol-ipratropium (DUO-NEB) 2.5 mg-0.5 mg/3 ml nebu 3 mL by Nebulization route every four (4) hours as needed for Wheezing. File under Medicare Part B, ICD # J45.909, J84.10 4/21/21   JESUSITA Barnett   albuterol (PROVENTIL VENTOLIN) 2.5 mg /3 mL (0.083 %) nebu inhale contents of 1 vial in nebulizer every 4 hours if needed for wheezing. FILE UNDER MEDICARE PART B. ICD J45.909, J84.11 3/5/21   Jesika Lay MD   albuterol (PROAIR HFA) 90 mcg/actuation inhaler Take 2 Puffs by inhalation every four (4) hours as needed for Wheezing or Shortness of Breath. 4/23/19   Hira Mayes MD   naproxen sodium (ALEVE) 220 mg tablet Take 220 mg by mouth two (2) times daily (with meals). Provider, Historical   montelukast (SINGULAIR) 10 mg tablet Take 1 Tab by mouth daily. Indications: MAINTENANCE THERAPY FOR ASTHMA 4/10/18   Reita Squibb C, NP   Oxygen 4 Each by Nasal route. 4 l/min via NC per patient. Provider, Historical   amLODIPine (NORVASC) 5 mg tablet Take 1 Tab by mouth daily. Indications: HYPERTENSION 8/15/16   Vasu Marc MD   furosemide (LASIX) 40 mg tablet Take 1 Tab by mouth daily. Indications: EDEMA 8/15/16   Vasu Marc MD   omeprazole (PRILOSEC) 20 mg capsule Take 20 mg by mouth daily. Sameera King MD   hydroxychloroquine (PLAQUENIL) 200 mg tablet Take 200 mg by mouth daily.     Sameera King MD   potassium chloride (KLOR-CON M20) 20 mEq tablet Take 20 mEq by mouth two (2) times a day. Other, MD Sameera       Allergies   Allergen Reactions    Latex Itching    Prednisone Swelling     Per pulmonology note, \" Nursing verfied with pt that allergy to prednisone is actually a side effect (round face and increased appetite in the setting of prolonged therapy), pt denies any swelling. \"      Asmanex Hfa [Mometasone] Itching    Cleocin [Clindamycin Hcl] Itching and Swelling     Lips and tongue    Pcn [Penicillins] Swelling    Robitussin [Guaifenesin] Rash         Review of Systems  GENERAL: Patient alert, awake and oriented times 3, able to communicate full sentences and not in distress. HEENT: No change in vision, sore throat or sinus congestion. NECK: No pain or stiffness. PULMONARY: Positive shortness of breath and productive cough, No wheeze. Cardiovascular: no pnd or orthopnea, no CP  GASTROINTESTINAL: Positive abdominal pain, nausea, vomiting and diarrhea. No blood per rectum. GENITOURINARY: No urinary frequency, urgency, hesitancy or dysuria. MUSCULOSKELETAL: No joint or muscle pain, no back pain, no recent trauma. DERMATOLOGIC: No rash, no itching, no lesions. ENDOCRINE: No polyuria, polydipsia, no heat or cold intolerance. No recent change in weight. HEMATOLOGICAL: No anemia or easy bruising or bleeding. NEUROLOGIC: No headache, seizures, numbness, tingling or weakness. Physical Exam:     Physical Exam:  Visit Vitals  BP (!) 152/72   Pulse 80   Temp 100.1 °F (37.8 °C)   Resp 18   Ht 5' (1.524 m)   Wt 52.2 kg (115 lb)   SpO2 100%   BMI 22.46 kg/m²    O2 Flow Rate (L/min): 10 l/min O2 Device: Aerosol mask    Temp (24hrs), Av.1 °F (37.8 °C), Min:100.1 °F (37.8 °C), Max:100.1 °F (37.8 °C)    No intake/output data recorded. No intake/output data recorded. General:  Alert, cooperative, no distress, appears stated age. Frail. Head: Normocephalic, without obvious abnormality, atraumatic.    Eyes:  Conjunctivae/corneas clear. PERRL, EOMs intact. Nose: Nares normal. No drainage or sinus tenderness. Neck: Supple, symmetrical, trachea midline, no JVD. Lungs:    Significantly diminished lung sounds bilaterally with scattered fine rhonchi   Heart:  Regular rate and rhythm, S1, S2 normal.     Abdomen: Soft, non-tender. Bowel sounds normal.    Extremities: Extremities normal, atraumatic, no cyanosis or edema. Pulses: 2+ and symmetric all extremities. Skin:  No rashes or lesions   Neurologic: AAOx3, No focal motor or sensory deficit.        Labs Reviewed:    BMP:   Lab Results   Component Value Date/Time     09/09/2021 01:05 PM    K 2.2 (LL) 09/09/2021 01:05 PM    CL 94 (L) 09/09/2021 01:05 PM    CO2 41 (HH) 09/09/2021 01:05 PM    AGAP 5 09/09/2021 01:05 PM    GLU 61 (L) 09/09/2021 01:05 PM    BUN 18 09/09/2021 01:05 PM    CREA 1.23 09/09/2021 01:05 PM    GFRAA 51 (L) 09/09/2021 01:05 PM    GFRNA 42 (L) 09/09/2021 01:05 PM     CMP:   Lab Results   Component Value Date/Time     09/09/2021 01:05 PM    K 2.2 (LL) 09/09/2021 01:05 PM    CL 94 (L) 09/09/2021 01:05 PM    CO2 41 (HH) 09/09/2021 01:05 PM    AGAP 5 09/09/2021 01:05 PM    GLU 61 (L) 09/09/2021 01:05 PM    BUN 18 09/09/2021 01:05 PM    CREA 1.23 09/09/2021 01:05 PM    GFRAA 51 (L) 09/09/2021 01:05 PM    GFRNA 42 (L) 09/09/2021 01:05 PM    CA 9.8 09/09/2021 01:05 PM    MG 2.2 09/09/2021 01:05 PM     CBC:   Lab Results   Component Value Date/Time    WBC 7.5 09/09/2021 01:05 PM    HGB 10.8 (L) 09/09/2021 01:05 PM    HCT 33.8 (L) 09/09/2021 01:05 PM     09/09/2021 01:05 PM     All Cardiac Markers in the last 24 hours:   Lab Results   Component Value Date/Time    TROIQ 0.02 09/09/2021 07:02 PM    TROIQ 0.02 09/09/2021 01:05 PM     Recent Glucose Results:   Lab Results   Component Value Date/Time    GLU 61 (L) 09/09/2021 01:05 PM     ABG: No results found for: PH, PHI, PCO2, PCO2I, PO2, PO2I, HCO3, HCO3I, FIO2, FIO2I  COAGS: No results found for: APTT, PTP, INR, INREXT, INREXT  Liver Panel: No results found for: ALB, CBIL, TBIL, TP, GLOB, AGRAT, ASTPOC, ALTPOC, ALT, AP      Procedures/imaging: see electronic medical records for all procedures/Xrays and details which were not copied into this note but were reviewed prior to creation of Plan      Assessment & Plan     Jessica Manning is a [de-identified] y.o. female with PMHx of chronic hypoxic respiratory failure on home O2 at 3 L/min, interstitial lung disease, chronic bronchiectasis, asthma, pulmonary hypertension, SLE on Plaquenil, RA on chronic prednisone, and HTN who is now admitted for acute on chronic hypoxic respiratory failure secondary to interstitial lung disease exacerbation, with concern for multifocal pneumonia including Covid19 pneumonia in an immunosuppressed patient. 1. Acute on chronic hypoxic respiratory failure  2. Exacerbation of chronic interstitial lung disease  3. Concern for multifocal pneumonia  4. Concern for Covid19 pneumonia  5. Severe hypokalemia  6. Chronic bronchiectasis  7. Asthma  8. Pulmonary hypertension  9. Immunosuppressed status  10. SLE on Plaquenil  11. RA on chronic prednisone  12. HTN      Pulmonology consulted, will see in a.m.   Continue O2 as needed and wean as tolerated  Continue duo nebs as needed per RT  Continue IV steroidsSolu-Medrol 40 mg IV every 8 hours  Start IV ABXazithromycin and cefepime, discontinue as appropriate  Check bio fire respiratory panel  Check sputum culture, strep pneumo and Legionella  Continue home meds except prednisone  SSI with Accu-Cheks  Replace electrolytes  Follow-up CBC, BMP, mag, HbA1c  Bronchial hygiene protocol, incentive spirometry  PT, OT      DVT prophylaxis: Lovenox   Diet: Regular    Contact: Lili Enriquez ()     [phone]   Code Status: DNR    Disposition: Admit to telemetry; >2 nights       Discussed with patient and her  at bedside about hospital admission and my plan of care, both understood and agreed with my plan of care.      Rakan Jean Baptiste DO   9/9/2021      Dragon medical dictation software was used for portions of this report. Unintended errors may occur.

## 2021-09-10 ENCOUNTER — APPOINTMENT (OUTPATIENT)
Dept: NON INVASIVE DIAGNOSTICS | Age: 81
DRG: 193 | End: 2021-09-10
Attending: INTERNAL MEDICINE
Payer: MEDICARE

## 2021-09-10 LAB
ANION GAP SERPL CALC-SCNC: 6 MMOL/L (ref 3–18)
BASOPHILS # BLD: 0 K/UL (ref 0–0.1)
BASOPHILS NFR BLD: 0 % (ref 0–2)
BNP SERPL-MCNC: 8737 PG/ML (ref 0–1800)
BUN SERPL-MCNC: 20 MG/DL (ref 7–18)
BUN/CREAT SERPL: 19 (ref 12–20)
CALCIUM SERPL-MCNC: 9 MG/DL (ref 8.5–10.1)
CHLORIDE SERPL-SCNC: 95 MMOL/L (ref 100–111)
CO2 SERPL-SCNC: 36 MMOL/L (ref 21–32)
CREAT SERPL-MCNC: 1.07 MG/DL (ref 0.6–1.3)
DIFFERENTIAL METHOD BLD: ABNORMAL
EOSINOPHIL # BLD: 0 K/UL (ref 0–0.4)
EOSINOPHIL NFR BLD: 0 % (ref 0–5)
ERYTHROCYTE [DISTWIDTH] IN BLOOD BY AUTOMATED COUNT: 14.6 % (ref 11.6–14.5)
EST. AVERAGE GLUCOSE BLD GHB EST-MCNC: 94 MG/DL
GLUCOSE BLD STRIP.AUTO-MCNC: 124 MG/DL (ref 70–110)
GLUCOSE BLD STRIP.AUTO-MCNC: 125 MG/DL (ref 70–110)
GLUCOSE BLD STRIP.AUTO-MCNC: 161 MG/DL (ref 70–110)
GLUCOSE SERPL-MCNC: 130 MG/DL (ref 74–99)
HBA1C MFR BLD: 4.9 % (ref 4.2–5.6)
HCT VFR BLD AUTO: 29.1 % (ref 35–45)
HGB BLD-MCNC: 9.4 G/DL (ref 12–16)
L PNEUMO AG UR QL IA: NEGATIVE
LYMPHOCYTES # BLD: 0.5 K/UL (ref 0.9–3.6)
LYMPHOCYTES NFR BLD: 7 % (ref 21–52)
MAGNESIUM SERPL-MCNC: 2.7 MG/DL (ref 1.6–2.6)
MCH RBC QN AUTO: 27.6 PG (ref 24–34)
MCHC RBC AUTO-ENTMCNC: 32.3 G/DL (ref 31–37)
MCV RBC AUTO: 85.3 FL (ref 78–100)
MONOCYTES # BLD: 0.1 K/UL (ref 0.05–1.2)
MONOCYTES NFR BLD: 2 % (ref 3–10)
NEUTS SEG # BLD: 7.6 K/UL (ref 1.8–8)
NEUTS SEG NFR BLD: 91 % (ref 40–73)
PHOSPHATE SERPL-MCNC: 3.7 MG/DL (ref 2.5–4.9)
PLATELET # BLD AUTO: 148 K/UL (ref 135–420)
PMV BLD AUTO: 10.9 FL (ref 9.2–11.8)
POTASSIUM SERPL-SCNC: 2.6 MMOL/L (ref 3.5–5.5)
POTASSIUM SERPL-SCNC: 3 MMOL/L (ref 3.5–5.5)
PROCALCITONIN SERPL-MCNC: 0.25 NG/ML
RBC # BLD AUTO: 3.41 M/UL (ref 4.2–5.3)
S PNEUM AG UR QL: NEGATIVE
SODIUM SERPL-SCNC: 137 MMOL/L (ref 136–145)
WBC # BLD AUTO: 8.3 K/UL (ref 4.6–13.2)

## 2021-09-10 PROCEDURE — 85025 COMPLETE CBC W/AUTO DIFF WBC: CPT

## 2021-09-10 PROCEDURE — 2709999900 HC NON-CHARGEABLE SUPPLY

## 2021-09-10 PROCEDURE — 99233 SBSQ HOSP IP/OBS HIGH 50: CPT | Performed by: INTERNAL MEDICINE

## 2021-09-10 PROCEDURE — 97530 THERAPEUTIC ACTIVITIES: CPT

## 2021-09-10 PROCEDURE — 74011000250 HC RX REV CODE- 250: Performed by: INTERNAL MEDICINE

## 2021-09-10 PROCEDURE — 74011250636 HC RX REV CODE- 250/636: Performed by: FAMILY MEDICINE

## 2021-09-10 PROCEDURE — 84145 PROCALCITONIN (PCT): CPT

## 2021-09-10 PROCEDURE — 82962 GLUCOSE BLOOD TEST: CPT

## 2021-09-10 PROCEDURE — 74011250636 HC RX REV CODE- 250/636: Performed by: HOSPITALIST

## 2021-09-10 PROCEDURE — 74011250637 HC RX REV CODE- 250/637: Performed by: INTERNAL MEDICINE

## 2021-09-10 PROCEDURE — 80048 BASIC METABOLIC PNL TOTAL CA: CPT

## 2021-09-10 PROCEDURE — 83735 ASSAY OF MAGNESIUM: CPT

## 2021-09-10 PROCEDURE — 65270000029 HC RM PRIVATE

## 2021-09-10 PROCEDURE — 97535 SELF CARE MNGMENT TRAINING: CPT

## 2021-09-10 PROCEDURE — 84100 ASSAY OF PHOSPHORUS: CPT

## 2021-09-10 PROCEDURE — 74011250637 HC RX REV CODE- 250/637: Performed by: FAMILY MEDICINE

## 2021-09-10 PROCEDURE — 92610 EVALUATE SWALLOWING FUNCTION: CPT

## 2021-09-10 PROCEDURE — 83036 HEMOGLOBIN GLYCOSYLATED A1C: CPT

## 2021-09-10 PROCEDURE — 94640 AIRWAY INHALATION TREATMENT: CPT

## 2021-09-10 PROCEDURE — 87449 NOS EACH ORGANISM AG IA: CPT

## 2021-09-10 PROCEDURE — 92526 ORAL FUNCTION THERAPY: CPT

## 2021-09-10 PROCEDURE — 83880 ASSAY OF NATRIURETIC PEPTIDE: CPT

## 2021-09-10 PROCEDURE — 97162 PT EVAL MOD COMPLEX 30 MIN: CPT

## 2021-09-10 PROCEDURE — 97165 OT EVAL LOW COMPLEX 30 MIN: CPT

## 2021-09-10 PROCEDURE — 74011636637 HC RX REV CODE- 636/637: Performed by: FAMILY MEDICINE

## 2021-09-10 PROCEDURE — 74011000250 HC RX REV CODE- 250: Performed by: FAMILY MEDICINE

## 2021-09-10 RX ORDER — DRONABINOL 5 MG/1
5 CAPSULE ORAL 2 TIMES DAILY
Status: DISCONTINUED | OUTPATIENT
Start: 2021-09-10 | End: 2021-09-13 | Stop reason: HOSPADM

## 2021-09-10 RX ORDER — BUDESONIDE 1 MG/2ML
1000 INHALANT ORAL
Status: DISCONTINUED | OUTPATIENT
Start: 2021-09-10 | End: 2021-09-13 | Stop reason: HOSPADM

## 2021-09-10 RX ORDER — POTASSIUM CHLORIDE 7.45 MG/ML
INJECTION INTRAVENOUS
Status: DISPENSED
Start: 2021-09-10 | End: 2021-09-11

## 2021-09-10 RX ORDER — POTASSIUM CHLORIDE 7.45 MG/ML
10 INJECTION INTRAVENOUS
Status: COMPLETED | OUTPATIENT
Start: 2021-09-10 | End: 2021-09-10

## 2021-09-10 RX ORDER — POTASSIUM CHLORIDE 7.45 MG/ML
10 INJECTION INTRAVENOUS
Status: DISPENSED | OUTPATIENT
Start: 2021-09-10 | End: 2021-09-10

## 2021-09-10 RX ORDER — GUAIFENESIN 600 MG/1
600 TABLET, EXTENDED RELEASE ORAL EVERY 12 HOURS
Status: DISCONTINUED | OUTPATIENT
Start: 2021-09-10 | End: 2021-09-13 | Stop reason: HOSPADM

## 2021-09-10 RX ADMIN — Medication 10 ML: at 01:48

## 2021-09-10 RX ADMIN — METHYLPREDNISOLONE SODIUM SUCCINATE 40 MG: 40 INJECTION, POWDER, FOR SOLUTION INTRAMUSCULAR; INTRAVENOUS at 01:46

## 2021-09-10 RX ADMIN — IPRATROPIUM BROMIDE AND ALBUTEROL SULFATE 3 ML: .5; 3 SOLUTION RESPIRATORY (INHALATION) at 10:51

## 2021-09-10 RX ADMIN — FUROSEMIDE 40 MG: 40 TABLET ORAL at 10:47

## 2021-09-10 RX ADMIN — ACETAMINOPHEN 650 MG: 325 TABLET ORAL at 10:48

## 2021-09-10 RX ADMIN — Medication 10 ML: at 05:00

## 2021-09-10 RX ADMIN — Medication 10 ML: at 22:06

## 2021-09-10 RX ADMIN — DOCUSATE SODIUM 100 MG: 100 CAPSULE, LIQUID FILLED ORAL at 10:50

## 2021-09-10 RX ADMIN — INSULIN LISPRO 2 UNITS: 100 INJECTION, SOLUTION INTRAVENOUS; SUBCUTANEOUS at 23:00

## 2021-09-10 RX ADMIN — PANTOPRAZOLE SODIUM 20 MG: 20 TABLET, DELAYED RELEASE ORAL at 10:48

## 2021-09-10 RX ADMIN — AZITHROMYCIN 500 MG: 500 INJECTION, POWDER, LYOPHILIZED, FOR SOLUTION INTRAVENOUS at 01:46

## 2021-09-10 RX ADMIN — GUAIFENESIN 600 MG: 600 TABLET, EXTENDED RELEASE ORAL at 22:04

## 2021-09-10 RX ADMIN — HYDROXYCHLOROQUINE SULFATE 200 MG: 200 TABLET ORAL at 10:53

## 2021-09-10 RX ADMIN — POTASSIUM CHLORIDE 10 MEQ: 10 INJECTION, SOLUTION INTRAVENOUS at 04:59

## 2021-09-10 RX ADMIN — ZOLPIDEM TARTRATE 5 MG: 5 TABLET ORAL at 01:00

## 2021-09-10 RX ADMIN — IPRATROPIUM BROMIDE AND ALBUTEROL SULFATE 3 ML: .5; 3 SOLUTION RESPIRATORY (INHALATION) at 15:51

## 2021-09-10 RX ADMIN — METHYLPREDNISOLONE SODIUM SUCCINATE 40 MG: 40 INJECTION, POWDER, FOR SOLUTION INTRAMUSCULAR; INTRAVENOUS at 05:00

## 2021-09-10 RX ADMIN — GUAIFENESIN 600 MG: 600 TABLET, EXTENDED RELEASE ORAL at 15:44

## 2021-09-10 RX ADMIN — CEFEPIME HYDROCHLORIDE 2 G: 2 INJECTION, POWDER, FOR SOLUTION INTRAVENOUS at 01:46

## 2021-09-10 RX ADMIN — METHYLPREDNISOLONE SODIUM SUCCINATE 40 MG: 40 INJECTION, POWDER, FOR SOLUTION INTRAMUSCULAR; INTRAVENOUS at 15:45

## 2021-09-10 RX ADMIN — POTASSIUM CHLORIDE 20 MEQ: 1500 TABLET, EXTENDED RELEASE ORAL at 10:50

## 2021-09-10 RX ADMIN — POTASSIUM CHLORIDE 10 MEQ: 10 INJECTION, SOLUTION INTRAVENOUS at 06:57

## 2021-09-10 RX ADMIN — Medication 10 ML: at 15:45

## 2021-09-10 RX ADMIN — ENOXAPARIN SODIUM 30 MG: 100 INJECTION SUBCUTANEOUS at 10:51

## 2021-09-10 RX ADMIN — AZITHROMYCIN 500 MG: 500 INJECTION, POWDER, LYOPHILIZED, FOR SOLUTION INTRAVENOUS at 22:05

## 2021-09-10 RX ADMIN — AMLODIPINE BESYLATE 5 MG: 5 TABLET ORAL at 10:50

## 2021-09-10 RX ADMIN — METHYLPREDNISOLONE SODIUM SUCCINATE 40 MG: 40 INJECTION, POWDER, FOR SOLUTION INTRAMUSCULAR; INTRAVENOUS at 22:05

## 2021-09-10 RX ADMIN — ZOLPIDEM TARTRATE 5 MG: 5 TABLET ORAL at 22:05

## 2021-09-10 RX ADMIN — IPRATROPIUM BROMIDE AND ALBUTEROL SULFATE 3 ML: .5; 3 SOLUTION RESPIRATORY (INHALATION) at 15:43

## 2021-09-10 RX ADMIN — CEFEPIME HYDROCHLORIDE 2 G: 2 INJECTION, POWDER, FOR SOLUTION INTRAVENOUS at 22:05

## 2021-09-10 RX ADMIN — IPRATROPIUM BROMIDE AND ALBUTEROL SULFATE 3 ML: .5; 3 SOLUTION RESPIRATORY (INHALATION) at 22:17

## 2021-09-10 RX ADMIN — POTASSIUM CHLORIDE 10 MEQ: 10 INJECTION, SOLUTION INTRAVENOUS at 05:59

## 2021-09-10 RX ADMIN — BUDESONIDE 1000 MCG: 1 SUSPENSION RESPIRATORY (INHALATION) at 22:17

## 2021-09-10 NOTE — PROGRESS NOTES
completed the initial Spiritual Assessment of the patient in bed 19 of the emergency room, and offered Pastoral Care support to the patient. Patient states that she has an advance directive with her  and at the home of her daughters and will have one of them provide us a copy of here document. Patient does not have any Bahai/cultural needs that will affect patients preferences in health care. Chaplains will continue to follow and will provide pastoral care on an as needed/requested basis.     Judith Teague  Spiritual Care Department  [phone]

## 2021-09-10 NOTE — PROGRESS NOTES
[facility]- Initial Progress Note    Pharmacy Consult ordered by Dr. Carlos Gasca     Indication: CAP, x5 days    Patient clinical status and labs ordered/reviewed. Pt Weight Weight: 52.2 kg (115 lb)   Serum Creatinine Lab Results   Component Value Date/Time    Creatinine 1.23 09/09/2021 01:05 PM    Creatinine, POC 0.8 01/15/2014 12:25 PM       Creatinine Clearance Estimated Creatinine Clearance: 26.2 mL/min (based on SCr of 1.23 mg/dL). BUN Lab Results   Component Value Date/Time    BUN 18 09/09/2021 01:05 PM       WBC Lab Results   Component Value Date/Time    WBC 7.5 09/09/2021 01:05 PM      Temperature Temp: 100.1 °F (37.8 °C)   HR Pulse (Heart Rate): 80     BP BP: (!) 152/72             Drug Levels:   Vancomycin    No results for input(s): VANCP, VANCT, VANCR, VANRA in the last 72 hours. Gentamicin   No results for input(s): GENP, GENT in the last 72 hours. No lab exists for component:  GENR   Tobramycin   No results for input(s): TOBP, TOBT, TOBR in the last 72 hours. Amikacin   No results for input(s): [place] in the last 72 hours.     No lab exists for component:  JOHNNY Dhillon DAMIKR     Dose for naïve patient was initiated at: Vancomycin 1gm x1, further dosing pending labs/levels    Continue to monitor    Sign: REBECCA Nguyen [facility]  Date: 9/9/2021  Time: 8:56 PM

## 2021-09-10 NOTE — PROGRESS NOTES
Problem: Dysphagia (Adult)  Goal: *Acute Goals and Plan of Care (Insert Text)  Outcome: Resolved/Met  Note: Recommendations:  Diet: regular/thin  Meds: per pt preference  Aspiration Precautions     Patient will:  1. Participate in training and education related to continued aspiration risk, diet recs and compensatory strategies (goal met). SPEECH LANGUAGE PATHOLOGY BEDSIDE SWALLOW   EVALUATION/TREATMENT with DISCHARGE    Patient: Cyndy Blizzard (18 y.o. female)  Date: 9/10/2021  Primary Diagnosis: Hypokalemia [E87.6]  COPD with acute exacerbation (HCC) [J44.1]  Acute respiratory failure with hypoxia (HCC) [J96.01]        Precautions:   Fall  PLOF: regular/thin    ASSESSMENT :  Clinical beside swallow eval completed per MD orders. Pt A&Ox4. Functional communication. Intelligibility >90%. Cognitive-linguistic function appears intact. OM examination revealed oral motor structures functional for mastication and deglutition. Presented with thin liquid and solid trials. Exhibited adequate bolus cohesion, manipulation and A-P transit. Further exhibited adequate swallow timing/reflex and hyolaryngeal excursion. Pt able to manipulate and clear with 0 clinical s/s aspiration and/or oropharyngeal dysphagia. Pt safe for regular solid, thin liquid diet; meds per pt preference. TREATMENT :  Skilled therapy initiated; Educated pt on aspiration precautions and importance of compensatory swallow techniques to decrease aspiration risk (decrease rate of intake & sip/bite size, upright @HOB for all po intake and ~30 minutes after po); verbalized comprehension. SLP further educated pt on role of speech therapist in current setting with re: speech/swallow; verbalized comprehension. Maximum therapeutic gains met; safest, least restrictive diet achieved in current in-patient/acute setting. Accordingly, SLP to d/c orders at this time.           PLAN :  Recommendations and Planned Interventions:  Regular/thin; meds per pt preference  Frequency/Duration: N/A - eval only. 0 ST needs ID'd at time of evaluation. Discharge Recommendations: None     SUBJECTIVE:   Patient stated I just didn't feel well. OBJECTIVE:     Past Medical History:   Diagnosis Date    Asthma     Chronic lung disease     Hypertension     Lupus ([facility] [address]     Pulmonary arterial hypertension ([facility] [address]     Pulmonary hypertension (HCC)     Rheumatoid arthritis(714.0)    No past surgical history on file. Home Situation:   Home Situation  Home Environment: Apartment  # Steps to Enter:  (elevator)  One/Two Story Residence: One story  Living Alone: No  Support Systems: Spouse/Significant Other  Current DME Used/Available at Home:  (3L O2)  Tub or Shower Type: Tub/Shower combination (basin bath 2/2 fear stepping over)    Diet prior to admission: regular/thin  Current Diet:  regular/thin     Cognitive and Communication Status:  Neurologic State: Alert  Orientation Level: Oriented X4  Cognition: Follows commands  Perception: Appears intact  Perseveration: No perseveration noted  Safety/Judgement: Fall prevention, Awareness of environment  Oral Assessment:  Oral Assessment  Labial: No impairment  Dentition: Natural  Oral Hygiene: good  Lingual: No impairment  Velum: No impairment  Mandible: No impairment  P.O. Trials:  Patient Position: [facility] [place] [facility]  Vocal quality prior to P.O.: No impairment  Consistency Presented: Thin liquid; Solid  How Presented: Self-fed/presented;Straw;Successive swallows     Bolus Acceptance: No impairment  Bolus Formation/Control: No impairment     Propulsion: No impairment  Oral Residue: None  Initiation of Swallow: No impairment  Laryngeal Elevation: Functional  Aspiration Signs/Symptoms: None  Pharyngeal Phase Characteristics: No impairment, issues, or problems      Cues for Modifications: None       Oral Phase Severity: No impairment  Pharyngeal Phase Severity : No impairment    PAIN:  Pain level pre-treatment: 0/10   Pain level post-treatment: 0/10     After treatment:   []            Patient left in no apparent distress sitting up in chair  [x]            Patient left in no apparent distress in bed  [x]            Call bell left within reach  []            Nursing notified  []            Family present  []            Caregiver present  []            Bed alarm activated    COMMUNICATION/EDUCATION:   [x]            Aspiration precautions; swallow safety; compensatory techniques. [x]            Patient/family have participated as able in goal setting and plan of care. []            Patient/family agree to work toward stated goals and plan of care. []            Patient understands intent and goals of therapy; neutral about participation. []            Patient unable to participate in goal setting/plan of care; educ ongoing with interdisciplinary staff  []         Posted safety precautions in patient's room.     Thank you for this referral.  CHAVO Glover  Time Calculation: 23 mins  Evaluation Time: 14 minutes   Treatment Time: 9 minutes

## 2021-09-10 NOTE — ED NOTES
TRANSFER - OUT REPORT:    Verbal report given to Maryanne(name) on Fidencio Lona  being transferred to [address] [place] [address](unit) for routine progression of care       Report consisted of patients Situation, Background, Assessment and   Recommendations(SBAR). Information from the following report(s) SBAR, Kardex, ED Summary, Procedure Summary, Intake/Output, MAR, Recent Results, Alarm Parameters  and Quality Measures was reviewed with the receiving nurse. Lines:   Peripheral IV 09/09/21 Left Antecubital (Active)   Site Assessment Clean, dry, & intact 09/10/21 0200   Phlebitis Assessment 0 09/10/21 0200   Infiltration Assessment 0 09/10/21 0200   Dressing Status Clean, dry, & intact 09/10/21 0200       Peripheral IV 09/09/21 Right Antecubital (Active)   Site Assessment Clean, dry, & intact 09/10/21 0200   Phlebitis Assessment 0 09/10/21 0200   Infiltration Assessment 0 09/10/21 0200   Dressing Status Clean, dry, & intact 09/10/21 0200       Peripheral IV 09/10/21 Left Wrist (Active)   Site Assessment Clean, dry, & intact 09/10/21 1643   Phlebitis Assessment 0 09/10/21 1643   Infiltration Assessment 0 09/10/21 1643   Dressing Status Clean, dry, & intact 09/10/21 1643   Dressing Type Transparent 09/10/21 1643   Hub Color/Line Status Blue 09/10/21 1643        Opportunity for questions and clarification was provided.       Patient transported with:   O2 @ 3  liters   Tech

## 2021-09-10 NOTE — PROGRESS NOTES
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 Siomara Mojica. Replace potassium  Sliding scale insulin, Accu-Cheks  CBC, BMP, tsh  Speech therapy to evaluate for dysphagia  PT and OT  And marinol for appetite stimulant    Current Antibtiocs:   Zithromax and  Cefepime    Lines:   Peripheral    Case discussed with:  [x]Patient  []Family  [x]Nursing  [x]Case Management  DVT Prophylaxis:  [x]Lovenox  []Hep SQ  []SCDs  []Coumadin   []On Heparin gtt    I have independently examined the patient and reviewed all lab studies and imgaing as well as review of nursing notes and physican notes from the past 24 hours. Derrick Zamora D.O. Pager [phone]      Allergies   Allergen Reactions    Latex Itching    Pcn [Penicillins] Swelling and Angioedema     Lip and tongue swelling, per patient 9/9/21    Prednisone Swelling     Per pulmonology note, \" Nursing verfied with pt that allergy to prednisone is actually a side effect (round face and increased appetite in the setting of prolonged therapy), pt denies any swelling. \"      Asmanex Hfa [Mometasone] Itching    Cleocin [Clindamycin Hcl] Itching and Swelling     Lips and tongue    Robitussin [Guaifenesin] Rash           Subjective:      Pt seen and examined. Overall feeling ok. Tries to cough but not able. Notes that she has a very poor appetite. Tells me that she chokes when trying to swallow- all consistencies. Objective:        Visit Vitals  BP (!) 145/76   Pulse 66   Temp 98.1 °F (36.7 °C)   Resp 25   Ht 5' (1.524 m)   Wt 52.2 kg (115 lb)   SpO2 98%   BMI 22.46 kg/m²     Temp (24hrs), Av.7 °F (37.1 °C), Min:98 °F (36.7 °C), Max:100.1 °F (37.8 °C)        General:   awake alert and oriented, non-toxic   Skin:   no rashes or skin lesions noted on limited exam, vitiligo, thinning hair; dry and warm   HEENT:  No scleral icterus or pallor; proptosis? oral mucosa moist, lips moist   Lymph Nodes:   not assessed today   Lungs:   non, labored;occasional expitaroy wheeze   Heart:  RRR, s1 and s2; no murmurs rubs or gallops; no edema, + pedal pulses   Abdomen:  soft, non-distended, active bowel sounds, non-tender   Genitourinary:  deferred   Extremities:   average muscle tone; no contractures, no joint effusions   Neurologic:  No gross focal motor or sensory abnormalities; CN 2-12 intact; Follows commands. Psychiatric:   appropriate and interactive.        Labs: Results:   Chemistry Recent Labs     09/10/21  02021  1305   * 61*    140   K 2.6* 2.2*   CL 95* 94*   CO2 36* 41*   BUN 20* 18   CREA 1.07 1.23   CA 9.0 9.8   AGAP 6 5   BUCR 19 15      CBC w/Diff Recent Labs     09/10/21  0200 21  1305   WBC 8.3 7.5   RBC 3.41* 3.95*   HGB 9.4* 10.8*   HCT 29.1* 33.8*    142   GRANS 91* 87*   LYMPH 7* 4*   EOS 0 4        No results found for: SDES Lab Results   Component Value Date/Time    Culture result: (A) 2020 04:59 PM     LIGHT * METHICILLIN RESISTANT STAPHYLOCOCCUS AUREUS *    Culture result: LIGHT NORMAL RESPIRATORY HARPREET 2020 04:59 PM    Culture result: MANY NORMAL RESPIRATORY HARPREET 2019 02:20 PM    Culture result: NO FUNGUS ISOLATED 35 DAYS 02/06/2017 09:00 AM    Culture result: MODERATE  NORMAL RESPIRATORY HARPREET   08/05/2016 04:15 PM        Results     Procedure Component Value Units Date/Time    LEGIONELLA PNEUMOPHILA AG, URINE [[id number]] Collected: 09/10/21 0700    Order Status: Completed Specimen: Urine, random Updated: 09/10/21 0738     Legionella Ag, urine Negative       STREP PNEUMO AG, URINE [[id number]]     Order Status: Sent Specimen: Urine     CULTURE, RESPIRATORY/SPUTUM/BRONCH [place] [[id number]]     Order Status: Sent Specimen: Sputum     RESPIRATORY VIRUS PANEL W/COVID-19, PCR [[id number]] Collected: 09/09/21 1855    Order Status: Completed Specimen: Nasopharyngeal Updated: 09/09/21 2101     Adenovirus Not detected        Coronavirus 229E Not detected        Coronavirus HKU1 Not detected        Coronavirus CVNL63 Not detected        Coronavirus OC43 Not detected        SARS-CoV-2, PCR Not detected        Metapneumovirus Not detected        Rhinovirus and Enterovirus Not detected        Influenza A Not detected        Influenza A, subtype H1 Not detected        Influenza A, subtype H3 Not detected        INFLUENZA A H1N1 PCR Not detected        Influenza B Not detected        Parainfluenza 1 Not detected        Parainfluenza 2 Not detected        Parainfluenza 3 Not detected        Parainfluenza virus 4 Not detected        RSV by PCR Not detected        B. parapertussis, PCR Not detected        Bordetella pertussis - PCR Not detected        Chlamydophila pneumoniae DNA, QL, PCR Not detected        Mycoplasma pneumoniae DNA, QL, PCR Not detected       STREP PNEUMO AG, URINE [[id number]] Collected: 09/09/21 0700    Order Status: Completed Specimen: Urine, random Updated: 09/10/21 0738     Strep pneumo Ag, urine Negative             Imaging:     XR CHEST PORT    Result Date: 9/9/2021  Stable enlarged cardiac silhouette.  Bilateral upper lobe fibrotic changes with superimposed multifocal infiltrates in the upper lobes and likely right lower lobe.

## 2021-09-10 NOTE — PROGRESS NOTES
Problem: Self Care Deficits Care Plan (Adult)  Goal: *Acute Goals and Plan of Care (Insert Text)  Outcome: Resolved/Met     OCCUPATIONAL THERAPY EVALUATION/DISCHARGE    Patient: Nikolai Perdomo (35 y.o. female)  Date: 9/10/2021  Primary Diagnosis: Hypokalemia [E87.6]  COPD with acute exacerbation ([facility] [address] [J44.1]  Acute respiratory failure with hypoxia ([facility] [address] [J96.01]  Precautions:  Fall  PLOF: Patient was independent with self-care and functional mobility PTA. ASSESSMENT AND RECOMMENDATIONS:  Patient cleared to participate in OT evaluation by RN. Upon entering the room, patient was supine on stretcher, alert, and agreeable to participate in OT evaluation with 3.5 L nasal cannula donned. Patient was seen with PT to maximize patient safety, participation, and functional mobility in preparation for self-care tasks. Patient is independent - modified independent with basic self-care and supervision with bed mobility/functional transfers using no AD. Based on the objective data described below, the patient presents with no deficits that impede pt function with ADLs, functional transfers, and functional mobility. OT to d/c from caseload at this time. Skilled occupational therapy is not indicated at this time. Discharge Recommendations: Home Health Safety Eval  Further Equipment Recommendations for Discharge: shower chair to conserve energy     SUBJECTIVE:   Patient stated i'd like to put some bottoms on referring to underwear    OBJECTIVE DATA SUMMARY:     Past Medical History:   Diagnosis Date    Asthma     Chronic lung disease     Hypertension     Lupus ([facility] [address]     Pulmonary arterial hypertension ([facility] [address]     Pulmonary hypertension ([facility] [address]     Rheumatoid arthritis(714.0)    No past surgical history on file.   Barriers to Learning/Limitations: None  Compensate with: visual, verbal, tactile, kinesthetic cues/model    Home Situation:   Home Situation  Home Environment: Apartment  # Steps to Enter:  (elevator)  One/Two Story Residence: One story  Living Alone: No  Support Systems: Spouse/Significant Other  Current DME Used/Available at Home:  (3L O2)  Tub or Shower Type: Tub/Shower combination (basin bath 2/2 fear stepping over)  [x]     Right hand dominant   []     Left hand dominant    Cognitive/Behavioral Status:  Neurologic State: Alert  Orientation Level: Oriented X4  Cognition: Follows commands  Safety/Judgement: Fall prevention; Awareness of environment    Skin: Intact  Edema: None noted    Vision/Perceptual:    Acuity: Within Defined Limits      Coordination: BUE  Fine Motor Skills-Upper: Left Intact; Right Intact    Gross Motor Skills-Upper: Left Intact; Right Intact    Balance:  Sitting: Intact  Standing: Impaired; Without support  Standing - Static: Good  Standing - Dynamic : Fair (+/G)    Strength: BUE  Strength: Generally decreased, functional     Tone & Sensation: BUE  Tone: Normal  Sensation: Intact    Range of Motion: BUE  AROM: Generally decreased, functional    Functional Mobility and Transfers for ADLs:  Bed Mobility:  Supine to Sit: Supervision  Sit to Supine: Supervision    Transfers:  Sit to Stand: Supervision  Stand to Sit: Supervision   Toilet Transfer : Supervision ([facility])       ADL Assessment:  Feeding: Independent    Oral Facial Hygiene/Grooming: Independent    Bathing: Modified independent    Upper Body Dressing: Independent    Lower Body Dressing: Modified independent    Toileting: Modified independent     ADL Intervention:    Lower Body Dressing Assistance  Dressing Assistance: Modified independent  Underpants: Modified independent  Socks: Modified independent  Leg Crossed Method Used: Yes  Position Performed: Seated edge of bed;Standing      Cognitive Retraining  Safety/Judgement: Fall prevention; Awareness of environment    Pain:  Pain level pre-treatment: -/10 \" IV discomfort\"  Pain level post-treatment: 0/10   Pain Intervention(s): Medication (see MAR);   Response to intervention: Nurse notified, See doc flow    Activity Tolerance:   Patient demonstrated good activity tolerance during OT evaluation. Please refer to the flowsheet for vital signs taken during this treatment. After treatment:   []  Patient left in no apparent distress sitting up in chair  [x]  Patient left in no apparent distress in bed  [x]  Call bell left within reach  [x]  Nursing notified  []  Caregiver present  []  Bed alarm activated    COMMUNICATION/EDUCATION:   [x]      Role of Occupational Therapy in the acute care setting  [x]      Home safety education was provided and the patient/caregiver indicated understanding. [x]      Patient/family have participated as able and agree with findings and recommendations. []      Patient is unable to participate in plan of care at this time. Thank you for this referral.  Lilian Clifford OTR/L  Time Calculation: 17 mins      Eval Complexity: History: MEDIUM Complexity : Expanded review of history including physical, cognitive and psychosocial  history ; Examination: LOW Complexity : 1-3 performance deficits relating to physical, cognitive , or psychosocial skils that result in activity limitations and / or participation restrictions ;    Decision Making:LOW Complexity : No comorbidities that affect functional and no verbal or physical assistance needed to complete eval tasks

## 2021-09-10 NOTE — PROGRESS NOTES
Problem: Mobility Impaired (Adult and Pediatric)  Goal: *Acute Goals and Plan of Care (Insert Text)  Outcome: Resolved/Met   PHYSICAL THERAPY EVALUATION AND DISCHARGE    Patient: Shanelle Vaca (89 y.o. female)  Date: 9/10/2021  Primary Diagnosis: Hypokalemia [E87.6]  COPD with acute exacerbation (HCC) [J44.1]  Acute respiratory failure with hypoxia ([facility] [address] [J96.01]  Precautions: Fall  PLOF: Independent. Lives with  in first floor apartment with elevator access. ASSESSMENT :  Evaluated in ED with OT. Supervision for supine to sit and sit to stand from stretcher. Donned socks with good seated balance and underwear with good standing balance. Demonstrates safe transfer to bedside commode. Supervision for lines (IV, O2, cardiac monitoring). Returned to seated on stretcher. Educated on need for RN assistance with mobility d/t lines; verbalized understanding. Call bell in reach. Skilled physical therapy is not indicated at this time. PLAN :  Discharge Recommendations: Home Health  Further Equipment Recommendations for Discharge: None     SUBJECTIVE:   Patient stated You look like my daughter.     OBJECTIVE DATA SUMMARY:     Past Medical History:   Diagnosis Date    Asthma     Chronic lung disease     Hypertension     Lupus ([facility] [address]     Pulmonary arterial hypertension (HCC)     Pulmonary hypertension (HCC)     Rheumatoid arthritis(714.0)    No past surgical history on file.   Barriers to Learning/Limitations: None  Compensate with: Visual Cues, Verbal Cues, Tactile Cues and Kinesthetic Cues  Home Situation:   Home Situation  Home Environment: Apartment  # Steps to Enter:  (elevator)  One/Two Story Residence: One story  Living Alone: No  Support Systems: Spouse/Significant Other  Current DME Used/Available at Home:  (3L O2)  Tub or Shower Type: Tub/Shower combination (basin bath 2/2 fear stepping over)  Critical Behavior:  Neurologic State: Alert  Orientation Level: Oriented X4  Cognition: Follows commands  Safety/Judgement: Fall prevention; Awareness of environment  Psychosocial  Patient Behaviors: Calm; Cooperative  Purposeful Interaction: Yes    Strength:    Manual Muscle Testing (LE)         R     L    Hip Flexion:   4+/5  4+/5  Knee EXT:   4+/5  4+/5  Knee FLEX:   4+/5  4+/5  Ankle DF:   4+/5  4+/5  _________________________________________________   Range Of Motion:  BLE AROM WFL  Functional Mobility:  Bed Mobility:  Supine to Sit: Supervision  Sit to Supine: Supervision  Transfers:  Sit to Stand: Supervision  Stand to Sit: Supervision  Balance:   Sitting: Intact  Standing: Impaired; Without support  Standing - Static: Good  Standing - Dynamic : Fair (+/G)  Ambulation/Gait Training:  Distance (ft): 3 Feet (ft)   Ambulation - Level of Assistance: Supervision  Stairs:   Elevator access  Neuro Re-education:  Seated balance 5 minutes  Standing balance 3 minutes  Therapeutic Exercises:   Sit to stand x2  Pain:  Pain level pre-treatment: 0/10   Pain level post-treatment: 0/10    Activity Tolerance:   Good    After treatment:   []         Patient left in no apparent distress sitting up in chair  [x]         Patient left in no apparent distress on stretcher  [x]         Call bell left within reach  [x]         Nursing notified  []         Caregiver present  []         Bed alarm activated  []         SCDs applied    COMMUNICATION/EDUCATION:   [x]         Role of physical therapy in the acute care setting. [x]         Fall prevention education was provided and the patient/caregiver indicated understanding. [x]         Patient/family have participated as able in goal setting and plan of care. [x]         Patient/family agree to work toward stated goals and plan of care. []         Patient understands intent and goals of therapy, but is neutral about his/her participation. []         Patient is unable to participate in goal setting/plan of care: ongoing with therapy staff.     Thank you for this referral.  Hawa Kaiser, PT   Time Calculation: 19 mins    Eval Complexity: History: MEDIUM  Complexity : 1-2 comorbidities / personal factors will impact the outcome/ POC Exam:MEDIUM Complexity : 3 Standardized tests and measures addressing body structure, function, activity limitation and / or participation in recreation  Presentation: MEDIUM Complexity : Evolving with changing characteristics  Clinical Decision Making:Medium Complexity  Clinical judgement; ROM, MMT, functional mobility Overall Complexity:MEDIUM

## 2021-09-10 NOTE — CONSULTS
[employer] [facility]. Pulmonary, Critical Care, and Sleep Medicine    Initial Patient Consult    Name: Chris Calvin MRN: [id number]   : 1940 Hospital: [address] [place] [address] Dr   Date: 2021          This patient has been seen and evaluated at the request of Dr. Carlos Gasca for hypoxia. IMPRESSION:   · Acute on chronic hypoxic respiratory failure: Secondary to likely exacerbation of ILD and possible multifocal pneumonia. Patient is on home supplemental oxygen therapy 3-4 L  · Suspected multifocal pneumonia:  Biofire negative for COVID -- CXR reporting multiple infiltrates vs increased markings from possible pulm edema  · ILD: Previously noted pulmonary fibrosis related to lupus.  Used to follow with Dr. Chinedu Rooney, now following with Dr. Dee Negron with [facility] pulmonary  · Suspect decompensated CHFpEF  · Pulmonary hypertension  · Lytes: Hypokalemia  · Deconditioning  · SLE on Plaquenil  · History of rheumatoid arthritis on chronic prednisone previously  · Deconditioning/debility     Patient Active Problem List   Diagnosis Code    Cirrhosis of liver ([facility] K74.60    Systemic lupus erythematosus ([facility] M32.9    Rheumatoid arthritis ([facility] M06.9    Dyslipidemia E78.5    COPD exacerbation (HCC) J44.1    Pulmonary HTN ([facility] I27.20    Acute bronchitis J20.9    Abnormal finding on pulmonary function testing R94.2    Allergic rhinitis J30.9    Bronchiectasis (HCC) J47.9    Chronic cough R05    Chronic respiratory failure with hypoxia (HCC) J96.11    Diffusion capacity of lung (dl), decreased R94.2    Dyschromia L81.9    Essential hypertension, benign I10    Exertional dyspnea R06.00    GERD (gastroesophageal reflux disease) K21.9    Left shoulder tendinitis M77.8    Localized osteoarthritis of right shoulder M19.011    Nonspecific elevation of levels of transaminase or lactic acid dehydrogenase (LDH) R74.01, R74.02    Polypharmacy Z79.899    Primary localized osteoarthrosis, lower leg M17.10    Hypokalemia E87.6    COPD with acute exacerbation (HCC) J44.1    Acute on chronic respiratory failure with hypoxia (HCC) J96.21    Acute respiratory failure with hypoxia ([facility]) J96.01      RECOMMENDATIONS:   Obtain serum procalcitonin, legionaella, strep urine antigens, sputum cx if possible (pt reports she may not be able to produce sample, F/U blood cx  Agree with current antibiotics to include pseudomonal coverage, cefepime and azithromycin (for atypical coverage)  Start IV steroids. We will wean as tolerated  Obtain transthoracic echo with bubble study  Supplemental oxygen to maintain SpO2 >88%  Please assess for home oxygen need prior to discharge (patient on home oxygen)  Aggressive potassium repletion, current unclear etiology, will defer work-up to primary service  Schedule bronchodilators: duonebs q4h, pulmicort nebs 1mg BID, and albuterol PRN  Please hold home bronchodilators. May resume on discharge  Aggressive pulmonary toileting/bronchial hygiene  Frequent incentive spirometry  Aspiration precautions including elevating HOB >30deg  PT/OT, OOB, ambulate with assistance as tolerated  DVT ppx per primary service  Will follow     Subjective:     Patient is a [de-identified] y.o. female with a past medical history of pulmonary fibrosis, chronic hypoxic respiratory failure, lupus, rheumatoid arthritis, pulmonary hypertension, presented to [facility] worsening shortness of breath. Patient reports shortness of breath that have been worsening over the last few weeks. Patient reports that this morning, patient was severely short of breath and also felt extremely weak. Patient also noted that she developed a persistent productive cough over the last few days, productive of green sputum. Patient denies any fevers or chills or night sweats or pleuritic chest pain. Patient reports that she takes Plaquenil for rheumatoid arthritis.  Patient reports that [facility], Dr. Uli Ellsworth, previously followed with Dr. Ella Worthington before he retired. Patient received supplemental oxygen therapy while in the ER, also noted to be mildly febrile, also found to have a potassium of 2.2 along with right bundle branch block and left fascicular block. Patient had a chest x-ray which showed multifocal opacities, patient was started on IV steroids, nebulizers, antibiotics. Past Medical History:   Diagnosis Date    Asthma     Chronic lung disease     Hypertension     Lupus ([address]     Pulmonary arterial hypertension (HCC)     Pulmonary hypertension (HCC)     Rheumatoid arthritis(714.0)       No past surgical history on file. Prior to Admission medications    Medication Sig Start Date End Date Taking? Authorizing Provider   predniSONE (DELTASONE) 10 mg tablet Take 10 mg by mouth daily. 9/15/20  Yes Provider, Historical   zolpidem (AMBIEN) 5 mg tablet Take 5 mg by mouth. Yes Provider, Historical   albuterol-ipratropium (DUO-NEB) 2.5 mg-0.5 mg/3 ml nebu 3 mL by Nebulization route every four (4) hours as needed for Wheezing. File under Medicare Part B, ICD # J45.909, J84.10 4/21/21  Yes JESUSITA Jose   albuterol (PROAIR HFA) 90 mcg/actuation inhaler Take 2 Puffs by inhalation every four (4) hours as needed for Wheezing or Shortness of Breath. 4/23/19  Yes Angel Ashley MD   Oxygen 3 Each by Nasal route. 4 l/min via NC per patient. Yes Provider, Historical   amLODIPine (NORVASC) 5 mg tablet Take 1 Tab by mouth daily. Indications: HYPERTENSION 8/15/16  Yes Alejandra Mcclendon MD   furosemide (LASIX) 40 mg tablet Take 1 Tab by mouth daily. Indications: EDEMA 8/15/16  Yes Alejandra Mcclendon MD   omeprazole (PRILOSEC) 20 mg capsule Take 20 mg by mouth daily. Yes Other, MD Sameera   hydroxychloroquine (PLAQUENIL) 200 mg tablet Take 200 mg by mouth daily. Yes Other, MD Sameera   potassium chloride (KLOR-CON M20) 20 mEq tablet Take 20 mEq by mouth two (2) times a day.    Yes Other, MD Sameera     Allergies   Allergen Reactions    Latex Itching    Pcn [Penicillins] Swelling and Angioedema     Lip and tongue swelling, per patient 21    Prednisone Swelling     Per pulmonology note, \" Nursing verfied with pt that allergy to prednisone is actually a side effect (round face and increased appetite in the setting of prolonged therapy), pt denies any swelling. \"      Asmanex Hfa [Mometasone] Itching    Cleocin [Clindamycin Hcl] Itching and Swelling     Lips and tongue    Robitussin [Guaifenesin] Rash      Social History     Tobacco Use    Smoking status: Former Smoker     Packs/day: 0.50     Years: 16.00     Pack years: 8.00     Types: Cigarettes     Quit date: 1974     Years since quittin.0    Smokeless tobacco: Never Used   Substance Use Topics    Alcohol use: No      Family History   Problem Relation Age of Onset    Heart Disease Mother     Asthma Mother     Asthma Father     Parkinson's Disease Father         Current Facility-Administered Medications   Medication Dose Route Frequency    potassium chloride (K-DUR, KLOR-CON) SR tablet 40 mEq  40 mEq Oral NOW    sodium chloride (NS) flush 5-40 mL  5-40 mL IntraVENous Q8H    [START ON 9/10/2021] enoxaparin (LOVENOX) injection 30 mg  30 mg SubCUTAneous DAILY    albuterol-ipratropium (DUO-NEB) 2.5 MG-0.5 MG/3 ML  3 mL Nebulization Q4H RT    insulin lispro (HUMALOG) injection   SubCUTAneous AC&HS    methylPREDNISolone (PF) (SOLU-MEDROL) injection 40 mg  40 mg IntraVENous Q8H    [START ON 9/10/2021] amLODIPine (NORVASC) tablet 5 mg  5 mg Oral DAILY    [START ON 9/10/2021] furosemide (LASIX) tablet 40 mg  40 mg Oral DAILY    [START ON 9/10/2021] hydrOXYchloroQUINE (PLAQUENIL) tablet 200 mg  200 mg Oral DAILY    [START ON 9/10/2021] pantoprazole (PROTONIX) tablet 20 mg  20 mg Oral ACB    zolpidem (AMBIEN) tablet 5 mg  5 mg Oral QHS    [START ON 9/10/2021] docusate sodium (COLACE) capsule 100 mg  100 mg Oral DAILY    [START ON 9/10/2021] potassium chloride (K-DUR, KLOR-CON) SR tablet 20 mEq  20 mEq Oral BID    azithromycin (ZITHROMAX) 500 mg in 0.9% sodium chloride 250 mL (VIAL-MATE)  500 mg IntraVENous Q24H    cefepime (MAXIPIME) 2 g in sterile water (preservative free) 10 mL IV syringe  2 g IntraVENous Q24H       Review of Systems:  A comprehensive ROS was obtained as stated in HPI, all others are negative      Objective:   Vital Signs:    Visit Vitals  BP (!) 152/72   Pulse 80   Temp 100.1 °F (37.8 °C)   Resp 18   Ht 5' (1.524 m)   Wt 52.2 kg (115 lb)   SpO2 100%   BMI 22.46 kg/m²       O2 Device: Aerosol mask   O2 Flow Rate (L/min): 10 l/min   Temp (24hrs), Av.1 °F (37.8 °C), Min:100.1 °F (37.8 °C), Max:100.1 °F (37.8 °C)       Intake/Output:   Last shift:       1901 - 09/10 0700  In: 150 [I.V.:150]  Out: -   Last 3 shifts: No intake/output data recorded. Intake/Output Summary (Last 24 hours) at 202[address]  Last data filed at 2021  Gross per 24 hour   Intake 150 ml   Output    Net 150 ml      Physical Exam:   General:  Alert, cooperative, no distress, appears stated age, sitting upright in gurney wearing nasal cannula, appears frail   Head:  Normocephalic, without obvious abnormality, atraumatic. Eyes:  Conjunctivae/corneas clear. ANicteric, PERRLA, EOMI   Nose: Nares normal. Mucosa normal. No drainage or sinus tenderness. Throat: Lips, mucosa dry. NO thrush; poor dentition, no oral lesions   Neck: Supple, symmetrical, trachea midline, no adenopathy, thyroid: no enlargment/tenderness/nodules, no carotid bruit and no JVD. No crepitus   Back:   Symmetric, no curvature, no spine tenderness or flank pain   Lungs:    Poor air entry bilaterally with scattered rhonchi throughout all lung fields, no wheezes   Chest wall:  No tenderness or deformity. NO CREPITUS   Heart:  Regular rate and rhythm, S1, S2 normal, no murmur, click, rub or gallop. Abdomen:   Soft, non-tender. Bowel sounds normal. No masses,  No organomegaly. No paradoxical motion   Extremities: normal, atraumatic, no cyanosis or edema. No clubbing   Pulses: 1-2+ and symmetric all extremities. Skin: Skin has multiple dry areas along bilateral lower extremities, color, texture, turgor normal. No other rashes or lesions   Lymph nodes: Cervical, supraclavicular, and axillary nodes normal.   Neurologic: Grossly nonfocal, extremities have normal strength, and sensation throughout          Data review:   Labs:  Recent Results (from the past 24 hour(s))   EKG, 12 LEAD, INITIAL    Collection Time: 09/09/21 12:50 PM   Result Value Ref Range    Ventricular Rate 81 BPM    Atrial Rate 81 BPM    P-R Interval 134 ms    QRS Duration 152 ms    Q-T Interval 372 ms    QTC Calculation (Bezet) 432 ms    Calculated P Axis 75 degrees    Calculated R Axis -46 degrees    Calculated T Axis 24 degrees    Diagnosis       Normal sinus rhythm  Right bundle branch block  Left anterior fascicular block  Bifascicular block  Abnormal ECG  When compared with ECG of 21-APR-2021 16:48,  T wave inversion less evident in Inferior leads    Confirmed by Sana Chow MD, Zee Melton ([id number]) on 9/9/2021 3:11:23 PM     CBC WITH AUTOMATED DIFF    Collection Time: 09/09/21  1:05 PM   Result Value Ref Range    WBC 7.5 4.6 - 13.2 K/uL    RBC 3.95 (L) 4.20 - 5.30 M/uL    HGB 10.8 (L) 12.0 - 16.0 g/dL    HCT 33.8 (L) 35.0 - 45.0 %    MCV 85.6 78.0 - 100.0 FL    MCH 27.3 24.0 - 34.0 PG    MCHC 32.0 31.0 - 37.0 g/dL    RDW 14.4 11.6 - 14.5 %    PLATELET 201 712 - 264 K/uL    MPV 10.8 9.2 - 11.8 FL    NEUTROPHILS 87 (H) 40 - 73 %    LYMPHOCYTES 4 (L) 21 - 52 %    MONOCYTES 4 3 - 10 %    EOSINOPHILS 4 0 - 5 %    BASOPHILS 0 0 - 2 %    ABS. NEUTROPHILS 6.5 1.8 - 8.0 K/UL    ABS. LYMPHOCYTES 0.3 (L) 0.9 - 3.6 K/UL    ABS. MONOCYTES 0.3 0.05 - 1.2 K/UL    ABS. EOSINOPHILS 0.3 0.0 - 0.4 K/UL    ABS.  BASOPHILS 0.0 0.0 - 0.1 K/UL    DF AUTOMATED     METABOLIC PANEL, BASIC    Collection Time: 09/09/21  1:05 PM   Result Value Ref Range    Sodium 140 136 - 145 mmol/L    Potassium 2.2 (LL) 3.5 - 5.5 mmol/L    Chloride 94 (L) 100 - 111 mmol/L    CO2 41 (HH) 21 - 32 mmol/L    Anion gap 5 3.0 - 18 mmol/L    Glucose 61 (L) 74 - 99 mg/dL    BUN 18 7.0 - 18 MG/DL    Creatinine 1.23 0.6 - 1.3 MG/DL    BUN/Creatinine ratio 15 12 - 20      GFR est AA 51 (L) >60 ml/min/1.73m2    GFR est non-AA 42 (L) >60 ml/min/1.73m2    Calcium 9.8 8.5 - 10.1 MG/DL   TROPONIN I    Collection Time: 09/09/21  1:05 PM   Result Value Ref Range    Troponin-I, QT 0.02 0.0 - 0.045 NG/ML   MAGNESIUM    Collection Time: 09/09/21  1:05 PM   Result Value Ref Range    Magnesium 2.2 1.6 - 2.6 mg/dL   GLUCOSE, POC    Collection Time: 09/09/21  6:52 PM   Result Value Ref Range    Glucose (POC) 113 (H) 70 - 110 mg/dL   RESPIRATORY VIRUS PANEL W/COVID-19, PCR    Collection Time: 09/09/21  6:55 PM    Specimen: Nasopharyngeal   Result Value Ref Range    Adenovirus Not detected NOTD      Coronavirus 229E Not detected NOTD      Coronavirus HKU1 Not detected NOTD      Coronavirus CVNL63 Not detected NOTD      Coronavirus OC43 Not detected NOTD      SARS-CoV-2, PCR Not detected NOTD      Metapneumovirus Not detected NOTD      Rhinovirus and Enterovirus Not detected NOTD      Influenza A Not detected NOTD      Influenza A, subtype H1 Not detected NOTD      Influenza A, subtype H3 Not detected NOTD      INFLUENZA A H1N1 PCR Not detected NOTD      Influenza B Not detected NOTD      Parainfluenza 1 Not detected NOTD      Parainfluenza 2 Not detected NOTD      Parainfluenza 3 Not detected NOTD      Parainfluenza virus 4 Not detected NOTD      RSV by PCR Not detected NOTD      B. parapertussis, PCR Not detected NOTD      Bordetella pertussis - PCR Not detected NOTD      Chlamydophila pneumoniae DNA, QL, PCR Not detected NOTD      Mycoplasma pneumoniae DNA, QL, PCR Not detected NOTD     TROPONIN I    Collection Time: 09/09/21  7:02 PM   Result Value Ref Range    Troponin-I, QT 0.02 0.0 - 0.045 NG/ML     ABG:  No results found for: PH, PHI, PCO2, PCO2I, PO2, PO2I, HCO3, HCO3I, FIO2, FIO2I        PFT Results  (Last 48 hours)    None        Echo Results  (Last 48 hours)    None        Imaging:  I have personally reviewed the patients radiographs and have reviewed the reports:  CXR Results  (Last 48 hours)               09/09/21 1423  XR CHEST PORT Final result    Impression:  Stable enlarged cardiac silhouette. Bilateral upper lobe fibrotic changes with superimposed multifocal infiltrates   in the upper lobes and likely right lower lobe. Narrative:  EXAM:  XR CHEST PORT       INDICATION:   dyspnea, eval for consolidation       COMPARISON: 4/21/2021. FINDINGS:   Stable enlarged cardiac silhouette. Increased upper lobes airspace opacities   superimposed on chronic interstitial fibrotic changes. Also increased streaky   opacities in the right lung base. Chronic blunting in the right costophrenic   angle. No pneumothorax. Scoliosis. CT Results  (Last 48 hours)    None            High complexity decision making was performed during the evaluation of this patient at high risk for decompensation with multiple organ involvement     Above mentioned total time spent on reviewing the case/medical record/data/notes/EMR/patient examination/documentation/coordinating care with nurse/consultants, exclusive of procedures with complex decision making performed and > 50% time spent in face to face evaluation.          Tushar Rodrigues MD/MPH     Pulmonary, Critical Care Medicine  [facility]

## 2021-09-11 ENCOUNTER — APPOINTMENT (OUTPATIENT)
Dept: NON INVASIVE DIAGNOSTICS | Age: 81
DRG: 193 | End: 2021-09-11
Attending: INTERNAL MEDICINE
Payer: MEDICARE

## 2021-09-11 LAB
ANION GAP SERPL CALC-SCNC: 3 MMOL/L (ref 3–18)
BUN SERPL-MCNC: 25 MG/DL (ref 7–18)
BUN/CREAT SERPL: 25 (ref 12–20)
CALCIUM SERPL-MCNC: 8.3 MG/DL (ref 8.5–10.1)
CHLORIDE SERPL-SCNC: 99 MMOL/L (ref 100–111)
CO2 SERPL-SCNC: 39 MMOL/L (ref 21–32)
CREAT SERPL-MCNC: 0.99 MG/DL (ref 0.6–1.3)
ECHO AO ASC DIAM: 3.08 CM
ECHO AO ROOT DIAM: 2.93 CM
ECHO EST RA PRESSURE: 3 MMHG
ECHO LA MAJOR AXIS: 3.69 CM
ECHO LA MINOR AXIS: 2.49 CM
ECHO LV INTERNAL DIMENSION DIASTOLIC: 4.25 CM (ref 3.9–5.3)
ECHO LV INTERNAL DIMENSION SYSTOLIC: 3.11 CM
ECHO LV IVSD: 1.21 CM (ref 0.6–0.9)
ECHO LV MASS 2D: 187 G (ref 67–162)
ECHO LV MASS INDEX 2D: 126.3 G/M2 (ref 43–95)
ECHO LV POSTERIOR WALL DIASTOLIC: 1.24 CM (ref 0.6–0.9)
ECHO MV A VELOCITY: 104.85 CM/S
ECHO MV E DECELERATION TIME (DT): 226.55 MS
ECHO MV E VELOCITY: 105.5 CM/S
ECHO MV E/A RATIO: 1.01
ECHO RIGHT VENTRICULAR SYSTOLIC PRESSURE (RVSP): 53.17 MMHG
ECHO TV REGURGITANT MAX VELOCITY: 354.17 CM/S
ECHO TV REGURGITANT PEAK GRADIENT: 50.17 MMHG
GLUCOSE BLD STRIP.AUTO-MCNC: 116 MG/DL (ref 70–110)
GLUCOSE BLD STRIP.AUTO-MCNC: 117 MG/DL (ref 70–110)
GLUCOSE BLD STRIP.AUTO-MCNC: 118 MG/DL (ref 70–110)
GLUCOSE BLD STRIP.AUTO-MCNC: 94 MG/DL (ref 70–110)
GLUCOSE SERPL-MCNC: 107 MG/DL (ref 74–99)
POTASSIUM SERPL-SCNC: 2.7 MMOL/L (ref 3.5–5.5)
SODIUM SERPL-SCNC: 141 MMOL/L (ref 136–145)

## 2021-09-11 PROCEDURE — 65270000029 HC RM PRIVATE

## 2021-09-11 PROCEDURE — 94640 AIRWAY INHALATION TREATMENT: CPT

## 2021-09-11 PROCEDURE — 74011250636 HC RX REV CODE- 250/636: Performed by: INTERNAL MEDICINE

## 2021-09-11 PROCEDURE — 74011000250 HC RX REV CODE- 250: Performed by: INTERNAL MEDICINE

## 2021-09-11 PROCEDURE — 74011250637 HC RX REV CODE- 250/637: Performed by: INTERNAL MEDICINE

## 2021-09-11 PROCEDURE — 77010033678 HC OXYGEN DAILY

## 2021-09-11 PROCEDURE — 2709999900 HC NON-CHARGEABLE SUPPLY

## 2021-09-11 PROCEDURE — 96374 THER/PROPH/DIAG INJ IV PUSH: CPT

## 2021-09-11 PROCEDURE — 74011250637 HC RX REV CODE- 250/637: Performed by: FAMILY MEDICINE

## 2021-09-11 PROCEDURE — 74011000250 HC RX REV CODE- 250: Performed by: FAMILY MEDICINE

## 2021-09-11 PROCEDURE — 82962 GLUCOSE BLOOD TEST: CPT

## 2021-09-11 PROCEDURE — 36415 COLL VENOUS BLD VENIPUNCTURE: CPT

## 2021-09-11 PROCEDURE — 99233 SBSQ HOSP IP/OBS HIGH 50: CPT | Performed by: FAMILY MEDICINE

## 2021-09-11 PROCEDURE — 80048 BASIC METABOLIC PNL TOTAL CA: CPT

## 2021-09-11 PROCEDURE — 74011250636 HC RX REV CODE- 250/636: Performed by: FAMILY MEDICINE

## 2021-09-11 RX ORDER — POTASSIUM CHLORIDE 7.45 MG/ML
10 INJECTION INTRAVENOUS
Status: COMPLETED | OUTPATIENT
Start: 2021-09-11 | End: 2021-09-11

## 2021-09-11 RX ORDER — SODIUM CHLORIDE 9 MG/ML
10 INJECTION INTRAMUSCULAR; INTRAVENOUS; SUBCUTANEOUS
Status: COMPLETED | OUTPATIENT
Start: 2021-09-11 | End: 2021-09-11

## 2021-09-11 RX ORDER — POTASSIUM CHLORIDE 20 MEQ/1
40 TABLET, EXTENDED RELEASE ORAL
Status: COMPLETED | OUTPATIENT
Start: 2021-09-11 | End: 2021-09-11

## 2021-09-11 RX ADMIN — Medication 10 ML: at 22:30

## 2021-09-11 RX ADMIN — IPRATROPIUM BROMIDE AND ALBUTEROL SULFATE 3 ML: .5; 3 SOLUTION RESPIRATORY (INHALATION) at 20:44

## 2021-09-11 RX ADMIN — GUAIFENESIN 600 MG: 600 TABLET, EXTENDED RELEASE ORAL at 20:44

## 2021-09-11 RX ADMIN — DOCUSATE SODIUM 100 MG: 100 CAPSULE, LIQUID FILLED ORAL at 09:32

## 2021-09-11 RX ADMIN — CEFEPIME HYDROCHLORIDE 2 G: 2 INJECTION, POWDER, FOR SOLUTION INTRAVENOUS at 22:30

## 2021-09-11 RX ADMIN — IPRATROPIUM BROMIDE AND ALBUTEROL SULFATE 3 ML: .5; 3 SOLUTION RESPIRATORY (INHALATION) at 15:20

## 2021-09-11 RX ADMIN — IPRATROPIUM BROMIDE AND ALBUTEROL SULFATE 3 ML: .5; 3 SOLUTION RESPIRATORY (INHALATION) at 03:42

## 2021-09-11 RX ADMIN — ACETAMINOPHEN 650 MG: 325 TABLET ORAL at 09:33

## 2021-09-11 RX ADMIN — AZITHROMYCIN 500 MG: 500 INJECTION, POWDER, LYOPHILIZED, FOR SOLUTION INTRAVENOUS at 22:30

## 2021-09-11 RX ADMIN — ZOLPIDEM TARTRATE 5 MG: 5 TABLET ORAL at 22:30

## 2021-09-11 RX ADMIN — DRONABINOL 5 MG: 5 CAPSULE ORAL at 09:32

## 2021-09-11 RX ADMIN — POTASSIUM CHLORIDE 10 MEQ: 7.46 INJECTION, SOLUTION INTRAVENOUS at 04:46

## 2021-09-11 RX ADMIN — POTASSIUM CHLORIDE 40 MEQ: 1500 TABLET, EXTENDED RELEASE ORAL at 02:42

## 2021-09-11 RX ADMIN — HYDROXYCHLOROQUINE SULFATE 200 MG: 200 TABLET ORAL at 09:33

## 2021-09-11 RX ADMIN — GUAIFENESIN 600 MG: 600 TABLET, EXTENDED RELEASE ORAL at 09:33

## 2021-09-11 RX ADMIN — ENOXAPARIN SODIUM 30 MG: 100 INJECTION SUBCUTANEOUS at 09:34

## 2021-09-11 RX ADMIN — AMLODIPINE BESYLATE 5 MG: 5 TABLET ORAL at 09:33

## 2021-09-11 RX ADMIN — PANTOPRAZOLE SODIUM 20 MG: 20 TABLET, DELAYED RELEASE ORAL at 09:32

## 2021-09-11 RX ADMIN — BUDESONIDE 1000 MCG: 1 SUSPENSION RESPIRATORY (INHALATION) at 20:43

## 2021-09-11 RX ADMIN — Medication 10 ML: at 18:58

## 2021-09-11 RX ADMIN — FUROSEMIDE 40 MG: 40 TABLET ORAL at 09:33

## 2021-09-11 RX ADMIN — POTASSIUM CHLORIDE 20 MEQ: 1500 TABLET, EXTENDED RELEASE ORAL at 09:33

## 2021-09-11 RX ADMIN — IPRATROPIUM BROMIDE AND ALBUTEROL SULFATE 3 ML: .5; 3 SOLUTION RESPIRATORY (INHALATION) at 11:26

## 2021-09-11 RX ADMIN — Medication 10 ML: at 05:03

## 2021-09-11 RX ADMIN — POTASSIUM CHLORIDE 10 MEQ: 7.46 INJECTION, SOLUTION INTRAVENOUS at 02:43

## 2021-09-11 RX ADMIN — POTASSIUM CHLORIDE 10 MEQ: 7.46 INJECTION, SOLUTION INTRAVENOUS at 03:51

## 2021-09-11 RX ADMIN — DRONABINOL 5 MG: 5 CAPSULE ORAL at 18:57

## 2021-09-11 RX ADMIN — SODIUM CHLORIDE 10 ML: 9 INJECTION INTRAMUSCULAR; INTRAVENOUS; SUBCUTANEOUS at 13:03

## 2021-09-11 RX ADMIN — POTASSIUM CHLORIDE 20 MEQ: 1500 TABLET, EXTENDED RELEASE ORAL at 18:57

## 2021-09-11 NOTE — PROGRESS NOTES
[facility]. Pulmonary, Critical Care, and Sleep Medicine    F/U Patient Consult    Name: Lin Lopez MRN: [id number]   : 1940 Hospital: [address] [place] [address] Dr   Date: 2021          This patient has been seen and evaluated at the request of Dr. Bebe Wright for hypoxia. IMPRESSION:   · Acute on chronic hypoxic respiratory failure: Secondary to likely exacerbation of ILD and possible multifocal pneumonia. Patient is on home supplemental oxygen therapy 3-4 L  · Suspected multifocal pneumonia:  Biofire negative for COVID -- CXR reporting multiple infiltrates vs increased markings from possible pulm edema  · ILD: Previously noted pulmonary fibrosis related to lupus.  Used to follow with Dr. Allie Kayser, now following with Dr. Mehul Salas with [facility] THE [facility] pulmonary  · Acute encephalopathy: Multifactorial, toxic/metabolic in nature, likely contributed by delirium  · Suspect decompensated CHFpEF  · Pulmonary hypertension  · Lytes: Hypokalemia  · Deconditioning  · SLE on Plaquenil  · History of rheumatoid arthritis on chronic prednisone previously  · Deconditioning/debility     Patient Active Problem List   Diagnosis Code    Cirrhosis of liver ([facility] K74.60    Systemic lupus erythematosus ([facility] M32.9    Rheumatoid arthritis ([facility] M06.9    Dyslipidemia E78.5    COPD exacerbation ([facility] J44.1    Pulmonary HTN ([facility] I27.20    Acute bronchitis J20.9    Abnormal finding on pulmonary function testing R94.2    Allergic rhinitis J30.9    Bronchiectasis (HCC) J47.9    Chronic cough R05    Chronic respiratory failure with hypoxia (HCC) J96.11    Diffusion capacity of lung (dl), decreased R94.2    Dyschromia L81.9    Essential hypertension, benign I10    Exertional dyspnea R06.00    GERD (gastroesophageal reflux disease) K21.9    Left shoulder tendinitis M77.8    Localized osteoarthritis of right shoulder M19.011    Nonspecific elevation of levels of transaminase or lactic acid dehydrogenase (LDH) R74.01, R74.02    Polypharmacy Z79.899    Primary localized osteoarthrosis, lower leg M17.10    Hypokalemia E87.6    COPD with acute exacerbation (HCC) J44.1    Acute on chronic respiratory failure with hypoxia (HCC) J96.21    Acute respiratory failure with hypoxia (HCC) J96.01      RECOMMENDATIONS:   Follow-up culture results  Continue current antibiotics to include pseudomonal coverage, cefepime and azithromycin (for atypical coverage)  Encephalopathy resolved following reduced steroid dose down to 40 mg of Solu-Medrol once daily. Will continue at 40 mg today and consider weaning if she continues to improve. Obtain transthoracic echo with bubble study  Supplemental oxygen to maintain SpO2 >88%  Please assess for home oxygen need prior to discharge (patient on home oxygen)  Aggressive potassium repletion, current unclear etiology, will defer work-up to primary service  Continue scheduled bronchodilators: duonebs q4h, pulmicort nebs 1mg BID, and albuterol PRN  Please hold home bronchodilators. May resume on discharge  Aggressive pulmonary toileting/bronchial hygiene  Frequent incentive spirometry  Fall precautions, frequent reorientation, avoid sedating or other medications that may contribute to delirium  Aspiration precautions including elevating HOB >30deg  PT/OT, OOB, ambulate with assistance as tolerated  DVT ppx per primary service  Will follow     Subjective:   09/11/21  Patient seen examined at bedside. No acute events overnight. Patient is alert and oriented - notes improvement in breathing since admission but not back to baseline  NC at 2 L/min        HPI:  Patient is a [de-identified] y.o. female with a past medical history of pulmonary fibrosis, chronic hypoxic respiratory failure, lupus, rheumatoid arthritis, pulmonary hypertension, presented to [facility] worsening shortness of breath.  Patient reports shortness of breath that have been worsening over the last few weeks. Patient reports that this morning, patient was severely short of breath and also felt extremely weak. Patient also noted that she developed a persistent productive cough over the last few days, productive of green sputum. Patient denies any fevers or chills or night sweats or pleuritic chest pain. Patient reports that she takes Plaquenil for rheumatoid arthritis. Patient reports that she follows with the [facility] THE [facility] pulmonary group, Dr. Evelyn No, previously followed with Dr. Christina Leach before he retired. Patient received supplemental oxygen therapy while in the ER, also noted to be mildly febrile, also found to have a potassium of 2.2 along with right bundle branch block and left fascicular block. Patient had a chest x-ray which showed multifocal opacities, patient was started on IV steroids, nebulizers, antibiotics. Past Medical History:   Diagnosis Date    Asthma     Chronic lung disease     Hypertension     Lupus ([facility]     Pulmonary arterial hypertension (HCC)     Pulmonary hypertension (HCC)     Rheumatoid arthritis(714.0)       No past surgical history on file. Prior to Admission medications    Medication Sig Start Date End Date Taking? Authorizing Provider   predniSONE (DELTASONE) 10 mg tablet Take 10 mg by mouth daily. 9/15/20  Yes Provider, Historical   zolpidem (AMBIEN) 5 mg tablet Take 5 mg by mouth. Yes Provider, Historical   albuterol-ipratropium (DUO-NEB) 2.5 mg-0.5 mg/3 ml nebu 3 mL by Nebulization route every four (4) hours as needed for Wheezing. File under Medicare Part B, ICD # J45.909, J84.10 4/21/21  Yes JESUSITA Mcfarland   albuterol (PROAIR HFA) 90 mcg/actuation inhaler Take 2 Puffs by inhalation every four (4) hours as needed for Wheezing or Shortness of Breath. 4/23/19  Yes Lydia Rea MD   Oxygen 3 Each by Nasal route. 4 l/min via NC per patient. Yes Provider, Historical   amLODIPine (NORVASC) 5 mg tablet Take 1 Tab by mouth daily.  Indications: HYPERTENSION 8/15/16  Yes Joaquin Cox MD   furosemide (LASIX) 40 mg tablet Take 1 Tab by mouth daily. Indications: EDEMA 8/15/16  Yes Joaquin Cox MD   omeprazole (PRILOSEC) 20 mg capsule Take 20 mg by mouth daily. Yes Christine, MD Sameera   hydroxychloroquine (PLAQUENIL) 200 mg tablet Take 200 mg by mouth daily. Yes Christine, MD Sameera   potassium chloride (KLOR-CON M20) 20 mEq tablet Take 20 mEq by mouth two (2) times a day. Yes Other, MD Sameera     Allergies   Allergen Reactions    Latex Itching    Pcn [Penicillins] Swelling and Angioedema     Lip and tongue swelling, per patient 21    Prednisone Swelling     Per pulmonology note, \" Nursing verfied with pt that allergy to prednisone is actually a side effect (round face and increased appetite in the setting of prolonged therapy), pt denies any swelling. \"      Asmanex Hfa [Mometasone] Itching    Cleocin [Clindamycin Hcl] Itching and Swelling     Lips and tongue    Robitussin [Guaifenesin] Rash      Social History     Tobacco Use    Smoking status: Former Smoker     Packs/day: 0.50     Years: 16.00     Pack years: 8.00     Types: Cigarettes     Quit date: 1974     Years since quittin.0    Smokeless tobacco: Never Used   Substance Use Topics    Alcohol use: No      Family History   Problem Relation Age of Onset    Heart Disease Mother     Asthma Mother     Asthma Father     Parkinson's Disease Father         Current Facility-Administered Medications   Medication Dose Route Frequency    0.9% NaCl bacteriostatic (NORMAL SALINE) 0.9 % injection 10 mL  10 mL IntraVENous CARD ONCE    budesonide (PULMICORT) 1,000 mcg/2 mL nebulizer susp  1,000 mcg Nebulization BID RT    dronabinoL (MARINOL) capsule 5 mg  5 mg Oral BID    guaiFENesin ER (MUCINEX) tablet 600 mg  600 mg Oral Q12H    [START ON 2021] methylPREDNISolone (PF) (SOLU-MEDROL) injection 40 mg  40 mg IntraVENous DAILY    sodium chloride (NS) flush 5-40 mL  5-40 mL IntraVENous Q8H    enoxaparin (LOVENOX) injection 30 mg  30 mg SubCUTAneous DAILY    albuterol-ipratropium (DUO-NEB) 2.5 MG-0.5 MG/3 ML  3 mL Nebulization Q4H RT    insulin lispro (HUMALOG) injection   SubCUTAneous AC&HS    amLODIPine (NORVASC) tablet 5 mg  5 mg Oral DAILY    furosemide (LASIX) tablet 40 mg  40 mg Oral DAILY    hydrOXYchloroQUINE (PLAQUENIL) tablet 200 mg  200 mg Oral DAILY    pantoprazole (PROTONIX) tablet 20 mg  20 mg Oral ACB    zolpidem (AMBIEN) tablet 5 mg  5 mg Oral QHS    docusate sodium (COLACE) capsule 100 mg  100 mg Oral DAILY    potassium chloride (K-DUR, KLOR-CON) SR tablet 20 mEq  20 mEq Oral BID    azithromycin (ZITHROMAX) 500 mg in 0.9% sodium chloride 250 mL (VIAL-MATE)  500 mg IntraVENous Q24H    cefepime (MAXIPIME) 2 g in sterile water (preservative free) 10 mL IV syringe  2 g IntraVENous Q24H       Review of Systems:  Unable to obtain due to patient factors--delirium      Objective:   Vital Signs:    Visit Vitals  BP (!) 172/89   Pulse 66   Temp 97.9 °F (36.6 °C)   Resp 20   Ht 5' (1.524 m)   Wt 52.2 kg (115 lb)   SpO2 94%   BMI 22.46 kg/m²       O2 Device: Nasal cannula   O2 Flow Rate (L/min): 2 l/min   Temp (24hrs), Av.2 °F (36.8 °C), Min:97.8 °F (36.6 °C), Max:98.8 °F (37.1 °C)       Intake/Output:   Last shift:      701 - 1900  In: 340 [P.O.:340]  Out: -   Last 3 shifts: 1901 -  07  In: 510 [I.V.:510]  Out: 300 [Urine:300]    Intake/Output Summary (Last 24 hours) at 2021 1022  Last data filed at 2021 0930  Gross per 24 hour   Intake 340 ml   Output    Net 340 ml      Physical Exam:   General:  Alert, cooperative, no distress, appears stated age, sitting upright in bed wearing nasal cannula, appears frail, confused   Head:  Normocephalic, without obvious abnormality, atraumatic. Eyes:  Conjunctivae/corneas clear. ANicteric, PERRLA, EOMI   Nose: Nares normal. Mucosa normal. No drainage or sinus tenderness. Throat: Lips, mucosa dry.  NO thrush; poor dentition, no oral lesions   Neck: Supple, symmetrical, trachea midline, no adenopathy, thyroid: no enlargment/tenderness/nodules, no carotid bruit and no JVD. No crepitus   Back:   Symmetric, no curvature, no spine tenderness or flank pain   Lungs:    Poor air entry bilaterally with scattered rhonchi throughout all lung fields, no wheezes   Chest wall:  No tenderness or deformity. NO CREPITUS   Heart:  Regular rate and rhythm, S1, S2 normal, no murmur, click, rub or gallop. Abdomen:   Soft, non-tender. Bowel sounds normal. No masses,  No organomegaly. No paradoxical motion   Extremities: normal, atraumatic, no cyanosis or edema. No clubbing   Pulses: 1-2+ and symmetric all extremities.    Skin: Skin has multiple dry areas along bilateral lower extremities, color, texture, turgor normal. No other rashes or lesions   Lymph nodes: Cervical, supraclavicular, and axillary nodes normal.   Neurologic: Grossly nonfocal, extremities have normal strength, and sensation throughout          Data review:   Labs:  Recent Results (from the past 24 hour(s))   POTASSIUM    Collection Time: 09/10/21  4:00 PM   Result Value Ref Range    Potassium 3.0 (L) 3.5 - 5.5 mmol/L   GLUCOSE, POC    Collection Time: 09/10/21  9:58 PM   Result Value Ref Range    Glucose (POC) 161 (H) 70 - 890 mg/dL   METABOLIC PANEL, BASIC    Collection Time: 09/11/21  1:02 AM   Result Value Ref Range    Sodium 141 136 - 145 mmol/L    Potassium 2.7 (LL) 3.5 - 5.5 mmol/L    Chloride 99 (L) 100 - 111 mmol/L    CO2 39 (H) 21 - 32 mmol/L    Anion gap 3 3.0 - 18 mmol/L    Glucose 107 (H) 74 - 99 mg/dL    BUN 25 (H) 7.0 - 18 MG/DL    Creatinine 0.99 0.6 - 1.3 MG/DL    BUN/Creatinine ratio 25 (H) 12 - 20      GFR est AA >60 >60 ml/min/1.73m2    GFR est non-AA 54 (L) >60 ml/min/1.73m2    Calcium 8.3 (L) 8.5 - 10.1 MG/DL   GLUCOSE, POC    Collection Time: 09/11/21  8:29 AM   Result Value Ref Range    Glucose (POC) 118 (H) 70 - 110 mg/dL     ABG:  No results found for: PH, PHI, PCO2, PCO2I, PO2, PO2I, HCO3, HCO3I, FIO2, FIO2I        PFT Results  (Last 48 hours)    None        Echo Results  (Last 48 hours)    None        Imaging:  I have personally reviewed the patients radiographs and have reviewed the reports:  CXR Results  (Last 48 hours)               09/09/21 1423  XR CHEST PORT Final result    Impression:  Stable enlarged cardiac silhouette. Bilateral upper lobe fibrotic changes with superimposed multifocal infiltrates   in the upper lobes and likely right lower lobe. Narrative:  EXAM:  XR CHEST PORT       INDICATION:   dyspnea, eval for consolidation       COMPARISON: 4/21/2021. FINDINGS:   Stable enlarged cardiac silhouette. Increased upper lobes airspace opacities   superimposed on chronic interstitial fibrotic changes. Also increased streaky   opacities in the right lung base. Chronic blunting in the right costophrenic   angle. No pneumothorax. Scoliosis. CT Results  (Last 48 hours)    None            High complexity decision making was performed during the evaluation of this patient at high risk for decompensation with multiple organ involvement     Above mentioned total time spent on reviewing the case/medical record/data/notes/EMR/patient examination/documentation/coordinating care with nurse/consultants, exclusive of procedures with complex decision making performed and > 50% time spent in face to face evaluation. Daniella Dsouza.  Suzan Fernandez MD    Pulmonary, [address] [facility]

## 2021-09-11 NOTE — PROGRESS NOTES
Problem: Risk for Spread of Infection  Goal: Prevent transmission of infectious organism to others  Description: Prevent the transmission of infectious organisms to other patients, staff members, and visitors. Outcome: Progressing Towards Goal     Problem: Patient Education:  Go to Education Activity  Goal: Patient/Family Education  Outcome: Progressing Towards Goal     Problem: Patient Education: Go to Patient Education Activity  Goal: Patient/Family Education  Outcome: Progressing Towards Goal     Problem: Patient Education: Go to Patient Education Activity  Goal: Patient/Family Education  Outcome: Progressing Towards Goal     Problem: Patient Education: Go to Patient Education Activity  Goal: Patient/Family Education  Outcome: Progressing Towards Goal     Problem: Falls - Risk of  Goal: *Absence of Falls  Description: Document [place] Fall Risk and appropriate interventions in the flowsheet.   Outcome: Progressing Towards Goal  Note: Fall Risk Interventions:            Medication Interventions: Patient to call before getting OOB    Elimination Interventions: Call light in reach, Patient to call for help with toileting needs, Toileting schedule/hourly rounds    History of Falls Interventions: Consult care management for discharge planning, Evaluate medications/consider consulting pharmacy         Problem: Patient Education: Go to Patient Education Activity  Goal: Patient/Family Education  Outcome: Progressing Towards Goal     Problem: Patient Education: Go to Patient Education Activity  Goal: Patient/Family Education  Outcome: Progressing Towards Goal     Problem: Gas Exchange - Impaired  Goal: *Absence of hypoxia  Outcome: Progressing Towards Goal     Problem: Patient Education: Go to Patient Education Activity  Goal: Patient/Family Education  Outcome: Progressing Towards Goal

## 2021-09-11 NOTE — PROGRESS NOTES
[facility]. Pulmonary, Critical Care, and Sleep Medicine    F/U Patient Consult    Name: Jackie Puentes MRN: [id number]   : 1940 Hospital: [address] [place] [address] Dr   Date: 9/10/2021          This patient has been seen and evaluated at the request of Dr. Dayron Colon for hypoxia. IMPRESSION:   · Acute on chronic hypoxic respiratory failure: Secondary to likely exacerbation of ILD and possible multifocal pneumonia. Patient is on home supplemental oxygen therapy 3-4 L  · Suspected multifocal pneumonia:  Biofire negative for COVID -- CXR reporting multiple infiltrates vs increased markings from possible pulm edema  · ILD: Previously noted pulmonary fibrosis related to lupus.  Used to follow with Dr. Cheng Merchant, now following with Dr. Sepideh Leblanc with [facility] THE [facility] pulmonary  · Acute encephalopathy: Multifactorial, toxic/metabolic in nature, likely contributed by delirium  · Suspect decompensated CHFpEF  · Pulmonary hypertension  · Lytes: Hypokalemia  · Deconditioning  · SLE on Plaquenil  · History of rheumatoid arthritis on chronic prednisone previously  · Deconditioning/debility     Patient Active Problem List   Diagnosis Code    Cirrhosis of liver ([facility] [address] K74.60    Systemic lupus erythematosus ([facility] M32.9    Rheumatoid arthritis ([facility] [facility] M06.9    Dyslipidemia E78.5    COPD exacerbation ([facility] J44.1    Pulmonary HTN ([facility] [address] [facility] I27.20    Acute bronchitis J20.9    Abnormal finding on pulmonary function testing R94.2    Allergic rhinitis J30.9    Bronchiectasis (HCC) J47.9    Chronic cough R05    Chronic respiratory failure with hypoxia (HCC) J96.11    Diffusion capacity of lung (dl), decreased R94.2    Dyschromia L81.9    Essential hypertension, benign I10    Exertional dyspnea R06.00    GERD (gastroesophageal reflux disease) K21.9    Left shoulder tendinitis M77.8    Localized osteoarthritis of right shoulder M19.011    Nonspecific elevation of levels of transaminase or lactic acid dehydrogenase (LDH) R74.01, R74.02    Polypharmacy Z79.899    Primary localized osteoarthrosis, lower leg M17.10    Hypokalemia E87.6    COPD with acute exacerbation (HCC) J44.1    Acute on chronic respiratory failure with hypoxia (HCC) J96.21    Acute respiratory failure with hypoxia (HCC) J96.01      RECOMMENDATIONS:   Follow-up culture results  Continue current antibiotics to include pseudomonal coverage, cefepime and azithromycin (for atypical coverage)  Due to encephalopathy, will reduce steroid dose down to 40 mg of Solu-Medrol once daily. If patient's mental status improves and oxygenation remains poor, then may increase at that time. Otherwise we will continue to wean as tolerated. Obtain transthoracic echo with bubble study  Supplemental oxygen to maintain SpO2 >88%  Please assess for home oxygen need prior to discharge (patient on home oxygen)  Aggressive potassium repletion, current unclear etiology, will defer work-up to primary service  Continue scheduled bronchodilators: duonebs q4h, pulmicort nebs 1mg BID, and albuterol PRN  Please hold home bronchodilators. May resume on discharge  Aggressive pulmonary toileting/bronchial hygiene  Frequent incentive spirometry  Fall precautions, frequent reorientation, avoid sedating or other medications that may contribute to delirium  Aspiration precautions including elevating HOB >30deg  PT/OT, OOB, ambulate with assistance as tolerated  DVT ppx per primary service  Will follow     Subjective:   09/10/21  Patient seen examined at bedside. No acute events overnight. Patient is delirious today, but oxygenation is slightly improved, patient weaned down to 2 L by nasal cannula. Patient has ongoing shortness of breath.   Patient's  is at bedside, reporting that it is his birthday today        HPI:  Patient is a [de-identified] y.o. female with a past medical history of pulmonary fibrosis, chronic hypoxic respiratory failure, lupus, rheumatoid arthritis, pulmonary hypertension, presented to [facility] worsening shortness of breath. Patient reports shortness of breath that have been worsening over the last few weeks. Patient reports that this morning, patient was severely short of breath and also felt extremely weak. Patient also noted that she developed a persistent productive cough over the last few days, productive of green sputum. Patient denies any fevers or chills or night sweats or pleuritic chest pain. Patient reports that she takes Plaquenil for rheumatoid arthritis. Patient reports that she follows with the [facility] THE [facility] pulmonary group, Dr. Lino Moreira, previously followed with Dr. Umm Yan before he retired. Patient received supplemental oxygen therapy while in the ER, also noted to be mildly febrile, also found to have a potassium of 2.2 along with right bundle branch block and left fascicular block. Patient had a chest x-ray which showed multifocal opacities, patient was started on IV steroids, nebulizers, antibiotics. Past Medical History:   Diagnosis Date    Asthma     Chronic lung disease     Hypertension     Lupus ([address]     Pulmonary arterial hypertension (HCC)     Pulmonary hypertension (HCC)     Rheumatoid arthritis(714.0)       No past surgical history on file. Prior to Admission medications    Medication Sig Start Date End Date Taking? Authorizing Provider   predniSONE (DELTASONE) 10 mg tablet Take 10 mg by mouth daily. 9/15/20  Yes Provider, Historical   zolpidem (AMBIEN) 5 mg tablet Take 5 mg by mouth. Yes Provider, Historical   albuterol-ipratropium (DUO-NEB) 2.5 mg-0.5 mg/3 ml nebu 3 mL by Nebulization route every four (4) hours as needed for Wheezing. File under Medicare Part B, ICD # J45.909, J84.10 4/21/21  Yes JESUSITA Sanchez   albuterol (PROAIR HFA) 90 mcg/actuation inhaler Take 2 Puffs by inhalation every four (4) hours as needed for Wheezing or Shortness of Breath.  4/23/19  Yes Martha Plummer MD   Oxygen 3 Each by Nasal route. 4 l/min via NC per patient. Yes Provider, Historical   amLODIPine (NORVASC) 5 mg tablet Take 1 Tab by mouth daily. Indications: HYPERTENSION 8/15/16  Yes Saulo Durant MD   furosemide (LASIX) 40 mg tablet Take 1 Tab by mouth daily. Indications: EDEMA 8/15/16  Yes Saulo Durant MD   omeprazole (PRILOSEC) 20 mg capsule Take 20 mg by mouth daily. Yes Other, MD Sameera   hydroxychloroquine (PLAQUENIL) 200 mg tablet Take 200 mg by mouth daily. Yes Other, MD Sameera   potassium chloride (KLOR-CON M20) 20 mEq tablet Take 20 mEq by mouth two (2) times a day. Yes Other, MD Sameera     Allergies   Allergen Reactions    Latex Itching    Pcn [Penicillins] Swelling and Angioedema     Lip and tongue swelling, per patient 21    Prednisone Swelling     Per pulmonology note, \" Nursing verfied with pt that allergy to prednisone is actually a side effect (round face and increased appetite in the setting of prolonged therapy), pt denies any swelling. \"      Asmanex Hfa [Mometasone] Itching    Cleocin [Clindamycin Hcl] Itching and Swelling     Lips and tongue    Robitussin [Guaifenesin] Rash      Social History     Tobacco Use    Smoking status: Former Smoker     Packs/day: 0.50     Years: 16.00     Pack years: 8.00     Types: Cigarettes     Quit date: 1974     Years since quittin.0    Smokeless tobacco: Never Used   Substance Use Topics    Alcohol use: No      Family History   Problem Relation Age of Onset    Heart Disease Mother     Asthma Mother     Asthma Father     Parkinson's Disease Father         Current Facility-Administered Medications   Medication Dose Route Frequency    budesonide (PULMICORT) 1,000 mcg/2 mL nebulizer susp  1,000 mcg Nebulization BID RT    dronabinoL (MARINOL) capsule 5 mg  5 mg Oral BID    guaiFENesin ER (MUCINEX) tablet 600 mg  600 mg Oral Q12H    potassium chloride in water 10 mEq/100 mL IVPB premix pgbk        sodium chloride (NS) flush 5-40 mL  5-40 mL IntraVENous Q8H    enoxaparin (LOVENOX) injection 30 mg  30 mg SubCUTAneous DAILY    albuterol-ipratropium (DUO-NEB) 2.5 MG-0.5 MG/3 ML  3 mL Nebulization Q4H RT    insulin lispro (HUMALOG) injection   SubCUTAneous AC&HS    methylPREDNISolone (PF) (SOLU-MEDROL) injection 40 mg  40 mg IntraVENous Q8H    amLODIPine (NORVASC) tablet 5 mg  5 mg Oral DAILY    furosemide (LASIX) tablet 40 mg  40 mg Oral DAILY    hydrOXYchloroQUINE (PLAQUENIL) tablet 200 mg  200 mg Oral DAILY    pantoprazole (PROTONIX) tablet 20 mg  20 mg Oral ACB    zolpidem (AMBIEN) tablet 5 mg  5 mg Oral QHS    docusate sodium (COLACE) capsule 100 mg  100 mg Oral DAILY    potassium chloride (K-DUR, KLOR-CON) SR tablet 20 mEq  20 mEq Oral BID    azithromycin (ZITHROMAX) 500 mg in 0.9% sodium chloride 250 mL (VIAL-MATE)  500 mg IntraVENous Q24H    cefepime (MAXIPIME) 2 g in sterile water (preservative free) 10 mL IV syringe  2 g IntraVENous Q24H       Review of Systems:  Unable to obtain due to patient factors--delirium      Objective:   Vital Signs:    Visit Vitals  BP (!) 161/84   Pulse 79   Temp 98.8 °F (37.1 °C)   Resp 21   Ht 5' (1.524 m)   Wt 52.2 kg (115 lb)   SpO2 93%   BMI 22.46 kg/m²       O2 Device: Nasal cannula   O2 Flow Rate (L/min): 3 l/min   Temp (24hrs), Av.3 °F (36.8 °C), Min:98 °F (36.7 °C), Max:98.8 °F (37.1 °C)       Intake/Output:   Last shift:      No intake/output data recorded. Last 3 shifts:  0701 - 09/10 1900  In: 510 [I.V.:510]  Out: 300 [Urine:300]    Intake/Output Summary (Last 24 hours) at 9/10/2021 2116  Last data filed at 9/10/2021 0757  Gross per 24 hour   Intake 360 ml   Output 300 ml   Net 60 ml      Physical Exam:   General:  Alert, cooperative, no distress, appears stated age, sitting upright in bed wearing nasal cannula, appears frail, confused   Head:  Normocephalic, without obvious abnormality, atraumatic. Eyes:  Conjunctivae/corneas clear. ANicteric, PERRLA, EOMI   Nose: Nares normal. Mucosa normal. No drainage or sinus tenderness. Throat: Lips, mucosa dry. NO thrush; poor dentition, no oral lesions   Neck: Supple, symmetrical, trachea midline, no adenopathy, thyroid: no enlargment/tenderness/nodules, no carotid bruit and no JVD. No crepitus   Back:   Symmetric, no curvature, no spine tenderness or flank pain   Lungs:    Poor air entry bilaterally with scattered rhonchi throughout all lung fields, no wheezes   Chest wall:  No tenderness or deformity. NO CREPITUS   Heart:  Regular rate and rhythm, S1, S2 normal, no murmur, click, rub or gallop. Abdomen:   Soft, non-tender. Bowel sounds normal. No masses,  No organomegaly. No paradoxical motion   Extremities: normal, atraumatic, no cyanosis or edema. No clubbing   Pulses: 1-2+ and symmetric all extremities.    Skin: Skin has multiple dry areas along bilateral lower extremities, color, texture, turgor normal. No other rashes or lesions   Lymph nodes: Cervical, supraclavicular, and axillary nodes normal.   Neurologic: Grossly nonfocal, extremities have normal strength, and sensation throughout          Data review:   Labs:  Recent Results (from the past 24 hour(s))   GLUCOSE, POC    Collection Time: 09/10/21 12:32 AM   Result Value Ref Range    Glucose (POC) 125 (H) 70 - 642 mg/dL   METABOLIC PANEL, BASIC    Collection Time: 09/10/21  2:00 AM   Result Value Ref Range    Sodium 137 136 - 145 mmol/L    Potassium 2.6 (LL) 3.5 - 5.5 mmol/L    Chloride 95 (L) 100 - 111 mmol/L    CO2 36 (H) 21 - 32 mmol/L    Anion gap 6 3.0 - 18 mmol/L    Glucose 130 (H) 74 - 99 mg/dL    BUN 20 (H) 7.0 - 18 MG/DL    Creatinine 1.07 0.6 - 1.3 MG/DL    BUN/Creatinine ratio 19 12 - 20      GFR est AA 60 (L) >60 ml/min/1.73m2    GFR est non-AA 49 (L) >60 ml/min/1.73m2    Calcium 9.0 8.5 - 10.1 MG/DL   MAGNESIUM    Collection Time: 09/10/21  2:00 AM   Result Value Ref Range    Magnesium 2.7 (H) 1.6 - 2.6 mg/dL   CBC WITH AUTOMATED DIFF    Collection Time: 09/10/21  2:00 AM Result Value Ref Range    WBC 8.3 4.6 - 13.2 K/uL    RBC 3.41 (L) 4.20 - 5.30 M/uL    HGB 9.4 (L) 12.0 - 16.0 g/dL    HCT 29.1 (L) 35.0 - 45.0 %    MCV 85.3 78.0 - 100.0 FL    MCH 27.6 24.0 - 34.0 PG    MCHC 32.3 31.0 - 37.0 g/dL    RDW 14.6 (H) 11.6 - 14.5 %    PLATELET 826 505 - 756 K/uL    MPV 10.9 9.2 - 11.8 FL    NEUTROPHILS 91 (H) 40 - 73 %    LYMPHOCYTES 7 (L) 21 - 52 %    MONOCYTES 2 (L) 3 - 10 %    EOSINOPHILS 0 0 - 5 %    BASOPHILS 0 0 - 2 %    ABS. NEUTROPHILS 7.6 1.8 - 8.0 K/UL    ABS. LYMPHOCYTES 0.5 (L) 0.9 - 3.6 K/UL    ABS. MONOCYTES 0.1 0.05 - 1.2 K/UL    ABS. EOSINOPHILS 0.0 0.0 - 0.4 K/UL    ABS.  BASOPHILS 0.0 0.0 - 0.1 K/UL    DF AUTOMATED     HEMOGLOBIN A1C WITH EAG    Collection Time: 09/10/21  2:00 AM   Result Value Ref Range    Hemoglobin A1c 4.9 4.2 - 5.6 %    Est. average glucose 94 mg/dL   NT-PRO BNP    Collection Time: 09/10/21  2:00 AM   Result Value Ref Range    NT pro-BNP 8,737 (H) 0 - 1,800 PG/ML   PROCALCITONIN    Collection Time: 09/10/21  2:00 AM   Result Value Ref Range    Procalcitonin 0.25 ng/mL   PHOSPHORUS    Collection Time: 09/10/21  2:00 AM   Result Value Ref Range    Phosphorus 3.7 2.5 - 4.9 MG/DL   LEGIONELLA PNEUMOPHILA AG, URINE    Collection Time: 09/10/21  7:00 AM    Specimen: Urine, random   Result Value Ref Range    Legionella Ag, urine Negative NEG     GLUCOSE, POC    Collection Time: 09/10/21  7:48 AM   Result Value Ref Range    Glucose (POC) 124 (H) 70 - 110 mg/dL   POTASSIUM    Collection Time: 09/10/21  4:00 PM   Result Value Ref Range    Potassium 3.0 (L) 3.5 - 5.5 mmol/L     ABG:  No results found for: PH, PHI, PCO2, PCO2I, PO2, PO2I, HCO3, HCO3I, FIO2, FIO2I        PFT Results  (Last 48 hours)    None        Echo Results  (Last 48 hours)    None        Imaging:  I have personally reviewed the patients radiographs and have reviewed the reports:  CXR Results  (Last 48 hours)               09/09/21 1423  XR CHEST PORT Final result    Impression:  Stable enlarged cardiac silhouette. Bilateral upper lobe fibrotic changes with superimposed multifocal infiltrates   in the upper lobes and likely right lower lobe. Narrative:  EXAM:  XR CHEST PORT       INDICATION:   dyspnea, eval for consolidation       COMPARISON: 4/21/2021. FINDINGS:   Stable enlarged cardiac silhouette. Increased upper lobes airspace opacities   superimposed on chronic interstitial fibrotic changes. Also increased streaky   opacities in the right lung base. Chronic blunting in the right costophrenic   angle. No pneumothorax. Scoliosis. CT Results  (Last 48 hours)    None            High complexity decision making was performed during the evaluation of this patient at high risk for decompensation with multiple organ involvement     Above mentioned total time spent on reviewing the case/medical record/data/notes/EMR/patient examination/documentation/coordinating care with nurse/consultants, exclusive of procedures with complex decision making performed and > 50% time spent in face to face evaluation.          Yaw Blancas MD/MPH     Pulmonary, Critical Care Medicine  [facility]

## 2021-09-11 NOTE — ROUTINE PROCESS
Bedside shift change report given to 1200 Ramone Boyd RN (oncoming nurse) by Abril Hanna RN (offgoing nurse). Report included the following information SBAR, Kardex, Intake/Output and MAR.

## 2021-09-11 NOTE — PROGRESS NOTES
Progress Note         Patient: Ledy Sánchez MRN: [id number]  CSN: [id number]    YOB: 1940  Age: [de-identified] y.o. Sex: female    DOA: 9/9/2021 LOS:  LOS: 2 days                    Subjective:     Ledy Sánchez is a [de-identified] y.o. female with PMHx of chronic hypoxic respiratory failure on home O2 at 3 L/min, interstitial lung disease, chronic bronchiectasis, asthma, pulmonary hypertension, SLE on Plaquenil, RA on chronic prednisone, and HTN who is now admitted for acute on chronic hypoxic respiratory failure secondary to interstitial lung disease exacerbation, with suspected multifocal pneumonia. Seen in room today  Sitting up in bed, NAD  Reports some mild improvement in her shortness of breath, but not back to baseline  SPO2 in the mid 90s on 3 L/min which is her baseline      Objective:     Physical Exam:  Visit Vitals  BP (!) 165/83 (BP 1 Location: Right upper arm, BP Patient Position: At rest)   Pulse 80   Temp 97.9 °F (36.6 °C)   Resp 16   Ht 5' (1.524 m)   Wt 52.2 kg (115 lb)   SpO2 94%   BMI 22.46 kg/m²        General:         Alert, cooperative, no acute distress    HEENT: NC, Atraumatic. Anicteric sclerae. Lungs: Diminished lung sounds bilaterally with faint fine scattered rhonchi throughout, no wheezing  Heart:  Regular  rhythm,  No murmur, No Rubs, No Gallops  Abdomen: Soft, Non distended, Non tender. +Bowel sounds, no HSM  Extremities: No c/c/e  Psych:   Not anxious or agitated. Neurologic:  Alert and oriented X 3. No acute neurological deficits. Intake and Output:  Current Shift:  No intake/output data recorded.   Last three shifts:  09/10 0701 - 09/11 1900  In: 1040 [P.O.:940; I.V.:100]  Out: -     Labs: Results:       Chemistry Recent Labs     09/11/21  0102 09/10/21  1600 09/10/21  0200 09/09/21  1305 09/09/21  1305   *  --  130*  --  61*     --  137  --  140   K 2.7* 3.0* 2.6*   < > 2.2*   CL 99*  --  95*  --  94*   CO2 39*  --  36*  --  41*   BUN 25*  --  20*  --  18   CREA 0.99  --  1.07  --  1.23   CA 8.3*  --  9.0  --  9.8   AGAP 3  --  6  --  5   BUCR 25*  --  19  --  15    < > = values in this interval not displayed. CBC w/Diff Recent Labs     09/10/21  0200 09/09/21  1305   WBC 8.3 7.5   RBC 3.41* 3.95*   HGB 9.4* 10.8*   HCT 29.1* 33.8*    142   GRANS 91* 87*   LYMPH 7* 4*   EOS 0 4      Cardiac Enzymes No results for input(s): CPK, CKND1, DORIS in the last 72 hours. No lab exists for component: CKRMB, TROIP   Coagulation No results for input(s): PTP, INR, APTT, INREXT in the last 72 hours. Lipid Panel Lab Results   Component Value Date/Time    Cholesterol, total 251 (H) 09/22/2010 08:04 AM    HDL Cholesterol 73 (H) 09/22/2010 08:04 AM    LDL, calculated 159.2 (H) 09/22/2010 08:04 AM    VLDL, calculated 18.8 09/22/2010 08:04 AM    Triglyceride 94 09/22/2010 08:04 AM    CHOL/HDL Ratio 3.4 09/22/2010 08:04 AM      BNP No results for input(s): BNPP in the last 72 hours. Liver Enzymes No results for input(s): TP, ALB, TBIL, AP in the last 72 hours. No lab exists for component: SGOT, GPT, DBIL   Thyroid Studies No results found for: T4, T3U, TSH, TSHEXT             Assessment and Plan:     Mylene Bellamy is a [de-identified] y.o. female with PMHx of chronic hypoxic respiratory failure on home O2 at 3 L/min, interstitial lung disease, chronic bronchiectasis, asthma, pulmonary hypertension, SLE on Plaquenil, RA on chronic prednisone, and HTN who is now admitted for acute on chronic hypoxic respiratory failure secondary to interstitial lung disease exacerbation, with suspected multifocal pneumonia. 1. Acute on chronic hypoxic respiratory failure  2. Exacerbation of chronic interstitial lung disease  3. Suspected multifocal pneumonia  4. Covid19 negative   5. Persistent severe hypokalemia  6. Pulmonary fibrosis   7. Asthma  8. Pulmonary hypertension  9. Immunosuppressed status  10. SLE on Plaquenil  11. RA on chronic prednisone  12.  HTN     Pulmonology following   Continue O2 as needed and wean as tolerated  Continue duo nebs as needed per RT  Continue and wean IV steroids  Continue IV ABXazithromycin and cefepime  Check sputum culture  Continue home meds except prednisone  SSI with Accu-Cheks  Replace electrolytes   Follow-up CBC, BMP  Bronchial hygiene protocol, incentive spirometry  PT, OT- recommending home health       Case discussed with:  [x]Patient  []Family  [x]Nursing  []Case Management  DVT prophylaxis: Lovenox   Diet: Regular    Contact: Alexmon Cherise ()     [phone]   Code Status: DNR    Disposition: Continue current care; hopefully home in 2-3 days     H. Terra Miller DO  9/11/2021       Dragon medical dictation software was used for portions of this report. Unintended errors may occur.

## 2021-09-12 LAB
ANION GAP SERPL CALC-SCNC: 3 MMOL/L (ref 3–18)
BUN SERPL-MCNC: 17 MG/DL (ref 7–18)
BUN/CREAT SERPL: 22 (ref 12–20)
CALCIUM SERPL-MCNC: 8 MG/DL (ref 8.5–10.1)
CHLORIDE SERPL-SCNC: 105 MMOL/L (ref 100–111)
CO2 SERPL-SCNC: 33 MMOL/L (ref 21–32)
CREAT SERPL-MCNC: 0.77 MG/DL (ref 0.6–1.3)
GLUCOSE BLD STRIP.AUTO-MCNC: 118 MG/DL (ref 70–110)
GLUCOSE BLD STRIP.AUTO-MCNC: 128 MG/DL (ref 70–110)
GLUCOSE BLD STRIP.AUTO-MCNC: 91 MG/DL (ref 70–110)
GLUCOSE BLD STRIP.AUTO-MCNC: 96 MG/DL (ref 70–110)
GLUCOSE SERPL-MCNC: 84 MG/DL (ref 74–99)
POTASSIUM SERPL-SCNC: 3.3 MMOL/L (ref 3.5–5.5)
SODIUM SERPL-SCNC: 141 MMOL/L (ref 136–145)

## 2021-09-12 PROCEDURE — 82962 GLUCOSE BLOOD TEST: CPT

## 2021-09-12 PROCEDURE — 65270000029 HC RM PRIVATE

## 2021-09-12 PROCEDURE — 74011000250 HC RX REV CODE- 250: Performed by: INTERNAL MEDICINE

## 2021-09-12 PROCEDURE — 77010033678 HC OXYGEN DAILY

## 2021-09-12 PROCEDURE — 94640 AIRWAY INHALATION TREATMENT: CPT

## 2021-09-12 PROCEDURE — 80048 BASIC METABOLIC PNL TOTAL CA: CPT

## 2021-09-12 PROCEDURE — 74011000250 HC RX REV CODE- 250: Performed by: FAMILY MEDICINE

## 2021-09-12 PROCEDURE — 99232 SBSQ HOSP IP/OBS MODERATE 35: CPT | Performed by: FAMILY MEDICINE

## 2021-09-12 PROCEDURE — 74011250637 HC RX REV CODE- 250/637: Performed by: FAMILY MEDICINE

## 2021-09-12 PROCEDURE — 2709999900 HC NON-CHARGEABLE SUPPLY

## 2021-09-12 PROCEDURE — 74011250636 HC RX REV CODE- 250/636: Performed by: INTERNAL MEDICINE

## 2021-09-12 PROCEDURE — 74011250637 HC RX REV CODE- 250/637: Performed by: INTERNAL MEDICINE

## 2021-09-12 PROCEDURE — 36415 COLL VENOUS BLD VENIPUNCTURE: CPT

## 2021-09-12 PROCEDURE — 74011250636 HC RX REV CODE- 250/636: Performed by: FAMILY MEDICINE

## 2021-09-12 RX ORDER — PREDNISONE 20 MG/1
20 TABLET ORAL
Status: DISCONTINUED | OUTPATIENT
Start: 2021-09-13 | End: 2021-09-13 | Stop reason: HOSPADM

## 2021-09-12 RX ORDER — ENOXAPARIN SODIUM 100 MG/ML
40 INJECTION SUBCUTANEOUS DAILY
Status: DISCONTINUED | OUTPATIENT
Start: 2021-09-13 | End: 2021-09-13 | Stop reason: HOSPADM

## 2021-09-12 RX ORDER — HYDRALAZINE HYDROCHLORIDE 20 MG/ML
10 INJECTION INTRAMUSCULAR; INTRAVENOUS
Status: DISCONTINUED | OUTPATIENT
Start: 2021-09-12 | End: 2021-09-13 | Stop reason: HOSPADM

## 2021-09-12 RX ADMIN — GUAIFENESIN 600 MG: 600 TABLET, EXTENDED RELEASE ORAL at 22:27

## 2021-09-12 RX ADMIN — ENOXAPARIN SODIUM 30 MG: 100 INJECTION SUBCUTANEOUS at 08:04

## 2021-09-12 RX ADMIN — AMLODIPINE BESYLATE 5 MG: 5 TABLET ORAL at 08:05

## 2021-09-12 RX ADMIN — DRONABINOL 5 MG: 5 CAPSULE ORAL at 17:03

## 2021-09-12 RX ADMIN — BUDESONIDE 1000 MCG: 1 SUSPENSION RESPIRATORY (INHALATION) at 20:43

## 2021-09-12 RX ADMIN — GUAIFENESIN 600 MG: 600 TABLET, EXTENDED RELEASE ORAL at 08:05

## 2021-09-12 RX ADMIN — POTASSIUM CHLORIDE 20 MEQ: 1500 TABLET, EXTENDED RELEASE ORAL at 08:04

## 2021-09-12 RX ADMIN — IPRATROPIUM BROMIDE AND ALBUTEROL SULFATE 3 ML: .5; 3 SOLUTION RESPIRATORY (INHALATION) at 00:07

## 2021-09-12 RX ADMIN — POTASSIUM BICARBONATE 20 MEQ: 391 TABLET, EFFERVESCENT ORAL at 22:25

## 2021-09-12 RX ADMIN — Medication 10 ML: at 06:00

## 2021-09-12 RX ADMIN — HYDROXYCHLOROQUINE SULFATE 200 MG: 200 TABLET ORAL at 08:05

## 2021-09-12 RX ADMIN — IPRATROPIUM BROMIDE AND ALBUTEROL SULFATE 3 ML: .5; 3 SOLUTION RESPIRATORY (INHALATION) at 12:47

## 2021-09-12 RX ADMIN — DRONABINOL 5 MG: 5 CAPSULE ORAL at 08:05

## 2021-09-12 RX ADMIN — AZITHROMYCIN 500 MG: 500 INJECTION, POWDER, LYOPHILIZED, FOR SOLUTION INTRAVENOUS at 22:23

## 2021-09-12 RX ADMIN — PANTOPRAZOLE SODIUM 20 MG: 20 TABLET, DELAYED RELEASE ORAL at 08:05

## 2021-09-12 RX ADMIN — ACETAMINOPHEN 650 MG: 325 TABLET ORAL at 08:04

## 2021-09-12 RX ADMIN — FUROSEMIDE 40 MG: 40 TABLET ORAL at 08:05

## 2021-09-12 RX ADMIN — IPRATROPIUM BROMIDE AND ALBUTEROL SULFATE 3 ML: .5; 3 SOLUTION RESPIRATORY (INHALATION) at 08:11

## 2021-09-12 RX ADMIN — BUDESONIDE 1000 MCG: 1 SUSPENSION RESPIRATORY (INHALATION) at 08:11

## 2021-09-12 RX ADMIN — ZOLPIDEM TARTRATE 5 MG: 5 TABLET ORAL at 22:36

## 2021-09-12 RX ADMIN — CEFEPIME HYDROCHLORIDE 2 G: 2 INJECTION, POWDER, FOR SOLUTION INTRAVENOUS at 22:35

## 2021-09-12 RX ADMIN — Medication 10 ML: at 22:37

## 2021-09-12 RX ADMIN — Medication 10 ML: at 13:08

## 2021-09-12 RX ADMIN — DOCUSATE SODIUM 100 MG: 100 CAPSULE, LIQUID FILLED ORAL at 08:05

## 2021-09-12 RX ADMIN — IPRATROPIUM BROMIDE AND ALBUTEROL SULFATE 3 ML: .5; 3 SOLUTION RESPIRATORY (INHALATION) at 17:04

## 2021-09-12 RX ADMIN — POTASSIUM BICARBONATE 40 MEQ: 391 TABLET, EFFERVESCENT ORAL at 15:23

## 2021-09-12 RX ADMIN — IPRATROPIUM BROMIDE AND ALBUTEROL SULFATE 3 ML: .5; 3 SOLUTION RESPIRATORY (INHALATION) at 20:43

## 2021-09-12 RX ADMIN — METHYLPREDNISOLONE SODIUM SUCCINATE 40 MG: 40 INJECTION, POWDER, FOR SOLUTION INTRAMUSCULAR; INTRAVENOUS at 08:10

## 2021-09-12 NOTE — PROGRESS NOTES
[employer] [facility]. Pulmonary, Critical Care, and Sleep Medicine    F/U Patient Consult    Name: Stan Calhoun MRN: [id number]   : 1940 Hospital: [facility]   Date: 2021          This patient has been seen and evaluated at the request of Dr. Edilson Harvey for hypoxia. IMPRESSION:   · Acute on chronic hypoxic respiratory failure: Secondary to likely exacerbation of ILD and possible multifocal pneumonia. Patient is on home supplemental oxygen therapy 3-4 L  · Suspected multifocal pneumonia:  Biofire negative for COVID -- CXR reporting multiple infiltrates vs increased markings from possible pulm edema  · ILD: Previously noted pulmonary fibrosis related to lupus.  Used to follow with Dr. Marilou Baez, now following with Dr. Ceci Velaczo with [facility] pulmonary  · Acute encephalopathy: Multifactorial, toxic/metabolic in nature, likely contributed by delirium  · Suspect decompensated CHFpEF  · Pulmonary hypertension  · Lytes: Hypokalemia  · Deconditioning  · SLE on Plaquenil  · History of rheumatoid arthritis on chronic prednisone previously  · Deconditioning/debility     Patient Active Problem List   Diagnosis Code    Cirrhosis of liver ([facility] [address] [address] K74.60    Systemic lupus erythematosus ([facility] [address] M32.9    Rheumatoid arthritis ([facility] [address] [facility] M06.9    Dyslipidemia E78.5    COPD exacerbation ([facility] [address] J44.1    Pulmonary HTN ([facility] [address] I27.20    Acute bronchitis J20.9    Abnormal finding on pulmonary function testing R94.2    Allergic rhinitis J30.9    Bronchiectasis (HCC) J47.9    Chronic cough R05    Chronic respiratory failure with hypoxia (HCC) J96.11    Diffusion capacity of lung (dl), decreased R94.2    Dyschromia L81.9    Essential hypertension, benign I10    Exertional dyspnea R06.00    GERD (gastroesophageal reflux disease) K21.9    Left shoulder tendinitis M77.8    Localized osteoarthritis of right shoulder M19.011    Nonspecific elevation of levels of transaminase or lactic acid dehydrogenase (LDH) R74.01, R74.02    Polypharmacy Z79.899    Primary localized osteoarthrosis, lower leg M17.10    Hypokalemia E87.6    COPD with acute exacerbation (HCC) J44.1    Acute on chronic respiratory failure with hypoxia (HCC) J96.21    Acute respiratory failure with hypoxia (HCC) J96.01      RECOMMENDATIONS:   Follow-up culture results  Continue current antibiotics to include pseudomonal coverage, cefepime and azithromycin (for atypical coverage)  Transition solumedrol to prednisone 20 mg daily  Obtain transthoracic echo with bubble study  Supplemental oxygen to maintain SpO2 >88%  Please assess for home oxygen need prior to discharge (patient on home oxygen)  Aggressive potassium repletion, current unclear etiology, will defer work-up to primary service  Continue scheduled bronchodilators: duonebs q4h, pulmicort nebs 1mg BID, and albuterol PRN  Please hold home bronchodilators. May resume on discharge  Aggressive pulmonary toileting/bronchial hygiene  Frequent incentive spirometry  Fall precautions, frequent reorientation, avoid sedating or other medications that may contribute to delirium  Aspiration precautions including elevating HOB >30deg  PT/OT, OOB, ambulate with assistance as tolerated  DVT ppx per primary service  Will follow - consider discharge in next 24-48 hours if continues to improve. Subjective:   09/12/21  Patient seen examined at bedside. No acute events overnight. Eating breakfast without distress  respiratory status near baseline per patient. NC at 2 L/min        HPI:  Patient is a [de-identified] y.o. female with a past medical history of pulmonary fibrosis, chronic hypoxic respiratory failure, lupus, rheumatoid arthritis, pulmonary hypertension, presented to [facility] worsening shortness of breath. Patient reports shortness of breath that have been worsening over the last few weeks.  Patient reports that this morning, patient was severely short of breath and also felt extremely weak. Patient also noted that she developed a persistent productive cough over the last few days, productive of green sputum. Patient denies any fevers or chills or night sweats or pleuritic chest pain. Patient reports that she takes Plaquenil for rheumatoid arthritis. Patient reports that she follows with the [facility] pulmonary group, Dr. Charbel Salmeron, previously followed with Dr. Neftali Acuña before he retired. Patient received supplemental oxygen therapy while in the ER, also noted to be mildly febrile, also found to have a potassium of 2.2 along with right bundle branch block and left fascicular block. Patient had a chest x-ray which showed multifocal opacities, patient was started on IV steroids, nebulizers, antibiotics. Past Medical History:   Diagnosis Date    Asthma     Chronic lung disease     Hypertension     Lupus ([address]     Pulmonary arterial hypertension (HCC)     Pulmonary hypertension (HCC)     Rheumatoid arthritis(714.0)       No past surgical history on file. Prior to Admission medications    Medication Sig Start Date End Date Taking? Authorizing Provider   predniSONE (DELTASONE) 10 mg tablet Take 10 mg by mouth daily. 9/15/20  Yes Provider, Historical   zolpidem (AMBIEN) 5 mg tablet Take 5 mg by mouth. Yes Provider, Historical   albuterol-ipratropium (DUO-NEB) 2.5 mg-0.5 mg/3 ml nebu 3 mL by Nebulization route every four (4) hours as needed for Wheezing. File under Medicare Part B, ICD # J45.909, J84.10 4/21/21  Yes JESUSITA Gomez   albuterol (PROAIR HFA) 90 mcg/actuation inhaler Take 2 Puffs by inhalation every four (4) hours as needed for Wheezing or Shortness of Breath. 4/23/19  Yes Torri Barth MD   Oxygen 3 Each by Nasal route. 4 l/min via NC per patient. Yes Provider, Historical   amLODIPine (NORVASC) 5 mg tablet Take 1 Tab by mouth daily.  Indications: HYPERTENSION 8/15/16  Yes Norma Finney MD   furosemide (LASIX) 40 mg tablet Take 1 Tab by mouth daily. Indications: EDEMA 8/15/16  Yes Nato Herman MD   omeprazole (PRILOSEC) 20 mg capsule Take 20 mg by mouth daily. Yes Sameera King MD   hydroxychloroquine (PLAQUENIL) 200 mg tablet Take 200 mg by mouth daily. Yes Other, MD Sameera   potassium chloride (KLOR-CON M20) 20 mEq tablet Take 20 mEq by mouth two (2) times a day. Yes Other, MD Sameera     Allergies   Allergen Reactions    Latex Itching    Pcn [Penicillins] Swelling and Angioedema     Lip and tongue swelling, per patient 21    Prednisone Swelling     Per pulmonology note, \" Nursing verfied with pt that allergy to prednisone is actually a side effect (round face and increased appetite in the setting of prolonged therapy), pt denies any swelling. \"      Asmanex Hfa [Mometasone] Itching    Cleocin [Clindamycin Hcl] Itching and Swelling     Lips and tongue    Robitussin [Guaifenesin] Rash      Social History     Tobacco Use    Smoking status: Former Smoker     Packs/day: 0.50     Years: 16.00     Pack years: 8.00     Types: Cigarettes     Quit date: 1974     Years since quittin.0    Smokeless tobacco: Never Used   Substance Use Topics    Alcohol use: No      Family History   Problem Relation Age of Onset    Heart Disease Mother     Asthma Mother     Asthma Father     Parkinson's Disease Father         Current Facility-Administered Medications   Medication Dose Route Frequency    [START ON 2021] predniSONE (DELTASONE) tablet 20 mg  20 mg Oral DAILY WITH BREAKFAST    budesonide (PULMICORT) 1,000 mcg/2 mL nebulizer susp  1,000 mcg Nebulization BID RT    dronabinoL (MARINOL) capsule 5 mg  5 mg Oral BID    guaiFENesin ER (MUCINEX) tablet 600 mg  600 mg Oral Q12H    sodium chloride (NS) flush 5-40 mL  5-40 mL IntraVENous Q8H    enoxaparin (LOVENOX) injection 30 mg  30 mg SubCUTAneous DAILY    albuterol-ipratropium (DUO-NEB) 2.5 MG-0.5 MG/3 ML  3 mL Nebulization Q4H RT    insulin lispro (HUMALOG) injection   SubCUTAneous AC&HS    amLODIPine (NORVASC) tablet 5 mg  5 mg Oral DAILY    furosemide (LASIX) tablet 40 mg  40 mg Oral DAILY    hydrOXYchloroQUINE (PLAQUENIL) tablet 200 mg  200 mg Oral DAILY    pantoprazole (PROTONIX) tablet 20 mg  20 mg Oral ACB    zolpidem (AMBIEN) tablet 5 mg  5 mg Oral QHS    docusate sodium (COLACE) capsule 100 mg  100 mg Oral DAILY    potassium chloride (K-DUR, KLOR-CON) SR tablet 20 mEq  20 mEq Oral BID    azithromycin (ZITHROMAX) 500 mg in 0.9% sodium chloride 250 mL (VIAL-MATE)  500 mg IntraVENous Q24H    cefepime (MAXIPIME) 2 g in sterile water (preservative free) 10 mL IV syringe  2 g IntraVENous Q24H       Review of Systems:  Unable to obtain due to patient factors--delirium      Objective:   Vital Signs:    Visit Vitals  BP (!) 179/83 (BP 1 Location: Left upper arm, BP Patient Position: At rest)   Pulse 75   Temp 97.9 °F (36.6 °C)   Resp 16   Ht 5' (1.524 m)   Wt 52.2 kg (115 lb)   SpO2 96%   BMI 22.46 kg/m²       O2 Device: Nasal cannula   O2 Flow Rate (L/min): 2 l/min   Temp (24hrs), Av.8 °F (36.6 °C), Min:97.4 °F (36.3 °C), Max:98.3 °F (36.8 °C)       Intake/Output:   Last shift:      No intake/output data recorded. Last 3 shifts: 09/10 1901 -  0700  In: 940 [P.O.:940]  Out: 800 [Urine:800]    Intake/Output Summary (Last 24 hours) at 2021 0948  Last data filed at 2021 0522  Gross per 24 hour   Intake 600 ml   Output 800 ml   Net -200 ml      Physical Exam:   General:  Alert, cooperative, no distress, appears stated age, sitting upright in bed wearing nasal cannula, appears frail, confused   Head:  Normocephalic, without obvious abnormality, atraumatic. Eyes:  Conjunctivae/corneas clear. ANicteric, PERRLA, EOMI   Nose: Nares normal. Mucosa normal. No drainage or sinus tenderness. Throat: Lips, mucosa dry.  NO thrush; poor dentition, no oral lesions   Neck: Supple, symmetrical, trachea midline, no adenopathy, thyroid: no enlargment/tenderness/nodules, no carotid bruit and no JVD. No crepitus   Back:   Symmetric, no curvature, no spine tenderness or flank pain   Lungs:    Poor air entry bilaterally with scattered rhonchi throughout all lung fields, no wheezes   Chest wall:  No tenderness or deformity. NO CREPITUS   Heart:  Regular rate and rhythm, S1, S2 normal, no murmur, click, rub or gallop. Abdomen:   Soft, non-tender. Bowel sounds normal. No masses,  No organomegaly. No paradoxical motion   Extremities: normal, atraumatic, no cyanosis or edema. No clubbing   Pulses: 1-2+ and symmetric all extremities.    Skin: Skin has multiple dry areas along bilateral lower extremities, color, texture, turgor normal. No other rashes or lesions   Lymph nodes: Cervical, supraclavicular, and axillary nodes normal.   Neurologic: Grossly nonfocal, extremities have normal strength, and sensation throughout          Data review:   Labs:  Recent Results (from the past 24 hour(s))   GLUCOSE, POC    Collection Time: 09/11/21 11:17 AM   Result Value Ref Range    Glucose (POC) 116 (H) 70 - 110 mg/dL   ECHO ADULT COMPLETE    Collection Time: 09/11/21 12:30 PM   Result Value Ref Range    IVSd 1.21 (A) 0.60 - 0.90 cm    LVIDd 4.25 3.90 - 5.30 cm    LVIDs 3.11 cm    LVPWd 1.24 (A) 0.60 - 0.90 cm    RVSP 53.17 mmHg    Left Atrium Major Axis 3.69 cm    Est. RA Pressure 3.00 mmHg    MV A Tip 104.85 cm/s    Mitral Valve E Wave Deceleration Time 226.55 ms    MV E Tip 105.50 cm/s    Triscuspid Valve Regurgitation Peak Gradient 50.17 mmHg    TR Max Velocity 354.17 cm/s    AO ASC D 3.08 cm    Ao Root D 2.93 cm    MV E/A 1.01     LV Mass .0 67.0 - 162.0 g    LV Mass AL Index 126.3 43.0 - 95.0 g/m2    Left Atrium Minor Axis 2.49 cm   GLUCOSE, POC    Collection Time: 09/11/21  3:51 PM   Result Value Ref Range    Glucose (POC) 117 (H) 70 - 110 mg/dL   GLUCOSE, POC    Collection Time: 09/11/21  9:26 PM   Result Value Ref Range    Glucose (POC) 94 70 - 912 mg/dL   METABOLIC PANEL, BASIC    Collection Time: 09/12/21  6:30 AM   Result Value Ref Range    Sodium 141 136 - 145 mmol/L    Potassium 3.3 (L) 3.5 - 5.5 mmol/L    Chloride 105 100 - 111 mmol/L    CO2 33 (H) 21 - 32 mmol/L    Anion gap 3 3.0 - 18 mmol/L    Glucose 84 74 - 99 mg/dL    BUN 17 7.0 - 18 MG/DL    Creatinine 0.77 0.6 - 1.3 MG/DL    BUN/Creatinine ratio 22 (H) 12 - 20      GFR est AA >60 >60 ml/min/1.73m2    GFR est non-AA >60 >60 ml/min/1.73m2    Calcium 8.0 (L) 8.5 - 10.1 MG/DL   GLUCOSE, POC    Collection Time: 09/12/21  7:50 AM   Result Value Ref Range    Glucose (POC) 91 70 - 110 mg/dL     ABG:  No results found for: PH, PHI, PCO2, PCO2I, PO2, PO2I, HCO3, HCO3I, FIO2, FIO2I        PFT Results  (Last 48 hours)    None        Echo Results  (Last 48 hours)    None        Imaging:  I have personally reviewed the patients radiographs and have reviewed the reports:  CXR Results  (Last 48 hours)    None        CT Results  (Last 48 hours)    None            High complexity decision making was performed during the evaluation of this patient at high risk for decompensation with multiple organ involvement     Above mentioned total time spent on reviewing the case/medical record/data/notes/EMR/patient examination/documentation/coordinating care with nurse/consultants, exclusive of procedures with complex decision making performed and > 50% time spent in face to face evaluation. Beth Hopkins.  Kyler Blackman MD    Pulmonary, [address] [place] [address] [facility]

## 2021-09-12 NOTE — PROGRESS NOTES
Report received from [facility]. Pt in bed resting awake and oriented x 4 using oxygen at 2 L via nc not in any distress. Assessment completed plan of care for the shift explained pt verbalized understandin. Bed alarm on for safety. Pt on contact isolation for history of MRSA. Pt has no visible open wound at the moment. Will continue with the care. 2200- Pt incontinent of urine care done, periwick applied. 0- Pt incontinent care done purewick repositioned pt educated to keep it on. 0230- Pt incontinent of urine care done bed linen and gown change - pure wick changed. Bedside and Verbal shift change report given to Cam Stewart RN (oncoming nurse) by Joanna Eng RN (offgoing nurse). Report included the following information SBAR, Kardex, Intake/Output, MAR and Recent Results.

## 2021-09-12 NOTE — PROGRESS NOTES
Problem: Risk for Spread of Infection  Goal: Prevent transmission of infectious organism to others  Description: Prevent the transmission of infectious organisms to other patients, staff members, and visitors. Outcome: Progressing Towards Goal     Problem: Falls - Risk of  Goal: *Absence of Falls  Description: Document Magaly Tan Fall Risk and appropriate interventions in the flowsheet. Outcome: Progressing Towards Goal  Note: Fall Risk Interventions:  Mobility Interventions: Patient to call before getting OOB, PT Consult for mobility concerns, Bed/chair exit alarm         Medication Interventions: Patient to call before getting OOB, Bed/chair exit alarm, Teach patient to arise slowly    Elimination Interventions: Call light in reach, Bed/chair exit alarm, Patient to call for help with toileting needs    History of Falls Interventions: Bed/chair exit alarm, Room close to nurse's station, Door open when patient unattended         Problem: Pressure Injury - Risk of  Goal: *Prevention of pressure injury  Description: Document Adrián Scale and appropriate interventions in the flowsheet.   Outcome: Progressing Towards Goal  Note: Pressure Injury Interventions:       Moisture Interventions: Absorbent underpads    Activity Interventions: Pressure redistribution bed/mattress(bed type)    Mobility Interventions: HOB 30 degrees or less    Nutrition Interventions: Document food/fluid/supplement intake, Offer support with meals,snacks and hydration                     Problem: Gas Exchange - Impaired  Goal: *Absence of hypoxia  Outcome: Progressing Towards Goal     Problem: Pain  Goal: *Control of Pain  Outcome: Progressing Towards Goal  Goal: *PALLIATIVE CARE:  Alleviation of Pain  Outcome: Progressing Towards Goal

## 2021-09-12 NOTE — PROGRESS NOTES
Problem: Risk for Spread of Infection  Goal: Prevent transmission of infectious organism to others  Description: Prevent the transmission of infectious organisms to other patients, staff members, and visitors. Outcome: Progressing Towards Goal     Problem: Patient Education:  Go to Education Activity  Goal: Patient/Family Education  Outcome: Progressing Towards Goal     Problem: Falls - Risk of  Goal: *Absence of Falls  Description: Document Xiomara Ny Fall Risk and appropriate interventions in the flowsheet. Outcome: Progressing Towards Goal  Note: Fall Risk Interventions:       medication Interventions: Patient to call before getting OOB    Elimination Interventions: Bed/chair exit alarm, Call light in reach    History of Falls Interventions: Consult care management for discharge planning     Problem: Patient Education: Go to Patient Education Activity  Goal: Patient/Family Education  Outcome: Progressing Towards Goal     Problem: Pressure Injury - Risk of  Goal: *Prevention of pressure injury  Description: Document Adrián Scale and appropriate interventions in the flowsheet.   Outcome: Progressing Towards Goal  Note: Pressure Injury Interventions:       Moisture Interventions: Absorbent underpads    Activity Interventions: Increase time out of bed    Mobility Interventions: HOB 30 degrees or less, Pressure redistribution bed/mattress (bed type)    Nutrition Interventions: Document food/fluid/supplement intake       Problem: Gas Exchange - Impaired  Goal: *Absence of hypoxia  Outcome: Progressing Towards Goal     Problem: Patient Education: Go to Patient Education Activity  Goal: Patient/Family Education  Outcome: Progressing Towards Goal     Problem: Pain  Goal: *Control of Pain  Outcome: Progressing Towards Goal  Goal: *PALLIATIVE CARE:  Alleviation of Pain  Outcome: Progressing Towards Goal     Problem: Patient Education: Go to Patient Education Activity  Goal: Patient/Family Education  Outcome: Progressing Towards Goal

## 2021-09-13 ENCOUNTER — HOME HEALTH ADMISSION (OUTPATIENT)
Dept: HOME HEALTH SERVICES | Facility: HOME HEALTH | Age: 81
End: 2021-09-13
Payer: MEDICARE

## 2021-09-13 VITALS
DIASTOLIC BLOOD PRESSURE: 88 MMHG | HEIGHT: 60 IN | SYSTOLIC BLOOD PRESSURE: 166 MMHG | BODY MASS INDEX: 22.58 KG/M2 | OXYGEN SATURATION: 100 % | WEIGHT: 115 LBS | TEMPERATURE: 97.8 F | RESPIRATION RATE: 18 BRPM | HEART RATE: 70 BPM

## 2021-09-13 LAB
ANION GAP SERPL CALC-SCNC: 1 MMOL/L (ref 3–18)
BUN SERPL-MCNC: 18 MG/DL (ref 7–18)
BUN/CREAT SERPL: 20 (ref 12–20)
CALCIUM SERPL-MCNC: 8.4 MG/DL (ref 8.5–10.1)
CHLORIDE SERPL-SCNC: 108 MMOL/L (ref 100–111)
CO2 SERPL-SCNC: 32 MMOL/L (ref 21–32)
CREAT SERPL-MCNC: 0.88 MG/DL (ref 0.6–1.3)
GLUCOSE BLD STRIP.AUTO-MCNC: 105 MG/DL (ref 70–110)
GLUCOSE BLD STRIP.AUTO-MCNC: 108 MG/DL (ref 70–110)
GLUCOSE BLD STRIP.AUTO-MCNC: 89 MG/DL (ref 70–110)
GLUCOSE SERPL-MCNC: 87 MG/DL (ref 74–99)
POTASSIUM SERPL-SCNC: 3.7 MMOL/L (ref 3.5–5.5)
SODIUM SERPL-SCNC: 141 MMOL/L (ref 136–145)

## 2021-09-13 PROCEDURE — 94640 AIRWAY INHALATION TREATMENT: CPT

## 2021-09-13 PROCEDURE — 74011000250 HC RX REV CODE- 250: Performed by: FAMILY MEDICINE

## 2021-09-13 PROCEDURE — 99232 SBSQ HOSP IP/OBS MODERATE 35: CPT | Performed by: INTERNAL MEDICINE

## 2021-09-13 PROCEDURE — 36415 COLL VENOUS BLD VENIPUNCTURE: CPT

## 2021-09-13 PROCEDURE — 74011250636 HC RX REV CODE- 250/636: Performed by: FAMILY MEDICINE

## 2021-09-13 PROCEDURE — 74011250637 HC RX REV CODE- 250/637: Performed by: INTERNAL MEDICINE

## 2021-09-13 PROCEDURE — 99239 HOSP IP/OBS DSCHRG MGMT >30: CPT | Performed by: FAMILY MEDICINE

## 2021-09-13 PROCEDURE — 74011250637 HC RX REV CODE- 250/637: Performed by: FAMILY MEDICINE

## 2021-09-13 PROCEDURE — 74011636637 HC RX REV CODE- 636/637: Performed by: INTERNAL MEDICINE

## 2021-09-13 PROCEDURE — 2709999900 HC NON-CHARGEABLE SUPPLY

## 2021-09-13 PROCEDURE — 80048 BASIC METABOLIC PNL TOTAL CA: CPT

## 2021-09-13 PROCEDURE — 82962 GLUCOSE BLOOD TEST: CPT

## 2021-09-13 RX ORDER — PREDNISONE 10 MG/1
10 TABLET ORAL DAILY
Qty: 30 TABLET | Refills: 3 | Status: SHIPPED | OUTPATIENT
Start: 2021-09-13

## 2021-09-13 RX ORDER — LEVOFLOXACIN 750 MG/1
750 TABLET ORAL
Status: DISCONTINUED | OUTPATIENT
Start: 2021-09-13 | End: 2021-09-13 | Stop reason: HOSPADM

## 2021-09-13 RX ORDER — LEVOFLOXACIN 750 MG/1
750 TABLET ORAL
Qty: 4 TABLET | Refills: 0 | Status: SHIPPED | OUTPATIENT
Start: 2021-09-13 | End: 2021-09-20

## 2021-09-13 RX ADMIN — FUROSEMIDE 40 MG: 40 TABLET ORAL at 10:39

## 2021-09-13 RX ADMIN — PANTOPRAZOLE SODIUM 20 MG: 20 TABLET, DELAYED RELEASE ORAL at 10:39

## 2021-09-13 RX ADMIN — POTASSIUM BICARBONATE 40 MEQ: 391 TABLET, EFFERVESCENT ORAL at 10:38

## 2021-09-13 RX ADMIN — PREDNISONE 20 MG: 20 TABLET ORAL at 10:39

## 2021-09-13 RX ADMIN — LEVOFLOXACIN 750 MG: 750 TABLET, FILM COATED ORAL at 16:02

## 2021-09-13 RX ADMIN — AMLODIPINE BESYLATE 5 MG: 5 TABLET ORAL at 10:39

## 2021-09-13 RX ADMIN — GUAIFENESIN 600 MG: 600 TABLET, EXTENDED RELEASE ORAL at 10:39

## 2021-09-13 RX ADMIN — IPRATROPIUM BROMIDE AND ALBUTEROL SULFATE 3 ML: .5; 3 SOLUTION RESPIRATORY (INHALATION) at 00:32

## 2021-09-13 RX ADMIN — Medication 10 ML: at 06:21

## 2021-09-13 RX ADMIN — HYDROXYCHLOROQUINE SULFATE 200 MG: 200 TABLET ORAL at 10:39

## 2021-09-13 RX ADMIN — ENOXAPARIN SODIUM 40 MG: 40 INJECTION SUBCUTANEOUS at 10:44

## 2021-09-13 RX ADMIN — IPRATROPIUM BROMIDE AND ALBUTEROL SULFATE 3 ML: .5; 3 SOLUTION RESPIRATORY (INHALATION) at 14:16

## 2021-09-13 RX ADMIN — DRONABINOL 5 MG: 5 CAPSULE ORAL at 10:39

## 2021-09-13 NOTE — PROGRESS NOTES
Progress Note         Patient: Dolly Walker MRN: [id number]  CSN: [id number]    YOB: 1940  Age: [de-identified] y.o. Sex: female    DOA: 9/9/2021 LOS:  LOS: 3 days                    Subjective:     Dolly Walker is a [de-identified] y.o. female with PMHx of chronic hypoxic respiratory failure on home O2 at 3 L/min, interstitial lung disease, chronic bronchiectasis, asthma, pulmonary hypertension, SLE on Plaquenil, RA on chronic prednisone, and HTN who is now admitted for acute on chronic hypoxic respiratory failure secondary to interstitial lung disease exacerbation, with suspected multifocal pneumonia. Seen in room today  Sitting up in bed, NAD  Breathing back to baseline   SPO2 in the mid 90s on 2-3 L/min which is her baseline  Says she'll likely be ready to go home tomorrow       Objective:     Physical Exam:  Visit Vitals  BP (!) 161/88 (BP 1 Location: Left upper arm, BP Patient Position: At rest)   Pulse 71   Temp 97.1 °F (36.2 °C)   Resp 18   Ht 5' (1.524 m)   Wt 52.2 kg (115 lb)   SpO2 95%   BMI 22.46 kg/m²        General:         Alert, cooperative, no acute distress    HEENT: NC, Atraumatic. Anicteric sclerae. Lungs: Diminished lung sounds bilaterally, clear with no wheezing  Heart:  Regular  rhythm,  No murmur, No Rubs, No Gallops  Abdomen: Soft, Non distended, Non tender. +Bowel sounds, no HSM  Extremities: No c/c/e  Psych:   Not anxious or agitated. Neurologic:  Alert and oriented X 3. No acute neurological deficits. Intake and Output:  Current Shift:  No intake/output data recorded.   Last three shifts:  09/11 0701 - 09/12 1900  In: 6678 [P.O.:1420]  Out: 800 [Urine:800]    Labs: Results:       Chemistry Recent Labs     09/12/21  0630 09/11/21  0102 09/10/21  1600 09/10/21  0200 09/10/21  0200   GLU 84 107*  --   --  130*    141  --   --  137   K 3.3* 2.7* 3.0*   < > 2.6*    99*  --   --  95*   CO2 33* 39*  --   --  36*   BUN 17 25*  --   --  20*   CREA 0.77 0.99  --   --  1.07   CA 8.0* 8.3*  --   --  9.0   AGAP 3 3  --   --  6   BUCR 22* 25*  --   --  19    < > = values in this interval not displayed. CBC w/Diff Recent Labs     09/10/21  0200   WBC 8.3   RBC 3.41*   HGB 9.4*   HCT 29.1*      GRANS 91*   LYMPH 7*   EOS 0      Cardiac Enzymes No results for input(s): CPK, CKND1, DORIS in the last 72 hours. No lab exists for component: CKRMB, TROIP   Coagulation No results for input(s): PTP, INR, APTT, INREXT, INREXT in the last 72 hours. Lipid Panel Lab Results   Component Value Date/Time    Cholesterol, total 251 (H) 09/22/2010 08:04 AM    HDL Cholesterol 73 (H) 09/22/2010 08:04 AM    LDL, calculated 159.2 (H) 09/22/2010 08:04 AM    VLDL, calculated 18.8 09/22/2010 08:04 AM    Triglyceride 94 09/22/2010 08:04 AM    CHOL/HDL Ratio 3.4 09/22/2010 08:04 AM      BNP No results for input(s): BNPP in the last 72 hours. Liver Enzymes No results for input(s): TP, ALB, TBIL, AP in the last 72 hours. No lab exists for component: SGOT, GPT, DBIL   Thyroid Studies No results found for: T4, T3U, TSH, TSHEXT, TSHEXT             Assessment and Plan:     Stan Calhoun is a [de-identified] y.o. female with PMHx of chronic hypoxic respiratory failure on home O2 at 3 L/min, interstitial lung disease, chronic bronchiectasis, asthma, pulmonary hypertension, SLE on Plaquenil, RA on chronic prednisone, and HTN who is now admitted for acute on chronic hypoxic respiratory failure secondary to interstitial lung disease exacerbation, with suspected multifocal pneumonia. 1. Acute on chronic hypoxic respiratory failure  2. Exacerbation of chronic interstitial lung disease  3. Suspected multifocal pneumonia  4. Covid19 negative   5. Persistent severe hypokalemia  6. Pulmonary fibrosis   7. Asthma  8. Pulmonary hypertension  9. Immunosuppressed status  10. SLE on Plaquenil  11. RA on chronic prednisone  12.  HTN     Pulmonology following   Continue O2 as needed  Continue duo nebs as needed per RT  IV steroids changed to prednisone daily   Continue IV ABXazithromycin and cefepime  Continue home meds  SSI with Accu-Cheks  Replace electrolytes   Follow-up CBC, BMP  Bronchial hygiene protocol, incentive spirometry  PT, OT- recommending home health       Case discussed with:  [x]Patient  []Family  [x]Nursing  []Case Management  DVT prophylaxis: Lovenox   Diet: Regular    Contact: Tito Bojorquez ()     [phone]   Code Status: DNR    Disposition: Likely home with [facility] tomorrow      MELLY Stanford DO  9/12/2021       Dragon medical dictation software was used for portions of this report. Unintended errors may occur.

## 2021-09-13 NOTE — PROGRESS NOTES
JUDI spoke to Luis Antonoi with [facility], they can accept patient for home health. JUDI put patient in the queue for [facility] home health.          Nisha Hull RN  Case Management [phone]

## 2021-09-13 NOTE — HOME CARE
Received home health referral for [facility] for (SN, PT, OT). Discharge orders noted for today. Spoke with patient's spouse via phone;  patient identifiers verified. Explained home care services and routines. Demographics verified including insurance, phone and address confirmed. Patient has the following DME: Nebulizer and O2 concentrator. Caregivers available family. Orders noted and arranged to be processed to central intake.   -  Nighat Egan LPN  [facility]

## 2021-09-13 NOTE — PROGRESS NOTES
Problem: Risk for Spread of Infection  Goal: Prevent transmission of infectious organism to others  Description: Prevent the transmission of infectious organisms to other patients, staff members, and visitors. Outcome: Resolved/Not Met     Problem: Patient Education:  Go to Education Activity  Goal: Patient/Family Education  Outcome: Resolved/Not Met     Problem: Patient Education: Go to Patient Education Activity  Goal: Patient/Family Education  Outcome: Resolved/Not Met     Problem: Patient Education: Go to Patient Education Activity  Goal: Patient/Family Education  Outcome: Resolved/Not Met     Problem: Patient Education: Go to Patient Education Activity  Goal: Patient/Family Education  Outcome: Resolved/Not Met     Problem: Falls - Risk of  Goal: *Absence of Falls  Description: Document Pawan Fall Risk and appropriate interventions in the flowsheet. Outcome: Resolved/Not Met  Note: Fall Risk Interventions:  Mobility Interventions: Patient to call before getting OOB         Medication Interventions: Patient to call before getting OOB    Elimination Interventions: Call light in reach    History of Falls Interventions: Bed/chair exit alarm         Problem: Patient Education: Go to Patient Education Activity  Goal: Patient/Family Education  Outcome: Resolved/Not Met     Problem: Pressure Injury - Risk of  Goal: *Prevention of pressure injury  Description: Document Adrián Scale and appropriate interventions in the flowsheet.   Outcome: Resolved/Not Met  Note: Pressure Injury Interventions:       Moisture Interventions: Absorbent underpads    Activity Interventions: Pressure redistribution bed/mattress(bed type)    Mobility Interventions: HOB 30 degrees or less    Nutrition Interventions: Document food/fluid/supplement intake, Offer support with meals,snacks and hydration                     Problem: Patient Education: Go to Patient Education Activity  Goal: Patient/Family Education  Outcome: Resolved/Not Met Problem: Gas Exchange - Impaired  Goal: *Absence of hypoxia  Outcome: Resolved/Not Met     Problem: Patient Education: Go to Patient Education Activity  Goal: Patient/Family Education  Outcome: Resolved/Not Met     Problem: Pain  Goal: *Control of Pain  Outcome: Resolved/Not Met  Goal: *PALLIATIVE CARE:  Alleviation of Pain  Outcome: Resolved/Not Met     Problem: Patient Education: Go to Patient Education Activity  Goal: Patient/Family Education  Outcome: Resolved/Not Met

## 2021-09-13 NOTE — PROGRESS NOTES
CM called and spoke with patient's  Eloy Zambrano [phone], he said he will  patient and transport her home today at time of discharge. Patient's  asked that he be called when patient is ready for discharge. CM spoke to [facility] and updated her to call patient's  when patient is ready for discharge to go home.         Ami Perry, RN  Case Management [phone]

## 2021-09-13 NOTE — PROGRESS NOTES
Discharge order noted for today. Pt has been accepted to [facility] agency. Spoke with patient's  and he is agreeable to the transition plan today. Transport has been arranged through patient's . Patient's home health  orders have been forwarded to  [address] [place] [address] health  agency via [facility] [address]. Updated bedside RN, Coleen,  to the transition plan.   Discharge information has been documented on the AVS.         Paula Maza RN  Case Management [phone]

## 2021-09-13 NOTE — DISCHARGE INSTRUCTIONS
Patient Education        COPD Exacerbation Plan: Care Instructions  Your Care Instructions     If you have chronic obstructive pulmonary disease (COPD), your usual shortness of breath could suddenly get worse. You may start coughing more and have more mucus. This flare-up is called a COPD exacerbation (say \"[id number]-gaurav\"). A lung infection or air pollution could set off an exacerbation. Sometimes it can happen after a quick change in temperature or being around chemicals. Work with your doctor to make a plan for dealing with an exacerbation. You can better manage it if you plan ahead. Follow-up care is a key part of your treatment and safety. Be sure to make and go to all appointments, and call your doctor if you are having problems. It's also a good idea to know your test results and keep a list of the medicines you take. How can you care for yourself at home? During an exacerbation  · Do not panic if you start to have one. Quick treatment at home may help you prevent serious breathing problems. If you have a COPD exacerbation plan that you developed with your doctor, follow it. · Take your medicines exactly as your doctor tells you.  ? Use your inhaler as directed by your doctor. If your symptoms do not get better after you use your medicine, have someone take you to the emergency room. Call an ambulance if necessary. ? With inhaled medicines, a spacer or a nebulizer may help you get more medicine to your lungs. Ask your doctor or pharmacist how to use them properly. Practice using the spacer in front of a mirror before you have an exacerbation. This may help you get the medicine into your lungs quickly. ? If your doctor has given you steroid pills, take them as directed. ? Your doctor may have given you a prescription for antibiotics, which you can fill if you need it. ? Talk to your doctor if you have any problems with your medicine.  And call your doctor if you have to use your antibiotic or steroid pills. Preventing an exacerbation  · Do not smoke. This is the most important step you can take to prevent more damage to your lungs and prevent problems. If you already smoke, it is never too late to stop. If you need help quitting, talk to your doctor about stop-smoking programs and medicines. These can increase your chances of quitting for good. · Take your daily medicines as prescribed. · Avoid colds and flu. ? Get a pneumococcal vaccine. ? Get a flu vaccine each year, as soon as it is available. Ask those you live or work with to do the same, so they will not get the flu and infect you. ? Try to stay away from people with colds or the flu. ? Wash your hands often. · Avoid secondhand smoke; air pollution; cold, dry air; hot, humid air; and high altitudes. Stay at home with your windows closed when air pollution is bad. · Learn breathing techniques for COPD, such as breathing through pursed lips. These techniques can help you breathe easier during an exacerbation. When should you call for help? Call 911 anytime you think you may need emergency care. For example, call if:    · You have severe trouble breathing.     · You have severe chest pain. Call your doctor now or seek immediate medical care if:    · You have new or worse shortness of breath.     · You develop new chest pain.     · You are coughing more deeply or more often, especially if you notice more mucus or a change in the color of your mucus.     · You cough up blood.     · You have new or increased swelling in your legs or belly.     · You have a fever. Watch closely for changes in your health, and be sure to contact your doctor if:    · You need to use your antibiotic or steroid pills.     · Your symptoms are getting worse. Where can you learn more? Go to http://www.gray.com/  Enter U536 in the search box to learn more about \"COPD Exacerbation Plan: Care Instructions. \"  Current as of: October 26, 2020               Content Version: 12.8  © 2006-2021 [employer]. Care instructions adapted under license by [employer] (which disclaims liability or warranty for this information). If you have questions about a medical condition or this instruction, always ask your healthcare professional. [address] any warranty or liability for your use of this information. Patient Education        Breathing Techniques for COPD: Care Instructions  Your Care Instructions     Breathing is hard when you have chronic obstructive pulmonary disease (COPD). You may take quick, short breaths. Breathing this way makes it harder to get air into your lungs. Learning new ways to control your breathing may help. You may feel better and be able to do more. You can try three basic ways to control your breathing. They are pursed-lip breathing, diaphragmatic breathing, and breathing while bending. Use these methods when you are more short of breath than normal. Practice them often so you can do them well. Follow-up care is a key part of your treatment and safety. Be sure to make and go to all appointments, and call your doctor if you are having problems. It's also a good idea to know your test results and keep a list of the medicines you take. How can you care for yourself at home? · Pursed-lip breathing helps you breathe more air out so that your next breath can be deeper. For this type of breathing, you breathe in through your nose and out through your mouth while almost closing your lips. Breathe in for about 2 seconds, and breathe out for 4 to 6 seconds. Pursed-lip breathing decreases shortness of breath and improves your ability to exercise. · Diaphragmatic breathing helps your lungs expand so that they take in more air. ? Lie on your back, or prop yourself up on several pillows.   ? Put one hand on your belly and the other on your chest. When you breathe in, push your belly out as far as possible. You should feel the hand on your belly move out, while the hand on your chest does not move. ? When you breathe out, you should feel the hand on your belly move in. When you can do this type of breathing well while lying down, learn to do it while sitting or standing. Many people with COPD find this breathing method helpful. ? Practice diaphragmatic breathing for 20 minutes, 2 or 3 times a day. · Breathing while bending forward at the waist may make breathing easier. It can reduce shortness of breath while you exercise or rest. It helps the diaphragm move more easily. The diaphragm is a large muscle that separates your lungs from your belly. It helps draw air into your lungs as you breathe. When should you call for help? Call your doctor now or seek immediate medical care if:    · Your breathing methods do not help.     · Your shortness of breath gets worse.     · You cough up blood.     · You have swelling in your belly and legs.     · You have severe chest pain. Watch closely for changes in your health, and be sure to contact your doctor if you have any problems. Where can you learn more? Go to http://www.gray.com/  Enter [id number] in the search box to learn more about \"Breathing Techniques for COPD: Care Instructions. \"  Current as of: October 26, 2020               Content Version: 12.8  © 2006-2021 [employer]. Care instructions adapted under license by [employer] (which disclaims liability or warranty for this information). If you have questions about a medical condition or this instruction, always ask your healthcare professional. [place] [address] any warranty or liability for your use of this information. Patient Education        Learning About COPD and How to Prevent Lung Infections  How do lung infections affect COPD?      Lung infections like pneumonia and acute bronchitis are common causes of COPD flare-ups. And people who have COPD are more likely to get these lung infections, especially if they smoke. When you have COPD, it is important to know the symptoms of pneumonia and acute bronchitis and call your doctor if you have them. Symptoms include:  · A cough that brings up more mucus than usual.  · Fever. · Shortness of breath. What can you do to prevent these infections? Stay healthy   · Get the flu vaccine every year. · Get a pneumococcal vaccine shot. If you have had one before, ask your doctor whether you need another dose. Two different types of pneumococcal vaccines are recommended for people ages 72 and older. · If you must be around people with colds or the flu, wash your hands often. · Do not smoke. This is the most important step you can take to prevent more damage to your lungs. If you need help quitting, talk to your doctor about stop-smoking programs and medicines. These can increase your chances of quitting for good. · Avoid secondhand smoke, air pollution, and high altitudes. Also avoid cold, dry air and hot, humid air. Stay at home with your windows closed when air pollution is bad. Exercise and eat well   · If your doctor recommends it, get more exercise. Walking is a good choice. Bit by bit, increase the amount you walk every day. Try for at least 30 minutes on most days of the week. · Eat regular, well-balanced meals. Eating right keeps your energy levels up and helps your body fight infection. · Get plenty of rest and sleep. Follow-up care is a key part of your treatment and safety. Be sure to make and go to all appointments, and call your doctor if you are having problems. It's also a good idea to know your test results and keep a list of the medicines you take. Where can you learn more? Go to http://www.gray.com/  Enter V064 in the search box to learn more about \"Learning About COPD and How to Prevent Lung Infections. \"  Current as of: October 26, 2020               Content Version: 12.8  © 1818-1708 [employer]. Care instructions adapted under license by [employer] (which disclaims liability or warranty for this information). If you have questions about a medical condition or this instruction, always ask your healthcare professional. [address] any warranty or liability for your use of this information. Patient Education        Learning About COPD Triggers  What are triggers? When you have COPD (chronic obstructive pulmonary disease), certain things can make your symptoms worse. These are called triggers. They include:  · Cigarette smoke or air pollution. · Illnesses like colds, flu, or pneumonia. · Cleaning supplies or other chemicals. · Gases, particles, or fumes from wood or kerosene home heaters. Not all people have the same triggers. What may cause symptoms in one person may not be a problem for another person. How do triggers affect COPD? Triggers can make it harder for your lungs to work as they should and can lead to sudden difficulty breathing and other symptoms. When you are around a trigger, a COPD flare-up is more likely. If your symptoms are severe, you may need emergency treatment or have to go to the hospital for treatment. If you know what your triggers are and can avoid them, you can reduce how often you have flare-ups and how much COPD affects your life. It's also important to be active and to take your daily medicines as prescribed. This helps prevent flare-ups too. What can you do to avoid triggers? The first thing is to know your triggers. When you are having symptoms, note the things around you that might be causing them. Then look for patterns in what may be triggering your symptoms. When you have your list of possible triggers, work with your doctor to find ways to avoid them. Here are some ways to avoid a few common triggers.   · Do not smoke or allow others to smoke around you. If you need help quitting, talk to your doctor about stop-smoking programs and medicines. These can increase your chances of quitting for good. · If there is a lot of pollution, pollen, or dust outside, stay at home and keep your windows closed. Use an air conditioner or air filter in your home. Check your local weather report or newspaper for air quality and pollen reports. · Get a flu vaccine every year. Talk to your doctor about getting a pneumococcal shot. Wash your hands often to prevent infections. How can you manage a flare-up? Do not panic if you start to have a COPD flare-up. If you have a COPD action plan, follow the plan. In general:  · Use your quick-relief inhaler as directed by your doctor. If your symptoms do not get better after you use your medicine, have someone take you to the emergency room. Call an ambulance if needed. · Use a spacer with your metered-dose inhaler (MDI). If you have a nebulizer for inhaled medicine, use it. A spacer or nebulizer may help get more medicine to your lungs. · If your doctor has given you other inhaled medicines or steroid pills, take them as directed. · If your doctor has given you a prescription for an antibiotic, fill it if you need to. · Call your doctor if you have to use your antibiotic or steroid pills. Where can you learn more? Go to http://www.gray.com/  Enter W244 in the search box to learn more about \"Learning About COPD Triggers. \"  Current as of: October 26, 2020               Content Version: 12.8  © 2006-2021 [employer]. Care instructions adapted under license by [employer] (which disclaims liability or warranty for this information). If you have questions about a medical condition or this instruction, always ask your healthcare professional. [place] [address] any warranty or liability for your use of this information.     DISCHARGE SUMMARY from Nurse    PATIENT INSTRUCTIONS:    After general anesthesia or intravenous sedation, for 24 hours or while taking prescription Narcotics:  · Limit your activities  · Do not drive and operate hazardous machinery  · Do not make important personal or business decisions  · Do  not drink alcoholic beverages  · If you have not urinated within 8 hours after discharge, please contact your surgeon on call. Report the following to your surgeon:  · Excessive pain, swelling, redness or odor of or around the surgical area  · Temperature over 100.5  · Nausea and vomiting lasting longer than 4 hours or if unable to take medications  · Any signs of decreased circulation or nerve impairment to extremity: change in color, persistent  numbness, tingling, coldness or increase pain  · Any questions    What to do at Home:  Recommended activity: Activity as tolerated,     If you experience any of the following symptoms: severe shortness of breath, please follow up with your primary care provider or dial 911. *  Please give a list of your current medications to your Primary Care Provider. *  Please update this list whenever your medications are discontinued, doses are      changed, or new medications (including over-the-counter products) are added. *  Please carry medication information at all times in case of emergency situations. These are general instructions for a healthy lifestyle:    No smoking/ No tobacco products/ Avoid exposure to second hand smoke  Surgeon General's Warning:  Quitting smoking now greatly reduces serious risk to your health.     Obesity, smoking, and sedentary lifestyle greatly increases your risk for illness    A healthy diet, regular physical exercise & weight monitoring are important for maintaining a healthy lifestyle    You may be retaining fluid if you have a history of heart failure or if you experience any of the following symptoms:  Weight gain of 3 pounds or more overnight or 5 pounds in a week, increased swelling in our hands or feet or shortness of breath while lying flat in bed. Please call your doctor as soon as you notice any of these symptoms; do not wait until your next office visit. The discharge information has been reviewed with the patient. The patient verbalized understanding. Discharge medications reviewed with the patient and appropriate educational materials and side effects teaching were provided.   ___________________________________________________________________________________________________________________________________

## 2021-09-13 NOTE — PROGRESS NOTES
CM went to try to complete an initial assessment with patient, she was in the restroom.          Vicky Shahid RN  Case Management [phone]

## 2021-09-13 NOTE — PROGRESS NOTES
Reason for Admission:  Hypokalemia [E87.6]  COPD with acute exacerbation ([facility] [address] [J44.1]  Acute respiratory failure with hypoxia ([facility] [address] [J96.01]                 RUR Score:    21%            Plan for utilizing home health:    Yes, FOC given for [address] [facility], if they cannot accept, then [facility]. Likelihood of Readmission:   Moderate                         Do you (patient/family) have any concerns for transition/discharge?  no, not at this time. Transition of Care Plan:       Initial assessment completed with spouse/SO, Priiclla Ruff. Cognitive status of patient: oriented to time, place, person and situation. Provider at the bedside. Face sheet information confirmed:  yes. The patient's  Pricilla Ruff [phone] agrees to participate in her discharge plan and to receive any needed information. This patient lives in a apartment with her , with an elevator to enter. Patient is able to navigate steps as needed. Prior to hospitalization, patient was considered to be independent with ADLs/IADLS : yes . Patient has a current ACP document on file: no.      Healthcare Decision Maker:     Click here to complete [address] including selection of the Healthcare Decision Maker Relationship (ie \"Primary\")    The patient's  will be available to transport patient home upon discharge. The patient already has none reported, and Oxygen and Nebulizer machine (Company unknown) medical equipment available in the home. Patient is not currently active with home health. Patient has not stayed in a skilled nursing facility or rehab. This patient is on dialysis :no.    List of available Home Health agencies were provided and reviewed with the patient prior to discharge. Freedom of choice signed: yes, for [address] [facility] or [facility].        Currently, the discharge plan is Home with Home Health. The patient states that she can obtain her medications from the pharmacy, and take her medications as directed. Patient's current insurance is [employer]  and [employer] Care Management Interventions  PCP Verified by CM:  Yes  Mode of Transport at Discharge: Self (Patient's  will be transporting patient home at time of discharge. )  Transition of Care Consult (CM Consult): [address]: Yes  Discharge Durable Medical Equipment: No  Physical Therapy Consult: Yes  Occupational Therapy Consult: Yes  Speech Therapy Consult: Yes  Support Systems: Spouse/Significant Other  Confirm Follow Up Transport: Family  The Plan for Transition of Care is Related to the Following Treatment Goals : Home with [address] [place], [place] [address]  The Patient and/or Patient Representative was Provided with a Choice of Provider and Agrees with the Discharge Plan?: Yes  Name of the Patient Representative Who was Provided with a Choice of Provider and Agrees with the Discharge Plan: Evin Guthrie (Patient's )  Freedom of Choice List was Provided with Basic Dialogue that Supports the Patient's Individualized Plan of Care/Goals, Treatment Preferences and Shares the Quality Data Associated with the Providers?: Yes  Discharge Location  Discharge Placement: Home with home health        Paula Maza RN  Case Management [phone]

## 2021-09-13 NOTE — DISCHARGE SUMMARY
Discharge Summary      Patient: Duncan Dandy MRN: [id number]  CSN: [id number]    YOB: 1940  Age: [de-identified] y.o. Sex: female    DOA: 9/9/2021 LOS:  LOS: 4 days   Discharge Date: 9/13/21     Admission Diagnoses: Hypokalemia [E87.6]  COPD with acute exacerbation ([address] [J44.1]  Acute respiratory failure with hypoxia ([address] [J96.01]    Discharge Diagnoses:    1. Acute on chronic hypoxic respiratory failure  2. Exacerbation of chronic interstitial lung disease  3. Suspected multifocal pneumonia  4. Covid19 negative   5. Persistent severe hypokalemia  6. Pulmonary fibrosis   7. Asthma  8. Pulmonary hypertension  9. Immunosuppressed status  10. SLE on Plaquenil  11. RA on chronic prednisone  12. HTN      Discharge Condition: Stable    PHYSICAL EXAM  Visit Vitals  BP (!) 166/88 (BP 1 Location: Left upper arm, BP Patient Position: Lying)   Pulse 70   Temp 97.8 °F (36.6 °C)   Resp 18   Ht 5' (1.524 m)   Wt 52.2 kg (115 lb)   SpO2 100%   BMI 22.46 kg/m²       General:         Alert, cooperative, no acute distress    HEENT:           NC, Atraumatic. Anicteric sclerae. Lungs:            Diminished lung sounds bilaterally, clear with no wheezing  Heart:              Regular  rhythm,  No murmur, No Rubs, No Gallops  Abdomen:      Soft, Non distended, Non tender.  +Bowel sounds, no HSM  Extremities:   No c/c/e  Psych:              Not anxious or agitated. Neurologic:     Alert and oriented X 3. No acute neurological deficits. Hospital Course:   Duncan Dandy is a [de-identified] y.o. female with PMHx of chronic hypoxic respiratory failure on home O2 at 3 L/min, interstitial lung disease, chronic bronchiectasis, asthma, pulmonary hypertension, SLE on Plaquenil, RA on chronic prednisone, and HTN who presented to the ED with complaints of increasing shortness of breath. In the ED, she had low-grade fever to 100.1 degrees, had some elevated blood pressure and was able to maintain SPO2 in the upper 90s on 4 L/min.   Labs were notable for WBCs WNL, Hgb 10.8, potassium 2.2, CO2 41, and troponin less than 0.02x2. CXR showed bilateral upper lobe fibrotic changes with superimposed multifocal infiltrates in the upper lobes and likely the right lower lobe. Ms. Juliana Halsted was then started on IV steroids and nebulizers and admitted for acute on chronic hypoxic respiratory failure secondary to interstitial lung disease exacerbation, with concern for multifocal pneumonia. Additional labs were ordered which included a bio fire respiratory panel with Covid19, Legionella and strep pneumo, and influenza a and B which were all negative. Pulmonology was consulted and made further recommendations. An echo was done and showed an EF of 55-60% with moderate pulmonary hypertension and the other findings detailed below. Over the next few days, Ms. Juliana Halsted was continued on O2, duo nebs, IV steroids, and IV ABX. She made slow but gradual progress in her respiratory status and her breathing eventually returned to baseline. She was transition from IV to p.o. steroids and her antibiotics were transitioned to p.o. Levaquin which she tolerated well. She was weaned to her home O2 level of 2 to 3 L and was able to maintain SPO2 in the mid 90s. PT and OT evaluated her and recommended home health which was ordered. On 9/13/2021, she had remained hemodynamically stable. Return precautions were discussed and she was given instructions and prescriptions for the medications as below. She was also instructed to follow up with her PCP and pulmonologist and was then discharged home. Consults:   Chun Mckeon MD       Significant Diagnostic Studies:   CXR 9/9/2021:  Stable enlarged cardiac silhouette. Bilateral upper lobe fibrotic changes with superimposed multifocal infiltrates in the upper lobes and likely right lower lobe.      Respiratory virus panel with Covid19, PCR 9/9/2021:  Adenovirus NOTD   Not detected    Coronavirus 229E NOTD   Not detected    Coronavirus HKU1 NOTD   Not detected    Coronavirus CVNL63 NOTD   Not detected    Coronavirus OC43 NOTD   Not detected    SARS-CoV-2, PCR NOTD   Not detected    Metapneumovirus NOTD   Not detected    Rhinovirus and Enterovirus NOTD   Not detected    Influenza A NOTD   Not detected    Influenza A, subtype H1 NOTD   Not detected    Influenza A, subtype H3 NOTD   Not detected    INFLUENZA A H1N1 PCR NOTD   Not detected    Influenza B NOTD   Not detected    Parainfluenza 1 NOTD   Not detected    Parainfluenza 2 NOTD   Not detected    Parainfluenza 3 NOTD   Not detected    Parainfluenza virus 4 NOTD   Not detected    RSV by PCR NOTD   Not detected    B. parapertussis, PCR NOTD   Not detected    Bordetella pertussis - PCR NOTD   Not detected    Chlamydophila pneumoniae DNA, QL, PCR NOTD   Not detected    Mycoplasma pneumoniae DNA, QL, PCR NOTD   Not detected        Echo 9/11/2021:  · Saline contrast was given to evaluate for intracardiac shunt. Negative at rest and after Valsalva. · LV: Estimated LVEF is 55 - 60%. Visually measured ejection fraction. Normal cavity size and systolic function (ejection fraction normal). Mild concentric hypertrophy. Wall motion: normal. Age-appropriate left ventricular diastolic function. Septal flattening consistent with RV volume and pressure overload. · PA: Moderate pulmonary hypertension. Pulmonary arterial systolic pressure is 53 mmHg. · PV: Severe pulmonic valve regurgitation is present. · TV: Mild tricuspid valve regurgitation is present. · MV: Mild to moderate mitral valve regurgitation is present. Procedures Performed: None      Discharge Medications:  Discharge Medication List as of 9/13/2021  3:16 PM      START taking these medications    Details   levoFLOXacin (LEVAQUIN) 750 mg tablet Take 1 Tablet by mouth every fourty-eight (48) hours for 4 doses. , Normal, Disp-4 Tablet, R-0         CONTINUE these medications which have CHANGED    Details   predniSONE (DELTASONE) 10 mg tablet Take 10 mg by mouth daily. Take 20 mg daily for 3 days. Then take 10 mg daily thereafter., Normal, Disp-30 Tablet, R-3         CONTINUE these medications which have NOT CHANGED    Details   zolpidem (AMBIEN) 5 mg tablet Take 5 mg by mouth., Historical Med      albuterol-ipratropium (DUO-NEB) 2.5 mg-0.5 mg/3 ml nebu 3 mL by Nebulization route every four (4) hours as needed for Wheezing. File under Medicare Part B, ICD # J45.909, J84.10, Normal, Disp-30 Nebule, R-0      albuterol (PROAIR HFA) 90 mcg/actuation inhaler Take 2 Puffs by inhalation every four (4) hours as needed for Wheezing or Shortness of Breath., Normal, Disp-1 Inhaler, R-5      Oxygen 3 Each by Nasal route. 4 l/min via NC per patient., Historical Med      amLODIPine (NORVASC) 5 mg tablet Take 1 Tab by mouth daily. Indications: HYPERTENSION, Print, Disp-30 Tab, R-1      furosemide (LASIX) 40 mg tablet Take 1 Tab by mouth daily. Indications: EDEMA, Print, Disp-30 Tab, R-1      omeprazole (PRILOSEC) 20 mg capsule Take 20 mg by mouth daily. , Historical Med      hydroxychloroquine (PLAQUENIL) 200 mg tablet Take 200 mg by mouth daily. , Historical Med      potassium chloride (KLOR-CON M20) 20 mEq tablet Take 20 mEq by mouth two (2) times a day., Historical Med              Activity: activity as tolerated    Diet: Regular Diet    Wound Care: None needed      Follow-up Information     Follow up With Specialties Details Why Contact Info    Venita Ko MD Internal Medicine Schedule an appointment as soon as possible for a visit in 1 week F/U  [address] [address] [phone]      [address] [place] [address]  Your preferred agency, Chosen to continue managing healthcare needs [address] [place] [address] [place]  [phone]           Minutes spent on discharge: >30 minutes spent coordinating this discharge (review instructions/follow-up, prescriptions, preparing report for sign off)          MELLY Hebert,    September 13, 2021       [facility] medical dictation software was used for portions of this report. Unintended errors may occur.

## 2021-09-13 NOTE — PROGRESS NOTES
[facility]. Pulmonary, Critical Care, and Sleep Medicine    Progress Note    Name: Fidencio Das MRN: [id number]   : 1940 Hospital: [facility]   Date: 2021               IMPRESSION:   · Acute on chronic hypoxic respiratory failure: Secondary to likely exacerbation of ILD and possible multifocal pneumonia. Patient is on home supplemental oxygen therapy 3-4 L  · Suspected multifocal pneumonia:  Biofire negative for COVID -- CXR reporting multiple infiltrates vs increased markings from possible pulm edema  · ILD: Previously noted pulmonary fibrosis related to lupus.  Used to follow with Dr. Bharati Euceda, now following with Dr. Demi Dover with [facility] pulmonary  · Acute encephalopathy: Multifactorial, toxic/metabolic in nature, likely contributed by delirium  · Suspect decompensated CHFpEF  · Pulmonary hypertension  · Lytes: Hypokalemia  · Deconditioning  · SLE on Plaquenil  · History of rheumatoid arthritis on chronic prednisone previously  · Deconditioning/debility     Patient Active Problem List   Diagnosis Code    Cirrhosis of liver ([facility] K74.60    Systemic lupus erythematosus ([address] M32.9    Rheumatoid arthritis ([address] M06.9    Dyslipidemia E78.5    COPD exacerbation ([address] J44.1    Pulmonary HTN ([address] I27.20    Acute bronchitis J20.9    Abnormal finding on pulmonary function testing R94.2    Allergic rhinitis J30.9    Bronchiectasis (HCC) J47.9    Chronic cough R05    Chronic respiratory failure with hypoxia (HCC) J96.11    Diffusion capacity of lung (dl), decreased R94.2    Dyschromia L81.9    Essential hypertension, benign I10    Exertional dyspnea R06.00    GERD (gastroesophageal reflux disease) K21.9    Left shoulder tendinitis M77.8    Localized osteoarthritis of right shoulder M19.011    Nonspecific elevation of levels of transaminase or lactic acid dehydrogenase (LDH) R74.01, R74.02    Polypharmacy Z79.899    Primary localized osteoarthrosis, lower leg M17.10    Hypokalemia E87.6    COPD with acute exacerbation ([facility]) J44.1    Acute on chronic respiratory failure with hypoxia ([facility]) J96.21    Acute respiratory failure with hypoxia ([facility]) J96.01      RECOMMENDATIONS:     Continue antipseudomonal antibiotics to complete course-ordered Levaquin and discontinued cefepime and Zithromax  Continue prednisone 20 mg daily-can be tapered further as outpatient to maintenance dose of 10 mg  Supplemental oxygen to maintain SpO2 >88%  Please assess for home oxygen need prior to discharge (patient on home oxygen)-uses 2-3 l at baseline  Continue scheduled bronchodilators: duonebs q4h, pulmicort nebs 1mg BID, and albuterol PRN  May resume home bronchodilators on discharge  Aggressive pulmonary toileting/bronchial hygiene  Frequent incentive spirometry  Fall precautions, frequent reorientation, avoid sedating or other medications that may contribute to delirium  Aspiration precautions including elevating HOB >30deg  PT/OT, OOB, ambulate with assistance as tolerated  DVT ppx per primary service  Will follow with primary pulmonologist as outpatient     Subjective:   09/13/21     Patient seen   Sitting up in chair states that she feels good  No acute events overnight. respiratory status near baseline per patient. NC at 2 L/min        HPI:  Patient is a [de-identified] y.o. female with a past medical history of pulmonary fibrosis, chronic hypoxic respiratory failure, lupus, rheumatoid arthritis, pulmonary hypertension, presented to [facility] worsening shortness of breath. Patient reports shortness of breath that have been worsening over the last few weeks. Patient reports that this morning, patient was severely short of breath and also felt extremely weak. Patient also noted that she developed a persistent productive cough over the last few days, productive of green sputum. Patient denies any fevers or chills or night sweats or pleuritic chest pain. Patient reports that she takes Plaquenil for rheumatoid arthritis. Patient reports that she follows with the [facility] THE [facility] pulmonary group, Dr. Regan Covarrubias, previously followed with Dr. Mary Gates before he retired. Patient received supplemental oxygen therapy while in the ER, also noted to be mildly febrile, also found to have a potassium of 2.2 along with right bundle branch block and left fascicular block. Patient had a chest x-ray which showed multifocal opacities, patient was started on IV steroids, nebulizers, antibiotics. Past Medical History:   Diagnosis Date    Asthma     Chronic lung disease     Hypertension     Lupus ([address]     Pulmonary arterial hypertension (HCC)     Pulmonary hypertension (HCC)     Rheumatoid arthritis(714.0)       No past surgical history on file. Allergies   Allergen Reactions    Latex Itching    Pcn [Penicillins] Swelling and Angioedema     Lip and tongue swelling, per patient 9/9/21    Prednisone Swelling     Per pulmonology note, \" Nursing verfied with pt that allergy to prednisone is actually a side effect (round face and increased appetite in the setting of prolonged therapy), pt denies any swelling. \"      Asmanex Hfa [Mometasone] Itching    Cleocin [Clindamycin Hcl] Itching and Swelling     Lips and tongue    Robitussin [Guaifenesin] Rash      Current Facility-Administered Medications   Medication Dose Route Frequency    predniSONE (DELTASONE) tablet 20 mg  20 mg Oral DAILY WITH BREAKFAST    potassium bicarb-citric acid (EFFER-K) tablet 40 mEq  40 mEq Oral DAILY    potassium bicarb-citric acid (EFFER-K) tablet 20 mEq  20 mEq Oral QHS    enoxaparin (LOVENOX) injection 40 mg  40 mg SubCUTAneous DAILY    budesonide (PULMICORT) 1,000 mcg/2 mL nebulizer susp  1,000 mcg Nebulization BID RT    dronabinoL (MARINOL) capsule 5 mg  5 mg Oral BID    guaiFENesin ER (MUCINEX) tablet 600 mg  600 mg Oral Q12H    sodium chloride (NS) flush 5-40 mL  5-40 mL IntraVENous Q8H    albuterol-ipratropium (DUO-NEB) 2.5 MG-0.5 MG/3 ML  3 mL Nebulization Q4H RT    insulin lispro (HUMALOG) injection   SubCUTAneous AC&HS    amLODIPine (NORVASC) tablet 5 mg  5 mg Oral DAILY    furosemide (LASIX) tablet 40 mg  40 mg Oral DAILY    hydrOXYchloroQUINE (PLAQUENIL) tablet 200 mg  200 mg Oral DAILY    pantoprazole (PROTONIX) tablet 20 mg  20 mg Oral ACB    zolpidem (AMBIEN) tablet 5 mg  5 mg Oral QHS    docusate sodium (COLACE) capsule 100 mg  100 mg Oral DAILY    azithromycin (ZITHROMAX) 500 mg in 0.9% sodium chloride 250 mL (VIAL-MATE)  500 mg IntraVENous Q24H    cefepime (MAXIPIME) 2 g in sterile water (preservative free) 10 mL IV syringe  2 g IntraVENous Q24H       Review of Systems:  Unable to obtain due to patient factors--delirium      Objective:   Vital Signs:    Visit Vitals  BP (!) 171/84 (BP 1 Location: Left upper arm, BP Patient Position: Lying)   Pulse 70   Temp 98.3 °F (36.8 °C)   Resp 18   Ht 5' (1.524 m)   Wt 52.2 kg (115 lb)   SpO2 96%   BMI 22.46 kg/m²       O2 Device: (P) Nasal cannula   O2 Flow Rate (L/min): (P) 2 l/min   Temp (24hrs), Av.7 °F (36.5 °C), Min:97 °F (36.1 °C), Max:98.3 °F (36.8 °C)       Intake/Output:   Last shift:      701 - 1900  In: 120 [P.O.:120]  Out: -   Last 3 shifts: 1901 -  0700  In: 740 [P.O.:480; I.V.:260]  Out: 800 [Urine:800]    Intake/Output Summary (Last 24 hours) at 2021 1121  Last data filed at 2021 0914  Gross per 24 hour   Intake 620 ml   Output    Net 620 ml      Physical Exam:   General:  Alert, cooperative, no distress, appears stated age, sitting upright in bed wearing nasal cannula, appears frail, confused   Head:  Normocephalic, without obvious abnormality, atraumatic. Eyes:  Conjunctivae/corneas clear. ANicteric, PERRLA, EOMI   Nose: Nares normal. Mucosa normal. No drainage or sinus tenderness. Throat: Lips, mucosa dry.  NO thrush; poor dentition, no oral lesions   Neck: Supple, symmetrical, trachea midline, no adenopathy, thyroid: no enlargment/tenderness/nodules, no carotid bruit and no JVD. No crepitus   Back:   Symmetric, no curvature, no spine tenderness or flank pain   Lungs:    Poor air entry bilaterally with scattered rhonchi throughout all lung fields, no wheezes   Chest wall:  No tenderness or deformity. NO CREPITUS   Heart:  Regular rate and rhythm, S1, S2 normal, no murmur, click, rub or gallop. Abdomen:   Soft, non-tender. Bowel sounds normal. No masses,  No organomegaly. No paradoxical motion   Extremities: normal, atraumatic, no cyanosis or edema. No clubbing   Pulses: 1-2+ and symmetric all extremities.    Skin: Skin has multiple dry areas along bilateral lower extremities, color, texture, turgor normal. No other rashes or lesions   Lymph nodes: Cervical, supraclavicular, and axillary nodes normal.   Neurologic: Grossly nonfocal, extremities have normal strength, and sensation throughout          Data review:   Labs:  Recent Results (from the past 24 hour(s))   GLUCOSE, POC    Collection Time: 09/12/21 11:59 AM   Result Value Ref Range    Glucose (POC) 128 (H) 70 - 110 mg/dL   GLUCOSE, POC    Collection Time: 09/12/21  3:40 PM   Result Value Ref Range    Glucose (POC) 118 (H) 70 - 110 mg/dL   GLUCOSE, POC    Collection Time: 09/12/21  9:24 PM   Result Value Ref Range    Glucose (POC) 96 70 - 964 mg/dL   METABOLIC PANEL, BASIC    Collection Time: 09/13/21  3:57 AM   Result Value Ref Range    Sodium 141 136 - 145 mmol/L    Potassium 3.7 3.5 - 5.5 mmol/L    Chloride 108 100 - 111 mmol/L    CO2 32 21 - 32 mmol/L    Anion gap 1 (L) 3.0 - 18 mmol/L    Glucose 87 74 - 99 mg/dL    BUN 18 7.0 - 18 MG/DL    Creatinine 0.88 0.6 - 1.3 MG/DL    BUN/Creatinine ratio 20 12 - 20      GFR est AA >60 >60 ml/min/1.73m2    GFR est non-AA >60 >60 ml/min/1.73m2    Calcium 8.4 (L) 8.5 - 10.1 MG/DL   GLUCOSE, POC    Collection Time: 09/13/21  9:10 AM   Result Value Ref Range    Glucose (POC) 89 70 - 110 mg/dL     ABG:  No results found for: PH, PHI, PCO2, PCO2I, PO2, PO2I, HCO3, HCO3I, FIO2, FIO2I        PFT Results  (Last 48 hours)    None        Echo Results  (Last 48 hours)    None        Imaging:  I have personally reviewed the patients radiographs and have reviewed the reports:  CXR Results  (Last 48 hours)    None        CT Results  (Last 48 hours)    None            High complexity decision making was performed during the evaluation of this patient at high risk for decompensation with multiple organ involvement     Above mentioned total time spent on reviewing the case/medical record/data/notes/EMR/patient examination/documentation/coordinating care with nurse/consultants, exclusive of procedures with complex decision making performed and > 50% time spent in face to face evaluation.          Azul Louise MD    Pulmonary, [address] [facility]

## 2021-09-15 ENCOUNTER — HOME CARE VISIT (OUTPATIENT)
Dept: SCHEDULING | Facility: HOME HEALTH | Age: 81
End: 2021-09-15
Payer: MEDICARE

## 2021-09-15 ENCOUNTER — HOME CARE VISIT (OUTPATIENT)
Dept: HOME HEALTH SERVICES | Facility: HOME HEALTH | Age: 81
End: 2021-09-15
Payer: MEDICARE

## 2021-09-15 VITALS
TEMPERATURE: 98.2 F | SYSTOLIC BLOOD PRESSURE: 150 MMHG | DIASTOLIC BLOOD PRESSURE: 80 MMHG | RESPIRATION RATE: 16 BRPM | OXYGEN SATURATION: 94 % | HEART RATE: 62 BPM

## 2021-09-15 PROCEDURE — G0151 HHCP-SERV OF PT,EA 15 MIN: HCPCS

## 2021-09-15 PROCEDURE — 400013 HH SOC

## 2021-09-15 NOTE — HOME HEALTH
PT INITIAL EVALUATION    Past Medical Hx:   Pulmonary HTN ([address] 5/11/2017   Essential hypertension, benign 9/1/2010   Digestive   Cirrhosis of liver ([address] 6/8/2014   GERD (gastroesophageal reflux disease) 8/7/2014   Respiratory   COPD exacerbation ([address] 5/6/2017   Acute bronchitis 5/11/2017   Allergic rhinitis 8/7/2014   Bronchiectasis ([address] 4/22/2014   Chronic respiratory failure with hypoxia ([address] 2/28/2020   COPD with acute exacerbation ([address] 9/9/2021   Acute on chronic respiratory failure with hypoxia ([address] 9/9/2021   Acute respiratory failure with hypoxia ([address] 9/9/2021   Rheumatoid arthritis ([address] 8/5/2016   Localized osteoarthritis of right shoulder 10/19/2017   Primary localized osteoarthrosis, lower leg 5/8/2012   Systemic lupus erythematosus ([address] 8/5/2016   Dyslipidemia 8/5/2016   Abnormal finding on pulmonary function testing 1/19/2016   Chronic cough 8/7/2014   Diffusion capacity of lung (dl), decreased 11/27/2018   Dyschromia 1/17/2013   DR Shannon Ignacio thought is was drug induced from HCQ   Exertional dyspnea 8/7/2014   Left shoulder tendinitis 10/19/2017   Nonspecific elevation of levels of transaminase or lactic acid dehydrogenase (LDH) 4/1/2014   Formatting of this note might be different from the original.  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    Caregiver Involvement:  helps with medical appointments and some ADL's  PLOF:  Pt PLOF is ambulation w no AD, pts base level of function independent with all ADL's  BALANCE:     Seated unsupported balance is good   Standing static balance is fair  Standing dynamic balance is fair-  Tinetti 13 /28  Patient is at risk for falls due to weakness, recent hospitalization  BLE Strength:  Left Hip flexion 3/5 , hip abduction 3/5, hip adduction 3/5, Knee flexion 3/5  knee extension 3/5, ankle dorsiflexion 3/5  Right Hip flexion 3/5 , hip abduction 3/5, hip adduction 3/5, Knee flexion 3/5  knee extension 3/5, ankle dorsiflexion 3/5  BLE ROM:  Right hip/knee/ankle: WFL  Left hip/knee/ankle: WFL  Bed mobility:  min A   Transfers:  min/mod A with sit<->stand for bed to chair, with no AD. min cues and instruction needed for safety and sequencing  GAIT:  Patient ambulated  25 ft  with min/mod A and furniutre walking  on level  surfaces min A. Pt demonstrates with decreased hip and knee flexion on BLE in pre and mid swing phase of gait as well as decreased stride-length and merrill. Patient is unable to safely ambulate without assistance at this time. Pt required min cues for  safety and sequencing  Stairs: NT  Patient education provided this visit: Safety with functional mobility and instruction with HEP. Assessment: Referral for HHPT following recent hospitalization due to acute on chronic respiratory failure. HHPT is medically necessary to address the following clinical findings: decreased BLE strength and ROM, impaired gait, decreased I and safety with transfers and gait, decreased endurance, decreased balance and decreased safety in order to improve functional mobility and decrease fall risk. Pt is a fall risk as indicated by Tinetti score of 13/28. Patient will be seen for HHPT 2w4, 1w1 for therapeutic exercises to increase BLE strength and ROM,  training for gait, stairs and transfers to increase I and safety with daily functional mobility and balance and endurance activities to decrease fall risk, decrease impairment and increase functional mobility and independence in the home.   Pt. requires skilled PT intervention to instruct Pt. with gait training with LRAD, ROM and strengthening, stair training, transfer training, balance training, manual therapy, neuromuscular re-education, patient care-giver education, HEP, endurance training, body mechanics. Instructed patient with HEP for BLE strengthening and left HEP handout for the patient. Patient was able to return demonstrate the exercises given. HEP includes:   Pt. received therapeutic exercises which included: COPD/CHF exercises including: pursed lip breathing, ankle pumps, LAQ x 10 x 2,  Toe raises, mini Squats, marching in place, Hip abd/add, arm circles, biceps curls, pray and open, overhead reaching, triceps extensions, pray and open x 30. stepping to left, stepping forward and back x 3 steps, x 10, 2 times/day    Skilled services/Home bound verification:     Skilled Reason for admission/summary of clinical condition:  s/p acute on chronic respiratory failure . This patient is homebound for the following reasons Requires considerable and taxing effort to leave the home , Requires the assistance of 1 or more persons to leave the home  and Only leaves the home for medical reasons or [employer] services and are infrequent and of short duration for other reasons . Caregiver: spouse. Caregiver assists with medication management, medical appointments. Medications reconciled and all medications are not available in the home this visit. The following education was provided regarding medications, medication interactions, and look alike medications (specify): NA.    Medications  are effective, somewhat effective at this time. High risk medication teaching regarding anticoagulants, hyperglycemic agents or opiod narcotics performed (specify) KELLY,    Dr. Luisito De Jesus MD  notified of any discrepancies/medication interactions Pt. is missing Prednisone, albuterol and levaquin.      Home health supplies by type and quantity ordered/delivered this visit include: NA (recommend rollator)    Patient education provided this visit to include: safety with functional mobility. Patient/caregiver degree of understanding:fair    Pt/Caregiver instructed on plan of care and are agreeable to plan of care at this time. Physician Mikel Alegria MD notified of patient admission to home health and plan of care including anticipated frequency of 2w4, 1w1 and treatments/interventions/modalities of therex, functional mobility, balance, gait and safety with mobility. Discharge planning discussed with patient and caregiver. Discharge planning as follows: once goals are met, pt will be discharged to self under MD supervision. Pt/Caregiver did verbalize understanding of discharge planning. Next MD appointment TBD with Mikel Alegria MD.  Patient/caregiver encouraged/instructed to keep appointment as lack of follow through with physician appointment could result in discontinuation of home care services for non-compliance.

## 2021-09-17 ENCOUNTER — HOME CARE VISIT (OUTPATIENT)
Dept: HOME HEALTH SERVICES | Facility: HOME HEALTH | Age: 81
End: 2021-09-17
Payer: MEDICARE

## 2021-09-18 ENCOUNTER — HOME CARE VISIT (OUTPATIENT)
Dept: SCHEDULING | Facility: HOME HEALTH | Age: 81
End: 2021-09-18
Payer: MEDICARE

## 2021-09-18 ENCOUNTER — HOME CARE VISIT (OUTPATIENT)
Dept: HOME HEALTH SERVICES | Facility: HOME HEALTH | Age: 81
End: 2021-09-18
Payer: MEDICARE

## 2021-09-18 VITALS
OXYGEN SATURATION: 100 % | RESPIRATION RATE: 16 BRPM | SYSTOLIC BLOOD PRESSURE: 120 MMHG | DIASTOLIC BLOOD PRESSURE: 60 MMHG | TEMPERATURE: 98.1 F | HEART RATE: 62 BPM

## 2021-09-18 PROCEDURE — G0157 HHC PT ASSISTANT EA 15: HCPCS

## 2021-09-18 PROCEDURE — G0493 RN CARE EA 15 MIN HH/HOSPICE: HCPCS

## 2021-09-18 PROCEDURE — G0299 HHS/HOSPICE OF RN EA 15 MIN: HCPCS

## 2021-09-19 NOTE — HOME HEALTH
Caregiver involvement, spouse available to provide some assistance but is not well. Patient is alert, oriented and cooperative. She becomes sob with minimal exertion. Instructed her that she needs to wear her oxygen whenever she is exerting herself. She states that she does a little and sits down for a while. Instructed her to make sure that she keeps her oxygen on when doing anything. Medication reconciliation done and patient admits to not taking her medications as ordered. Instructed on the importance of taking medications as ordered and to make sure that she is informing her doctor on the medications that she is not compliant with. Patient able to verbalize understanding. Instructed patient on the importance of completing her regimen of antibiotics, taking her k-dur and her lasix as ordered because of the detrimental effects that can be caused if she does not take it correctly. Patient able to verbalize understanding. Patient also states that she will talk to her doctor about the medications she is not taking as ordered. Patient continues to require skilled nursing visits for assessment and teaching. Patient was sent home with an intravenous line in the right lateral mid arm. Line was removed without issue. Pressure was applied for 5 minutes, bandaid applied with pressure and patient instructed to change if bleeding continued. She and her  were able to verbalize understanding. Patient instructed on abdominal breathing to aid in loosening mucus in her lungs. Instructed to practice the abdominal breathing before doing her nebulizer treatments.  Patient did not sign documentation for visit verification

## 2021-09-20 ENCOUNTER — HOME CARE VISIT (OUTPATIENT)
Dept: HOME HEALTH SERVICES | Facility: HOME HEALTH | Age: 81
End: 2021-09-20
Payer: MEDICARE

## 2021-09-20 VITALS
RESPIRATION RATE: 13 BRPM | SYSTOLIC BLOOD PRESSURE: 126 MMHG | OXYGEN SATURATION: 91 % | DIASTOLIC BLOOD PRESSURE: 78 MMHG | TEMPERATURE: 98.2 F | HEART RATE: 71 BPM

## 2021-09-20 NOTE — HOME HEALTH
Subjective: Patient relays that she still fatigues very easily and needs frequent breaks. She states that she has had no loss of balance when ambulating to/form the bathroom during the day recently. Objective: Skilled home health physical therapy interventions completed today listed in care plan section. Interventions performed today to assist in return to prior level of function, address deficits as apparent upon time of initial evaluation, return to community and personal activities, and progress further towards goals as previously established in [address]. There are not any changes to medications upon timing of this visit. Home health supplies were not ordered/delivered today. Assessment:  Patient is progressing towards goals as previously established in POC with skilled home health physical therapy services at this time. Family/caregiver spouse involvement is present/set-up at this point in time. Mild fatigue noted post treatment completion today. Patient understands need for continued oxygen use as prescribed. Patient oxygen saturation improved when wearing nasal cannula. Patient understands need for more regular ambulation to better overall strength/endurance. Plan:  Discharge planning discussed at this visit regarding eventual discharge once patient has met goals and/or met maximum benefit from home health skilled PT services with patient understanding and agreeable at this time. Continued need for skilled home health PT at this time to address deficits, reduce risk of falls, and obtain goals as previously established per POC. Further gait training possibly in hallway.

## 2021-09-21 ENCOUNTER — HOME CARE VISIT (OUTPATIENT)
Dept: SCHEDULING | Facility: HOME HEALTH | Age: 81
End: 2021-09-21
Payer: MEDICARE

## 2021-09-21 VITALS
OXYGEN SATURATION: 97 % | DIASTOLIC BLOOD PRESSURE: 80 MMHG | HEART RATE: 78 BPM | RESPIRATION RATE: 20 BRPM | SYSTOLIC BLOOD PRESSURE: 142 MMHG | TEMPERATURE: 98.1 F

## 2021-09-21 PROCEDURE — G0299 HHS/HOSPICE OF RN EA 15 MIN: HCPCS

## 2021-09-21 PROCEDURE — G0152 HHCP-SERV OF OT,EA 15 MIN: HCPCS

## 2021-09-22 ENCOUNTER — HOME CARE VISIT (OUTPATIENT)
Dept: SCHEDULING | Facility: HOME HEALTH | Age: 81
End: 2021-09-22
Payer: MEDICARE

## 2021-09-22 VITALS
TEMPERATURE: 98.1 F | OXYGEN SATURATION: 97 % | HEART RATE: 78 BPM | DIASTOLIC BLOOD PRESSURE: 80 MMHG | SYSTOLIC BLOOD PRESSURE: 142 MMHG

## 2021-09-22 PROCEDURE — G0157 HHC PT ASSISTANT EA 15: HCPCS

## 2021-09-22 NOTE — HOME HEALTH
Caregiver involvement: Sukh Sharma (spouse) lives with pt and and provides daily emotional support. Medications reconciled and all medications are available in the home this visit. The following education was provided regarding medications, medication interactions, and look a like medications: Continue as directed by MD.  Medications  are effective at this time. Home health supplies by type and quantity ordered/delivered this visit include: na    Patient education provided this visit:  educated pt on use of energy conservation to include sitting for rest periods or during functional tasks to reduce SOB with exertion. Progress toward goals: Ms. Michael Amado has excellent rehab potential.  She has returned to her independent PLOF with self care and functional mobility in her home. No further skilled OT is indicated. Home exercise program: na    Continued need for the following skills: Nursing and Physical Therapy    The following discharge planning was discussed with the pt/caregiver: 1w1 DC OT per pt request.  Therapy agreeable as pt has returned to her PLOF. MD office notified. Inpatient Notes    Allergies:    Latex Itching    Pcn [Penicillins] Swelling and Angioedema        Lip and tongue swelling, per patient 9/9/21    Prednisone Swelling        Per pulmonology note, \" Nursing verfied with pt that allergy to prednisone is actually a side effect (round face and increased appetite in the setting of prolonged therapy), pt denies any swelling. \"         Asmanex Hfa [Mometasone] Itching    Cleocin [Clindamycin Hcl] Itching and S welling        Lips and tongue    Robitussin [Guaifenesin] Rash        HPI:         Alessio Jennings is a [de-identified] y.o. female with PMHx of chronic hypoxic respiratory failure on home O2 at 3 L/min, interstitial lung disease, chronic bronchiectasis, asthma, pulmonary hypertension, SLE on Plaquenil, RA on chronic prednisone, and HTN who presented to the ED with complaints of increasing shortness of breath. Ms. Amparo Hartman reports that she has chroni c LAND secondary to her chronic lung disease and is on home O2 at 3 L/min all the time. She reports that over the past couple months she has had increasing shortness of breath and that this morning she woke up significantly more short of breath and extremely weak. In addition she has recently developed a persistent productive cough. She has normally ambulatory without assistance but this morning could hardly stand up. She d enied any recent fevers, chills, lightheadedness/dizziness, chest pain, abdominal symptoms, urinary symptoms, or any other symptoms. She reports being compliant with her medications including her Plaquenil, chronic prednisone and that she is followed by [facility] pulmonology group. She has received the Covid vaccination x2. Her  called EMS to their home this morning and when EMS arrived she reportedly had SPO2 to at  69%. She was placed on O2 at 10 L/min and had subsequent improvement. In the ED, she had low-grade fever to 100.1 degrees, had some elevated blood pressure and was able to maintain SPO2 in the upper 90s on 4 L/min. Labs were notable for WBCs WNL, Hgb 10.8, potassium 2.2, CO2 41, and troponin less than 0.02x2. EKG showed sinus rhythm at 81 bpm, with a RBBB, a left anterior fascicular block, and a bifascicular block. CXR sh owed bilateral upper lobe fibrotic changes with superimposed multifocal infiltrates in the upper lobes and likely the right lower lobe. Ms. Amparo Hartman was then started on IV steroids and nebulizers. She is now admitted for acute on chronic hypoxic respiratory failure secondary to interstitial lung disease exacerbation, with concern for multifocal pneumonia including Covid-19 pneumonia in an immunosuppressed patient.       DME    Nebulizer   Oxygen concentrator   Portable oxygen

## 2021-09-22 NOTE — HOME HEALTH
Skilled reason for visit: Assessment and education on COPD, acute/chronic respiratory failure. Caregiver involvement: Patient's spouse assists patient with ADL's and IADL's. Medications reviewed and all medications are available in the home this visit. The following education was provided regarding medications, medication interactions, and look alike medications (specify): Norvasc. Medications reviewed, purpose, action, frequency and side effects. Medications  are effective at this time. Home health supplies by type and quantity ordered/delivered this visit include: N/A    Patient education provided this visit: Instructed patient in COPD disease process; respiratory distress; complications of exacerbation, hypoxia, and cardiac function. Instruct patient/family on the types of disease: chronic bronchitis, emphysema or asthma. Symptoms may be due to alveolar destruction or mucus. Discuss signs/symptoms, complications and actions to take if exacerbation occurs. Reviewed COPD s/sx fever,chills, increased heart/respiratory, mucous:green/brown/yellow, increase SOB, increase fatigue, unable to urinate, or increase swelling to MD.  Instruction provided on irritants that can worsen symptoms of COPD include:  smoke/aerosol sprays/pollution/dust, any second hand smoke, strong odors and weather changes - extreme heat/strong wind or extreme cold. Educated patient on energy conservation techniques, alternating rest periods with periods of activity, use of BSC and energy saving devices, rest/activity balance. Encouraged patient to ask for assistance with ADL/IADL's. Instructed patient to avoid extremes in outside temperatures: heat with humidity, use air conditioner, cold dries mucous membranes, thickens secretions and causes bronchospasms. Educated patient to sleep with head elevated on pillows to allow breathing ease and adequate chest expansion.  Instructed patient to report any signs of dyspnea not controlled by medication, an inability to clear secretions from airway, a change in mental status, confusion. Reduce anxiety around you by being in a calm environment with minimal disturbances. Family and friends to visit in small numbers. Infection prevention: avoid crowds, people with known infection. Stay current with immunizations, use of antibiotics when prescribed by MD when needed. Patient verbalized understanding of instruction. Skilled Care Performed this visit: Assessment and education on COPD, respiratory failure     Agency Progress toward goals: Patient will have no exacerbations of COPD. Patient will be knowledgeable of COPD and will continue to progress toward meeting the goals of care. Patient's Progress towards personal goals: Patient will have no exacerbations of COPD. Patient will be knowledgeable of COPD and will continue to progress toward meeting the goals of care. Home exercise program: Continue all medications as prescribed, implement fall/infection/pressure ulcer interventions, monitor for abnormal signs/symptoms of infection and report to MD immediately. Keep all follow up appointments as prescribed. Read all teaching packets for education of disease process and to prevent complications. Continued need for the following skills: Nursing and Physical Therapy    Plan for next visit: Assessment and continue education on COPD. Patient and/or caregiver notified and agrees to changes in the Plan of Care N/A      The following discharge planning was discussed with the pt/caregiver: Patient will be discharged when education has been completed and the goals of care have been met.

## 2021-09-23 ENCOUNTER — HOME CARE VISIT (OUTPATIENT)
Dept: SCHEDULING | Facility: HOME HEALTH | Age: 81
End: 2021-09-23
Payer: MEDICARE

## 2021-09-23 VITALS
HEART RATE: 65 BPM | SYSTOLIC BLOOD PRESSURE: 136 MMHG | OXYGEN SATURATION: 99 % | DIASTOLIC BLOOD PRESSURE: 69 MMHG | RESPIRATION RATE: 18 BRPM | TEMPERATURE: 97.5 F

## 2021-09-23 VITALS
OXYGEN SATURATION: 96 % | DIASTOLIC BLOOD PRESSURE: 52 MMHG | SYSTOLIC BLOOD PRESSURE: 115 MMHG | RESPIRATION RATE: 20 BRPM | TEMPERATURE: 98.3 F | HEART RATE: 71 BPM

## 2021-09-23 PROCEDURE — G0299 HHS/HOSPICE OF RN EA 15 MIN: HCPCS

## 2021-09-24 ENCOUNTER — HOME CARE VISIT (OUTPATIENT)
Dept: HOME HEALTH SERVICES | Facility: HOME HEALTH | Age: 81
End: 2021-09-24
Payer: MEDICARE

## 2021-09-24 PROCEDURE — G0157 HHC PT ASSISTANT EA 15: HCPCS

## 2021-09-24 NOTE — HOME HEALTH
Skilled reason for visit: Assessment and education on COPD, pain management and s/s of infection. Caregiver involvement: Patient's spouse is the primary caregiver who assists her with ADL's and IADL's. Medications reviewed and all medications are available in the home this visit. The following education was provided regarding medications, medication interactions, and look alike medications (specify): Plaquenil. Medications reviewed, purpose, action, frequency and side effects. Medications are effective at this time. Home health supplies by type and quantity ordered/delivered this visit include: N/A    Patient education provided this visit: Instructed patient/caregiver in COPD disease process; respiratory distress; complications of exacerbation, hypoxia, and cardiac function. Instruct patient/family on the types of disease: chronic bronchitis, emphysema or asthma. Symptoms may be due to alveolar destruction or mucus. Discuss signs/symptoms, complications and actions to take if exacerbation occurs. Instructed patient and caregiver the signs and symptoms of infection , chills and malaise, increased difficulty clearing secretions, change in color of secretions and no relief after respiratory treatments and when to report to physician. Instructed on pharmacologic pain relief and non pharmacologic techniques including warm/cold packs to areas to relieve discomfort. Patient verbalized understanding of instruction. Skilled Care Performed this visit: Assessment and education on COPD, s/s of infection and pain relief. Agency Progress toward goals: Patient will be knowledgeable of COPD, s/s of infection and pain relief. Patient will continue to progress towards meeting the goals of care. Patient's Progress towards personal goals:  Patient will be knowledgeable of COPD, s/s of infection and pain relief. Patient will continue to progress towards meeting the goals of care.      Home exercise program: Continue all medications as prescribed, implement fall/infection/pressure ulcer interventions, monitor for abnormal signs/symptoms of infection and report to MD immediately. Keep all follow up appointments as prescribed. Read all teaching packets for education of disease process and to prevent complications. Continued need for the following skills: Nursing and Physical Therapy    Plan for next visit: Assessment and education on disease processes. Patient and/or caregiver notified and agrees to changes in the Plan of Care N/A      The following discharge planning was discussed with the pt/caregiver: The patient will be discharged once the goals of care have been met.

## 2021-09-25 ENCOUNTER — HOME CARE VISIT (OUTPATIENT)
Dept: HOME HEALTH SERVICES | Facility: HOME HEALTH | Age: 81
End: 2021-09-25
Payer: MEDICARE

## 2021-09-26 VITALS
RESPIRATION RATE: 18 BRPM | DIASTOLIC BLOOD PRESSURE: 63 MMHG | SYSTOLIC BLOOD PRESSURE: 114 MMHG | TEMPERATURE: 97.5 F | HEART RATE: 76 BPM | OXYGEN SATURATION: 96 %

## 2021-09-26 NOTE — HOME HEALTH
SUBJECTIVE: Patient notes she has been working on her HEP. She has not done any walking outside of her apartment. CAREGIVER INVOLVEMENT/ASSISTANCE NEEDED FOR: Patient resides with  who assist with ADL's and transfers as needed. HOME HEALTH SUPPLIES BY TYPE AND QUANTITY ORDERED/DELIVERED THIS VISIT INCLUDE: none    OBJECTIVE: See interventions. ASSESSMENT OF PROGRESS TOWARD GOALS: Patient ambulated ~2 minutes with no AD and SBA due to weakness and unsteadiness with gait. Patient with RPE 5/10 post ambulation and required rest break post activity. Patient instructed on sitting thera ex to improve functional mobility and strength of LE's. Patient transferred sit<->stand 5x with SBA due to weakness. CONTINUED NEED FOR THE FOLLOWING SKILLS: Continuation of PT to focus on sitting/standing balance activities to reduce fall risk. Patient needs reinforcement on safety with transfers. Patient would benefit from gait and stair training. PLAN FOR NEXT VISIT: Increase ambulation distance next visit. THE FOLLOWING DISCHARGE PLANNING WAS DISCUSSED WITH THE PATIENT/CAREGIVER: D/C from HHPT in 2w4, 1w1 when goals are met or max potential benefit achieved.

## 2021-09-28 ENCOUNTER — HOME CARE VISIT (OUTPATIENT)
Dept: SCHEDULING | Facility: HOME HEALTH | Age: 81
End: 2021-09-28
Payer: MEDICARE

## 2021-09-28 VITALS
DIASTOLIC BLOOD PRESSURE: 62 MMHG | HEART RATE: 69 BPM | SYSTOLIC BLOOD PRESSURE: 120 MMHG | OXYGEN SATURATION: 94 % | TEMPERATURE: 98.3 F | RESPIRATION RATE: 24 BRPM

## 2021-09-28 PROCEDURE — G0299 HHS/HOSPICE OF RN EA 15 MIN: HCPCS

## 2021-09-29 ENCOUNTER — HOME CARE VISIT (OUTPATIENT)
Dept: SCHEDULING | Facility: HOME HEALTH | Age: 81
End: 2021-09-29
Payer: MEDICARE

## 2021-09-29 VITALS
RESPIRATION RATE: 18 BRPM | DIASTOLIC BLOOD PRESSURE: 66 MMHG | OXYGEN SATURATION: 97 % | TEMPERATURE: 97 F | SYSTOLIC BLOOD PRESSURE: 124 MMHG

## 2021-09-29 PROCEDURE — G0157 HHC PT ASSISTANT EA 15: HCPCS

## 2021-09-29 NOTE — HOME HEALTH
Skilled reason for visit: Assessment and education on COPD, energy conservation, s/s of infection. Caregiver involvement: Patient's spouse assists patient with ADL's and IADL's. Medications reviewed and all medications are available in the home this visit. The following education was provided regarding medications, medication interactions, and look alike medications (specify): Naprosyn. Medications reviewed, purpose, action, frequency and side effects. Medications  are effective at this time. Home health supplies by type and quantity ordered/delivered this visit include: N/A    Patient education provided this visit: Instructed patient on energy conservation techniques: Instruct patient to alternate rest periods with periods of activity, use of bedside commode and energy-saving devices, rest/activity balance, encourage patient to ask for assistance with ADLs/IADLs. Teach patient to avoid extremes in outside temperatures: heat with humidity, use air conditioner; cold dries out mucous membranes, thickens secretions and causes bronchospasms. Instructed patient on signs/symptoms of infection to include: chills, malaise, difficulty clearing secretions, no relief after respiratory treatments, a change in the color of secretions. Instructed patient to report dyspnea not controlled by medications, inability to clear secretions, change in mental status, confusion. Patient verbalized understanding of education. Skilled Care Performed this visit: Assessment and education on disease process. Agency Progress toward goals: Patient will be free from exacerbations of her COPD and will be knowledgeable of s/s of infection. Patient's Progress towards personal goals: Patient will be free from exacerbations of her COPD and will be knowledgeable of s/s of infection.      Home exercise program: Continue all medications as prescribed, implement fall/infection/pressure ulcer interventions, monitor for abnormal signs/symptoms of infection and report to MD immediately. Keep all follow up appointments as prescribed. Read all teaching packets for education of disease process and to prevent complications. Continued need for the following skills: Nursing and Physical Therapy    Plan for next visit: Assessment and continue education on COPD and acute/chronic respiratory failure. Patient and/or caregiver notified and agrees to changes in the Plan of Care N/A      The following discharge planning was discussed with the pt/caregiver: Patient will be discharged when the goals of care have been met.

## 2021-09-30 NOTE — HOME HEALTH
SUBJECTIVE:  Patient notes she has a follow up with Pulmonologist today. She is going to discuss with her  about obtaining a rollator walker. CAREGIVER INVOLVEMENT/ASSISTANCE NEEDED FOR: Patient resides with  who assist with ADL's and transfers as needed. HOME HEALTH SUPPLIES BY TYPE AND QUANTITY ORDERED/DELIVERED THIS VISIT INCLUDE: none    OBJECTIVE: See interventions. ASSESSMENT OF PROGRESS TOWARD GOALS: Patient is progressing well towards goals. Patient transferred sit<->stand with use of UE's and SBA. Patient is improving towards increasing strength of LE's and balance to decrease fall risk. Patient ambulated throughout home with no AD and SBA due to weakness and instability with giat. SPO2 remained >95% during session. CONTINUED NEED FOR THE FOLLOWING SKILLS: Continuation of PT to further improve patients functional mobility and strength of LE's. Patient needs further focus on balance in sitting and standing to decrease fall risk. Patient needs reinforcement on stair training. PLAN FOR NEXT VISIT: Attempt stair training next visit and increase ambulation distance. THE FOLLOWING DISCHARGE PLANNING WAS DISCUSSED WITH THE PATIENT/CAREGIVER: D/C from HHPT in 2w4, 1w1 when goals are met or max potential benefit achieved.

## 2021-10-01 ENCOUNTER — HOME CARE VISIT (OUTPATIENT)
Dept: HOME HEALTH SERVICES | Facility: HOME HEALTH | Age: 81
End: 2021-10-01
Payer: MEDICARE

## 2021-10-01 PROCEDURE — G0157 HHC PT ASSISTANT EA 15: HCPCS

## 2021-10-03 VITALS
DIASTOLIC BLOOD PRESSURE: 83 MMHG | OXYGEN SATURATION: 100 % | HEART RATE: 60 BPM | RESPIRATION RATE: 18 BRPM | TEMPERATURE: 97.3 F | SYSTOLIC BLOOD PRESSURE: 146 MMHG

## 2021-10-04 ENCOUNTER — HOME CARE VISIT (OUTPATIENT)
Dept: SCHEDULING | Facility: HOME HEALTH | Age: 81
End: 2021-10-04
Payer: MEDICARE

## 2021-10-04 VITALS
SYSTOLIC BLOOD PRESSURE: 162 MMHG | OXYGEN SATURATION: 94 % | DIASTOLIC BLOOD PRESSURE: 89 MMHG | HEART RATE: 71 BPM | RESPIRATION RATE: 17 BRPM | TEMPERATURE: 98.5 F

## 2021-10-04 PROCEDURE — G0151 HHCP-SERV OF PT,EA 15 MIN: HCPCS

## 2021-10-04 NOTE — HOME HEALTH
SUBJECTIVE:  Patient notes she does not want to use a walker at this time. She is planning to move to [place] at the end of this month. CAREGIVER INVOLVEMENT/ASSISTANCE NEEDED FOR: Patient resides with family who assist with ADL's and transfers as needed. HOME HEALTH SUPPLIES BY TYPE AND QUANTITY ORDERED/DELIVERED THIS VISIT INCLUDE: none    OBJECTIVE: See interventions. ASSESSMENT OF PROGRESS TOWARD GOALS: Patient is progressing well towards goals. Patient improved on Tinetti balance and gait assessment to 17/28 compared to IE. Patient is improving towards increasing strength of LE's and balance to decrease fall risk. Patient is progressing with independence with HEP. CONTINUED NEED FOR THE FOLLOWING SKILLS: Continuation of PT to further improve patients functional mobility and strength of LE's. Patient needs further focus on balance in sitting and standing to decrease fall risk. Patient will benefit from gait training and increasing ambulation distance on indoor/outdoor surfaces. PLAN FOR NEXT VISIT: PT supervisory visit next session. THE FOLLOWING DISCHARGE PLANNING WAS DISCUSSED WITH THE PATIENT/CAREGIVER: D/C from HHPT in 3 visits when goals are met or max potential benefit achieved.

## 2021-10-04 NOTE — HOME HEALTH
Supervisory Visit  Reviewed POC with patient and PTA. Pt. is making good progress with therapy as evidenced by improved functional mobility, balance, gait, strength and safety with mobility  Current Functional Status and progress towards goals:  Strength: Pt. BLE strength is 3+/5 which is an improvement from the initial evaluation strength of 3/5. This allows the patient increased functional independence and mobility  BED MOBILITY: Pt. is mod I with all bed mobility which demonstrates an improvement from the initial evaluation of min A. This allows for the patient to be functionally more independent. TRANSFERS: Pt. is supervision with all transfers with no AD which demonstrates and improvement from the initial evaluation score of min/mod A with no AD. This allows the patient increased functional independence and mobility  GAIT/WC MOBILITY: Pt. is able to ambulate >200 feet inside over even surfaces with SBA with no AD. This represents an improvement from the initial evaluation of 25 feet with min/mod on level surfaces with HHA AD.  STAIRS: NT  BALANCE: Patient's tinetti is 17/28 which is an improvement from the initial evaluation score of 13/28. This allows the patient to be functionally more independent and have a decreased fall risk  Home exercise program: Patient has been given a HEP and patient/caregiver understand the HEP. Patient is doing the HEP 1-2/day as able  Reviewed medications and updated any new medication changes as needed. PLAN: Continue with skilled therapy with emphasis on safety with functional mobility, endurance, strength and gait . Plan is for discharge in next week as progress continues.

## 2021-10-07 ENCOUNTER — HOME CARE VISIT (OUTPATIENT)
Dept: HOME HEALTH SERVICES | Facility: HOME HEALTH | Age: 81
End: 2021-10-07
Payer: MEDICARE

## 2021-10-07 PROCEDURE — G0157 HHC PT ASSISTANT EA 15: HCPCS

## 2021-10-08 ENCOUNTER — HOME CARE VISIT (OUTPATIENT)
Dept: SCHEDULING | Facility: HOME HEALTH | Age: 81
End: 2021-10-08
Payer: MEDICARE

## 2021-10-08 PROCEDURE — G0299 HHS/HOSPICE OF RN EA 15 MIN: HCPCS

## 2021-10-10 VITALS
TEMPERATURE: 98 F | RESPIRATION RATE: 18 BRPM | SYSTOLIC BLOOD PRESSURE: 138 MMHG | DIASTOLIC BLOOD PRESSURE: 70 MMHG | HEART RATE: 70 BPM | OXYGEN SATURATION: 95 %

## 2021-10-10 VITALS
SYSTOLIC BLOOD PRESSURE: 140 MMHG | TEMPERATURE: 97.6 F | HEART RATE: 68 BPM | OXYGEN SATURATION: 99 % | DIASTOLIC BLOOD PRESSURE: 90 MMHG | RESPIRATION RATE: 18 BRPM

## 2021-10-10 NOTE — HOME HEALTH
Medications reconciled, all medications are at home. The following education was provided regarding medications, medication interactions, and look a like medications: N/A all meds reviewed. Discussed importance of compliance, timely taking all prescribed meds, proper dosage and freq. Recommend taking Tylenol OTC for pain prn and to call MD with uncontrollable pain and excruciating pain. Medications are effective at this time. No new medication added at this time. Caregiver: caregiver/ is available to assist with daily meals, ADL's prn, provide reminders with daily medications, run errands, groceries and MD appt. Skilled care provided: Teaching disease and medication management, completed assessment,  V/S WNR to pt and no wt gain. Pt manage well with O2. Completed dis D/C instructions with good understanding. (Verbalized moving to [place] next 2 weeks). Patient education provided this visit to include: Discussed intervention to prevent infection; hand washing or hand , wearing mask during clinician's visit. Encreasing O2 when sat below 90's and deep breathing exercises. Ambulate q 2hrs as tolerated, safety and fall prec; avoid getting up too quickly when feeling weak, dizzy, and to call for assistance prn.  continue to follow heart healthy diet; avoiding foods with high in NA such as; bologna, hot dog, salami and ham, TV box dinners, reading labels with canned foods and choosing low Na content and using spices low in NA. Avoid skipping meals, sm freq meals with less appetite, and hydration per limit 1200 ml. Monitor for increase weight gain 2-3 lbs. Reviewed S/S of infection; fever 101.3, increase pain, redness, swelling, coughing with yellow thick sputum, cloudy urine with foul smell, not feeling well 2-3 days, SOB, and to call [facility] or MD for assistance if experiencing any of these S/S.  To call 911 with chest pains, facial drooping, difficulty talking, non arousable/unconscious and uncontrollable bleeding. Patient/caregiver degree of understanding: good   Home health supplies by type and quantity ordered/delivered this visit include: N/A   Continued need for the following skills: PT  Home exercise program/Homework provided: Ambulation, HEP deep breathing exercises 10x when having SOB, pain and anxiety. SN goals are met. Pt/CG able to manage disease and medication independently, and health condition stable. No further SNV needed. COVID - 23 Screening completed before visit:       Denies and no family member  has any of these S/S:    Fever, dry cough, sore throat diarrhea, body aches, not feeling well and loss of taste.

## 2021-10-11 NOTE — HOME HEALTH
SUBJECTIVE:  Patient notes she has been staying busy with packing since she will be moving to [place] in a few weeks. CAREGIVER INVOLVEMENT/ASSISTANCE NEEDED FOR: Patient resides with  who assist with ADL's and transfers as needed. HOME HEALTH SUPPLIES BY TYPE AND QUANTITY ORDERED/DELIVERED THIS VISIT INCLUDE: none    OBJECTIVE: See interventions. ASSESSMENT OF PROGRESS TOWARD GOALS: Patient is progressing well towards goals. Patient transferred sit<->stand with use of UE's and supervision due to instability with initial standing. Patient improved on Tinetti balance and gait assessment to 22/28 compared to IE. Patient is improving towards increasing strength of LE's and balance to decrease fall risk. CONTINUED NEED FOR THE FOLLOWING SKILLS: D/C from HHPT next visit due to goals met. PLAN FOR NEXT VISIT: D/C next visit. THE FOLLOWING DISCHARGE PLANNING WAS DISCUSSED WITH THE PATIENT/CAREGIVER: D/C from HHPT in next visit due to goals met.

## 2021-10-13 ENCOUNTER — HOME CARE VISIT (OUTPATIENT)
Dept: HOME HEALTH SERVICES | Facility: HOME HEALTH | Age: 81
End: 2021-10-13
Payer: MEDICARE

## 2021-10-14 ENCOUNTER — HOME CARE VISIT (OUTPATIENT)
Dept: SCHEDULING | Facility: HOME HEALTH | Age: 81
End: 2021-10-14
Payer: MEDICARE

## 2021-10-14 NOTE — Clinical Note
Mrs. Portia Michaels has been  clinically discharged and documentation finalized for completion of PT discharge. Patient declined further care and declined final discharge visit. Information taken from last visit by PTA/re-assessment visit. Caregiver involvement: Pt  is primary caregiver at this time and has been assisting with medical appointments and some ADL's  Medications reconciled unable to as patient refused further care  Home health supplies by type and quantity ordered/delivered this visit include: NA  Current Functional Status and progress towards goals:  Strength: Pt. BLE strength is 3+/5 which is an improvement from the initial evaluation strength of 3/5. This allows the patient increased functional independence and mobility    BED MOBILITY: Pt. is I with all bed mobility which demonstrates an improvement from the initial evaluation of min A. This allows for the patient to be functionally more independent. TRANSFERS: Pt. is I with all transfers with no AD which demonstrates and improvement from the initial evaluation score of min/mod A with no AD. This allows the patient increased functional independence and mobility    GAIT/WC MOBILITY: Pt. is able to ambulate >200 feet inside over even surfaces with SBA with no AD. This represents an improvement from the initial evaluation of 25 feet with min/mod on level surfaces with HHA AD.    STAIRS: NT    BALANCE: Patient's tinetti is 17/28 which is an improvement from the initial evaluation score of 13/28. This allows the patient to be functionally more independent and have a decreased fall risk  Progress toward goals: Patient has met most goals, see interventions for details. Continued need for the following skills:  NA patient is final DC from PT today   The following discharge planning was discussed with the pt/caregiver: DC from agency    Thank you for your referral of this patient.     Sincerely,    Christiano Kingston, MPT  Physical Therapist

## 2021-10-15 NOTE — HOME HEALTH
DC ACTIONS NARRATIVE  Pt. clinically discharged and documentation finalized for completion of PT discharge. Caregiver involvement: Pt  is primary caregiver at this time and has been assisting with medical appointments and some ADL's  Medications reconciled unable to as patient refused further care  Home health supplies by type and quantity ordered/delivered this visit include: NA  Current Functional Status and progress towards goals:  Strength: Pt. BLE strength is 3+/5 which is an improvement from the initial evaluation strength of 3/5. This allows the patient increased functional independence and mobility    BED MOBILITY: Pt. is I with all bed mobility which demonstrates an improvement from the initial evaluation of min A. This allows for the patient to be functionally more independent. TRANSFERS: Pt. is I with all transfers with no AD which demonstrates and improvement from the initial evaluation score of min/mod A with no AD. This allows the patient increased functional independence and mobility    GAIT/WC MOBILITY: Pt. is able to ambulate >200 feet inside over even surfaces with SBA with no AD. This represents an improvement from the initial evaluation of 25 feet with min/mod on level surfaces with HHA AD.    STAIRS: NT    BALANCE: Patient's tinetti is 17/28 which is an improvement from the initial evaluation score of 13/28. This allows the patient to be functionally more independent and have a decreased fall risk  Progress toward goals: Patient has met most goals, see interventions for details.       Continued need for the following skills:  NA patient is final DC from PT today   The following discharge planning was discussed with the pt/caregiver: DC from agency

## 2023-11-29 NOTE — PROGRESS NOTES
[facility]  Pulmonary, Critical Care, and Sleep Medicine      Chief complaint:  Bronchiectasis pulmonary fibrosis pulmonary hypertension    HPI:    Kamala Whitlock    is 69years old and returns the office today for follow-up and relates she still has shortness of breath with exertion but says her cough has improved somewhat. She denies chest pain and says she has mild leg swelling. Allergies   Allergen Reactions    Latex Itching    Asmanex Hfa [Mometasone] Itching    Cleocin [Clindamycin Hcl] Itching and Swelling     Lips and tongue    Pcn [Penicillins] Swelling    Robitussin [Guaifenesin] Rash     Current Outpatient Medications   Medication Sig    PROAIR HFA 90 mcg/actuation inhaler inhale 2 puffs by mouth every 4 hours if needed for wheezing    predniSONE (DELTASONE) 5 mg tablet Take 2.5 mg by mouth daily. Takes 1/2 of 5 mg tab    naproxen sodium (ALEVE) 220 mg tablet Take 220 mg by mouth two (2) times daily (with meals).  aspirin (ASPIRIN) 325 mg tablet Take 325 mg by mouth daily.  levoFLOXacin (LEVAQUIN) 500 mg tablet Take 1 Tab by mouth daily.  budesonide-formoterol (SYMBICORT) 160-4.5 mcg/actuation HFAA Take 2 Puffs by inhalation two (2) times a day. Indications: MAINTENANCE THERAPY FOR ASTHMA    albuterol-ipratropium (DUO-NEB) 2.5 mg-0.5 mg/3 ml nebu 3 mL by Nebulization route every four (4) hours as needed. ICD 10: I27.2, J84.10    Cetirizine (ZYRTEC) 10 mg cap Take  by mouth.  zolpidem (AMBIEN) 10 mg tablet Take 1 Tab by mouth nightly as needed for Sleep. Max Daily Amount: 10 mg.  predniSONE (DELTASONE) 10 mg tablet Take 1 Tab by mouth daily (with lunch). (Patient taking differently: Take 5 mg by mouth daily (with lunch). )    amLODIPine (NORVASC) 5 mg tablet Take 1 Tab by mouth daily. Indications: HYPERTENSION    furosemide (LASIX) 40 mg tablet Take 1 Tab by mouth daily.  Indications: EDEMA    omeprazole (PRILOSEC) 20 mg capsule Take 20 mg by mouth daily.    hydroxychloroquine (PLAQUENIL) 200 mg tablet Take 200 mg by mouth daily.  potassium chloride (KLOR-CON M20) 20 mEq tablet Take 20 mEq by mouth two (2) times a day.  predniSONE (DELTASONE) 20 mg tablet Take 1 Tab by mouth daily (with breakfast).  predniSONE (DELTASONE) 20 mg tablet 2 tablets every morning with breakfast    montelukast (SINGULAIR) 10 mg tablet Take 1 Tab by mouth daily. Indications: MAINTENANCE THERAPY FOR ASTHMA    ferrous sulfate ER (IRON) 160 mg (50 mg iron) TbER tablet Take 1 Tab by mouth daily. Indications: patient unsure of the mg of the medication    Oxygen 2 Each by Nasal route. 2 L [employer] [facility] [employer] [facility]    HYDROcodone-acetaminophen (NORCO) 5-325 mg per tablet Take 1 Tab by mouth every six (6) hours as needed. Max Daily Amount: 4 Tabs.  chlorpheniramine-HYDROcodone (TUSSIONEX) 10-8 mg/5 mL suspension Take 5 mL by mouth every twelve (12) hours as needed for Cough. Max Daily Amount: 10 mL.  traZODone (DESYREL) 50 mg tablet Take 1 Tab by mouth nightly. No current facility-administered medications for this visit. Past Medical History:   Diagnosis Date    Asthma     Chronic lung disease     Hypertension     Lupus     Pulmonary arterial hypertension (HCC)     Pulmonary hypertension (HCC)     Rheumatoid arthritis(714.0)      No past surgical history on file.   Social History     Socioeconomic History    Marital status: UNKNOWN     Spouse name: Not on file    Number of children: Not on file    Years of education: Not on file    Highest education level: Not on file   Social Needs    Financial resource strain: Not on file    Food insecurity - worry: Not on file    Food insecurity - inability: Not on file   [employer]App needs - medical: Not on file   [employer] needs - non-medical: Not on file   Occupational History    Not on file   Tobacco Use    Smoking status: Former Smoker     Packs/day: 0.50     Years: 16.00     Pack years: 8.00 Types: Cigarettes     Last attempt to quit: 1974     Years since quittin.1    Smokeless tobacco: Never Used   Substance and Sexual Activity    Alcohol use: No    Drug use: Not on file    Sexual activity: Not on file   Other Topics Concern    Not on file   Social History Narrative    Not on file     Family History   Problem Relation Age of Onset    Heart Disease Mother     Asthma Mother     Asthma Father     Parkinson's Disease Father        Review of systems:  She denies fever chills poor appetite weight loss    Physical Exam:  Visit Vitals  /60 (BP 1 Location: Left arm, BP Patient Position: Sitting)   Pulse 75   Temp 98.7 °F (37.1 °C)   Resp 20   Ht 5' 3\" (1.6 m)   Wt 71.7 kg (158 lb)   SpO2 94%   BMI 27.99 kg/m²       Well-developed well-nourished  HEENT: WNL  Lymph node exam supraclavicular cervical lymph nodes negative  Chest exam equal symmetrical expansion no dullness no wheezes rales rubs  Heart exam regular rhythm no gallop no murmur no JVD no peripheral edema  Extremities exam no cyanosis clubbing or calf tenderness   neurological exam alert and oriented    Labs:    Echocardiogram 10/11/18: Normal left ventricular function elevated pulmonary artery systolic pressure of elevated right ventricular end-diastolic pressure of 14 and a mild to moderate mitral pulmonic and tricuspid valve insufficiency    Impression:     Pulmonary hypertension possibly due to lung disease which is related to fibrosis associated with bronchiectasis however the patient also has connective tissue disorder but certainly could be related to this as well. She is also noted to have mitral valve disease but this could also be a cause    Plan:     Continue Symbicort as needed albuterol  Surveillance for respiratory infection  Discussed with Dr. Thiago Toledo, cardiology concerning a right heart   Follow-up in approximately 3 months    Ramona Yoder MD , [facility]    CC: Wong Farah MD     [address] [facility] Detail Level: Simple [place], [address] [facility] [address]     P: [phone]     F: [phone]

## 2024-02-10 NOTE — TELEPHONE ENCOUNTER
Called [employer] and the POC is out for delivery tomorrow
Darek Oscar with [employer] [facility] called back. She states they can get her the POC back. She has sent the [facility] department a message to make sure they do not need an auth for the POC. They did not need when she had the POC in oct. She will call me back once all is clear and they do have a POC for the pt at the [facility] now.
Daughter lives in [place]. She is trying to get a home concentrator for her mothers visit. Mother coming to [place] 12/23-12/30. Daughter not aware we can talk with [employer] [facility] to see if we can get a company in [place] to supply the oxygen instead of her renting. I have called [facility] [employer] and also found an [employer] that services [place]. I talked to [facility] with [facility] and she is working on this now.
Daughter, Chiara Chi, would like to know if Dr. Kadi Barker can write a prescription for a rental stationary oxygen unit. She will be in [place] for a couple of weeks. The company is [employer] in [place], [place]. Please call dtr at [phone].
Left message
Spoke with daughter Jessica Irvin and advised mother to get the POC
Spoke with patient to see if she has gotten call from [employer] about the POC. She said she has and they are bringing out the POC to her today or tomorrow.
pulse oximetry

## 2025-04-14 NOTE — ROUTINE PROCESS
Assumed patient care after receiving report from [facility]. Patient in bed, awake, alert and oriented x 4. Denies pain or discomforts at this time. Call light placed within reach. Bed in low position. 2157 - Due medications given at this time. Patient wanted to wait to take trazodone between [id number], per patient it works well for her at home when she takes med at those times. She is resting well in bed, watching tv. Snacks provided. 2334 -Scheduled trazodone given at this time. Patient in bed resting, watching tv. Turned lights off for comfort. 0013 - Patient sleeping soundly in no distress. Call light placed within reach. 4152 - Bedside shift change report given to STEPHANI Orozco (oncoming nurse) by Ceci Carrion RN (offgoing nurse). Report given with SBAR, Kardex, Intake/Output, MAR and Recent Results. Pt's step parent called back.  She stated that she tried calling the pt yesterday and today with no response.  RN let her know what the  nurse reported.  Step parent stated that she is at home sick with fever and chills and she does not want to bring her illness around the pt, so she will call the pt's aunt and check in and see if she brought pt to the hospital.  All questions and concerns addressed.  Pt's step parent verbalized understanding to all.